# Patient Record
Sex: MALE | Race: BLACK OR AFRICAN AMERICAN | Employment: OTHER | URBAN - METROPOLITAN AREA
[De-identification: names, ages, dates, MRNs, and addresses within clinical notes are randomized per-mention and may not be internally consistent; named-entity substitution may affect disease eponyms.]

---

## 2017-03-28 ENCOUNTER — APPOINTMENT (OUTPATIENT)
Dept: GENERAL RADIOLOGY | Age: 72
DRG: 389 | End: 2017-03-28
Attending: EMERGENCY MEDICINE
Payer: MEDICARE

## 2017-03-28 ENCOUNTER — APPOINTMENT (OUTPATIENT)
Dept: CT IMAGING | Age: 72
DRG: 389 | End: 2017-03-28
Attending: EMERGENCY MEDICINE
Payer: MEDICARE

## 2017-03-28 ENCOUNTER — HOSPITAL ENCOUNTER (EMERGENCY)
Age: 72
Discharge: HOME | DRG: 389 | End: 2017-03-28
Attending: EMERGENCY MEDICINE | Admitting: EMERGENCY MEDICINE
Payer: MEDICARE

## 2017-03-28 VITALS
HEART RATE: 68 BPM | OXYGEN SATURATION: 100 % | BODY MASS INDEX: 18.51 KG/M2 | RESPIRATION RATE: 14 BRPM | TEMPERATURE: 98.5 F | WEIGHT: 125 LBS | HEIGHT: 69 IN | DIASTOLIC BLOOD PRESSURE: 90 MMHG | SYSTOLIC BLOOD PRESSURE: 147 MMHG

## 2017-03-28 VITALS
TEMPERATURE: 97.5 F | OXYGEN SATURATION: 99 % | HEART RATE: 74 BPM | RESPIRATION RATE: 17 BRPM | WEIGHT: 125 LBS | BODY MASS INDEX: 19.01 KG/M2 | SYSTOLIC BLOOD PRESSURE: 155 MMHG | DIASTOLIC BLOOD PRESSURE: 96 MMHG

## 2017-03-28 DIAGNOSIS — R11.2 NON-INTRACTABLE VOMITING WITH NAUSEA, UNSPECIFIED VOMITING TYPE: ICD-10-CM

## 2017-03-28 DIAGNOSIS — R10.9 ACUTE ABDOMINAL PAIN: Primary | ICD-10-CM

## 2017-03-28 DIAGNOSIS — I73.9 PVD (PERIPHERAL VASCULAR DISEASE) (HCC): ICD-10-CM

## 2017-03-28 DIAGNOSIS — R55 SYNCOPE, UNSPECIFIED SYNCOPE TYPE: Primary | ICD-10-CM
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
## 2017-03-28 DIAGNOSIS — I95.1 ORTHOSTATIC HYPOTENSION: ICD-10-CM

## 2017-03-28 DIAGNOSIS — R51.9 ACUTE NONINTRACTABLE HEADACHE, UNSPECIFIED HEADACHE TYPE: ICD-10-CM

## 2017-03-28 DIAGNOSIS — E87.1 HYPONATREMIA: ICD-10-CM

## 2017-03-28 DIAGNOSIS — E87.6 HYPOKALEMIA: ICD-10-CM
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
## 2017-03-28 LAB
ALBUMIN SERPL BCP-MCNC: 3.6 G/DL (ref 3.4–5)
ALBUMIN/GLOB SERPL: 1.1 {RATIO} (ref 0.8–1.7)
ALP SERPL-CCNC: 64 U/L (ref 45–117)
ALT SERPL-CCNC: 70 U/L (ref 16–61)
ANION GAP BLD CALC-SCNC: 11 MMOL/L (ref 3–18)
ANION GAP BLD CALC-SCNC: 9 MMOL/L (ref 3–18)
APPEARANCE UR: CLEAR
AST SERPL W P-5'-P-CCNC: 56 U/L (ref 15–37)
BASOPHILS # BLD AUTO: 0 K/UL (ref 0–0.06)
BASOPHILS # BLD AUTO: 0 K/UL (ref 0–0.06)
BASOPHILS # BLD: 0 % (ref 0–2)
BASOPHILS # BLD: 0 % (ref 0–2)
BILIRUB SERPL-MCNC: 0.3 MG/DL (ref 0.2–1)
BILIRUB UR QL: NEGATIVE
BUN SERPL-MCNC: 5 MG/DL (ref 7–18)
BUN SERPL-MCNC: 8 MG/DL (ref 7–18)
BUN/CREAT SERPL: 10 (ref 12–20)
BUN/CREAT SERPL: 7 (ref 12–20)
CALCIUM SERPL-MCNC: 8.5 MG/DL (ref 8.5–10.1)
CALCIUM SERPL-MCNC: 8.6 MG/DL (ref 8.5–10.1)
CHLORIDE SERPL-SCNC: 89 MMOL/L (ref 100–108)
CHLORIDE SERPL-SCNC: 92 MMOL/L (ref 100–108)
CK MB CFR SERPL CALC: 1.6 % (ref 0–4)
CK MB CFR SERPL CALC: 1.6 % (ref 0–4)
CK MB CFR SERPL CALC: 1.7 % (ref 0–4)
CK MB SERPL-MCNC: 2.5 NG/ML (ref 5–25)
CK MB SERPL-MCNC: 2.9 NG/ML (ref 5–25)
CK MB SERPL-MCNC: 2.9 NG/ML (ref 5–25)
CK SERPL-CCNC: 160 U/L (ref 39–308)
CK SERPL-CCNC: 166 U/L (ref 39–308)
CK SERPL-CCNC: 184 U/L (ref 39–308)
CO2 SERPL-SCNC: 28 MMOL/L (ref 21–32)
CO2 SERPL-SCNC: 29 MMOL/L (ref 21–32)
COLOR UR: YELLOW
CREAT SERPL-MCNC: 0.67 MG/DL (ref 0.6–1.3)
CREAT SERPL-MCNC: 0.84 MG/DL (ref 0.6–1.3)
DIFFERENTIAL METHOD BLD: ABNORMAL
DIFFERENTIAL METHOD BLD: ABNORMAL
EOSINOPHIL # BLD: 0 K/UL (ref 0–0.4)
EOSINOPHIL # BLD: 0 K/UL (ref 0–0.4)
EOSINOPHIL NFR BLD: 0 % (ref 0–5)
EOSINOPHIL NFR BLD: 0 % (ref 0–5)
ERYTHROCYTE [DISTWIDTH] IN BLOOD BY AUTOMATED COUNT: 13.7 % (ref 11.6–14.5)
ERYTHROCYTE [DISTWIDTH] IN BLOOD BY AUTOMATED COUNT: 13.8 % (ref 11.6–14.5)
GLOBULIN SER CALC-MCNC: 3.2 G/DL (ref 2–4)
GLUCOSE SERPL-MCNC: 127 MG/DL (ref 74–99)
GLUCOSE SERPL-MCNC: 146 MG/DL (ref 74–99)
GLUCOSE UR STRIP.AUTO-MCNC: NEGATIVE MG/DL
HCT VFR BLD AUTO: 39 % (ref 36–48)
HCT VFR BLD AUTO: 39.2 % (ref 36–48)
HGB BLD-MCNC: 13.7 G/DL (ref 13–16)
HGB BLD-MCNC: 13.9 G/DL (ref 13–16)
HGB UR QL STRIP: NEGATIVE
KETONES UR QL STRIP.AUTO: NEGATIVE MG/DL
LEUKOCYTE ESTERASE UR QL STRIP.AUTO: NEGATIVE
LIPASE SERPL-CCNC: 124 U/L (ref 73–393)
LYMPHOCYTES # BLD AUTO: 11 % (ref 21–52)
LYMPHOCYTES # BLD AUTO: 25 % (ref 21–52)
LYMPHOCYTES # BLD: 0.5 K/UL (ref 0.9–3.6)
LYMPHOCYTES # BLD: 1.1 K/UL (ref 0.9–3.6)
MAGNESIUM SERPL-MCNC: 2.4 MG/DL (ref 1.8–2.4)
MCH RBC QN AUTO: 31.6 PG (ref 24–34)
MCH RBC QN AUTO: 32.1 PG (ref 24–34)
MCHC RBC AUTO-ENTMCNC: 34.9 G/DL (ref 31–37)
MCHC RBC AUTO-ENTMCNC: 35.6 G/DL (ref 31–37)
MCV RBC AUTO: 90.1 FL (ref 74–97)
MCV RBC AUTO: 90.5 FL (ref 74–97)
MONOCYTES # BLD: 0.6 K/UL (ref 0.05–1.2)
MONOCYTES # BLD: 1 K/UL (ref 0.05–1.2)
MONOCYTES NFR BLD AUTO: 13 % (ref 3–10)
MONOCYTES NFR BLD AUTO: 21 % (ref 3–10)
NEUTS SEG # BLD: 2.5 K/UL (ref 1.8–8)
NEUTS SEG # BLD: 3.5 K/UL (ref 1.8–8)
NEUTS SEG NFR BLD AUTO: 54 % (ref 40–73)
NEUTS SEG NFR BLD AUTO: 76 % (ref 40–73)
NITRITE UR QL STRIP.AUTO: NEGATIVE
PH UR STRIP: 5.5 [PH] (ref 5–8)
PLATELET # BLD AUTO: 207 K/UL (ref 135–420)
PLATELET # BLD AUTO: 211 K/UL (ref 135–420)
PMV BLD AUTO: 9 FL (ref 9.2–11.8)
PMV BLD AUTO: 9 FL (ref 9.2–11.8)
POTASSIUM SERPL-SCNC: 2.8 MMOL/L (ref 3.5–5.5)
POTASSIUM SERPL-SCNC: 3.8 MMOL/L (ref 3.5–5.5)
PROT SERPL-MCNC: 6.8 G/DL (ref 6.4–8.2)
PROT UR STRIP-MCNC: NEGATIVE MG/DL
RBC # BLD AUTO: 4.33 M/UL (ref 4.7–5.5)
RBC # BLD AUTO: 4.33 M/UL (ref 4.7–5.5)
SODIUM SERPL-SCNC: 129 MMOL/L (ref 136–145)
SODIUM SERPL-SCNC: 129 MMOL/L (ref 136–145)
SP GR UR REFRACTOMETRY: 1.01 (ref 1–1.03)
TROPONIN I SERPL-MCNC: <0.02 NG/ML (ref 0–0.04)
UROBILINOGEN UR QL STRIP.AUTO: 0.2 EU/DL (ref 0.2–1)
WBC # BLD AUTO: 4.6 K/UL (ref 4.6–13.2)
WBC # BLD AUTO: 4.6 K/UL (ref 4.6–13.2)

## 2017-03-28 RX ORDER — POTASSIUM CHLORIDE 20 MEQ/1
40 TABLET, EXTENDED RELEASE ORAL DAILY
Qty: 9 TAB | Refills: 0 | Status: SHIPPED | OUTPATIENT
Start: 2017-03-28 | End: 2017-03-31

## 2017-03-28 RX ORDER — HYDROCODONE BITARTRATE AND ACETAMINOPHEN 5; 325 MG/1; MG/1
1 TABLET ORAL
Qty: 6 TAB | Refills: 0 | Status: SHIPPED | OUTPATIENT
Start: 2017-03-28 | End: 2017-10-20

## 2017-03-28 RX ORDER — ASPIRIN 81 MG/1
81 TABLET ORAL DAILY
Qty: 90 TAB | Refills: 3 | Status: SHIPPED | OUTPATIENT
Start: 2017-03-28 | End: 2019-06-11

## 2017-03-28 RX ORDER — ONDANSETRON 4 MG/1
4 TABLET, ORALLY DISINTEGRATING ORAL
Qty: 8 TAB | Refills: 0 | Status: SHIPPED | OUTPATIENT
Start: 2017-03-28 | End: 2017-10-20

## 2017-03-28 RX ORDER — ONDANSETRON 4 MG/1
4 TABLET, ORALLY DISINTEGRATING ORAL
Qty: 8 TAB | Refills: 0 | Status: SHIPPED | OUTPATIENT
Start: 2017-03-28 | End: 2017-03-28

## 2017-03-28 RX ORDER — ONDANSETRON 2 MG/ML
4 INJECTION INTRAMUSCULAR; INTRAVENOUS
Status: COMPLETED | OUTPATIENT
Start: 2017-03-28 | End: 2017-03-28

## 2017-03-28 RX ORDER — POTASSIUM CHLORIDE 7.45 MG/ML
10 INJECTION INTRAVENOUS
Status: COMPLETED | OUTPATIENT
Start: 2017-03-28 | End: 2017-03-28

## 2017-03-28 RX ORDER — POTASSIUM CHLORIDE 20 MEQ/1
40 TABLET, EXTENDED RELEASE ORAL
Status: COMPLETED | OUTPATIENT
Start: 2017-03-28 | End: 2017-03-28

## 2017-03-28 RX ORDER — SODIUM CHLORIDE 9 MG/ML
100 INJECTION, SOLUTION INTRAVENOUS
Status: DISCONTINUED | OUTPATIENT
Start: 2017-03-28 | End: 2017-03-29 | Stop reason: HOSPADM

## 2017-03-28 RX ORDER — HYDROMORPHONE HYDROCHLORIDE 1 MG/ML
0.5 INJECTION, SOLUTION INTRAMUSCULAR; INTRAVENOUS; SUBCUTANEOUS
Status: COMPLETED | OUTPATIENT
Start: 2017-03-28 | End: 2017-03-28

## 2017-03-28 RX ADMIN — IOPAMIDOL 96 ML: 612 INJECTION, SOLUTION INTRAVENOUS at 21:28

## 2017-03-28 RX ADMIN — POTASSIUM CHLORIDE 10 MEQ: 7.46 INJECTION, SOLUTION INTRAVENOUS at 12:35

## 2017-03-28 RX ADMIN — SODIUM CHLORIDE 2000 ML: 900 INJECTION, SOLUTION INTRAVENOUS at 11:14

## 2017-03-28 RX ADMIN — POTASSIUM CHLORIDE 40 MEQ: 1500 TABLET, FILM COATED, EXTENDED RELEASE ORAL at 11:13

## 2017-03-28 RX ADMIN — ONDANSETRON 4 MG: 2 INJECTION INTRAMUSCULAR; INTRAVENOUS at 20:33

## 2017-03-28 RX ADMIN — HYDROMORPHONE HYDROCHLORIDE 0.5 MG: 1 INJECTION, SOLUTION INTRAMUSCULAR; INTRAVENOUS; SUBCUTANEOUS at 20:32

## 2017-03-28 RX ADMIN — POTASSIUM CHLORIDE 10 MEQ: 7.46 INJECTION, SOLUTION INTRAVENOUS at 11:13
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
## 2017-03-28 NOTE — DISCHARGE INSTRUCTIONS
Electrolyte Imbalance: Care Instructions  Your Care Instructions  Electrolytes are minerals in your blood. They include sodium, potassium, calcium, and magnesium. When they are not at the right levels, you can feel very ill. You may not know what is causing it, but you know something is wrong. You may feel weak or numb, have muscle spasms, or twitch. Your heart may beat fast. Symptoms are different with each mineral. Too much is as bad as too little. Minerals help keep your body working as it should. Vomiting, diarrhea, and fever can cause you to lose minerals. A problem with your kidneys can tip a mineral out of balance. So can taking certain medicines. Your doctor may do more tests. He or she may change your medicine and diet. If you are low in one or more minerals, they may be given through a tube into your vein (IV). Your doctor may have you take or drink special fluids or pills. If your kidneys are failing, your blood may be filtered. This is called dialysis. It can put the minerals back in balance. Follow-up care is a key part of your treatment and safety. Be sure to make and go to all appointments, and call your doctor if you are having problems. It's also a good idea to know your test results and keep a list of the medicines you take. How can you care for yourself at home? · Take your medicines exactly as prescribed. Call your doctor if you have any problems with your medicines. You will get more details on the specific medicines your doctor prescribes. · Do not take any medicine without talking to your doctor first. This includes prescription, over-the-counter, and herbal medicines. · If you have kidney, heart, or liver disease and have to limit fluids, talk with your doctor before you increase the amount of fluids you drink. · Your doctor or dietitian may give you a diet plan to help balance your minerals. Follow the diet carefully. When should you call for help?   Call 911 anytime you think you may need emergency care. For example, call if:  · You passed out (lost consciousness). · Your heart seems to be speeding up and then slowing down or skipping beats. Call your doctor now or seek immediate medical care if:  · You are very tired and have no energy. · You have trouble thinking or concentrating. Watch closely for changes in your health, and be sure to contact your doctor if:  · You want advice on what to do to keep your minerals in balance. · You do not get better as expected. Where can you learn more? Go to http://vale-peng.info/. Enter F424 in the search box to learn more about \"Electrolyte Imbalance: Care Instructions. \"  Current as of: July 26, 2016  Content Version: 11.1  © 1727-4778 [employer]. Care instructions adapted under license by [employer] (which disclaims liability or warranty for this information). If you have questions about a medical condition or this instruction, always ask your healthcare professional. [place] [address] any warranty or liability for your use of this information. Headache: Care Instructions  Your Care Instructions    Headaches have many possible causes. Most headaches aren't a sign of a more serious problem, and they will get better on their own. Home treatment may help you feel better faster. The doctor has checked you carefully, but problems can develop later. If you notice any problems or new symptoms, get medical treatment right away. Follow-up care is a key part of your treatment and safety. Be sure to make and go to all appointments, and call your doctor if you are having problems. It's also a good idea to know your test results and keep a list of the medicines you take. How can you care for yourself at home? · Do not drive if you have taken a prescription pain medicine. · Rest in a quiet, dark room until your headache is gone. Close your eyes and try to relax or go to sleep. Don't watch TV or read. · Put a cold, moist cloth or cold pack on the painful area for 10 to 20 minutes at a time. Put a thin cloth between the cold pack and your skin. · Use a warm, moist towel or a heating pad set on low to relax tight shoulder and neck muscles. · Have someone gently massage your neck and shoulders. · Take pain medicines exactly as directed. ¨ If the doctor gave you a prescription medicine for pain, take it as prescribed. ¨ If you are not taking a prescription pain medicine, ask your doctor if you can take an over-the-counter medicine. · Be careful not to take pain medicine more often than the instructions allow, because you may get worse or more frequent headaches when the medicine wears off. · Do not ignore new symptoms that occur with a headache, such as a fever, weakness or numbness, vision changes, or confusion. These may be signs of a more serious problem. To prevent headaches  · Keep a headache diary so you can figure out what triggers your headaches. Avoiding triggers may help you prevent headaches. Record when each headache began, how long it lasted, and what the pain was like (throbbing, aching, stabbing, or dull). Write down any other symptoms you had with the headache, such as nausea, flashing lights or dark spots, or sensitivity to bright light or loud noise. Note if the headache occurred near your period. List anything that might have triggered the headache, such as certain foods (chocolate, cheese, wine) or odors, smoke, bright light, stress, or lack of sleep. · Find healthy ways to deal with stress. Headaches are most common during or right after stressful times. Take time to relax before and after you do something that has caused a headache in the past.  · Try to keep your muscles relaxed by keeping good posture. Check your jaw, face, neck, and shoulder muscles for tension, and try relaxing them.  When sitting at a desk, change positions often, and stretch for 30 seconds each hour. · Get plenty of sleep and exercise. · Eat regularly and well. Long periods without food can trigger a headache. · Treat yourself to a massage. Some people find that regular massages are very helpful in relieving tension. · Limit caffeine by not drinking too much coffee, tea, or soda. But don't quit caffeine suddenly, because that can also give you headaches. · Reduce eyestrain from computers by blinking frequently and looking away from the computer screen every so often. Make sure you have proper eyewear and that your monitor is set up properly, about an arm's length away. · Seek help if you have depression or anxiety. Your headaches may be linked to these conditions. Treatment can both prevent headaches and help with symptoms of anxiety or depression. When should you call for help? Call 911 anytime you think you may need emergency care. For example, call if:  · You have signs of a stroke. These may include:  ¨ Sudden numbness, paralysis, or weakness in your face, arm, or leg, especially on only one side of your body. ¨ Sudden vision changes. ¨ Sudden trouble speaking. ¨ Sudden confusion or trouble understanding simple statements. ¨ Sudden problems with walking or balance. ¨ A sudden, severe headache that is different from past headaches. Call your doctor now or seek immediate medical care if:  · You have a new or worse headache. · Your headache gets much worse. Where can you learn more? Go to http://vale-peng.info/. Enter M271 in the search box to learn more about \"Headache: Care Instructions. \"  Current as of: February 19, 2016  Content Version: 11.1  © 9707-4409 [employer]. Care instructions adapted under license by [employer] (which disclaims liability or warranty for this information).  If you have questions about a medical condition or this instruction, always ask your healthcare professional. [place] [address] any warranty or liability for your use of this information.
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
## 2017-03-28 NOTE — ED NOTES
While walking back to ER room pt was noted to be unsteady at points in the walk, pt's wife states this is new.

## 2017-03-28 NOTE — ED NOTES
Pt hourly rounding competed. Safety   Pt () resting on stretcher with side rails up and call bell in reach. () in chair    () in parents arms. Toileting   Pt offered ()Bedpan     ()Assistance to Restroom     ()Urinal  Ongoing Updates  Updated on plan of care and status of test results.   Pain Management  Inquired as to comfort and offered comfort measures:    (x) warm blankets   () dimmed lights

## 2017-03-28 NOTE — LETTER
NOTIFICATION RETURN TO WORK / SCHOOL 
 
3/28/2017 11:24 PM 
 
Mr. Gwen Kumar [address] [place] [phone] To Whom It May Concern: Gwen Kumar is currently under the care of [facility] EMERGENCY DEPT. He will return to work/school on: 3/31/17 If there are questions or concerns please have the patient contact our office. Sincerely, Lamonte Chaparro MD

## 2017-03-28 NOTE — LETTER
NOTIFICATION RETURN TO WORK / SCHOOL 
 
3/28/2017 1:42 PM 
 
Mr. Harvey Nicole [address] [place] [phone] To Whom It May Concern: Harvey Nicole is currently under the care of [address] [place] [address] EMERGENCY DEPT. He will return to work/school on: 3/30/17. If there are questions or concerns please have the patient contact our office.  
 
 
 
Sincerely, 
 
 
 
 
Caity Nickerson PA-C

## 2017-03-29 LAB
ATRIAL RATE: 72 BPM
ATRIAL RATE: 88 BPM
CALCULATED P AXIS, ECG09: 34 DEGREES
CALCULATED P AXIS, ECG09: 69 DEGREES
CALCULATED R AXIS, ECG10: 29 DEGREES
CALCULATED R AXIS, ECG10: 63 DEGREES
CALCULATED T AXIS, ECG11: 52 DEGREES
CALCULATED T AXIS, ECG11: 70 DEGREES
DIAGNOSIS, 93000: NORMAL
DIAGNOSIS, 93000: NORMAL
P-R INTERVAL, ECG05: 142 MS
P-R INTERVAL, ECG05: 156 MS
Q-T INTERVAL, ECG07: 386 MS
Q-T INTERVAL, ECG07: 426 MS
QRS DURATION, ECG06: 86 MS
QRS DURATION, ECG06: 88 MS
QTC CALCULATION (BEZET), ECG08: 466 MS
QTC CALCULATION (BEZET), ECG08: 467 MS
VENTRICULAR RATE, ECG03: 72 BPM
VENTRICULAR RATE, ECG03: 88 BPM

## 2017-03-29 PROCEDURE — 99285 EMERGENCY DEPT VISIT HI MDM: CPT

## 2017-03-29 PROCEDURE — 96375 TX/PRO/DX INJ NEW DRUG ADDON: CPT

## 2017-03-29 PROCEDURE — 96374 THER/PROPH/DIAG INJ IV PUSH: CPT

## 2017-03-29 PROCEDURE — 96361 HYDRATE IV INFUSION ADD-ON: CPT

## 2017-03-29 PROCEDURE — 94640 AIRWAY INHALATION TREATMENT: CPT
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
## 2017-03-29 NOTE — ED TRIAGE NOTES
Patient states he developed abdominal pain and N/V after leaving ED this afternoon. States pain increased with standing or lying down.

## 2017-03-30 ENCOUNTER — APPOINTMENT (OUTPATIENT)
Dept: CT IMAGING | Age: 72
DRG: 389 | End: 2017-03-30
Attending: EMERGENCY MEDICINE
Payer: MEDICARE

## 2017-03-30 ENCOUNTER — HOSPITAL ENCOUNTER (INPATIENT)
Age: 72
LOS: 3 days | Discharge: LEFT BEFORE DISCHARGE | DRG: 389 | End: 2017-04-02
Attending: EMERGENCY MEDICINE | Admitting: FAMILY MEDICINE
Payer: MEDICARE

## 2017-03-30 DIAGNOSIS — J20.9 ACUTE BRONCHITIS, UNSPECIFIED ORGANISM: ICD-10-CM

## 2017-03-30 DIAGNOSIS — K56.609 SBO (SMALL BOWEL OBSTRUCTION) (HCC): Primary | ICD-10-CM

## 2017-03-30 DIAGNOSIS — S39.91XD BLUNT ABDOMINAL TRAUMA, SUBSEQUENT ENCOUNTER: ICD-10-CM

## 2017-03-30 PROBLEM — I10 HTN (HYPERTENSION): Status: ACTIVE | Noted: 2017-03-30

## 2017-03-30 PROBLEM — J06.9 URI (UPPER RESPIRATORY INFECTION): Status: ACTIVE | Noted: 2017-03-30

## 2017-03-30 LAB
ALBUMIN SERPL BCP-MCNC: 3.7 G/DL (ref 3.4–5)
ALBUMIN/GLOB SERPL: 1.1 {RATIO} (ref 0.8–1.7)
ALP SERPL-CCNC: 68 U/L (ref 45–117)
ALT SERPL-CCNC: 55 U/L (ref 16–61)
ANION GAP BLD CALC-SCNC: 10 MMOL/L (ref 3–18)
APPEARANCE UR: CLEAR
AST SERPL W P-5'-P-CCNC: 27 U/L (ref 15–37)
BASOPHILS # BLD AUTO: 0 K/UL (ref 0–0.06)
BASOPHILS # BLD: 0 % (ref 0–2)
BILIRUB DIRECT SERPL-MCNC: 0.2 MG/DL (ref 0–0.2)
BILIRUB SERPL-MCNC: 0.5 MG/DL (ref 0.2–1)
BILIRUB UR QL: NEGATIVE
BUN SERPL-MCNC: 6 MG/DL (ref 7–18)
BUN/CREAT SERPL: 7 (ref 12–20)
CALCIUM SERPL-MCNC: 9.3 MG/DL (ref 8.5–10.1)
CHLORIDE SERPL-SCNC: 90 MMOL/L (ref 100–108)
CO2 SERPL-SCNC: 30 MMOL/L (ref 21–32)
COLOR UR: YELLOW
CREAT SERPL-MCNC: 0.81 MG/DL (ref 0.6–1.3)
DIFFERENTIAL METHOD BLD: ABNORMAL
EOSINOPHIL # BLD: 0 K/UL (ref 0–0.4)
EOSINOPHIL NFR BLD: 0 % (ref 0–5)
ERYTHROCYTE [DISTWIDTH] IN BLOOD BY AUTOMATED COUNT: 13.9 % (ref 11.6–14.5)
GLOBULIN SER CALC-MCNC: 3.5 G/DL (ref 2–4)
GLUCOSE SERPL-MCNC: 154 MG/DL (ref 74–99)
GLUCOSE UR STRIP.AUTO-MCNC: NEGATIVE MG/DL
HCT VFR BLD AUTO: 42.1 % (ref 36–48)
HGB BLD-MCNC: 14.6 G/DL (ref 13–16)
HGB UR QL STRIP: NEGATIVE
KETONES UR QL STRIP.AUTO: ABNORMAL MG/DL
LACTATE BLD-SCNC: 1.6 MMOL/L (ref 0.4–2)
LEUKOCYTE ESTERASE UR QL STRIP.AUTO: NEGATIVE
LIPASE SERPL-CCNC: 74 U/L (ref 73–393)
LYMPHOCYTES # BLD AUTO: 5 % (ref 21–52)
LYMPHOCYTES # BLD: 0.5 K/UL (ref 0.9–3.6)
MCH RBC QN AUTO: 31.5 PG (ref 24–34)
MCHC RBC AUTO-ENTMCNC: 34.7 G/DL (ref 31–37)
MCV RBC AUTO: 90.9 FL (ref 74–97)
MONOCYTES # BLD: 1 K/UL (ref 0.05–1.2)
MONOCYTES NFR BLD AUTO: 10 % (ref 3–10)
NEUTS SEG # BLD: 8.1 K/UL (ref 1.8–8)
NEUTS SEG NFR BLD AUTO: 85 % (ref 40–73)
NITRITE UR QL STRIP.AUTO: NEGATIVE
PH UR STRIP: 6 [PH] (ref 5–8)
PLATELET # BLD AUTO: 219 K/UL (ref 135–420)
PMV BLD AUTO: 9.2 FL (ref 9.2–11.8)
POTASSIUM SERPL-SCNC: 3.3 MMOL/L (ref 3.5–5.5)
PROT SERPL-MCNC: 7.2 G/DL (ref 6.4–8.2)
PROT UR STRIP-MCNC: NEGATIVE MG/DL
RBC # BLD AUTO: 4.63 M/UL (ref 4.7–5.5)
SODIUM SERPL-SCNC: 130 MMOL/L (ref 136–145)
SP GR UR REFRACTOMETRY: 1.03 (ref 1–1.03)
UROBILINOGEN UR QL STRIP.AUTO: 1 EU/DL (ref 0.2–1)
WBC # BLD AUTO: 9.6 K/UL (ref 4.6–13.2)

## 2017-03-30 PROCEDURE — 81003 URINALYSIS AUTO W/O SCOPE: CPT | Performed by: EMERGENCY MEDICINE

## 2017-03-30 PROCEDURE — 74011000250 HC RX REV CODE- 250: Performed by: FAMILY MEDICINE

## 2017-03-30 PROCEDURE — 85025 COMPLETE CBC W/AUTO DIFF WBC: CPT | Performed by: EMERGENCY MEDICINE

## 2017-03-30 PROCEDURE — 80048 BASIC METABOLIC PNL TOTAL CA: CPT | Performed by: EMERGENCY MEDICINE

## 2017-03-30 PROCEDURE — 74011250637 HC RX REV CODE- 250/637: Performed by: EMERGENCY MEDICINE

## 2017-03-30 PROCEDURE — 74011250637 HC RX REV CODE- 250/637

## 2017-03-30 PROCEDURE — 83690 ASSAY OF LIPASE: CPT | Performed by: EMERGENCY MEDICINE

## 2017-03-30 PROCEDURE — 74011250636 HC RX REV CODE- 250/636: Performed by: FAMILY MEDICINE

## 2017-03-30 PROCEDURE — 80076 HEPATIC FUNCTION PANEL: CPT | Performed by: EMERGENCY MEDICINE

## 2017-03-30 PROCEDURE — 74011636320 HC RX REV CODE- 636/320: Performed by: EMERGENCY MEDICINE

## 2017-03-30 PROCEDURE — 83605 ASSAY OF LACTIC ACID: CPT

## 2017-03-30 PROCEDURE — 74011000250 HC RX REV CODE- 250: Performed by: EMERGENCY MEDICINE

## 2017-03-30 PROCEDURE — 77030008771 HC TU NG SALEM SUMP -A

## 2017-03-30 PROCEDURE — 74011250636 HC RX REV CODE- 250/636

## 2017-03-30 PROCEDURE — 74177 CT ABD & PELVIS W/CONTRAST: CPT

## 2017-03-30 PROCEDURE — 0D9670Z DRAINAGE OF STOMACH WITH DRAINAGE DEVICE, VIA NATURAL OR ARTIFICIAL OPENING: ICD-10-PCS | Performed by: INTERNAL MEDICINE

## 2017-03-30 PROCEDURE — 94640 AIRWAY INHALATION TREATMENT: CPT

## 2017-03-30 PROCEDURE — 74011250636 HC RX REV CODE- 250/636: Performed by: EMERGENCY MEDICINE

## 2017-03-30 PROCEDURE — 74011000250 HC RX REV CODE- 250

## 2017-03-30 PROCEDURE — 65270000029 HC RM PRIVATE

## 2017-03-30 RX ORDER — HEPARIN SODIUM 5000 [USP'U]/ML
5000 INJECTION, SOLUTION INTRAVENOUS; SUBCUTANEOUS EVERY 8 HOURS
Status: DISCONTINUED | OUTPATIENT
Start: 2017-03-30 | End: 2017-04-02 | Stop reason: HOSPADM

## 2017-03-30 RX ORDER — HYDRALAZINE HYDROCHLORIDE 20 MG/ML
10 INJECTION INTRAMUSCULAR; INTRAVENOUS
Status: DISCONTINUED | OUTPATIENT
Start: 2017-03-30 | End: 2017-03-31

## 2017-03-30 RX ORDER — DEXTROSE, SODIUM CHLORIDE, AND POTASSIUM CHLORIDE 5; .45; .15 G/100ML; G/100ML; G/100ML
125 INJECTION INTRAVENOUS
Status: COMPLETED | OUTPATIENT
Start: 2017-03-30 | End: 2017-03-30

## 2017-03-30 RX ORDER — MORPHINE SULFATE 10 MG/ML
4 INJECTION, SOLUTION INTRAMUSCULAR; INTRAVENOUS
Status: DISCONTINUED | OUTPATIENT
Start: 2017-03-30 | End: 2017-03-31

## 2017-03-30 RX ORDER — ALBUTEROL SULFATE 2.5 MG/.5ML
2.5 SOLUTION RESPIRATORY (INHALATION)
Status: DISCONTINUED | OUTPATIENT
Start: 2017-03-30 | End: 2017-04-02 | Stop reason: HOSPADM

## 2017-03-30 RX ORDER — OXYMETAZOLINE HCL 0.05 %
3 SPRAY, NON-AEROSOL (ML) NASAL
Status: COMPLETED | OUTPATIENT
Start: 2017-03-30 | End: 2017-03-30

## 2017-03-30 RX ORDER — MORPHINE SULFATE 2 MG/ML
INJECTION, SOLUTION INTRAMUSCULAR; INTRAVENOUS
Status: COMPLETED
Start: 2017-03-30 | End: 2017-03-30

## 2017-03-30 RX ORDER — DEXAMETHASONE SODIUM PHOSPHATE 4 MG/ML
10 INJECTION, SOLUTION INTRA-ARTICULAR; INTRALESIONAL; INTRAMUSCULAR; INTRAVENOUS; SOFT TISSUE
Status: COMPLETED | OUTPATIENT
Start: 2017-03-30 | End: 2017-03-30

## 2017-03-30 RX ORDER — IPRATROPIUM BROMIDE AND ALBUTEROL SULFATE 2.5; .5 MG/3ML; MG/3ML
3 SOLUTION RESPIRATORY (INHALATION)
Status: COMPLETED | OUTPATIENT
Start: 2017-03-30 | End: 2017-03-30

## 2017-03-30 RX ORDER — NALOXONE HYDROCHLORIDE 0.4 MG/ML
0.4 INJECTION, SOLUTION INTRAMUSCULAR; INTRAVENOUS; SUBCUTANEOUS AS NEEDED
Status: DISCONTINUED | OUTPATIENT
Start: 2017-03-30 | End: 2017-04-02 | Stop reason: HOSPADM

## 2017-03-30 RX ORDER — ONDANSETRON 2 MG/ML
4 INJECTION INTRAMUSCULAR; INTRAVENOUS
Status: DISCONTINUED | OUTPATIENT
Start: 2017-03-30 | End: 2017-04-02 | Stop reason: HOSPADM

## 2017-03-30 RX ORDER — LEVOFLOXACIN 5 MG/ML
500 INJECTION, SOLUTION INTRAVENOUS EVERY 24 HOURS
Status: DISCONTINUED | OUTPATIENT
Start: 2017-03-31 | End: 2017-04-02 | Stop reason: HOSPADM

## 2017-03-30 RX ORDER — ALBUTEROL SULFATE 2.5 MG/.5ML
SOLUTION RESPIRATORY (INHALATION)
Status: COMPLETED
Start: 2017-03-30 | End: 2017-03-30

## 2017-03-30 RX ORDER — MORPHINE SULFATE 2 MG/ML
2 INJECTION, SOLUTION INTRAMUSCULAR; INTRAVENOUS
Status: COMPLETED | OUTPATIENT
Start: 2017-03-30 | End: 2017-03-30

## 2017-03-30 RX ORDER — LEVOFLOXACIN 500 MG/1
500 TABLET, FILM COATED ORAL
Status: COMPLETED | OUTPATIENT
Start: 2017-03-30 | End: 2017-03-30

## 2017-03-30 RX ORDER — SODIUM CHLORIDE AND POTASSIUM CHLORIDE .9; .15 G/100ML; G/100ML
SOLUTION INTRAVENOUS CONTINUOUS
Status: DISCONTINUED | OUTPATIENT
Start: 2017-03-30 | End: 2017-04-02 | Stop reason: HOSPADM

## 2017-03-30 RX ORDER — LEVOFLOXACIN 500 MG/1
TABLET, FILM COATED ORAL
Status: COMPLETED
Start: 2017-03-30 | End: 2017-03-30

## 2017-03-30 RX ORDER — LIDOCAINE HYDROCHLORIDE 20 MG/ML
JELLY TOPICAL AS NEEDED
Status: DISCONTINUED | OUTPATIENT
Start: 2017-03-30 | End: 2017-04-02 | Stop reason: HOSPADM

## 2017-03-30 RX ADMIN — ALBUTEROL SULFATE 2.5 MG: 2.5 SOLUTION RESPIRATORY (INHALATION) at 21:58

## 2017-03-30 RX ADMIN — SODIUM CHLORIDE 1000 ML: 900 INJECTION, SOLUTION INTRAVENOUS at 01:38

## 2017-03-30 RX ADMIN — DEXTROSE MONOHYDRATE, SODIUM CHLORIDE, AND POTASSIUM CHLORIDE 125 ML/HR: 50; 4.5; 1.49 INJECTION, SOLUTION INTRAVENOUS at 06:27

## 2017-03-30 RX ADMIN — IOPAMIDOL 100 ML: 612 INJECTION, SOLUTION INTRAVENOUS at 04:45

## 2017-03-30 RX ADMIN — IOHEXOL 50 ML: 240 INJECTION, SOLUTION INTRATHECAL; INTRAVASCULAR; INTRAVENOUS; ORAL at 03:00

## 2017-03-30 RX ADMIN — MORPHINE SULFATE 4 MG: 10 INJECTION INTRAMUSCULAR; INTRAVENOUS; SUBCUTANEOUS at 20:04

## 2017-03-30 RX ADMIN — TOPICAL ANESTHETIC 200 SPRAY: 200 SPRAY DENTAL; PERIODONTAL at 06:26

## 2017-03-30 RX ADMIN — MORPHINE SULFATE 2 MG: 2 INJECTION, SOLUTION INTRAMUSCULAR; INTRAVENOUS at 01:46

## 2017-03-30 RX ADMIN — LEVOFLOXACIN 500 MG: 500 TABLET, FILM COATED ORAL at 01:39

## 2017-03-30 RX ADMIN — DEXAMETHASONE SODIUM PHOSPHATE 10 MG: 4 INJECTION, SOLUTION INTRAMUSCULAR; INTRAVENOUS at 01:41

## 2017-03-30 RX ADMIN — TOPICAL ANESTHETIC 200 SPRAY: 200 SPRAY DENTAL; PERIODONTAL at 06:39

## 2017-03-30 RX ADMIN — HEPARIN SODIUM 5000 UNITS: 5000 INJECTION, SOLUTION INTRAVENOUS; SUBCUTANEOUS at 10:40

## 2017-03-30 RX ADMIN — ALBUTEROL SULFATE 2.5 MG: 2.5 SOLUTION RESPIRATORY (INHALATION) at 01:44

## 2017-03-30 RX ADMIN — PROMETHAZINE HYDROCHLORIDE 25 MG: 25 INJECTION, SOLUTION INTRAMUSCULAR; INTRAVENOUS at 06:38

## 2017-03-30 RX ADMIN — OXYMETAZOLINE HYDROCHLORIDE 3 SPRAY: 5 SPRAY NASAL at 05:40

## 2017-03-30 RX ADMIN — LIDOCAINE HYDROCHLORIDE 5 ML: 20 JELLY TOPICAL at 06:26

## 2017-03-30 RX ADMIN — SODIUM CHLORIDE AND POTASSIUM CHLORIDE: 9; 1.49 INJECTION, SOLUTION INTRAVENOUS at 10:42

## 2017-03-30 RX ADMIN — IPRATROPIUM BROMIDE AND ALBUTEROL SULFATE 3 ML: .5; 3 SOLUTION RESPIRATORY (INHALATION) at 02:11

## 2017-03-30 NOTE — ED TRIAGE NOTES
Patient with continued abdominal pain. Evaluated for same symptoms yesterday. No improvement in pain.

## 2017-03-30 NOTE — PROGRESS NOTES
5200 Assumed care for the pt.; Assessment done. 1030 Pt in bed; asleep; awaken; denies any complaints; call bell within reach; bed on the lowest position. 1203 Pt visitor in the room. 1400 Reassessment; no changes noted. 1630 Pt in bed; resting denies any complaints; safety measures maintained. 1900 Pt in bed; wife at the bedside; watching TV; NAD noted. Bedside shift change report given to Naa Arroyo (oncoming nurse) by Derek Burk (offgoing nurse). Report included the following information SBAR, Kardex and MAR.
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
## 2017-03-30 NOTE — ED NOTES
14 Serbian NG tube inserted right nares. Patient tolerates fair. Immediate return of gastric secretions and positive air bolus auscultated.
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
## 2017-03-30 NOTE — PROGRESS NOTES
Epifanio   Discharge Planning/ Assessment    Reasons for Intervention: Interviewed patient, he agrees to share his discharge information with his wife, see below. He was independent prior to admission and see Dr Guardado for his primary care needs. He has VA Medicare and generic Commercial insurance. His discharge plan is to retuen home.      High Risk Criteria  [x] Yes  []No   Physician Referral  [] Yes  [x]No        Date    Nursing Referral  [] Yes  [x]No        Date    Patient/Family Request  [] Yes  [x]No        Date       Resources:    Medicare  [x] Yes  []No   Medicaid  [] Yes  [x]No   No Resources  [] Yes  [x]No   Private Insurance  [x] Yes  []No    Name/Phone Number    Other  [] Yes  [x]No        (i.e. Workman's Comp)         Prior Services:    Prior Services  [] Yes  [x]No   Home Health  [] Yes  [x]No   [address] [place]  [] Yes  [x]No        Number of [address]  [] Yes  [x]No       Meals on Wheels  [] Yes  [x]No   Office on Aging  [] Yes  [x]No   Transportation Services  [] Yes  [x]No   Nursing Home  [] Yes  [x]No        Nursing Home Name    [address]  [] Yes  [x]No        [address] Name    Other       Information Source:      Information obtained from  [x] Patient  [] Parent   [] [address]  [] Child  [] Spouse   [] Significant Other/Partner   [] Friend      [] EMS    [] Nursing Home Chart          [x] Other: chart   Chart Review  [x] Yes  []No     Family/Support System:    Patient lives with  [] Alone    [x] Spouse   [] Significant Other  [] Children  [] Caretaker   [] Parent  [] Sibling     [] Other       Other Support System:    Is the patient responsible for care of others  [] Yes  [x]No   Information of person caring for patient on  discharge self   Managers financial affairs independently  [x] Yes  []No   If no, explain:      Status Prior to Admission:    Mental Status  [x] Awake  [x] Alert  [x] Oriented  [] Quiet/Calm [] Lethargic/Sedated   [] Disoriented  [] Restless/Anxious  [] Combative   Personal Care  [] Dependent  [x] Independent Personal Care  [] Requires Assistance   Meal Preparation Ability  [x] Independent   [] Standby Assistance   [] Minimal Assistance   [] Moderate Assistance  [] Maximum Assistance     [] Total Assistance   Chores  [x] Independent with Chores   [] N/A Nursing Home Resident   [] Requires Assistance   Bowel/Bladder  [x] Continent  [] Catheter  [] Incontinent  [] Ostomy Self-Care    [] Urine Diversion Self-Care  [] Maximum Assistance     [] Total Assistance   Number of Persons needed for assistance    DME at home  [] [employer]  [] [employer], Straight   [] Commode    [] Bathroom/Grab Bars  [] Hospital Bed  [] Nebulizer  [] Oxygen           [] Raised Toilet Seat  [] Shower Chair  [] Side Rails for Bed   [] Tub Transfer Bench   [] Parish Ing  [] Trey Man      [] Other:   Vendor      Treatment Presently Receiving:    Current Treatments  [] Chemotherapy  [] Dialysis  [] Insulin  [] IVAB [x] IVF   [] O2  [] PCA   [] PT   [] RT   [] Tube Feedings   [] Wound Care     Psychosocial Evaluation:    Verbalized Knowledge of Disease Process  [x] Patient  [x]Family   Coping with Disease Process  [x] Patient  [x]Family   Requires Further Counseling Coping with Disease Process  [] Patient  []Family     Identified Projected Needs:    Home Health Aid  [] Yes  [x]No   Transportation  [] Yes  [x]No   Education  [] Yes  [x]No        Specific Education     Financial Counseling  [] Yes  [x]No   Inability to Care for Self/Will Require 24 hour care  [] Yes  [x]No   Pain Management  [] Yes  [x]No   Home Infusion Therapy  [] Yes  [x]No   Oxygen Therapy  [] Yes  [x]No   DME  [] Yes  [x]No   Long Term Care Placement  [] Yes  [x]No   Rehab  [] Yes  [x]No   Physical Therapy  [] Yes  [x]No   Needs Anticipated At This Time  [] Yes  [x]No     Intra-Hospital Referral:    [facility]  [] Yes  [x]No    [] Yes  [x]No   Patient Representative  [] Yes  [x]No   Staff for Teaching Needs  [] Yes  [x]No   Specialty Teaching Needs     Diabetic Educator  [] Yes  [x]No   Referral for Diabetic Educator Needed  [] Yes  [x]No  If Yes, place order for Nutritionist or Diabetic Consult     Tentative Discharge Plan:    Home with No Services  [x] Yes  []No   Home with [address]  [] Yes  [x]No        If Yes, specify type    Home Care Program  [] Yes  [x]No        If Yes, specify type    Meals on Wheels  [] Yes  [x]No   Office of Aging  [] Yes  [x]No   NHP  [] Yes  [x]No   Return to the Nursing Home  [] Yes  [x]No   Rehab Therapy  [] Yes  [x]No   Acute Rehab  [] Yes  [x]No   Subacute Rehab  [] Yes  [x]No   Private Care  [] Yes  [x]No   Substance Abuse Referral  [] Yes  [x]No   Transportation  [] Yes  [x]No   Chore Service  [] Yes  [x]No   Inpatient Hospice  [] Yes  [x]No   OP RT  [] Yes  [x] No   OP Hemo  [] Yes  [x] No   OP PT  [] Yes  [x]No   Support Group  [] Yes  [x]No   Reach to Recovery  [] Yes  [x]No   OP Oncology Clinic  [] Yes  [x]No   Clinic Appointment  [] Yes  [x]No   DME  [] Yes  [x]No   Comments    Name of D/C Planner or  Given to Patient or Family Ladona Babinski, RN   Phone Number [phone]   Date March 30, 2017   Time 800 am   If you are discharged home, whom do you designate to participate in your discharge plan and receive any information needed?      Enter name of ava Díaz  spouse        Phone # of ava         Address of [address]        Updated March 30, 2017        Patient refused to designate any           individual
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
## 2017-03-30 NOTE — PROGRESS NOTES
Patient has designated his wife to participate in his/her discharge plan and to receive any needed information.      Name:   Marlene Lerma  spouse   [phone]   [facility] [address]   March 30, 2017     Address:  Phone number:
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
## 2017-03-31 ENCOUNTER — APPOINTMENT (OUTPATIENT)
Dept: GENERAL RADIOLOGY | Age: 72
DRG: 389 | End: 2017-03-31
Attending: NURSE PRACTITIONER
Payer: MEDICARE

## 2017-03-31 LAB
ANION GAP BLD CALC-SCNC: 9 MMOL/L (ref 3–18)
BASOPHILS # BLD AUTO: 0 K/UL (ref 0–0.06)
BASOPHILS # BLD: 0 % (ref 0–2)
BUN SERPL-MCNC: 8 MG/DL (ref 7–18)
BUN/CREAT SERPL: 11 (ref 12–20)
CALCIUM SERPL-MCNC: 8.6 MG/DL (ref 8.5–10.1)
CHLORIDE SERPL-SCNC: 95 MMOL/L (ref 100–108)
CO2 SERPL-SCNC: 33 MMOL/L (ref 21–32)
CREAT SERPL-MCNC: 0.76 MG/DL (ref 0.6–1.3)
DIFFERENTIAL METHOD BLD: ABNORMAL
EOSINOPHIL # BLD: 0 K/UL (ref 0–0.4)
EOSINOPHIL NFR BLD: 0 % (ref 0–5)
ERYTHROCYTE [DISTWIDTH] IN BLOOD BY AUTOMATED COUNT: 14.5 % (ref 11.6–14.5)
GLUCOSE SERPL-MCNC: 103 MG/DL (ref 74–99)
HCT VFR BLD AUTO: 42.7 % (ref 36–48)
HGB BLD-MCNC: 14.8 G/DL (ref 13–16)
INR PPP: 1.1 (ref 0.8–1.2)
LYMPHOCYTES # BLD AUTO: 9 % (ref 21–52)
LYMPHOCYTES # BLD: 0.8 K/UL (ref 0.9–3.6)
MCH RBC QN AUTO: 32 PG (ref 24–34)
MCHC RBC AUTO-ENTMCNC: 34.7 G/DL (ref 31–37)
MCV RBC AUTO: 92.4 FL (ref 74–97)
MONOCYTES # BLD: 1.5 K/UL (ref 0.05–1.2)
MONOCYTES NFR BLD AUTO: 17 % (ref 3–10)
NEUTS SEG # BLD: 6.4 K/UL (ref 1.8–8)
NEUTS SEG NFR BLD AUTO: 74 % (ref 40–73)
PLATELET # BLD AUTO: 252 K/UL (ref 135–420)
PMV BLD AUTO: 10.1 FL (ref 9.2–11.8)
POTASSIUM SERPL-SCNC: 4.1 MMOL/L (ref 3.5–5.5)
PROTHROMBIN TIME: 13.9 SEC (ref 11.5–15.2)
RBC # BLD AUTO: 4.62 M/UL (ref 4.7–5.5)
SODIUM SERPL-SCNC: 137 MMOL/L (ref 136–145)
TSH SERPL DL<=0.05 MIU/L-ACNC: 1.43 UIU/ML (ref 0.36–3.74)
WBC # BLD AUTO: 8.7 K/UL (ref 4.6–13.2)

## 2017-03-31 PROCEDURE — 36415 COLL VENOUS BLD VENIPUNCTURE: CPT | Performed by: FAMILY MEDICINE

## 2017-03-31 PROCEDURE — 84443 ASSAY THYROID STIM HORMONE: CPT | Performed by: FAMILY MEDICINE

## 2017-03-31 PROCEDURE — 74011250636 HC RX REV CODE- 250/636: Performed by: FAMILY MEDICINE

## 2017-03-31 PROCEDURE — 74011000250 HC RX REV CODE- 250: Performed by: FAMILY MEDICINE

## 2017-03-31 PROCEDURE — 74011250636 HC RX REV CODE- 250/636: Performed by: INTERNAL MEDICINE

## 2017-03-31 PROCEDURE — 94640 AIRWAY INHALATION TREATMENT: CPT

## 2017-03-31 PROCEDURE — 74020 XR ABD FLAT/ ERECT: CPT

## 2017-03-31 PROCEDURE — 85610 PROTHROMBIN TIME: CPT | Performed by: FAMILY MEDICINE

## 2017-03-31 PROCEDURE — 65270000029 HC RM PRIVATE

## 2017-03-31 PROCEDURE — 74011250636 HC RX REV CODE- 250/636: Performed by: SPECIALIST

## 2017-03-31 PROCEDURE — 85025 COMPLETE CBC W/AUTO DIFF WBC: CPT | Performed by: FAMILY MEDICINE

## 2017-03-31 PROCEDURE — 93306 TTE W/DOPPLER COMPLETE: CPT

## 2017-03-31 PROCEDURE — 80048 BASIC METABOLIC PNL TOTAL CA: CPT | Performed by: FAMILY MEDICINE

## 2017-03-31 RX ORDER — MORPHINE SULFATE 10 MG/ML
4 INJECTION, SOLUTION INTRAMUSCULAR; INTRAVENOUS
Status: DISCONTINUED | OUTPATIENT
Start: 2017-03-31 | End: 2017-04-02 | Stop reason: HOSPADM

## 2017-03-31 RX ORDER — HYDRALAZINE HYDROCHLORIDE 20 MG/ML
20 INJECTION INTRAMUSCULAR; INTRAVENOUS
Status: DISCONTINUED | OUTPATIENT
Start: 2017-03-31 | End: 2017-04-02 | Stop reason: HOSPADM

## 2017-03-31 RX ADMIN — HEPARIN SODIUM 5000 UNITS: 5000 INJECTION, SOLUTION INTRAVENOUS; SUBCUTANEOUS at 23:17

## 2017-03-31 RX ADMIN — HEPARIN SODIUM 5000 UNITS: 5000 INJECTION, SOLUTION INTRAVENOUS; SUBCUTANEOUS at 00:39

## 2017-03-31 RX ADMIN — MORPHINE SULFATE 4 MG: 10 INJECTION INTRAMUSCULAR; INTRAVENOUS; SUBCUTANEOUS at 23:15

## 2017-03-31 RX ADMIN — SODIUM CHLORIDE AND POTASSIUM CHLORIDE: 9; 1.49 INJECTION, SOLUTION INTRAVENOUS at 23:23

## 2017-03-31 RX ADMIN — SODIUM CHLORIDE AND POTASSIUM CHLORIDE: 9; 1.49 INJECTION, SOLUTION INTRAVENOUS at 04:31

## 2017-03-31 RX ADMIN — HEPARIN SODIUM 5000 UNITS: 5000 INJECTION, SOLUTION INTRAVENOUS; SUBCUTANEOUS at 08:46

## 2017-03-31 RX ADMIN — HEPARIN SODIUM 5000 UNITS: 5000 INJECTION, SOLUTION INTRAVENOUS; SUBCUTANEOUS at 15:47

## 2017-03-31 RX ADMIN — MORPHINE SULFATE 4 MG: 10 INJECTION INTRAMUSCULAR; INTRAVENOUS; SUBCUTANEOUS at 13:29

## 2017-03-31 RX ADMIN — ALBUTEROL SULFATE 2.5 MG: 2.5 SOLUTION RESPIRATORY (INHALATION) at 08:54

## 2017-03-31 RX ADMIN — MORPHINE SULFATE 4 MG: 10 INJECTION INTRAMUSCULAR; INTRAVENOUS; SUBCUTANEOUS at 04:36

## 2017-03-31 RX ADMIN — LEVOFLOXACIN 500 MG: 5 INJECTION, SOLUTION INTRAVENOUS at 01:35

## 2017-03-31 RX ADMIN — SODIUM CHLORIDE AND POTASSIUM CHLORIDE: 9; 1.49 INJECTION, SOLUTION INTRAVENOUS at 15:42

## 2017-03-31 RX ADMIN — ALBUTEROL SULFATE 2.5 MG: 2.5 SOLUTION RESPIRATORY (INHALATION) at 13:26

## 2017-03-31 RX ADMIN — ALBUTEROL SULFATE 2.5 MG: 2.5 SOLUTION RESPIRATORY (INHALATION) at 20:39

## 2017-03-31 RX ADMIN — HYDRALAZINE HYDROCHLORIDE 20 MG: 20 INJECTION INTRAMUSCULAR; INTRAVENOUS at 22:26
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
## 2017-03-31 NOTE — PROGRESS NOTES
4206 Report received; Pt is going for abd x-ray. Maria M Saravia 6515 Off the floor for radiology. 7391  Ambulating in the room; assisted to sit in bed; attached to the NGT; and continue IV fluids; Denies any pain; call bell within reach. 3486 Informed by Dr. Mario Flor that on the x-ray the NGT tube is on the lower esophagus and to ordered this nurse to advance it. Advanced NGT tube it is now on 65 cm. Flushed and checked ph of 3. In bed, Dr. Mario Flor in the room; assessing the pt. Another x-ray of the abd is ordered for tomorrow. Made pt; Pt understood. In bed resting    1152 Pt off the floor for an echo. 1224 Pt is back on the floor. 1300 Pt's wife visited the pt; pt ambulated to the bathroom to change. 1430 NGT ph checked; flushed and replaced cannister. 1500 Pt in bed; resting; scheduled heparin given; NAD noted. 1712 Pt resting in bed; NAD noted. 1826 Pt in bed; resting; denies any complaints; call bell within reach; bed on the lowest position. Bedside shift change report given to CATHIE Tony (oncoming nurse) by Haven Estrada (offgoing nurse). Report included the following information SBAR, Kardex and MAR.
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
## 2017-03-31 NOTE — ROUTINE PROCESS
Bedside and Verbal shift change report given to Naa florian (oncoming nurse) by Keyla Guo RN (offgoing nurse). Report included the following information SBAR, Kardex, Intake/Output and MAR.
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
## 2017-04-01 ENCOUNTER — APPOINTMENT (OUTPATIENT)
Dept: GENERAL RADIOLOGY | Age: 72
DRG: 389 | End: 2017-04-01
Attending: INTERNAL MEDICINE
Payer: MEDICARE

## 2017-04-01 ENCOUNTER — APPOINTMENT (OUTPATIENT)
Dept: GENERAL RADIOLOGY | Age: 72
DRG: 389 | End: 2017-04-01
Attending: SPECIALIST
Payer: MEDICARE

## 2017-04-01 LAB
ANION GAP BLD CALC-SCNC: 14 MMOL/L (ref 3–18)
BASOPHILS # BLD AUTO: 0 K/UL (ref 0–0.06)
BASOPHILS # BLD: 0 % (ref 0–2)
BUN SERPL-MCNC: 8 MG/DL (ref 7–18)
BUN/CREAT SERPL: 11 (ref 12–20)
CALCIUM SERPL-MCNC: 8.3 MG/DL (ref 8.5–10.1)
CHLORIDE SERPL-SCNC: 101 MMOL/L (ref 100–108)
CO2 SERPL-SCNC: 25 MMOL/L (ref 21–32)
CREAT SERPL-MCNC: 0.7 MG/DL (ref 0.6–1.3)
DIFFERENTIAL METHOD BLD: ABNORMAL
EOSINOPHIL # BLD: 0 K/UL (ref 0–0.4)
EOSINOPHIL NFR BLD: 0 % (ref 0–5)
ERYTHROCYTE [DISTWIDTH] IN BLOOD BY AUTOMATED COUNT: 14.8 % (ref 11.6–14.5)
GLUCOSE SERPL-MCNC: 96 MG/DL (ref 74–99)
HCT VFR BLD AUTO: 40.1 % (ref 36–48)
HGB BLD-MCNC: 13.8 G/DL (ref 13–16)
LYMPHOCYTES # BLD AUTO: 24 % (ref 21–52)
LYMPHOCYTES # BLD: 0.8 K/UL (ref 0.9–3.6)
MCH RBC QN AUTO: 32 PG (ref 24–34)
MCHC RBC AUTO-ENTMCNC: 34.4 G/DL (ref 31–37)
MCV RBC AUTO: 93 FL (ref 74–97)
MONOCYTES # BLD: 1.1 K/UL (ref 0.05–1.2)
MONOCYTES NFR BLD AUTO: 32 % (ref 3–10)
NEUTS SEG # BLD: 1.5 K/UL (ref 1.8–8)
NEUTS SEG NFR BLD AUTO: 44 % (ref 40–73)
PLATELET # BLD AUTO: 337 K/UL (ref 135–420)
PMV BLD AUTO: 9.9 FL (ref 9.2–11.8)
POTASSIUM SERPL-SCNC: 4.2 MMOL/L (ref 3.5–5.5)
RBC # BLD AUTO: 4.31 M/UL (ref 4.7–5.5)
SODIUM SERPL-SCNC: 140 MMOL/L (ref 136–145)
WBC # BLD AUTO: 3.4 K/UL (ref 4.6–13.2)

## 2017-04-01 PROCEDURE — 77030018846 HC SOL IRR STRL H20 ICUM -A

## 2017-04-01 PROCEDURE — 74011000250 HC RX REV CODE- 250: Performed by: FAMILY MEDICINE

## 2017-04-01 PROCEDURE — 74011250636 HC RX REV CODE- 250/636: Performed by: SPECIALIST

## 2017-04-01 PROCEDURE — 36415 COLL VENOUS BLD VENIPUNCTURE: CPT | Performed by: SPECIALIST

## 2017-04-01 PROCEDURE — 65270000029 HC RM PRIVATE

## 2017-04-01 PROCEDURE — 74000 XR ABD (KUB): CPT

## 2017-04-01 PROCEDURE — 80048 BASIC METABOLIC PNL TOTAL CA: CPT | Performed by: SPECIALIST

## 2017-04-01 PROCEDURE — 74020 XR ABD (AP AND ERECT OR DECUB): CPT

## 2017-04-01 PROCEDURE — 74011250636 HC RX REV CODE- 250/636: Performed by: FAMILY MEDICINE

## 2017-04-01 PROCEDURE — 94640 AIRWAY INHALATION TREATMENT: CPT

## 2017-04-01 PROCEDURE — 85025 COMPLETE CBC W/AUTO DIFF WBC: CPT | Performed by: SPECIALIST

## 2017-04-01 RX ADMIN — ALBUTEROL SULFATE 2.5 MG: 2.5 SOLUTION RESPIRATORY (INHALATION) at 02:52

## 2017-04-01 RX ADMIN — ALBUTEROL SULFATE 2.5 MG: 2.5 SOLUTION RESPIRATORY (INHALATION) at 13:58

## 2017-04-01 RX ADMIN — SODIUM CHLORIDE AND POTASSIUM CHLORIDE: 9; 1.49 INJECTION, SOLUTION INTRAVENOUS at 10:55

## 2017-04-01 RX ADMIN — HEPARIN SODIUM 5000 UNITS: 5000 INJECTION, SOLUTION INTRAVENOUS; SUBCUTANEOUS at 10:33

## 2017-04-01 RX ADMIN — ALBUTEROL SULFATE 2.5 MG: 2.5 SOLUTION RESPIRATORY (INHALATION) at 19:48

## 2017-04-01 RX ADMIN — SODIUM CHLORIDE AND POTASSIUM CHLORIDE: 9; 1.49 INJECTION, SOLUTION INTRAVENOUS at 20:21

## 2017-04-01 RX ADMIN — MORPHINE SULFATE 4 MG: 10 INJECTION INTRAMUSCULAR; INTRAVENOUS; SUBCUTANEOUS at 05:03

## 2017-04-01 RX ADMIN — HEPARIN SODIUM 5000 UNITS: 5000 INJECTION, SOLUTION INTRAVENOUS; SUBCUTANEOUS at 17:39

## 2017-04-01 RX ADMIN — ALBUTEROL SULFATE 2.5 MG: 2.5 SOLUTION RESPIRATORY (INHALATION) at 08:30

## 2017-04-01 RX ADMIN — LEVOFLOXACIN 500 MG: 5 INJECTION, SOLUTION INTRAVENOUS at 02:16
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
## 2017-04-01 NOTE — PROGRESS NOTES
Surgery    Pt reports starting passing flatus (which he describes as \"lots\") and just had a very large BM. He feels much less distended now    Patient Vitals for the past 12 hrs:   Temp Pulse Resp BP SpO2   04/01/17 0950 97.6 °F (36.4 °C) 84 16 (!) 166/95 96 %   04/01/17 0436 97.7 °F (36.5 °C) 88 17 159/90 92 %   04/01/17 0253 - - - - 95 %       Date 03/31/17 0700 - 04/01/17 0659 04/01/17 0700 - 04/02/17 0659   Shift 3496-5844 8669-5043 24 Hour Total 8694-2614 6159-2264 24 Hour Total   I  N  T  A  K  E   P. O.  720 720         P. O.  720 720       I.V.  (mL/kg/hr) 1491.7  (2.2) 1422.9  (2.1) 2914.6  (2.1)         Volume (0.9% sodium chloride with KCl 20 mEq/L infusion) 1491.7 1422.9 2914.6       NG/ 60 180         Water Flush Volume (mL) (Nasogastric Tube 03/30/17) 120 60 180       Shift Total  (mL/kg) 1611.7  (28.4) 2202.9  (38.9) 3814.6  (67.3)      O  U  T  P  U  T   Urine  (mL/kg/hr) 1100  (1.6) 375  (0.6) 1475  (1.1)         Urine Voided 5300 656 3822       Emesis/NG output 2300 1000 3300         Output (ml) (Nasogastric Tube 03/30/17) 2300 1000 3300       Shift Total  (mL/kg) 3400  (60) 1375  (24.3) 4775  (84.2)      NET -1788.3 827.9 -960.4      Weight (kg) 56.7 56.7 56.7 56.7 56.7 56.7     Pulm: CTA    Abd - soft, nondistended, much mnore active BS norw, NT    Recent Results (from the past 12 hour(s))   CBC WITH AUTOMATED DIFF    Collection Time: 04/01/17  4:53 AM   Result Value Ref Range    WBC 3.4 (L) 4.6 - 13.2 K/uL    RBC 4.31 (L) 4.70 - 5.50 M/uL    HGB 13.8 13.0 - 16.0 g/dL    HCT 40.1 36.0 - 48.0 %    MCV 93.0 74.0 - 97.0 FL    MCH 32.0 24.0 - 34.0 PG    MCHC 34.4 31.0 - 37.0 g/dL    RDW 14.8 (H) 11.6 - 14.5 %    PLATELET 496 442 - 071 K/uL    MPV 9.9 9.2 - 11.8 FL    NEUTROPHILS 44 40 - 73 %    LYMPHOCYTES 24 21 - 52 %    MONOCYTES 32 (H) 3 - 10 %    EOSINOPHILS 0 0 - 5 %    BASOPHILS 0 0 - 2 %    ABS. NEUTROPHILS 1.5 (L) 1.8 - 8.0 K/UL    ABS. LYMPHOCYTES 0.8 (L) 0.9 - 3.6 K/UL    ABS.  MONOCYTES 1.1 0.05 - 1.2 K/UL    ABS. EOSINOPHILS 0.0 0.0 - 0.4 K/UL    ABS.  BASOPHILS 0.0 0.0 - 0.06 K/UL    DF AUTOMATED     METABOLIC PANEL, BASIC    Collection Time: 04/01/17  4:53 AM   Result Value Ref Range    Sodium 140 136 - 145 mmol/L    Potassium 4.2 3.5 - 5.5 mmol/L    Chloride 101 100 - 108 mmol/L    CO2 25 21 - 32 mmol/L    Anion gap 14 3.0 - 18 mmol/L    Glucose 96 74 - 99 mg/dL    BUN 8 7.0 - 18 MG/DL    Creatinine 0.70 0.6 - 1.3 MG/DL    BUN/Creatinine ratio 11 (L) 12 - 20      GFR est AA >60 >60 ml/min/1.73m2    GFR est non-AA >60 >60 ml/min/1.73m2    Calcium 8.3 (L) 8.5 - 10.1 MG/DL     Films - still with distended SB AF levels but more colonic air    Imp: resolving PSBO    Will clamp NG for 4 hours - if no GR over 200 ml, remove NG and start PO in AM. If over 220 ml, resume suction

## 2017-04-01 NOTE — ROUTINE PROCESS
Bedside and Verbal shift change report given to QUE Gutierrez RN (oncoming nurse) by Dalia Elizabeth RN   (offgoing nurse). Report included the following information SBAR, Kardex and MAR.

## 2017-04-01 NOTE — PROGRESS NOTES
Rec'd pt on RA- pt awake & alert sitting upright on bed  -administer neb-no adverse reaction observed

## 2017-04-02 VITALS
RESPIRATION RATE: 14 BRPM | WEIGHT: 125 LBS | HEART RATE: 74 BPM | OXYGEN SATURATION: 98 % | DIASTOLIC BLOOD PRESSURE: 77 MMHG | SYSTOLIC BLOOD PRESSURE: 146 MMHG | BODY MASS INDEX: 18.51 KG/M2 | TEMPERATURE: 97.6 F | HEIGHT: 69 IN

## 2017-04-02 PROCEDURE — 94640 AIRWAY INHALATION TREATMENT: CPT

## 2017-04-02 PROCEDURE — 74011250636 HC RX REV CODE- 250/636: Performed by: FAMILY MEDICINE

## 2017-04-02 PROCEDURE — 74011000250 HC RX REV CODE- 250: Performed by: FAMILY MEDICINE

## 2017-04-02 RX ADMIN — SODIUM CHLORIDE AND POTASSIUM CHLORIDE: 9; 1.49 INJECTION, SOLUTION INTRAVENOUS at 05:47

## 2017-04-02 RX ADMIN — HEPARIN SODIUM 5000 UNITS: 5000 INJECTION, SOLUTION INTRAVENOUS; SUBCUTANEOUS at 02:10

## 2017-04-02 RX ADMIN — ALBUTEROL SULFATE 2.5 MG: 2.5 SOLUTION RESPIRATORY (INHALATION) at 01:53

## 2017-04-02 RX ADMIN — ALBUTEROL SULFATE 2.5 MG: 2.5 SOLUTION RESPIRATORY (INHALATION) at 08:09

## 2017-04-02 RX ADMIN — LEVOFLOXACIN 500 MG: 5 INJECTION, SOLUTION INTRAVENOUS at 02:13

## 2017-04-02 NOTE — PROGRESS NOTES
Surgery    Pt wants to go home. He has begun to have copious BMs, 3 this morning, all liquid. He is also having more and more flatus. His NG clamp trial failed yesterday. However, the patient has been taking a relatively large volume of ice chips PO (at least 4 cups). His NG was also replaced yesterday. He has not had his morning films yet. Patient Vitals for the past 12 hrs:   Temp Pulse Resp BP SpO2   04/02/17 0847 97.6 °F (36.4 °C) 74 14 146/77 98 %   04/02/17 0407 97.8 °F (36.6 °C) 71 14 185/88 98 %   04/02/17 0153 - - - - 99 %   04/02/17 0125 98 °F (36.7 °C) 66 16 185/88 91 %       Date 04/01/17 0700 - 04/02/17 0659 04/02/17 0700 - 04/03/17 0659   Shift 4542-9565 2935-9244 24 Hour Total 2815-5303 0812-6284 24 Hour Total   I  N  T  A  K  E   I.V.  (mL/kg/hr) 1575  (2.3) 1600  (2.4) 3175  (2.3) 125  125      Volume (0.9% sodium chloride with KCl 20 mEq/L infusion) 1575 1500 3075 125  125      Volume (levoFLOXacin (LEVAQUIN) 500 mg in D5W IVPB)  100 100       NG/GT 60  60         Water Flush Volume (mL) (Nasogastric Tube 03/30/17) 60  60       Shift Total  (mL/kg) 1635  (28.8) 1600  (28.2) 3235  (57.1) 125  (2.2)  125  (2.2)   O  U  T  P  U  T   Urine  (mL/kg/hr)  400  (0.6) 400  (0.3)         Urine Voided  400 400         Urine Occurrence(s) 3 x  3 x       Emesis/NG output 1434 617 9037 500  500      Output (ml) (Nasogastric Tube 03/30/17) 4338 282 9625 500  500    Stool            Stool Occurrence(s)  1 x 1 x       Shift Total  (mL/kg) 1950  (34.4) 1300  (22.9) 3250  (57.3) 500  (8.8)  500  (8.8)   NET -315 300 -15 -375  -375   Weight (kg) 56.7 56.7 56.7 56.7 56.7 56.7     NG - light green output    Abd - soft, ND NT, BS are more active now    No results found for this or any previous visit (from the past 12 hour(s)).     Abd films pending    Imp: PSBO - clinically resolving but NG and X-rays remain concerning    Check films today    Keep NG for now    If does not clear by tomorrow, need to consider SBFT versus exploration.
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
## 2017-04-02 NOTE — ROUTINE PROCESS
Bedside and Verbal shift change report given to Deshawn Shelton (oncoming nurse) by Bridgette Valentin RN   (offgoing nurse). Report included the following information SBAR, Kardex, Intake/Output, MAR and Recent Results.
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
## 2017-04-02 NOTE — PROGRESS NOTES
2000 Nursing assessment done, no distress noted. Ngt to suction draining brown drainage. 0000 Sleeping on and off, no change noted. Wants to go home. 0530 Loose bowel move twice.

## 2017-04-02 NOTE — PROGRESS NOTES
4302 Received bedside report from May, RN, updated white board, call bell is within reach. During shift report patient expressed that he is ready to go home today. Will make the doctors aware of this. 46 Dr. Maximino Sun is made aware of the patients wishes and states patient can leave AMA if he choices to go. 6542 Unable to perform assessment on the patient as he refuses to allow me to. He is up pacing in the hallway, disturbing all the other patients on the unit. Took coffee on the nutrition tray with creamer. Educated the patient that it is not advised to drink anything at this time. 0900 Supervisor made aware that the patient wishes to leave AMA. Dr. Corie Ledesma paged at this time. 6673 Dr. Corie Ledesma return paged and informed him of the patient's wishes. 9606 Patient's IV removed, NGT removed, AMA paper signed, patient walked off the unit with all personal items sent with him.

## 2017-04-06 ENCOUNTER — HOSPITAL ENCOUNTER (INPATIENT)
Age: 72
LOS: 1 days | Discharge: LEFT BEFORE DISCHARGE | DRG: 389 | End: 2017-04-08
Attending: EMERGENCY MEDICINE | Admitting: HOSPITALIST
Payer: MEDICARE

## 2017-04-06 ENCOUNTER — APPOINTMENT (OUTPATIENT)
Dept: CT IMAGING | Age: 72
DRG: 389 | End: 2017-04-06
Attending: EMERGENCY MEDICINE
Payer: MEDICARE

## 2017-04-06 DIAGNOSIS — R10.9 ACUTE ABDOMINAL PAIN: ICD-10-CM

## 2017-04-06 DIAGNOSIS — K56.609 SMALL BOWEL OBSTRUCTION (HCC): Primary | ICD-10-CM

## 2017-04-06 LAB
ALBUMIN SERPL BCP-MCNC: 3.6 G/DL (ref 3.4–5)
ALBUMIN/GLOB SERPL: 0.9 {RATIO} (ref 0.8–1.7)
ALP SERPL-CCNC: 61 U/L (ref 45–117)
ALT SERPL-CCNC: 21 U/L (ref 16–61)
ANION GAP BLD CALC-SCNC: 16 MMOL/L (ref 3–18)
AST SERPL W P-5'-P-CCNC: 25 U/L (ref 15–37)
BASOPHILS # BLD AUTO: 0 K/UL (ref 0–0.06)
BASOPHILS # BLD: 0 % (ref 0–2)
BILIRUB SERPL-MCNC: 0.7 MG/DL (ref 0.2–1)
BUN SERPL-MCNC: 10 MG/DL (ref 7–18)
BUN/CREAT SERPL: 13 (ref 12–20)
CALCIUM SERPL-MCNC: 9.4 MG/DL (ref 8.5–10.1)
CHLORIDE SERPL-SCNC: 90 MMOL/L (ref 100–108)
CK MB CFR SERPL CALC: NORMAL % (ref 0–4)
CK MB SERPL-MCNC: <1 NG/ML (ref 5–25)
CK SERPL-CCNC: 68 U/L (ref 39–308)
CO2 SERPL-SCNC: 26 MMOL/L (ref 21–32)
CREAT SERPL-MCNC: 0.76 MG/DL (ref 0.6–1.3)
DIFFERENTIAL METHOD BLD: ABNORMAL
EOSINOPHIL # BLD: 0 K/UL (ref 0–0.4)
EOSINOPHIL NFR BLD: 0 % (ref 0–5)
ERYTHROCYTE [DISTWIDTH] IN BLOOD BY AUTOMATED COUNT: 14.9 % (ref 11.6–14.5)
GLOBULIN SER CALC-MCNC: 4.1 G/DL (ref 2–4)
GLUCOSE SERPL-MCNC: 107 MG/DL (ref 74–99)
HCT VFR BLD AUTO: 48.5 % (ref 36–48)
HGB BLD-MCNC: 15.2 G/DL (ref 13–16)
LACTATE BLD-SCNC: 1.5 MMOL/L (ref 0.4–2)
LIPASE SERPL-CCNC: 401 U/L (ref 73–393)
LYMPHOCYTES # BLD AUTO: 10 % (ref 21–52)
LYMPHOCYTES # BLD: 1.2 K/UL (ref 0.9–3.6)
MCH RBC QN AUTO: 32.3 PG (ref 24–34)
MCHC RBC AUTO-ENTMCNC: 31.3 G/DL (ref 31–37)
MCV RBC AUTO: 103.2 FL (ref 74–97)
MONOCYTES # BLD: 1.7 K/UL (ref 0.05–1.2)
MONOCYTES NFR BLD AUTO: 13 % (ref 3–10)
NEUTS SEG # BLD: 10.1 K/UL (ref 1.8–8)
NEUTS SEG NFR BLD AUTO: 77 % (ref 40–73)
PLATELET # BLD AUTO: 388 K/UL (ref 135–420)
PMV BLD AUTO: 12.6 FL (ref 9.2–11.8)
POTASSIUM SERPL-SCNC: 4.1 MMOL/L (ref 3.5–5.5)
PROT SERPL-MCNC: 7.7 G/DL (ref 6.4–8.2)
RBC # BLD AUTO: 4.7 M/UL (ref 4.7–5.5)
SODIUM SERPL-SCNC: 132 MMOL/L (ref 136–145)
TROPONIN I SERPL-MCNC: <0.02 NG/ML (ref 0–0.04)
WBC # BLD AUTO: 13 K/UL (ref 4.6–13.2)

## 2017-04-06 PROCEDURE — 83605 ASSAY OF LACTIC ACID: CPT

## 2017-04-06 PROCEDURE — 74011000250 HC RX REV CODE- 250: Performed by: EMERGENCY MEDICINE

## 2017-04-06 PROCEDURE — 74177 CT ABD & PELVIS W/CONTRAST: CPT

## 2017-04-06 PROCEDURE — 96361 HYDRATE IV INFUSION ADD-ON: CPT

## 2017-04-06 PROCEDURE — 96374 THER/PROPH/DIAG INJ IV PUSH: CPT

## 2017-04-06 PROCEDURE — 99285 EMERGENCY DEPT VISIT HI MDM: CPT

## 2017-04-06 PROCEDURE — 77030008771 HC TU NG SALEM SUMP -A

## 2017-04-06 PROCEDURE — 80053 COMPREHEN METABOLIC PANEL: CPT | Performed by: EMERGENCY MEDICINE

## 2017-04-06 PROCEDURE — 85025 COMPLETE CBC W/AUTO DIFF WBC: CPT | Performed by: EMERGENCY MEDICINE

## 2017-04-06 PROCEDURE — 82550 ASSAY OF CK (CPK): CPT | Performed by: EMERGENCY MEDICINE

## 2017-04-06 PROCEDURE — 74011636320 HC RX REV CODE- 636/320: Performed by: EMERGENCY MEDICINE

## 2017-04-06 PROCEDURE — 74011250636 HC RX REV CODE- 250/636: Performed by: EMERGENCY MEDICINE

## 2017-04-06 PROCEDURE — 74011250637 HC RX REV CODE- 250/637: Performed by: EMERGENCY MEDICINE

## 2017-04-06 PROCEDURE — 96375 TX/PRO/DX INJ NEW DRUG ADDON: CPT

## 2017-04-06 PROCEDURE — 83690 ASSAY OF LIPASE: CPT | Performed by: EMERGENCY MEDICINE

## 2017-04-06 RX ORDER — SODIUM CHLORIDE 9 MG/ML
150 INJECTION, SOLUTION INTRAVENOUS CONTINUOUS
Status: DISCONTINUED | OUTPATIENT
Start: 2017-04-06 | End: 2017-04-08 | Stop reason: HOSPADM

## 2017-04-06 RX ORDER — DIPHENHYDRAMINE HYDROCHLORIDE 50 MG/ML
25 INJECTION, SOLUTION INTRAMUSCULAR; INTRAVENOUS
Status: COMPLETED | OUTPATIENT
Start: 2017-04-06 | End: 2017-04-06

## 2017-04-06 RX ORDER — OXYMETAZOLINE HCL 0.05 %
2 SPRAY, NON-AEROSOL (ML) NASAL
Status: COMPLETED | OUTPATIENT
Start: 2017-04-06 | End: 2017-04-06

## 2017-04-06 RX ORDER — LIDOCAINE HYDROCHLORIDE 20 MG/ML
JELLY TOPICAL AS NEEDED
Status: DISCONTINUED | OUTPATIENT
Start: 2017-04-06 | End: 2017-04-08 | Stop reason: HOSPADM

## 2017-04-06 RX ORDER — METOCLOPRAMIDE HYDROCHLORIDE 5 MG/ML
10 INJECTION INTRAMUSCULAR; INTRAVENOUS
Status: COMPLETED | OUTPATIENT
Start: 2017-04-06 | End: 2017-04-06

## 2017-04-06 RX ORDER — MORPHINE SULFATE 4 MG/ML
4 INJECTION, SOLUTION INTRAMUSCULAR; INTRAVENOUS
Status: COMPLETED | OUTPATIENT
Start: 2017-04-06 | End: 2017-04-06

## 2017-04-06 RX ADMIN — SODIUM CHLORIDE 2000 ML: 900 INJECTION, SOLUTION INTRAVENOUS at 19:28

## 2017-04-06 RX ADMIN — LIDOCAINE HYDROCHLORIDE: 20 JELLY TOPICAL at 21:16

## 2017-04-06 RX ADMIN — OXYMETAZOLINE HYDROCHLORIDE 2 SPRAY: 5 SPRAY NASAL at 21:16

## 2017-04-06 RX ADMIN — IOPAMIDOL 100 ML: 612 INJECTION, SOLUTION INTRAVENOUS at 20:43

## 2017-04-06 RX ADMIN — DIPHENHYDRAMINE HYDROCHLORIDE 25 MG: 50 INJECTION, SOLUTION INTRAMUSCULAR; INTRAVENOUS at 20:44

## 2017-04-06 RX ADMIN — SODIUM CHLORIDE 150 ML/HR: 900 INJECTION, SOLUTION INTRAVENOUS at 21:30

## 2017-04-06 RX ADMIN — Medication 4 MG: at 20:44

## 2017-04-06 RX ADMIN — TOPICAL ANESTHETIC 200 SPRAY: 200 SPRAY DENTAL; PERIODONTAL at 21:16

## 2017-04-06 RX ADMIN — METOCLOPRAMIDE 10 MG: 5 INJECTION, SOLUTION INTRAMUSCULAR; INTRAVENOUS at 20:44

## 2017-04-06 NOTE — ED TRIAGE NOTES
Recent admission for bowel obstruction. Had blunt force trauma causing green vomit.  Continues to vomit green , white BM
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
## 2017-04-07 ENCOUNTER — APPOINTMENT (OUTPATIENT)
Dept: GENERAL RADIOLOGY | Age: 72
DRG: 389 | End: 2017-04-07
Attending: HOSPITALIST
Payer: MEDICARE

## 2017-04-07 LAB
ANION GAP BLD CALC-SCNC: 14 MMOL/L (ref 3–18)
BUN SERPL-MCNC: 9 MG/DL (ref 7–18)
BUN/CREAT SERPL: 14 (ref 12–20)
CALCIUM SERPL-MCNC: 8.1 MG/DL (ref 8.5–10.1)
CHLORIDE SERPL-SCNC: 98 MMOL/L (ref 100–108)
CO2 SERPL-SCNC: 27 MMOL/L (ref 21–32)
CREAT SERPL-MCNC: 0.65 MG/DL (ref 0.6–1.3)
GLUCOSE SERPL-MCNC: 92 MG/DL (ref 74–99)
POTASSIUM SERPL-SCNC: 2.9 MMOL/L (ref 3.5–5.5)
SODIUM SERPL-SCNC: 139 MMOL/L (ref 136–145)

## 2017-04-07 PROCEDURE — 74000 XR ABD (KUB): CPT

## 2017-04-07 PROCEDURE — 74011250636 HC RX REV CODE- 250/636: Performed by: HOSPITALIST

## 2017-04-07 PROCEDURE — 77030018846 HC SOL IRR STRL H20 ICUM -A

## 2017-04-07 PROCEDURE — 77030020263 HC SOL INJ SOD CL0.9% LFCR 1000ML

## 2017-04-07 PROCEDURE — 36415 COLL VENOUS BLD VENIPUNCTURE: CPT | Performed by: SPECIALIST

## 2017-04-07 PROCEDURE — 80048 BASIC METABOLIC PNL TOTAL CA: CPT | Performed by: SPECIALIST

## 2017-04-07 PROCEDURE — 77030019938 HC TBNG IV PCA ICUM -A

## 2017-04-07 PROCEDURE — 65270000029 HC RM PRIVATE

## 2017-04-07 RX ORDER — POTASSIUM CHLORIDE 7.45 MG/ML
10 INJECTION INTRAVENOUS
Status: DISPENSED | OUTPATIENT
Start: 2017-04-07 | End: 2017-04-07

## 2017-04-07 RX ORDER — LABETALOL HYDROCHLORIDE 5 MG/ML
20 INJECTION, SOLUTION INTRAVENOUS
Status: DISCONTINUED | OUTPATIENT
Start: 2017-04-07 | End: 2017-04-07

## 2017-04-07 RX ORDER — ENOXAPARIN SODIUM 100 MG/ML
40 INJECTION SUBCUTANEOUS EVERY 24 HOURS
Status: DISCONTINUED | OUTPATIENT
Start: 2017-04-07 | End: 2017-04-08 | Stop reason: HOSPADM

## 2017-04-07 RX ORDER — MORPHINE SULFATE 2 MG/ML
2 INJECTION, SOLUTION INTRAMUSCULAR; INTRAVENOUS
Status: DISCONTINUED | OUTPATIENT
Start: 2017-04-07 | End: 2017-04-08 | Stop reason: HOSPADM

## 2017-04-07 RX ORDER — LABETALOL HYDROCHLORIDE 5 MG/ML
10 INJECTION, SOLUTION INTRAVENOUS
Status: DISPENSED | OUTPATIENT
Start: 2017-04-07 | End: 2017-04-07

## 2017-04-07 RX ORDER — SODIUM CHLORIDE 9 MG/ML
100 INJECTION, SOLUTION INTRAVENOUS CONTINUOUS
Status: DISCONTINUED | OUTPATIENT
Start: 2017-04-07 | End: 2017-04-07

## 2017-04-07 RX ORDER — ENOXAPARIN SODIUM 100 MG/ML
INJECTION SUBCUTANEOUS
Status: DISPENSED
Start: 2017-04-07 | End: 2017-04-07

## 2017-04-07 RX ADMIN — POTASSIUM CHLORIDE 10 MEQ: 7.46 INJECTION, SOLUTION INTRAVENOUS at 12:49

## 2017-04-07 RX ADMIN — POTASSIUM CHLORIDE 10 MEQ: 7.46 INJECTION, SOLUTION INTRAVENOUS at 10:32

## 2017-04-07 RX ADMIN — ENOXAPARIN SODIUM 40 MG: 40 INJECTION SUBCUTANEOUS at 01:03

## 2017-04-07 NOTE — PROGRESS NOTES
Patient has designated his wife to participate in his/her discharge plan and to receive any needed information.      Name:   Lesa Sandoval  spouse      [facility] [address]   April 7 2017     Address:  Phone number:

## 2017-04-07 NOTE — PROGRESS NOTES
Patient admitted early this AM by Dr Rubin Samuels with SBO. He has NG tube in place. Apparently he left the hospital AMA this past weekend also with SBO. He and I agreed on IDR's that we will have good communication and that he will notify us with any concerns or frustrations early on.
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
## 2017-04-07 NOTE — PROGRESS NOTES
Nutrition initial assessment/Plan of care      RECOMMENDATIONS:   1. NPO. Advance diet when medically indicated  2. Monitor weight and PO intake  3. RD to follow     GOALS:   1. PO intake meets >75% of protein/calorie needs by 4/10  2. Promote healthy gain (1-2 lb) by 4/17    ASSESSMENT:   Per BMI of 18.7, weight is in the normal classification. However, patient is at nutritional risk since patient is > 72years old with BMI <23. PO intake is poor. Labs noted. Patient with hypokalemia. Nutrition recommendations listed. RD to follow. Nutrition Diagnoses: Altered GI function related to SBO as evidenced by NPO order. Nutrition Risk:  [x] High  [] Moderate []  Low    SUBJECTIVE/OBJECTIVE:   Patient admitted for SBO. Pt was recently here for SBO as well. No known food. Patient states that his weight has been stable at 125 lb (but used to be 150 lb 25 years ago). Patient denies trouble chewing/swallowing. Information Obtained from:    [x] Chart Review   [x] Patient   [] Family/Caregiver   [] Nurse/Physician   [] Interdisciplinary Meeting/Rounds    Diet: NPO  Medications: [x] Reviewed  (0.9%NaCl: 150 ml/hr)  Allergies: [x] Reviewed   Encounter Diagnoses     ICD-10-CM ICD-9-CM   1. Small bowel obstruction (HCC) K56.69 560.9   2.  Acute abdominal pain R10.9 789.00     338.19     Past Medical History:   Diagnosis Date    Hypertension       Labs:    Lab Results   Component Value Date/Time    Sodium 139 04/07/2017 08:05 AM    Potassium 2.9 04/07/2017 08:05 AM    Chloride 98 04/07/2017 08:05 AM    CO2 27 04/07/2017 08:05 AM    Anion gap 14 04/07/2017 08:05 AM    Glucose 92 04/07/2017 08:05 AM    BUN 9 04/07/2017 08:05 AM    Creatinine 0.65 04/07/2017 08:05 AM    Calcium 8.1 04/07/2017 08:05 AM    Magnesium 2.4 03/28/2017 09:40 AM    Albumin 3.6 04/06/2017 06:45 PM     Anthropometrics: BMI (calculated): 18.7  Last 3 Recorded Weights in this Encounter    04/06/17 1812   Weight: 56.7 kg (125 lb)      Ht Readings from Last 1 Encounters:   04/06/17 5' 8.5\" (1.74 m)       IBW: 157 lb %IBW: 80% UBW: 125 lb %UBW: 100%   [] Weight Loss [] Weight Gain [x] Weight Stable    Estimated Nutrition Needs: [x] MSJ  [] Other:  Calories: 2400 kcal Based on:   [x] Actual BW    Protein:   70-80 g Based on:   [x] Actual BW    Fluid:       8756-8902 ml Based on:   [x] Actual BW      [x] No Cultural, Mormon or ethnic dietary need identified.     [] Cultural, Mormon and ethnic food preferences identified and addressed     Wt Status:  [x] Normal (18.6 - 24.9) [] Underweight (<18.5) [] Overweight (25 - 29.9) [] Mild Obesity (30 - 34.9)  [] Moderate Obesity (35 - 39.9) [] Morbid Obesity (40+)   [] Moderate Malnutrition [] Severe Malnutrition in the context of :     Nutrition Problems Identified:   [x] Suboptimal PO intake   [] Food Allergies  [] Difficulty chewing/swallowing/poor dentition  [] Constipation/Diarrhea   [] Nausea/Vomiting   [] None  [] Other:     Plan:   [] Therapeutic Diet  []  Obtained/adjusted food preferences/tolerances and/or snacks options   []  Supplements added   [] Occupational therapy following for feeding techniques  []  HS snack added   []  Modify diet texture   []  Modify diet for food allergies   []  Educate patient   []  Assist with menu selection   []  Monitor PO intake on meal rounds   [x]  Continue inpatient monitoring and intervention   []  Participated in discharge planning/Interdisciplinary rounds/Team meetings   []  Other:     Education Needs:   [x] Not appropriate for teaching at this time due to: NPO   [] Identified and addressed    Nutrition Monitoring and Evaluation:  [x] Continue ongoing monitoring and intervention  [] Other    Alicia Pugh, [address] [place] [address]  Pager: [phone]

## 2017-04-07 NOTE — CDMP QUERY
This patient presented with abdominal pain and noted to have  a height of 5'8.5 and weight 125 lbs with BMI  of 18.3. The Center for Disease Control and Prevention suggest that BMI of 18 indicates a weight status of underweight. Please Clarify if possible:     ____yes this patent is underweight  ____No this patient is not underweight      Please clarify and document your clinical opinion in the progress notes and discharge summary including the definitive and/or presumptive diagnosis, (suspected or probable), related to the above clinical findings. Please include clinical findings supporting your diagnosis. If you DECLINE this query or would like to communicate with [facility], please utilize the \"Chewse message box\" at the TOP of the Progress Note on the right.       Thank you,  Cl Betts [phone]

## 2017-04-07 NOTE — H&P
History and Physical    Patient: Armaan Aguilar               Sex: male          DOA: 4/6/2017       YOB: 1945      Age:  73 y.o.        LOS:  LOS: 0 days        HPI:     Armaan Aguilar is a 73 y.o. male who presents to the ED complaining of abdominal pain and vomiting. Patient was recently admitted for SBO. During admission, he left against medical advice. He states that beginning yesterday, he had abdominal pain and vomited twice. Symptoms persisted today, he was unable to keep any food down. He decided to seek medical attention. While in the ED, patient reports 4 episode of non bloody emesis. Imaging consistent with bowel obstruction. GI consulted and agrees to see patient . He is admitted for further management. Past Medical History:   Diagnosis Date    Hypertension        Past Surgical History:   Procedure Laterality Date    APPENDECTOMY      HX APPENDECTOMY      HX COLONOSCOPY  1/28/2015    Repeat colonoscopy in 5 yrs per Dr. Mar Feliz       Social History     Social History    Marital status:      Spouse name: N/A    Number of children: N/A    Years of education: N/A     Occupational History    Not on file. Social History Main Topics    Smoking status: Current Every Day Smoker     Packs/day: 1.00     Years: 50.00     Types: Cigarettes    Smokeless tobacco: Never Used    Alcohol use Yes      Comment: 2 beers aday    Drug use: Not on file    Sexual activity: Not on file     Other Topics Concern    Not on file     Social History Narrative       Family History   Problem Relation Age of Onset    Heart Disease Mother     Cancer Father     Cancer Sister     Diabetes Sister        No Known Allergies    Review of Systems:  Positive in bold.   CONST: Fever, weight loss, fatigue or chills  GI: Nausea, vomiting, abdominal pain, change in bowel habits, hematochezia, melena, and GERD   INTEG: Dermatitis, abnormal moles  HEENT: Recent changes in vision, vertigo, epistaxis, dysphagia, hoarseness  CV: Chest pain, palpitations, HTN, edema and varicosities  RESP: Cough, shortness of breath, wheezing, hemoptysis, snoring. : Hematuria, dysuria, frequency, urgency, retention, incontinence   MS: Weakness, joint pain and arthritis  ENDO: Diabetes, thyroid disease, polyuria, polydipsia, polyphagia, poor wound healing, heat intolerance, cold intolerance  LYMPH/HEME: Anemia, bruising and history of blood transfusions  NEURO: Dizziness, headache, fainting, seizures and stroke  PSYCH: Anxiety , depression    Physical Exam:      Visit Vitals    /83    Pulse 98    Temp 98.4 °F (36.9 °C)    Resp 17    Wt 56.7 kg (125 lb)    SpO2 97%    BMI 18.73 kg/m2       Physical Exam:  Gen:  No distress, alert, awake and oriented x 4. NGT in place. HEENT:  Normal cephalic atraumatic, extra-occular movements are intact. Neck:  Supple, No JVD  Lungs:  Clear bilaterally, no wheeze, no rales, normal effort  Cardiovascular:  Regular Rate and Rhythm, normal S1 and S2, no audible murmur. Capillary refill: < 3 seconds. Abdomen:  Soft, non tender, non distended, normal bowel sounds, no guarding. Extremities:  Well perfused, no cyanosis, no edema  Neurological:  Awake and alert, CN's are intact, normal strength throughout extremities  Skin:  No rashes or moles. Turgor and color normal  Mental Status:  Normal thought process, does not appear anxious      Laboratory Studies: All lab results for the last 24 hours reviewed. Assessment/Plan     Active Problems:    SBO (small bowel obstruction) ([facility] [address] (3/30/2017)        PLAN:  71yo male admitted for recurrent Small bowel obstruction   Admit to surgical unit   NGT to intermittent low suction   Keep NPO   Continue IV fluid   Morphine 2 mg IV as needed. CV: HTN- uncontrolled. IV antihypertensive as needed. Heme:  DVT prophylaxis   Lovenox 40 mg SQ daily    Misc:  FULL CODE   Ambulate with assistance.    Monitor intake and output   Monitor vitals as per unit   Replace electrolytes as needed. Follow up am labs.           Fara Ray MD

## 2017-04-07 NOTE — PROGRESS NOTES
Received Pt in bed alert and oriented X 4.  IV infusing to right AC infusing  ml/hr. Pt has NG-tube on intermittent suction. Pt NPO. Skin assessment completed no open areas noted. Pt denies SOB, chest pain or numbness and tingling. Call bell within reach. Monitoring ongoing. 0400 Pt denies pain. PH 5. In NG tube. Pt voiding clear michelle colored urine. Monitoring ongoing. 0530 IV to placed to left forearm #20. Patient denies pain states I'm just really nausea. Monitoring ongoing.

## 2017-04-07 NOTE — ROUTINE PROCESS
Bedside and Verbal shift change report given to Edwin Guerra (oncoming nurse) by Radhika Shelton RN (offgoing nurse). Report included the following information SBAR, Kardex, Intake/Output, MAR and Recent Results.

## 2017-04-07 NOTE — ROUTINE PROCESS
Received report from [facility], patient has no complaints of pain, NG tube in left nare, patient requesting to have something to drink, explained to patient concerning his NPO status, canister from previous shift full, canister changed by previous nurse. 1909: Patient has critical lab values, potassium 2.9, paged Dr. Arias Jha regarding patients critical labs, patient has new orders for 3 runs of potassium,    1032: Assessment completed. Started patients first run of potassium, explained to patient the side effects of potassium, and how it may burn at some point. 1109: Patient  Called this nurse, that his arm is burning and does not want potassium, explained to patient this nurse will slow the infusion down some and applied ice to IV site. Patient states it not going to do any good, (patient is abusive verbally) clinical coordinator (Mitul Ag RN) went in to talk to patient. 1249: started patients 2nd run of potassium, patient states he does not want anymore potassium after this bag.     1432: this nurse change canister to wall suction, canister full, 1500 ml of green drainage  from canister.

## 2017-04-07 NOTE — PROGRESS NOTES
conducted an initial consultation and Spiritual Assessment for [facility], who is a 73 y.o.,male. Patients Primary Language is: [place]. According to the patients EMR Lutheran Affiliation is: [place]. The reason the Patient came to the hospital is:   Patient Active Problem List    Diagnosis Date Noted    SBO (small bowel obstruction) ([facility] [address] 03/30/2017    HTN (hypertension) 03/30/2017    URI (upper respiratory infection) 03/30/2017        The  provided the following Interventions:  Initiated a relationship of care and support with patient in room 2201 at 1000 this morning. Listened  As patient talked about being here and how God was blessing him in a great way. Patient shared how he was here in the past and now he knows that God will make him better once again. Provided information about Spiritual Care Services. Offered prayer and assurance of continued prayers on patients behalf. The following outcomes were achieved:  Patient shared limited information about his medical narrative and spiritual journey/beliefs. Patient processed feeling about current hospitalization. Patient expressed gratitude for pastoral care visit. Assessment:  Patient does not have any Presybeterian/cultural needs that will affect patients preferences in health care. There are no further spiritual or Presybeterian issues which require Spiritual Care Services interventions at this time. Plan:  Chaplains will continue to follow and will provide pastoral care on an as needed/requested basis    . Shelli Nails   Spiritual Care   [phone]

## 2017-04-07 NOTE — PROGRESS NOTES
[facility]   Discharge Planning/ Assessment     Reasons for Intervention: Sindy Mcintyre patient, he agrees to share his discharge information with his wife, see below. He was independent prior to admission and see Dr Guardado for his primary care needs. He has VA Medicare and generic Commercial insurance.  His discharge plan is to retuen home.      High Risk Criteria [x] Yes  []No   Physician Referral [] Yes  [x]No   Date     Nursing Referral [] Yes  [x]No   Date     Patient/Family Request [] Yes  [x]No   Date         Resources:     Medicare [x] Yes  []No   Medicaid [] Yes  [x]No   No Resources [] Yes  [x]No   Private Insurance [x] Yes  []No    Name/Phone Number     Other [] Yes  [x]No   (i.e. Workman's Comp)         Prior Services:     Prior Services [] Yes  [x]No   Home Health [] Yes  [x]No   [place] [address] [] Yes  [x]No   Number of [address] Program [] Yes  [x]No        Meals on Wheels [] Yes  [x]No   Office on Aging [] Yes  [x]No   Transportation Services [] Yes  [x]No   Nursing Home [] Yes  [x]No   Nursing Home Name     [address] [] Yes  [x]No   [address] Name     Other         Information Source:       Information obtained from [x] Patient  [] Parent  [] [address] [] Child [] Spouse  [] Significant Other/Partner  [] Friend  [] EMS   [] Nursing Home Chart [x] Other: chart   Chart Review [x] Yes  []No      Family/Support System:     Patient lives with [] Alone  [x] Spouse  [] Significant Other [] Children  [] Caretaker  [] Parent  [] Sibling  [] Other        Other Support System:     Is the patient responsible for care of others [] Yes  [x]No   Information of person caring for patient on  discharge self   Managers financial affairs independently [x] Yes  []No   If no, explain:        Status Prior to Admission:     Mental Status [x] Awake  [x] Alert  [x] Oriented [] Quiet/Calm [] Lethargic/Sedated  [] Disoriented [] Restless/Anxious [] Combative   Personal Care [] Dependent  [x] Independent Personal Care [] Requires Assistance   Meal Preparation Ability [x] Independent  [] Standby Assistance  [] Minimal Assistance  [] Moderate Assistance  [] Maximum Assistance   [] Total Assistance   Chores [x] Independent with Chores  [] N/A Nursing Home Resident  [] Requires Assistance   Bowel/Bladder [x] Continent [] Catheter  [] Incontinent  [] Ostomy Self-Care   [] Urine Diversion Self-Care  [] Maximum Assistance   [] Total Assistance   Number of Persons needed for assistance     DME at home [] Charmayne Brakeman, Melba Samuel  [] Charmayne Brakeman, Straight  [] Commode   [] Bathroom/Grab Bars  [] Hospital Bed [] Nebulizer  [] Oxygen [] Raised Toilet Seat [] Shower Chair  [] Side Rails for Bed  [] Tub Transfer Bench  [] Della Lopez  [] Walker, Standard   [] Other:   Vendor        Treatment Presently Receiving:     Current Treatments [] Chemotherapy  [] Dialysis  [] Insulin [] IVAB [x] IVF  [] O2 [] PCA [] PT [] RT [] Tube Feedings [] Wound Care      Psychosocial Evaluation:     Verbalized Knowledge of Disease Process [x] Patient  [x]Family   Coping with Disease Process [x] Patient  [x]Family   Requires Further Counseling Coping with Disease Process [] Patient  []Family      Identified Projected Needs:  [address] Aid [] Yes  [x]No   Transportation [] Yes  [x]No   Education [] Yes  [x]No   Specific Education      Financial Counseling [] Yes  [x]No   Inability to Care for Self/Will Require 24 hour care [] Yes  [x]No   Pain Management [] Yes  [x]No   Home Infusion Therapy [] Yes  [x]No   Oxygen Therapy [] Yes  [x]No   DME [] Yes  [x]No   Long Term Care Placement [] Yes  [x]No   Rehab [] Yes  [x]No   Physical Therapy [] Yes  [x]No   Needs Anticipated At This Time [] Yes  [x]No      Intra-Hospital Referral:  [address] [place] [address] [] Yes  [x]No    [] Yes  [x]No   Patient Representative [] Yes  [x]No   Staff for Teaching Needs [] Yes  [x]No   Specialty Teaching Needs    Diabetic Educator [] Yes  [x]No   Referral for Diabetic Educator Needed [] Yes  [x]No  If Yes, place order for Nutritionist or Diabetic Consult      Tentative Discharge Plan:     Home with No Services [x] Yes  []No   Home with [address] [] Yes  [x]No   If Yes, specify type [address] [] Yes  [x]No   If Yes, specify type     Meals on Wheels [] Yes  [x]No   Office of Aging [] Yes  [x]No   NHP [] Yes  [x]No   Return to the Nursing Home [] Yes  [x]No   Rehab Therapy [] Yes  [x]No   Acute Rehab [] Yes  [x]No   Subacute Rehab [] Yes  [x]No   Private Care [] Yes  [x]No   Substance Abuse Referral [] Yes  [x]No   Transportation [] Yes  [x]No   Chore Service [] Yes  [x]No   Inpatient Hospice [] Yes  [x]No   OP RT [] Yes  [x] No   OP Hemo [] Yes  [x] No   OP PT [] Yes  [x]No   Support Group [] Yes  [x]No   Reach to Recovery [] Yes  [x]No   OP Oncology Clinic [] Yes  [x]No   Clinic Appointment [] Yes  [x]No   DME [] Yes  [x]No   Comments     Name of D/C Planner or  Given to Patient or Family Kelvin Aly RN   Phone Number    Date April 7, 2017   Time 800 am   If you are discharged home, whom do you designate to participate in your discharge plan and receive any information needed?      Enter name of ava Alessandra Cornelldavide  spouse   Phone # of ava    Address of ava [address]   Updated April 7 2017   Patient refused to designate any   individual

## 2017-04-08 VITALS
RESPIRATION RATE: 16 BRPM | HEIGHT: 69 IN | TEMPERATURE: 97.9 F | HEART RATE: 88 BPM | SYSTOLIC BLOOD PRESSURE: 159 MMHG | WEIGHT: 125 LBS | OXYGEN SATURATION: 92 % | BODY MASS INDEX: 18.51 KG/M2 | DIASTOLIC BLOOD PRESSURE: 90 MMHG

## 2017-04-08 LAB
ANION GAP BLD CALC-SCNC: 13 MMOL/L (ref 3–18)
BASOPHILS # BLD AUTO: 0 K/UL (ref 0–0.06)
BASOPHILS # BLD: 0 % (ref 0–2)
BUN SERPL-MCNC: 10 MG/DL (ref 7–18)
BUN/CREAT SERPL: 15 (ref 12–20)
CALCIUM SERPL-MCNC: 8.4 MG/DL (ref 8.5–10.1)
CHLORIDE SERPL-SCNC: 101 MMOL/L (ref 100–108)
CO2 SERPL-SCNC: 29 MMOL/L (ref 21–32)
CREAT SERPL-MCNC: 0.68 MG/DL (ref 0.6–1.3)
DIFFERENTIAL METHOD BLD: ABNORMAL
EOSINOPHIL # BLD: 0.1 K/UL (ref 0–0.4)
EOSINOPHIL NFR BLD: 1 % (ref 0–5)
ERYTHROCYTE [DISTWIDTH] IN BLOOD BY AUTOMATED COUNT: 14.7 % (ref 11.6–14.5)
GLUCOSE SERPL-MCNC: 60 MG/DL (ref 74–99)
HCT VFR BLD AUTO: 36.9 % (ref 36–48)
HGB BLD-MCNC: 12.7 G/DL (ref 13–16)
LYMPHOCYTES # BLD AUTO: 17 % (ref 21–52)
LYMPHOCYTES # BLD: 1.6 K/UL (ref 0.9–3.6)
MCH RBC QN AUTO: 31.8 PG (ref 24–34)
MCHC RBC AUTO-ENTMCNC: 34.4 G/DL (ref 31–37)
MCV RBC AUTO: 92.3 FL (ref 74–97)
MONOCYTES # BLD: 1.2 K/UL (ref 0.05–1.2)
MONOCYTES NFR BLD AUTO: 12 % (ref 3–10)
NEUTS SEG # BLD: 6.7 K/UL (ref 1.8–8)
NEUTS SEG NFR BLD AUTO: 70 % (ref 40–73)
PLATELET # BLD AUTO: 584 K/UL (ref 135–420)
PMV BLD AUTO: 9.7 FL (ref 9.2–11.8)
POTASSIUM SERPL-SCNC: 3.6 MMOL/L (ref 3.5–5.5)
RBC # BLD AUTO: 4 M/UL (ref 4.7–5.5)
RBC MORPH BLD: ABNORMAL
SODIUM SERPL-SCNC: 143 MMOL/L (ref 136–145)
WBC # BLD AUTO: 9.6 K/UL (ref 4.6–13.2)

## 2017-04-08 PROCEDURE — 74011250636 HC RX REV CODE- 250/636: Performed by: EMERGENCY MEDICINE

## 2017-04-08 PROCEDURE — 85025 COMPLETE CBC W/AUTO DIFF WBC: CPT | Performed by: HOSPITALIST

## 2017-04-08 PROCEDURE — 77030020263 HC SOL INJ SOD CL0.9% LFCR 1000ML

## 2017-04-08 PROCEDURE — 74011250636 HC RX REV CODE- 250/636: Performed by: HOSPITALIST

## 2017-04-08 PROCEDURE — 36415 COLL VENOUS BLD VENIPUNCTURE: CPT | Performed by: HOSPITALIST

## 2017-04-08 PROCEDURE — 80048 BASIC METABOLIC PNL TOTAL CA: CPT | Performed by: HOSPITALIST

## 2017-04-08 RX ORDER — ENOXAPARIN SODIUM 100 MG/ML
INJECTION SUBCUTANEOUS
Status: DISCONTINUED
Start: 2017-04-08 | End: 2017-04-08 | Stop reason: HOSPADM

## 2017-04-08 RX ADMIN — SODIUM CHLORIDE 150 ML/HR: 900 INJECTION, SOLUTION INTRAVENOUS at 01:33

## 2017-04-08 RX ADMIN — ENOXAPARIN SODIUM 40 MG: 40 INJECTION SUBCUTANEOUS at 01:32
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
## 2017-04-09 ENCOUNTER — HOSPITAL ENCOUNTER (INPATIENT)
Age: 72
LOS: 6 days | Discharge: HOME | DRG: 388 | End: 2017-04-15
Attending: EMERGENCY MEDICINE | Admitting: HOSPITALIST
Payer: MEDICARE

## 2017-04-09 ENCOUNTER — APPOINTMENT (OUTPATIENT)
Dept: GENERAL RADIOLOGY | Age: 72
DRG: 388 | End: 2017-04-09
Attending: HOSPITALIST
Payer: MEDICARE

## 2017-04-09 ENCOUNTER — APPOINTMENT (OUTPATIENT)
Dept: GENERAL RADIOLOGY | Age: 72
DRG: 388 | End: 2017-04-09
Attending: EMERGENCY MEDICINE
Payer: MEDICARE

## 2017-04-09 ENCOUNTER — APPOINTMENT (OUTPATIENT)
Dept: CT IMAGING | Age: 72
DRG: 388 | End: 2017-04-09
Attending: EMERGENCY MEDICINE
Payer: MEDICARE

## 2017-04-09 DIAGNOSIS — E87.6 HYPOKALEMIA: Primary | ICD-10-CM

## 2017-04-09 DIAGNOSIS — K56.609 SBO (SMALL BOWEL OBSTRUCTION) (HCC): ICD-10-CM

## 2017-04-09 LAB
ALBUMIN SERPL BCP-MCNC: 3.4 G/DL (ref 3.4–5)
ALBUMIN/GLOB SERPL: 0.8 {RATIO} (ref 0.8–1.7)
ALP SERPL-CCNC: 58 U/L (ref 45–117)
ALT SERPL-CCNC: 16 U/L (ref 16–61)
ANION GAP BLD CALC-SCNC: 14 MMOL/L (ref 3–18)
APPEARANCE UR: ABNORMAL
AST SERPL W P-5'-P-CCNC: 12 U/L (ref 15–37)
ATRIAL RATE: 94 BPM
BACTERIA URNS QL MICRO: ABNORMAL /HPF
BASOPHILS # BLD AUTO: 0 K/UL (ref 0–0.06)
BASOPHILS # BLD: 0 % (ref 0–2)
BILIRUB DIRECT SERPL-MCNC: <0.1 MG/DL (ref 0–0.2)
BILIRUB SERPL-MCNC: 0.4 MG/DL (ref 0.2–1)
BILIRUB UR QL: ABNORMAL
BUN SERPL-MCNC: 10 MG/DL (ref 7–18)
BUN/CREAT SERPL: 10 (ref 12–20)
CALCIUM SERPL-MCNC: 9.6 MG/DL (ref 8.5–10.1)
CALCULATED P AXIS, ECG09: 68 DEGREES
CALCULATED R AXIS, ECG10: -19 DEGREES
CALCULATED T AXIS, ECG11: 60 DEGREES
CHLORIDE SERPL-SCNC: 90 MMOL/L (ref 100–108)
CK MB CFR SERPL CALC: NORMAL % (ref 0–4)
CK MB SERPL-MCNC: <1 NG/ML (ref 5–25)
CK SERPL-CCNC: 64 U/L (ref 39–308)
CO2 SERPL-SCNC: 34 MMOL/L (ref 21–32)
COLOR UR: ABNORMAL
CREAT SERPL-MCNC: 1 MG/DL (ref 0.6–1.3)
DIAGNOSIS, 93000: NORMAL
DIFFERENTIAL METHOD BLD: ABNORMAL
EOSINOPHIL # BLD: 0 K/UL (ref 0–0.4)
EOSINOPHIL NFR BLD: 0 % (ref 0–5)
EPITH CASTS URNS QL MICRO: ABNORMAL /LPF (ref 0–5)
ERYTHROCYTE [DISTWIDTH] IN BLOOD BY AUTOMATED COUNT: 14.1 % (ref 11.6–14.5)
GLOBULIN SER CALC-MCNC: 4.3 G/DL (ref 2–4)
GLUCOSE SERPL-MCNC: 134 MG/DL (ref 74–99)
GLUCOSE UR STRIP.AUTO-MCNC: NEGATIVE MG/DL
HCT VFR BLD AUTO: 41.2 % (ref 36–48)
HGB BLD-MCNC: 14.4 G/DL (ref 13–16)
HGB UR QL STRIP: NEGATIVE
HYALINE CASTS URNS QL MICRO: ABNORMAL /LPF (ref 0–2)
KETONES UR QL STRIP.AUTO: 40 MG/DL
LEUKOCYTE ESTERASE UR QL STRIP.AUTO: NEGATIVE
LYMPHOCYTES # BLD AUTO: 10 % (ref 21–52)
LYMPHOCYTES # BLD: 1.3 K/UL (ref 0.9–3.6)
MAGNESIUM SERPL-MCNC: 2 MG/DL (ref 1.6–2.6)
MCH RBC QN AUTO: 31.6 PG (ref 24–34)
MCHC RBC AUTO-ENTMCNC: 35 G/DL (ref 31–37)
MCV RBC AUTO: 90.4 FL (ref 74–97)
MONOCYTES # BLD: 1.9 K/UL (ref 0.05–1.2)
MONOCYTES NFR BLD AUTO: 14 % (ref 3–10)
MUCOUS THREADS URNS QL MICRO: ABNORMAL /LPF
NEUTS SEG # BLD: 10.5 K/UL (ref 1.8–8)
NEUTS SEG NFR BLD AUTO: 76 % (ref 40–73)
NITRITE UR QL STRIP.AUTO: NEGATIVE
P-R INTERVAL, ECG05: 144 MS
PH UR STRIP: 5 [PH] (ref 5–8)
PLATELET # BLD AUTO: 588 K/UL (ref 135–420)
PMV BLD AUTO: 9.3 FL (ref 9.2–11.8)
POTASSIUM SERPL-SCNC: 2.7 MMOL/L (ref 3.5–5.5)
PROT SERPL-MCNC: 7.7 G/DL (ref 6.4–8.2)
PROT UR STRIP-MCNC: 100 MG/DL
Q-T INTERVAL, ECG07: 380 MS
QRS DURATION, ECG06: 84 MS
QTC CALCULATION (BEZET), ECG08: 475 MS
RBC # BLD AUTO: 4.56 M/UL (ref 4.7–5.5)
RBC #/AREA URNS HPF: NEGATIVE /HPF (ref 0–5)
SODIUM SERPL-SCNC: 138 MMOL/L (ref 136–145)
SP GR UR REFRACTOMETRY: >1.03 (ref 1–1.03)
TROPONIN I SERPL-MCNC: <0.02 NG/ML (ref 0–0.04)
TSH SERPL DL<=0.05 MIU/L-ACNC: 1.88 UIU/ML (ref 0.36–3.74)
UROBILINOGEN UR QL STRIP.AUTO: 1 EU/DL (ref 0.2–1)
VENTRICULAR RATE, ECG03: 94 BPM
WBC # BLD AUTO: 13.9 K/UL (ref 4.6–13.2)
WBC URNS QL MICRO: ABNORMAL /HPF (ref 0–4)

## 2017-04-09 PROCEDURE — 80048 BASIC METABOLIC PNL TOTAL CA: CPT | Performed by: EMERGENCY MEDICINE

## 2017-04-09 PROCEDURE — 71010 XR CHEST PORT: CPT

## 2017-04-09 PROCEDURE — 85025 COMPLETE CBC W/AUTO DIFF WBC: CPT | Performed by: EMERGENCY MEDICINE

## 2017-04-09 PROCEDURE — 77030008771 HC TU NG SALEM SUMP -A

## 2017-04-09 PROCEDURE — 0D9670Z DRAINAGE OF STOMACH WITH DRAINAGE DEVICE, VIA NATURAL OR ARTIFICIAL OPENING: ICD-10-PCS | Performed by: EMERGENCY MEDICINE

## 2017-04-09 PROCEDURE — 74011250636 HC RX REV CODE- 250/636: Performed by: HOSPITALIST

## 2017-04-09 PROCEDURE — 74011636320 HC RX REV CODE- 636/320: Performed by: EMERGENCY MEDICINE

## 2017-04-09 PROCEDURE — 74177 CT ABD & PELVIS W/CONTRAST: CPT

## 2017-04-09 PROCEDURE — 80076 HEPATIC FUNCTION PANEL: CPT | Performed by: EMERGENCY MEDICINE

## 2017-04-09 PROCEDURE — 83735 ASSAY OF MAGNESIUM: CPT | Performed by: HOSPITALIST

## 2017-04-09 PROCEDURE — 93005 ELECTROCARDIOGRAM TRACING: CPT

## 2017-04-09 PROCEDURE — 84443 ASSAY THYROID STIM HORMONE: CPT | Performed by: EMERGENCY MEDICINE

## 2017-04-09 PROCEDURE — 99285 EMERGENCY DEPT VISIT HI MDM: CPT

## 2017-04-09 PROCEDURE — 82550 ASSAY OF CK (CPK): CPT | Performed by: EMERGENCY MEDICINE

## 2017-04-09 PROCEDURE — 81001 URINALYSIS AUTO W/SCOPE: CPT | Performed by: EMERGENCY MEDICINE

## 2017-04-09 PROCEDURE — 96365 THER/PROPH/DIAG IV INF INIT: CPT

## 2017-04-09 PROCEDURE — 74011636320 HC RX REV CODE- 636/320: Performed by: HOSPITALIST

## 2017-04-09 PROCEDURE — 74000 XR ABD PORT  1 V: CPT

## 2017-04-09 PROCEDURE — 74011250636 HC RX REV CODE- 250/636: Performed by: EMERGENCY MEDICINE

## 2017-04-09 PROCEDURE — 74000 XR ABD (KUB): CPT

## 2017-04-09 PROCEDURE — 65270000029 HC RM PRIVATE

## 2017-04-09 RX ORDER — HEPARIN SODIUM 5000 [USP'U]/ML
5000 INJECTION, SOLUTION INTRAVENOUS; SUBCUTANEOUS EVERY 8 HOURS
Status: DISCONTINUED | OUTPATIENT
Start: 2017-04-09 | End: 2017-04-15 | Stop reason: HOSPADM

## 2017-04-09 RX ORDER — POTASSIUM CHLORIDE 7.45 MG/ML
10 INJECTION INTRAVENOUS
Status: COMPLETED | OUTPATIENT
Start: 2017-04-09 | End: 2017-04-09

## 2017-04-09 RX ORDER — POTASSIUM CHLORIDE, DEXTROSE MONOHYDRATE AND SODIUM CHLORIDE 300; 5; 900 MG/100ML; G/100ML; MG/100ML
INJECTION, SOLUTION INTRAVENOUS CONTINUOUS
Status: DISPENSED | OUTPATIENT
Start: 2017-04-09 | End: 2017-04-10

## 2017-04-09 RX ORDER — POTASSIUM CHLORIDE 7.45 MG/ML
10 INJECTION INTRAVENOUS
Status: COMPLETED | OUTPATIENT
Start: 2017-04-09 | End: 2017-04-10

## 2017-04-09 RX ORDER — ONDANSETRON 2 MG/ML
4 INJECTION INTRAMUSCULAR; INTRAVENOUS
Status: DISCONTINUED | OUTPATIENT
Start: 2017-04-09 | End: 2017-04-15 | Stop reason: HOSPADM

## 2017-04-09 RX ORDER — MORPHINE SULFATE 2 MG/ML
2 INJECTION, SOLUTION INTRAMUSCULAR; INTRAVENOUS
Status: DISCONTINUED | OUTPATIENT
Start: 2017-04-09 | End: 2017-04-15 | Stop reason: HOSPADM

## 2017-04-09 RX ORDER — LIDOCAINE HYDROCHLORIDE 20 MG/ML
JELLY TOPICAL AS NEEDED
Status: DISCONTINUED | OUTPATIENT
Start: 2017-04-09 | End: 2017-04-15 | Stop reason: HOSPADM

## 2017-04-09 RX ORDER — SODIUM CHLORIDE 9 MG/ML
100 INJECTION, SOLUTION INTRAVENOUS CONTINUOUS
Status: DISCONTINUED | OUTPATIENT
Start: 2017-04-09 | End: 2017-04-09

## 2017-04-09 RX ORDER — ENOXAPARIN SODIUM 100 MG/ML
40 INJECTION SUBCUTANEOUS EVERY 24 HOURS
Status: DISCONTINUED | OUTPATIENT
Start: 2017-04-09 | End: 2017-04-09

## 2017-04-09 RX ADMIN — IOPAMIDOL 100 ML: 612 INJECTION, SOLUTION INTRAVENOUS at 10:58

## 2017-04-09 RX ADMIN — MORPHINE SULFATE 2 MG: 2 INJECTION, SOLUTION INTRAMUSCULAR; INTRAVENOUS at 18:38

## 2017-04-09 RX ADMIN — HEPARIN SODIUM 5000 UNITS: 5000 INJECTION, SOLUTION INTRAVENOUS; SUBCUTANEOUS at 18:42

## 2017-04-09 RX ADMIN — IOHEXOL 50 ML: 240 INJECTION, SOLUTION INTRATHECAL; INTRAVASCULAR; INTRAVENOUS; ORAL at 08:41

## 2017-04-09 RX ADMIN — POTASSIUM CHLORIDE 10 MEQ: 7.46 INJECTION, SOLUTION INTRAVENOUS at 21:44

## 2017-04-09 RX ADMIN — POTASSIUM CHLORIDE 10 MEQ: 7.46 INJECTION, SOLUTION INTRAVENOUS at 06:52

## 2017-04-09 RX ADMIN — POTASSIUM CHLORIDE 10 MEQ: 7.46 INJECTION, SOLUTION INTRAVENOUS at 20:13

## 2017-04-09 RX ADMIN — POTASSIUM CHLORIDE 10 MEQ: 7.46 INJECTION, SOLUTION INTRAVENOUS at 18:42

## 2017-04-09 RX ADMIN — POTASSIUM CHLORIDE 10 MEQ: 7.46 INJECTION, SOLUTION INTRAVENOUS at 23:07

## 2017-04-09 RX ADMIN — POTASSIUM CHLORIDE 10 MEQ: 7.46 INJECTION, SOLUTION INTRAVENOUS at 08:17

## 2017-04-09 RX ADMIN — POTASSIUM CHLORIDE, DEXTROSE MONOHYDRATE AND SODIUM CHLORIDE: 300; 5; 900 INJECTION, SOLUTION INTRAVENOUS at 20:21

## 2017-04-09 RX ADMIN — SODIUM CHLORIDE 100 ML/HR: 900 INJECTION, SOLUTION INTRAVENOUS at 11:40

## 2017-04-09 NOTE — ROUTINE PROCESS
Bedside and Verbal shift change report given to Greg Swan (oncoming nurse) by Rebekah Garcia RN   (offgoing nurse). Report included the following information SBAR, Kardex, Intake/Output and Recent Results.

## 2017-04-09 NOTE — PROGRESS NOTES
1940  I took  Morphine 1 mg from pyxis, initial  Count 15, remaining should be 14, but pyxis written I created a discrepancy, when I check the discrepancy there should be 20 , but the actual  Is only 15, I called pharmacist spoke to [place] and Esthela Oleary, pharmacist said that the ana rosa Simpson refill the pyxis yesterday 04/08/2017, with ( 5 ) Morphine 2mg, added to the beginning count of 10, with a total 15, but per Esthela Oleary, the tech accidentally pressed 10 Morphine, making count of 20, so the pharmacy aware of the discrepancy as of yesterday but it was not resolve, ana rosa Simpson to resolve in AM per Esthela Oleary.

## 2017-04-09 NOTE — ED PROVIDER NOTES
HPI Comments: 5:23 AM Shay Sim is a 73 y.o. male who presents to the ED, via EMS, for evaluation of AMS. Per EMS, pt's wife called because pt was more confused than normal and not, \"acting like himself. \" Pt was admitted to the hospital for bowel obstruction but left AMA this week. Pt does not remember home phone number in the ED. Pt did have abd pain but denies any currently in the ED. There are no other sx or complaints. Past Medical History:   Diagnosis Date    Hypertension        Past Surgical History:   Procedure Laterality Date    APPENDECTOMY      HX APPENDECTOMY      HX COLONOSCOPY  1/28/2015    Repeat colonoscopy in 5 yrs per Dr. Neo Guerra         Family History:   Problem Relation Age of Onset    Heart Disease Mother     Cancer Father     Cancer Sister     Diabetes Sister        Social History     Social History    Marital status:      Spouse name: N/A    Number of children: N/A    Years of education: N/A     Occupational History    Not on file. Social History Main Topics    Smoking status: Current Every Day Smoker     Packs/day: 1.00     Years: 50.00     Types: Cigarettes    Smokeless tobacco: Never Used    Alcohol use Yes      Comment: 2 beers aday    Drug use: Not on file    Sexual activity: Not on file     Other Topics Concern    Not on file     Social History Narrative         ALLERGIES: Review of patient's allergies indicates no known allergies. Review of Systems   Constitutional: Negative for appetite change, chills, diaphoresis, fatigue, fever and unexpected weight change. HENT: Negative for congestion, dental problem, ear discharge, ear pain, hearing loss, nosebleeds, rhinorrhea, sinus pressure, sore throat, tinnitus, trouble swallowing and voice change. Eyes: Negative for photophobia, pain, discharge, redness and visual disturbance. Respiratory: Negative for cough, choking, chest tightness, shortness of breath, wheezing and stridor. Cardiovascular: Negative for chest pain, palpitations and leg swelling. Gastrointestinal: Positive for abdominal pain. Negative for abdominal distention, anal bleeding, blood in stool, constipation, diarrhea, nausea and vomiting. Genitourinary: Negative for decreased urine volume, difficulty urinating, discharge, dysuria, flank pain, frequency, genital sores, hematuria, penile pain, penile swelling, scrotal swelling, testicular pain and urgency. Musculoskeletal: Negative for back pain, neck pain and neck stiffness. Skin: Negative for rash. Neurological: Negative for dizziness, tremors, seizures, syncope, speech difficulty, weakness, light-headedness and headaches. Hematological: Negative for adenopathy. Does not bruise/bleed easily. Psychiatric/Behavioral: Positive for confusion. Negative for agitation, behavioral problems, hallucinations, self-injury, sleep disturbance and suicidal ideas. The patient is not nervous/anxious and is not hyperactive. All other systems reviewed and are negative. Vitals:    04/09/17 0454   BP: 161/82   Pulse: 100   Resp: 14   Temp: 98.6 °F (37 °C)   SpO2: 95%   Weight: 56.7 kg (125 lb)            Physical Exam   Constitutional: He is oriented to person, place, and time. He appears well-developed and well-nourished. He appears distressed. 73year old  male in moderate distress. HENT:   Head: Normocephalic and atraumatic. Right Ear: External ear normal.   Left Ear: External ear normal.   Nose: Nose normal.   Mouth/Throat: Oropharynx is clear and moist. No oropharyngeal exudate. Eyes: Conjunctivae and EOM are normal. Pupils are equal, round, and reactive to light. Right eye exhibits no discharge. Left eye exhibits no discharge. No scleral icterus. Neck: Normal range of motion. Neck supple. No JVD present. No tracheal deviation present. No thyromegaly present.    Cardiovascular: Normal rate, regular rhythm, normal heart sounds and intact distal pulses. Exam reveals no gallop and no friction rub. No murmur heard. Pulmonary/Chest: Effort normal and breath sounds normal. No stridor. No respiratory distress. He has no wheezes. He has no rales. He exhibits no tenderness. Abdominal: Soft. Bowel sounds are normal. He exhibits distension. He exhibits no mass. There is no tenderness. There is no rebound and no guarding. Genitourinary: Penis normal.   Musculoskeletal: Normal range of motion. He exhibits no edema, tenderness or deformity. Lymphadenopathy:     He has no cervical adenopathy. Neurological: He is alert and oriented to person, place, and time. No cranial nerve deficit. He exhibits normal muscle tone. Coordination normal.   Skin: Skin is warm and dry. No rash noted. He is not diaphoretic. No erythema. No pallor. Psychiatric: He has a normal mood and affect. His behavior is normal. Judgment and thought content normal.   Nursing note and vitals reviewed. MDM  Number of Diagnoses or Management Options  Hypokalemia:   SBO (small bowel obstruction) [facility]):   Diagnosis management comments: Patient left AMA for the 2nd time in 1 week. Recently admitted for SBO. Labs and KUB ordered and noted below. He will require repeat admission.      Consulted Hospitalist and General Surgery       Amount and/or Complexity of Data Reviewed  Clinical lab tests: ordered and reviewed  Tests in the radiology section of CPT®: ordered and reviewed  Tests in the medicine section of CPT®: ordered and reviewed  Discussion of test results with the performing providers: yes  Decide to obtain previous medical records or to obtain history from someone other than the patient: yes  Obtain history from someone other than the patient: yes  Review and summarize past medical records: yes  Discuss the patient with other providers: yes  Independent visualization of images, tracings, or specimens: yes    Risk of Complications, Morbidity, and/or Mortality  Presenting problems: high  Diagnostic procedures: high  Management options: high    Patient Progress  Patient progress: stable    ED Course       Procedures      Vitals:  Patient Vitals for the past 12 hrs:   Temp Pulse Resp BP SpO2   04/09/17 0454 98.6 °F (37 °C) 100 14 161/82 95 %       Medications ordered:   Medications   potassium chloride 10 mEq in 100 ml IVPB (0 mEq IntraVENous IV Completed 4/9/17 0807)         Lab findings:  Recent Results (from the past 12 hour(s))   EKG, 12 LEAD, INITIAL    Collection Time: 04/09/17  4:49 AM   Result Value Ref Range    Ventricular Rate 94 BPM    Atrial Rate 94 BPM    P-R Interval 144 ms    QRS Duration 84 ms    Q-T Interval 380 ms    QTC Calculation (Bezet) 475 ms    Calculated P Axis 68 degrees    Calculated R Axis -19 degrees    Calculated T Axis 60 degrees    Diagnosis       Normal sinus rhythm  Possible Inferior infarct , age undetermined  Abnormal ECG  When compared with ECG of 28-MAR-2017 20:39,  Criteria for Septal infarct are no longer present  T wave amplitude has decreased in Anterior leads     METABOLIC PANEL, BASIC    Collection Time: 04/09/17  4:57 AM   Result Value Ref Range    Sodium 138 136 - 145 mmol/L    Potassium 2.7 (LL) 3.5 - 5.5 mmol/L    Chloride 90 (L) 100 - 108 mmol/L    CO2 34 (H) 21 - 32 mmol/L    Anion gap 14 3.0 - 18 mmol/L    Glucose 134 (H) 74 - 99 mg/dL    BUN 10 7.0 - 18 MG/DL    Creatinine 1.00 0.6 - 1.3 MG/DL    BUN/Creatinine ratio 10 (L) 12 - 20      GFR est AA >60 >60 ml/min/1.73m2    GFR est non-AA >60 >60 ml/min/1.73m2    Calcium 9.6 8.5 - 10.1 MG/DL   CBC WITH AUTOMATED DIFF    Collection Time: 04/09/17  4:57 AM   Result Value Ref Range    WBC 13.9 (H) 4.6 - 13.2 K/uL    RBC 4.56 (L) 4.70 - 5.50 M/uL    HGB 14.4 13.0 - 16.0 g/dL    HCT 41.2 36.0 - 48.0 %    MCV 90.4 74.0 - 97.0 FL    MCH 31.6 24.0 - 34.0 PG    MCHC 35.0 31.0 - 37.0 g/dL    RDW 14.1 11.6 - 14.5 %    PLATELET 334 (H) 428 - 420 K/uL    MPV 9.3 9.2 - 11.8 FL    NEUTROPHILS 76 (H) 40 - 73 %    LYMPHOCYTES 10 (L) 21 - 52 %    MONOCYTES 14 (H) 3 - 10 %    EOSINOPHILS 0 0 - 5 %    BASOPHILS 0 0 - 2 %    ABS. NEUTROPHILS 10.5 (H) 1.8 - 8.0 K/UL    ABS. LYMPHOCYTES 1.3 0.9 - 3.6 K/UL    ABS. MONOCYTES 1.9 (H) 0.05 - 1.2 K/UL    ABS. EOSINOPHILS 0.0 0.0 - 0.4 K/UL    ABS. BASOPHILS 0.0 0.0 - 0.06 K/UL    DF AUTOMATED     CARDIAC PANEL,(CK, CKMB & TROPONIN)    Collection Time: 04/09/17  4:57 AM   Result Value Ref Range    CK 64 39 - 308 U/L    CK - MB <1.0 <3.6 ng/ml    CK-MB Index Cannot be calulated 0.0 - 4.0 %    Troponin-I, Qt. <0.02 0.0 - 0.045 NG/ML   HEPATIC FUNCTION PANEL    Collection Time: 04/09/17  4:57 AM   Result Value Ref Range    Protein, total 7.7 6.4 - 8.2 g/dL    Albumin 3.4 3.4 - 5.0 g/dL    Globulin 4.3 (H) 2.0 - 4.0 g/dL    A-G Ratio 0.8 0.8 - 1.7      Bilirubin, total 0.4 0.2 - 1.0 MG/DL    Bilirubin, direct <0.1 0.0 - 0.2 MG/DL    Alk.  phosphatase 58 45 - 117 U/L    AST (SGOT) 12 (L) 15 - 37 U/L    ALT (SGPT) 16 16 - 61 U/L   TSH 3RD GENERATION    Collection Time: 04/09/17  4:57 AM   Result Value Ref Range    TSH 1.88 0.36 - 3.74 uIU/mL   URINALYSIS W/ RFLX MICROSCOPIC    Collection Time: 04/09/17  6:05 AM   Result Value Ref Range    Color DARK YELLOW      Appearance CLOUDY      Specific gravity >1.030 (H) 1.005 - 1.030    pH (UA) 5.0 5.0 - 8.0      Protein 100 (A) NEG mg/dL    Glucose NEGATIVE  NEG mg/dL    Ketone 40 (A) NEG mg/dL    Bilirubin MODERATE (A) NEG      Blood NEGATIVE  NEG      Urobilinogen 1.0 0.2 - 1.0 EU/dL    Nitrites NEGATIVE  NEG      Leukocyte Esterase NEGATIVE  NEG     URINE MICROSCOPIC ONLY    Collection Time: 04/09/17  6:05 AM   Result Value Ref Range    WBC 0 to 3 0 - 4 /hpf    RBC NEGATIVE  0 - 5 /hpf    Epithelial cells 1+ 0 - 5 /lpf    Bacteria 3+ (A) NEG /hpf    Mucus 4+ (A) NEG /lpf    Hyaline cast 4 to 10 0 - 2 /lpf       X-Ray, CT or other radiology findings or impressions:  XR CHEST PORT  No acute process   XR ABD (KUB)    Multiple dilated bowel loops consistent with bowel obstruction   CT ABD PELV W CONT    (Results Pending)       Progress notes, Consult notes or additional Procedure notes:   6:02 AM Updated wife on pt's labs and status regarding pt's low potassium     8:10 AM Consult:  Discussed care with Dr. Elida Gallardo (Surgery) Standard discussion; including history of patients chief complaint, available diagnostic results, and treatment course. Dr. Elida Gallardo recommended repeat CTA. Disposition:  Diagnosis:   1. Hypokalemia    2. SBO (small bowel obstruction) (HCC)        Disposition:    Follow-up Information     None           Patient's Medications   Start Taking    No medications on file   Continue Taking    ASPIRIN DELAYED-RELEASE 81 MG TABLET    Take 1 Tab by mouth daily. CHOLECALCIFEROL (VITAMIN D3) 1,000 UNIT TABLET    Take  by mouth daily. HYDROCHLOROTHIAZIDE (HYDRODIURIL) 25 MG TABLET    Take 25 mg by mouth daily. HYDROCODONE-ACETAMINOPHEN (NORCO) 5-325 MG PER TABLET    Take 1 Tab by mouth every six (6) hours as needed for Pain. Max Daily Amount: 4 Tabs. ONDANSETRON (ZOFRAN ODT) 4 MG DISINTEGRATING TABLET    Take 1 Tab by mouth every eight (8) hours as needed for Nausea. TADALAFIL (CIALIS) 5 MG TABLET    Take 5 mg by mouth daily as needed. These Medications have changed    No medications on file   Stop Taking    No medications on file         Scribe Attestation:   I, Astrid Yoder am scribing for and in the presence of Alexandra Licona DO on [facility]    Provider Attestation:  I personally performed the services described in the documentation, reviewed the documentation, as recorded by the scribe in my presence, and it accurately and completely records my words and actions.   Alexandra Licona DO. 5:22 AM      Signed by: Jossue Prakash, 5:22 AM

## 2017-04-09 NOTE — ED NOTES
Patient oriented to self and knows his wifes name and his . Patient unable to verbalize where he is at and is unable to verbalize the year.

## 2017-04-09 NOTE — PROGRESS NOTES
General Surgery Consult      Cata Rajan  Admit date: 2017    MRN: [id number]     : 1945     Age: 73 y.o. Attending Physician: Aleida Arauz MD, FACS      Subjective:     Cata Rajan is a 73 y.o. male who presented with abdominal pain and a picture of bowel obstruction. The patient had left AMA twice in the last week. This is his third admission. He has nausea and vomiting and abdominal pain. His CT scan showed  transition point. Right now the patient said he has no pain and he feels good. A CT scan of abdomen and pelvis showed SBO with a transition point.     Patient Active Problem List    Diagnosis Date Noted    SBO (small bowel obstruction) ([address] 2017    HTN (hypertension) 2017    URI (upper respiratory infection) 2017     Past Medical History:   Diagnosis Date    Hypertension       Past Surgical History:   Procedure Laterality Date    APPENDECTOMY      HX APPENDECTOMY      HX COLONOSCOPY  2015    Repeat colonoscopy in 5 yrs per Dr. Leobardo Hsu History   Substance Use Topics    Smoking status: Current Every Day Smoker     Packs/day: 1.00     Years: 50.00     Types: Cigarettes    Smokeless tobacco: Never Used    Alcohol use Yes      Comment: 2 beers aday      History   Smoking Status    Current Every Day Smoker    Packs/day: 1.00    Years: 50.00    Types: Cigarettes   Smokeless Tobacco    Never Used     Family History   Problem Relation Age of Onset    Heart Disease Mother     Cancer Father     Cancer Sister     Diabetes Sister       Current Facility-Administered Medications   Medication Dose Route Frequency    iohexol (OMNIPAQUE) solution 50 mL  50 mL Oral ONCE    benzocaine (HURRICAINE) 20 % spray   Mucous Membrane PRN    lidocaine (XYLOCAINE) 2 % jelly   Mucous Membrane PRN    morphine injection 2 mg  2 mg IntraVENous Q4H PRN    ondansetron (ZOFRAN) injection 4 mg  4 mg IntraVENous Q4H PRN    heparin (porcine) injection 5,000 Units  5,000 Units SubCUTAneous Q8H    dextrose 5% - 0.9% NaCl with KCl 40 mEq/L infusion   IntraVENous CONTINUOUS    potassium chloride 10 mEq in 100 ml IVPB  10 mEq IntraVENous Q1H      No Known Allergies     Review of Systems:  Pertinent items are noted in the History of Present Illness. Objective:     Visit Vitals    /81 (BP 1 Location: Left arm, BP Patient Position: At rest)    Pulse 84    Temp 97.9 °F (36.6 °C)    Resp 16    Wt 56.7 kg (125 lb)    SpO2 93%    BMI 18.73 kg/m2       Physical Exam:      General:  in no apparent distress   Eyes:  conjunctivae and sclerae normal, pupils equal, round, reactive to light   Throat & Neck: no erythema or exudates noted and neck supple and symmetrical; no palpable masses   Lungs:   clear to auscultation bilaterally   Heart:  Regular rate and rhythm   Abdomen:   flat, soft, nontender, nondistended, no masses or organomegaly.    Extremities: extremities normal, atraumatic, no cyanosis or edema   Skin: Normal.         Imaging and Lab Review:     CBC:   Lab Results   Component Value Date/Time    WBC 13.9 04/09/2017 04:57 AM    RBC 4.56 04/09/2017 04:57 AM    HGB 14.4 04/09/2017 04:57 AM    HCT 41.2 04/09/2017 04:57 AM    PLATELET 487 94/04/4531 04:57 AM     BMP:   Lab Results   Component Value Date/Time    Glucose 134 04/09/2017 04:57 AM    Sodium 138 04/09/2017 04:57 AM    Potassium 2.7 04/09/2017 04:57 AM    Chloride 90 04/09/2017 04:57 AM    CO2 34 04/09/2017 04:57 AM    BUN 10 04/09/2017 04:57 AM    Creatinine 1.00 04/09/2017 04:57 AM    Calcium 9.6 04/09/2017 04:57 AM     CMP:  Lab Results   Component Value Date/Time    Glucose 134 04/09/2017 04:57 AM    Sodium 138 04/09/2017 04:57 AM    Potassium 2.7 04/09/2017 04:57 AM    Chloride 90 04/09/2017 04:57 AM    CO2 34 04/09/2017 04:57 AM    BUN 10 04/09/2017 04:57 AM    Creatinine 1.00 04/09/2017 04:57 AM    Calcium 9.6 04/09/2017 04:57 AM    Anion gap 14 04/09/2017 04:57 AM    BUN/Creatinine ratio 10 04/09/2017 04:57 AM    Alk. phosphatase 58 04/09/2017 04:57 AM    Protein, total 7.7 04/09/2017 04:57 AM    Albumin 3.4 04/09/2017 04:57 AM    Globulin 4.3 04/09/2017 04:57 AM    A-G Ratio 0.8 04/09/2017 04:57 AM       Recent Results (from the past 24 hour(s))   EKG, 12 LEAD, INITIAL    Collection Time: 04/09/17  4:49 AM   Result Value Ref Range    Ventricular Rate 94 BPM    Atrial Rate 94 BPM    P-R Interval 144 ms    QRS Duration 84 ms    Q-T Interval 380 ms    QTC Calculation (Bezet) 475 ms    Calculated P Axis 68 degrees    Calculated R Axis -19 degrees    Calculated T Axis 60 degrees    Diagnosis       Normal sinus rhythm  When compared with ECG of 28-MAR-2017 20:39,  Criteria for Septal infarct are no longer present    Confirmed by Lin Essex ([phone]) on 4/9/2017 8:53:29 AM     METABOLIC PANEL, BASIC    Collection Time: 04/09/17  4:57 AM   Result Value Ref Range    Sodium 138 136 - 145 mmol/L    Potassium 2.7 (LL) 3.5 - 5.5 mmol/L    Chloride 90 (L) 100 - 108 mmol/L    CO2 34 (H) 21 - 32 mmol/L    Anion gap 14 3.0 - 18 mmol/L    Glucose 134 (H) 74 - 99 mg/dL    BUN 10 7.0 - 18 MG/DL    Creatinine 1.00 0.6 - 1.3 MG/DL    BUN/Creatinine ratio 10 (L) 12 - 20      GFR est AA >60 >60 ml/min/1.73m2    GFR est non-AA >60 >60 ml/min/1.73m2    Calcium 9.6 8.5 - 10.1 MG/DL   CBC WITH AUTOMATED DIFF    Collection Time: 04/09/17  4:57 AM   Result Value Ref Range    WBC 13.9 (H) 4.6 - 13.2 K/uL    RBC 4.56 (L) 4.70 - 5.50 M/uL    HGB 14.4 13.0 - 16.0 g/dL    HCT 41.2 36.0 - 48.0 %    MCV 90.4 74.0 - 97.0 FL    MCH 31.6 24.0 - 34.0 PG    MCHC 35.0 31.0 - 37.0 g/dL    RDW 14.1 11.6 - 14.5 %    PLATELET 595 (H) 665 - 420 K/uL    MPV 9.3 9.2 - 11.8 FL    NEUTROPHILS 76 (H) 40 - 73 %    LYMPHOCYTES 10 (L) 21 - 52 %    MONOCYTES 14 (H) 3 - 10 %    EOSINOPHILS 0 0 - 5 %    BASOPHILS 0 0 - 2 %    ABS. NEUTROPHILS 10.5 (H) 1.8 - 8.0 K/UL    ABS. LYMPHOCYTES 1.3 0.9 - 3.6 K/UL    ABS. MONOCYTES 1.9 (H) 0.05 - 1.2 K/UL    ABS. EOSINOPHILS 0.0 0.0 - 0.4 K/UL    ABS. BASOPHILS 0.0 0.0 - 0.06 K/UL    DF AUTOMATED     CARDIAC PANEL,(CK, CKMB & TROPONIN)    Collection Time: 04/09/17  4:57 AM   Result Value Ref Range    CK 64 39 - 308 U/L    CK - MB <1.0 <3.6 ng/ml    CK-MB Index Cannot be calulated 0.0 - 4.0 %    Troponin-I, Qt. <0.02 0.0 - 0.045 NG/ML   HEPATIC FUNCTION PANEL    Collection Time: 04/09/17  4:57 AM   Result Value Ref Range    Protein, total 7.7 6.4 - 8.2 g/dL    Albumin 3.4 3.4 - 5.0 g/dL    Globulin 4.3 (H) 2.0 - 4.0 g/dL    A-G Ratio 0.8 0.8 - 1.7      Bilirubin, total 0.4 0.2 - 1.0 MG/DL    Bilirubin, direct <0.1 0.0 - 0.2 MG/DL    Alk. phosphatase 58 45 - 117 U/L    AST (SGOT) 12 (L) 15 - 37 U/L    ALT (SGPT) 16 16 - 61 U/L   TSH 3RD GENERATION    Collection Time: 04/09/17  4:57 AM   Result Value Ref Range    TSH 1.88 0.36 - 3.74 uIU/mL   MAGNESIUM    Collection Time: 04/09/17  4:57 AM   Result Value Ref Range    Magnesium 2.0 1.6 - 2.6 mg/dL   URINALYSIS W/ RFLX MICROSCOPIC    Collection Time: 04/09/17  6:05 AM   Result Value Ref Range    Color DARK YELLOW      Appearance CLOUDY      Specific gravity >1.030 (H) 1.005 - 1.030    pH (UA) 5.0 5.0 - 8.0      Protein 100 (A) NEG mg/dL    Glucose NEGATIVE  NEG mg/dL    Ketone 40 (A) NEG mg/dL    Bilirubin MODERATE (A) NEG      Blood NEGATIVE  NEG      Urobilinogen 1.0 0.2 - 1.0 EU/dL    Nitrites NEGATIVE  NEG      Leukocyte Esterase NEGATIVE  NEG     URINE MICROSCOPIC ONLY    Collection Time: 04/09/17  6:05 AM   Result Value Ref Range    WBC 0 to 3 0 - 4 /hpf    RBC NEGATIVE  0 - 5 /hpf    Epithelial cells 1+ 0 - 5 /lpf    Bacteria 3+ (A) NEG /hpf    Mucus 4+ (A) NEG /lpf    Hyaline cast 4 to 10 0 - 2 /lpf       images and reports reviewed    Assessment:   Chavez Turk is a 73 y.o. male is presenting with abdominal pain and a picture of bowel obstruction. I think the patient needs exploratory laparotomy for his bowel obstruction.  But the patient is not sure and would like to wait. I am concerned that he will leave AMA again. That being said, he is feeling better, and his abdomen is completely soft and non-tender. So may be we can give it a day or 2, but I doubt he will open up. Plan:     NPO  IVF for hydration  Correction of any electrolytes abnormalities especially low Potassium and Magnesium  NG tube placement for decompression to low intermittent suction  Daily labs including CBC and CMP  DVT prophylaxis  Ambulation as tolerated  Possible surgery in the next 1 to 2 days.     Please call me if you have any questions (cell phone: [phone])     Signed By: Bela Loya MD     April 9, 2017
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
## 2017-04-09 NOTE — ED NOTES
Bedside report given to [facility], RN. Potassium rate verified in MAR. Pt denies any needs, VSS. Will continue to monitor.

## 2017-04-09 NOTE — IP AVS SNAPSHOT
Thomas Pruett 
 
 
 [address] [place] [address] Patient: Jose E Correia MRN: [id number] :1945 You are allergic to the following No active allergies Recent Documentation Height Weight BMI Smoking Status 1.74 m 56.7 kg 18.73 kg/m2 Current Every Day Smoker Emergency Contacts Name Discharge Info Relation Home Work Mobile Bibiana Whitney  Spouse [3] [phone] Kaitlynn Roca  Spouse [3] [phone] About your hospitalization You were admitted on:  2017 You last received care in the:  [address] [place] [address],[address] You were discharged on:  April 15, 2017 Unit phone number:  [phone] Why you were hospitalized Your primary diagnosis was:  Not on File Your diagnoses also included:  Sbo (Small Bowel Obstruction) (Hcc) Providers Seen During Your Hospitalizations Provider Role Specialty Primary office phone Keya Gaviria DO Attending Provider Emergency Medicine [phone] Milan Shultz DO Attending Provider Internal Medicine [phone] Joseph Narayan MD Attending Provider [facility] [phone] Lisa Gibson DO Attending Provider Internal Medicine [phone] Your Primary Care Physician (PCP) Primary Care Physician Office Phone Office Fax [address], [phone] Follow-up Information Follow up With Details Comments Contact Info Cindy Manuel MD Schedule an appointment as soon as possible for a visit in 1 week  [address] [facility] [address] [facility] [address] 
[phone] Current Discharge Medication List  
  
CONTINUE these medications which have NOT CHANGED Dose & Instructions Dispensing Information Comments Morning Noon Evening Bedtime  
 aspirin delayed-release 81 mg tablet Dose:  81 mg Take 1 Tab by mouth daily. Quantity:  90 Tab Refills:  3  
     
  
   
   
   
  
 hydroCHLOROthiazide 25 mg tablet Commonly known as:  HYDRODIURIL Dose:  25 mg Take 25 mg by mouth daily. Refills:  0 HYDROcodone-acetaminophen 5-325 mg per tablet Commonly known as:  Lynnda Levo Dose:  1 Tab Take 1 Tab by mouth every six (6) hours as needed for Pain. Max Daily Amount: 4 Tabs. Quantity:  6 Tab Refills:  0  
     
   
   
   
  
 ondansetron 4 mg disintegrating tablet Commonly known as:  ZOFRAN ODT Dose:  4 mg Take 1 Tab by mouth every eight (8) hours as needed for Nausea. Quantity:  8 Tab Refills:  0  
     
   
   
   
  
 tadalafil 5 mg tablet Commonly known as:  CIALIS Dose:  5 mg Take 5 mg by mouth daily as needed. Quantity:  30 Tab Refills:  1 VITAMIN D3 1,000 unit tablet Generic drug:  cholecalciferol Take  by mouth daily. Refills:  0 Discharge Instructions Bowel Blockage (Intestinal Obstruction): Care Instructions Your Care Instructions A bowel blockage, also called an intestinal obstruction, can prevent gas, fluids, or solids from moving through the intestines normally. It can cause constipation and, rarely, diarrhea. You may have pain, nausea, vomiting, and cramping. Most of the time, complete blockages require a stay in the hospital and possibly surgery. But if your bowel is only partly blocked, your doctor may tell you to wait until it clears on its own and you are able to pass gas and stool. If so, there are things you can do at home to help make you feel better. If you have had surgery for a bowel blockage, there are things you can do at home to make sure you heal well. You can also make some changes to keep your bowel from becoming blocked again. Follow-up care is a key part of your treatment and safety.  Be sure to make and go to all appointments, and call your doctor if you are having problems. It's also a good idea to know your test results and keep a list of the medicines you take. How can you care for yourself at home? If your doctor has told you to wait at home for a blockage to clear on its own: · Follow your doctor's instructions. These may include eating a liquid diet to avoid complete blockage. · Take your medicines exactly as prescribed. Call your doctor if you think you are having a problem with your medicine. · Put a heating pad set on low on your belly to relieve mild cramps and pain. To prevent another blockage · Try to eat smaller amounts of food more often. For example, have 5 or 6 small meals throughout the day instead of 2 or 3 large meals. · Chew your food very well. Try to chew each bite about 20 times or until it is liquid. · Avoid high-fiber foods and raw fruits and vegetables with skins, husks, strings, or seeds. These can form a ball of undigested material that can cause a blockage if a part of your bowel is scarred or narrowed. · Check with your doctor before you eat whole-grain products or use a fiber supplement such as Citrucel or Metamucil. · To help you have regular bowel movements, eat at regular times, do not strain during a bowel movement, and drink at least 8 to 10 glasses of water each day. If you have kidney, heart, or liver disease and have to limit fluids, talk with your doctor or before you increase the amount of fluids you drink. · Drink high-calorie liquid formulas if your doctor says to. Severe symptoms may make it hard for your body to take in vitamins and minerals. · Get regular exercise. It helps you digest your food better. Get at least 30 minutes of physical activity on most days of the week. Walking is a good choice. When should you call for help? Call 911 anytime you think you may need emergency care. For example, call if: 
· You passed out (lost consciousness). · You have severe pain or swelling in your belly. · You vomit blood or what looks like coffee grounds. · You pass maroon or very bloody stools. · You cannot pass any stools or gas. Call your doctor now or seek immediate medical care if: 
· You have a new or higher fever. · You cannot keep fluids or medicines down. · You have new pain that gets worse when you move or cough. · Your symptoms become much worse than usual. 
Watch closely for changes in your health, and be sure to contact your doctor if: 
· You do not get better as expected. · You have steady diarrhea for more than 2 weeks. · You've been losing weight. Where can you learn more? Go to http://vale-peng.info/. Enter E515 in the search box to learn more about \"Bowel Blockage (Intestinal Obstruction): Care Instructions. \" Current as of: August 9, 2016 Content Version: 11.2 © 7605-0283 Trendr. Care instructions adapted under license by [employer] (which disclaims liability or warranty for this information). If you have questions about a medical condition or this instruction, always ask your healthcare professional. [place] [address] any warranty or liability for your use of this information. DISCHARGE SUMMARY from Nurse The following personal items are in your possession at time of discharge: 
 
Dental Appliances: Partials, With patient Visual Aid: None Home Medications: None Jewelry: None Clothing: Undergarments Other Valuables: None Personal Items Sent to Safe: none PATIENT INSTRUCTIONS: 
 
 
F-face looks uneven A-arms unable to move or move unevenly S-speech slurred or non-existent T-time-call 911 as soon as signs and symptoms begin-DO NOT go Back to bed or wait to see if you get better-TIME IS BRAIN. Warning Signs of HEART ATTACK Call 911 if you have these symptoms: 
? Chest discomfort. Most heart attacks involve discomfort in the center of the chest that lasts more than a few minutes, or that goes away and comes back. It can feel like uncomfortable pressure, squeezing, fullness, or pain. ? Discomfort in other areas of the upper body. Symptoms can include pain or discomfort in one or both arms, the back, neck, jaw, or stomach. ? Shortness of breath with or without chest discomfort. ? Other signs may include breaking out in a cold sweat, nausea, or lightheadedness. Don't wait more than five minutes to call [address]! Fast action can save your life. Calling 911 is almost always the fastest way to get lifesaving treatment. Emergency Medical Services staff can begin treatment when they arrive  up to an hour sooner than if someone gets to the hospital by car. The discharge information has been reviewed with the patient and spouse. The patient and spouse verbalized understanding. Discharge medications reviewed with the patient and spouse and appropriate educational materials and side effects teaching were provided. Patient armband removed and shredded. RescueTime Activation Thank you for requesting access to RescueTime. Please follow the instructions below to securely access and download your online medical record. RescueTime allows you to send messages to your doctor, view your test results, renew your prescriptions, schedule appointments, and more. How Do I Sign Up? 1. In your internet browser, go to [email or web address] 
2. Click on the First Time User? Click Here link in the Sign In box. You will be redirect to the New Member Sign Up page. 3. Enter your RescueTime Access Code exactly as it appears below.  You will not need to use this code after youve completed the sign-up process. If you do not sign up before the expiration date, you must request a new code. [employer] Access Code: [email or web address] Expires: 2017  9:51 AM (This is the date your [employer] access code will ) 4. Enter the last four digits of your Social Security Number (xxxx) and Date of Birth (mm/dd/yyyy) as indicated and click Submit. You will be taken to the next sign-up page. 5. Create a GENIAC ID. This will be your [employer] login ID and cannot be changed, so think of one that is secure and easy to remember. 6. Create a [employer]AC password. You can change your password at any time. 7. Enter your Password Reset Question and Answer. This can be used at a later time if you forget your password. 8. Enter your e-mail address. You will receive e-mail notification when new information is available in [address]. 9. Click Sign Up. You can now view and download portions of your medical record. 10. Click the Download Summary menu link to download a portable copy of your medical information. Additional Information If you have questions, please visit the Frequently Asked Questions section of the GENIAC website at [email or web address] Remember, [employer] is NOT to be used for urgent needs. For medical emergencies, dial 911. Bowel Blockage (Intestinal Obstruction): Care Instructions Your Care Instructions A bowel blockage, also called an intestinal obstruction, can prevent gas, fluids, or solids from moving through the intestines normally. It can cause constipation and, rarely, diarrhea. You may have pain, nausea, vomiting, and cramping. Most of the time, complete blockages require a stay in the hospital and possibly surgery.  But if your bowel is only partly blocked, your doctor may tell you to wait until it clears on its own and you are able to pass gas and stool. If so, there are things you can do at home to help make you feel better. If you have had surgery for a bowel blockage, there are things you can do at home to make sure you heal well. You can also make some changes to keep your bowel from becoming blocked again. Follow-up care is a key part of your treatment and safety. Be sure to make and go to all appointments, and call your doctor if you are having problems. It's also a good idea to know your test results and keep a list of the medicines you take. How can you care for yourself at home? If your doctor has told you to wait at home for a blockage to clear on its own: · Follow your doctor's instructions. These may include eating a liquid diet to avoid complete blockage. · Take your medicines exactly as prescribed. Call your doctor if you think you are having a problem with your medicine. · Put a heating pad set on low on your belly to relieve mild cramps and pain. To prevent another blockage!!! 
· Try to eat smaller amounts of food more often. For example, have 5 or 6 small meals throughout the day instead of 2 or 3 large meals. · Chew your food very well. Try to chew each bite about 20 times or until it is liquid. · Avoid high-fiber foods and raw fruits and vegetables with skins, husks, strings, or seeds. These can form a ball of undigested material that can cause a blockage if a part of your bowel is scarred or narrowed. · Check with your doctor before you eat whole-grain products or use a fiber supplement such as Citrucel or Metamucil. · To help you have regular bowel movements, eat at regular times, do not strain during a bowel movement, and drink at least 8 to 10 glasses of water each day. If you have kidney, heart, or liver disease and have to limit fluids, talk with your doctor or before you increase the amount of fluids you drink. · Drink high-calorie liquid formulas if your doctor says to.  Severe symptoms may make it hard for your body to take in vitamins and minerals. · Get regular exercise. It helps you digest your food better. Get at least 30 minutes of physical activity on most days of the week. Walking is a good choice. When should you call for help? Call 911 anytime you think you may need emergency care. For example, call if: 
· You passed out (lost consciousness). · You have severe pain or swelling in your belly. · You vomit blood or what looks like coffee grounds. · You pass maroon or very bloody stools. · You cannot pass any stools or gas. Call your doctor now or seek immediate medical care if: 
· You have a new or higher fever. · You cannot keep fluids or medicines down. · You have new pain that gets worse when you move or cough. · Your symptoms become much worse than usual. 
Watch closely for changes in your health, and be sure to contact your doctor if: 
· You do not get better as expected. · You have steady diarrhea for more than 2 weeks. · You've been losing weight. Where can you learn more? Go to http://vale-peng.info/. Enter P214 in the search box to learn more about \"Bowel Blockage (Intestinal Obstruction): Care Instructions. \" Current as of: August 9, 2016 Content Version: 11.2 © 9383-0396 [employer]. Care instructions adapted under license by [employer] (which disclaims liability or warranty for this information). If you have questions about a medical condition or this instruction, always ask your healthcare professional. [place] [address] any warranty or liability for your use of this information. Discharge Orders None [place] [facility] [place] Announcement We are excited to announce that we are making your provider's discharge notes available to you in bigtincan.   You will see these notes when they are completed and signed by the physician that discharged you from your recent hospital stay. If you have any questions or concerns about any information you see in [employer], please call the Health Information Department where you were seen or reach out to your Primary Care Provider for more information about your plan of care. Introducing [facility]! Rainer Thomas introduces [employer] patient portal. Now you can access parts of your medical record, email your doctor's office, and request medication refills online. 1. In your internet browser, go to [email or web address] 2. Click on the First Time User? Click Here link in the Sign In box. You will see the New Member Sign Up page. 3. Enter your [employer] Access Code exactly as it appears below. You will not need to use this code after youve completed the sign-up process. If you do not sign up before the expiration date, you must request a new code. · [employer] Access Code: [email or web address] Expires: 6/26/2017  9:51 AM 
 
4. Enter the last four digits of your Social Security Number (xxxx) and Date of Birth (mm/dd/yyyy) as indicated and click Submit. You will be taken to the next sign-up page. 5. Create a [employer] ID. This will be your [employer] login ID and cannot be changed, so think of one that is secure and easy to remember. 6. Create a [employer] password. You can change your password at any time. 7. Enter your Password Reset Question and Answer. This can be used at a later time if you forget your password. 8. Enter your e-mail address. You will receive e-mail notification when new information is available in [address] 9. Click Sign Up. You can now view and download portions of your medical record. 10. Click the Download Summary menu link to download a portable copy of your medical information. If you have questions, please visit the Frequently Asked Questions section of the [employer] website. Remember, [employer] is NOT to be used for urgent needs. For medical emergencies, dial 911. Now available from your iPhone and Android! General Information Please provide this summary of care documentation to your next provider. Patient Signature:  ____________________________________________________________ Date:  ____________________________________________________________  
  
Bartlett Ledger Provider Signature:  ____________________________________________________________ Date:  ____________________________________________________________

## 2017-04-09 NOTE — H&P
Internal Medicine History and Physical          Subjective     HPI: Baljit Mitchell is a 73 y.o. male with a PMHx of HTN, SBO, Vit D def who presented to the ED complaining of continued abdominal pain with nausea and vomiting after multiple recent admissions for untreated SBO. The patient was admitted on 03/30/17 for a SBO and left AMA on 04/03 and then returned on 04/06 for the same thing. He then left AMA again on 04/07 because he wasn't aloud to eat. He returns again today for continued abdominal pain with nausea/vomiting. He also c/o hiccups. The symptoms have persisted and he has been unable to keep anything down. He states he doesn't remember leaving both times and doesn't know why he did. His wife states she believes him. He states he will not leave again because he wants to get better. In the ED, he had a NGT placed after imaging was consistent again with a high-grade SBO with focal transition in the right lower quad. PMHx:  HTN  SBO  Vit D def    PSurgHx:  Appendectomy  Colonoscopy    SocialHx:  Tobacco: 5/6 cig/d x 40 yrs  EtOH: 3 cans beer/day, hasnt drank in weeks since SBO started  Drugs: Denies  Lives at home with wife    FamilyHx:  M - Hrt disease  F - Ca    Prior to Admission Medications   Prescriptions Last Dose Informant Patient Reported? Taking? HYDROcodone-acetaminophen (NORCO) 5-325 mg per tablet Unknown at Unknown time  No No   Sig: Take 1 Tab by mouth every six (6) hours as needed for Pain. Max Daily Amount: 4 Tabs. aspirin delayed-release 81 mg tablet Unknown at Unknown time  No No   Sig: Take 1 Tab by mouth daily. cholecalciferol (VITAMIN D3) 1,000 unit tablet Unknown at Unknown time  Yes No   Sig: Take  by mouth daily. hydrochlorothiazide (HYDRODIURIL) 25 mg tablet Unknown at Unknown time  Yes No   Sig: Take 25 mg by mouth daily.    ondansetron (ZOFRAN ODT) 4 mg disintegrating tablet Unknown at Unknown time  No No   Sig: Take 1 Tab by mouth every eight (8) hours as needed for Nausea. tadalafil (CIALIS) 5 mg tablet Unknown at Unknown time  No No   Sig: Take 5 mg by mouth daily as needed. Facility-Administered Medications: None       Review of Systems:  Constitutional:  Denies fever, chills. Admits weight loss  Eyes: Denies vision loss or changes, denies pain  Ears, Nose, Mouth, Throat: Denies hearing loss, denies sore throat  Cardiovascular:  Denies chest pain or diaphoresis  Respiratory:  Denies coughing, wheezing, or shortness of breath. Gastrointestinal:  Admits nausea, vomiting, abd pain and hiccups.   No BMs  Genitourinary:  Denies hematuria or dysuria  Musculoskeletal: Denies back pain or muscle/joint pain  Skin:  Denies rashes or moles  Neuro:  Denies seizures, loss of sensation or motor function or syncope      Objective      Visit Vitals    /81 (BP 1 Location: Left arm, BP Patient Position: At rest)    Pulse 84    Temp 97.9 °F (36.6 °C)    Resp 16    Wt 56.7 kg (125 lb)    SpO2 93%    BMI 18.73 kg/m2       Physical Exam:  General Appearance: NAD, conversant  HENT: normocephalic/atraumatic, moist mucus membranes  Lungs: CTA with normal respiratory effort  Cardiovascular: RRR, no m/r/g  Abdomen: soft, distended w/ diffuse TTP, hypoactive bowel sounds  Extremities: no cyanosis, no peripheral edema  Neuro: moves all extremities, no focal deficits  Psych: appropriate affect, alert and oriented to person, place and time    Laboratory Studies:  CMP:   Lab Results   Component Value Date/Time     04/09/2017 04:57 AM    K 2.7 (LL) 04/09/2017 04:57 AM    CL 90 (L) 04/09/2017 04:57 AM    CO2 34 (H) 04/09/2017 04:57 AM    AGAP 14 04/09/2017 04:57 AM     (H) 04/09/2017 04:57 AM    BUN 10 04/09/2017 04:57 AM    CREA 1.00 04/09/2017 04:57 AM    GFRAA >60 04/09/2017 04:57 AM    GFRNA >60 04/09/2017 04:57 AM    CA 9.6 04/09/2017 04:57 AM    MG 2.0 04/09/2017 04:57 AM    ALB 3.4 04/09/2017 04:57 AM    TP 7.7 04/09/2017 04:57 AM    GLOB 4.3 (H) 04/09/2017 04:57 AM AGRAT 0.8 04/09/2017 04:57 AM    SGOT 12 (L) 04/09/2017 04:57 AM    ALT 16 04/09/2017 04:57 AM     CBC:   Lab Results   Component Value Date/Time    WBC 13.9 (H) 04/09/2017 04:57 AM    HGB 14.4 04/09/2017 04:57 AM    HCT 41.2 04/09/2017 04:57 AM     (H) 04/09/2017 04:57 AM       Imaging Reviewed:  Sahra Maher Nely (kub)    Result Date: 4/9/2017  EXAM: Frontal View of the Abdomen And Pelvis Indication: Recent bowel obstruction, patient able to give history of this time Technique: Single frontal view  of the abdomen and pelvis. Comparison: 04/07/2017 _______________ Findings: Diffuse abnormal small bowel dilatation with small bowel loops measuring 5.7 cm. No gross free air. _______________    IMPRESSION: 1. Findings of small bowel obstruction. Note: Preliminary report was provided by the on-call radiology resident. Ct Abd Pelv W Cont    Result Date: 4/9/2017  EXAM: CT of the Abdomen and Pelvis INDICATION: Abnormal KUB, small bowel obstruction, COMPARISON: KUB from same day, CT scan of the abdomen and pelvis from 04/06/2017. TECHNIQUE: Axial CT imaging of the abdomen and pelvis was performed with oral and intravenous contrast. Multiplanar reformats were generated. Dose reduction techniques used: Automated exposure control, adjustment of the mAs and/or kVp according to patient's size, and iterative reconstruction techniques. _______________ FINDINGS: LOWER CHEST: Bibasilar dependent changes without consolidation or effusion. Canary Malady LIVER, BILIARY: Liver is normal. No biliary dilation. Gallbladder is unremarkable. PANCREAS: Normal. SPLEEN: Normal. ADRENALS: Normal. KIDNEYS: Symmetric enhancing bilateral kidneys without hydronephrosis, perinephric fluid or induration. No hydroureter. LYMPH NODES: No enlarged lymph nodes.  GASTROINTESTINAL TRACT: Redemonstration of diffuse significant small bowel dilatation measuring up to 4.4 cm pathologic air-fluid levels and focal transition in the right upper pelvis (axial image [id number]/coronal image 43/sagittal image 70-74). No visualized pneumatosis. There is a very small amount of air in the otherwise decompressed colon. Enteric line tip is present in the proximal stomach PELVIC ORGANS: Unremarkable. VASCULATURE: Diffuse atherosclerotic calcification with high-grade/severe stenosis at the aortic bifurcation/origin of bilateral common iliac arteries. Valaria Ding BONES: No acute or aggressive osseous abnormalities identified. OTHER: No pneumatosis or free air. . _______________     IMPRESSION: 1. High-grade small bowel obstruction with focal transition in the right lower quadrant/right upper pelvis. No findings of pneumatosis or evidence of perforation. Xr Chest Port    Result Date: 4/9/2017  EXAM:Chest X-Ray  History: Chest pain Technique:  Portable Frontal View Comparison: 03/28/2017 _______________ FINDINGS: The trachea is midline. The cardiomediastinal silhouette is midline and, within normal limits. The lungs are grossly clear without evidence of focal consolidation, effusion, or pneumothorax. Intact osseous structures. _______________     IMPRESSION: 1. No acute cardiopulmonary process. Note: Preliminary report was provided by the on-call radiology resident. EKG:   Normal sinus rhythm   When compared with ECG of 28-MAR-2017 20:39,   Criteria for Septal infarct are no longer present    Assessment/Plan     #High Grade SBO  #Hypokalemia  #HTN  #Vit D Def  #Tobacco Use  #DVT PPx    - Bowel rest. NGT to intermittent suction. Pain control PRN. I/Os. Anti-emetics. Trend flat plate. Incentive spirometry. Appreciate surgery consult. - Replete lytes. Check Mg. Trend BMP  - Hold home meds for now. IV meds PRN  - Nicotine patch PRN. Smoking cessation discussion  - [facility]    I have personally reviewed all pertinent labs, films and EKGs that have officially resulted.  I reviewed available electronic documentation outlining the initial presentation as well as the emergency room physician's encounter.     Silvia Escudero, DO  Internal Medicine, Hospitalist  Pager: [phone] [facility]

## 2017-04-09 NOTE — ED NOTES
Assume care of patient, patient resting at this time, family at bedside. VSS.   Purposeful rounding completed:    Side rails up x 2:  YES  Bed in low position and wheels locked: YES  Call bell within reach: YES  Comfort addressed: YES    Toileting needs addressed: YES  Plan of care reviewed/updated with patient and or family members: YES  IV site assessed: YES  Pain assessed and addressed: YES, 0

## 2017-04-09 NOTE — PROGRESS NOTES
1200  Received from ER, alert and oriented x4 at this time, answering  Questions but does not remember he was in the hospital  Lately and went AMA, due to void, bell with in reach NPo maintained, , no drainage from NGT except small amount of blood, no number in the tube, I called ER, I was told  The length of NGT is 48 inches total, the number out is 28 inches, 21 inches  Is the length  The  Tube is inside, monitor. 1400  Pt alert, still not aware of AMA, wife at bedside wants to talk to Dr Paulette Kenny, about needed to call her when the pt  Goes AMA, ordered written, Dr Nash Cline talk to pt and  Wife , hiccups no  order but order for Zofran  For nausea,  stat KUB per placement check, Dr Henrry Khan.

## 2017-04-09 NOTE — PROGRESS NOTES
1200  Received from ER, alert and oriented x4 at this time, answering  Questions but does not remember he was in the hospital  Lately and went AMA, due to void, bell with in reach NPo maintained, , no drainage from NGT except small amount of blood, no number in the tube, I called ER, I was told  The length of NGT is 48 inches total, the number out is 28 inches, 21 inches  Is the length  The  Tube is inside, monitor. 1400  Pt alert, still not aware of AMA, wife at bedside wants to talk to Dr Shilpa Melton, about needed to call her when the pt  Goes AMA, ordered written, Dr Breanna Weiss talk to pt and  Wife , hiccups no  order but order for Zofran  For nausea,  stat KUB per placement check, Dr Henry Lowers. 1800  No nausea, hiccups gone, alert and oriented x 4, medicated for pain, awaiting result of KUB, post placement of NGT from ER, no  X ray done after insertion, there is 600 cc greenish drain, Dr Shilpa Melton think NGT is working but due to policy portable x ray ordered and was been done stat with wet reading but until now no result yet. 1900    No result of NGT yet to informed incoming RN to check result.

## 2017-04-10 ENCOUNTER — APPOINTMENT (OUTPATIENT)
Dept: GENERAL RADIOLOGY | Age: 72
DRG: 388 | End: 2017-04-10
Attending: HOSPITALIST
Payer: MEDICARE

## 2017-04-10 LAB
ANION GAP BLD CALC-SCNC: 12 MMOL/L (ref 3–18)
BASOPHILS # BLD AUTO: 0 K/UL (ref 0–0.06)
BASOPHILS # BLD: 0 % (ref 0–2)
BUN SERPL-MCNC: 7 MG/DL (ref 7–18)
BUN/CREAT SERPL: 12 (ref 12–20)
CALCIUM SERPL-MCNC: 8 MG/DL (ref 8.5–10.1)
CHLORIDE SERPL-SCNC: 95 MMOL/L (ref 100–108)
CO2 SERPL-SCNC: 32 MMOL/L (ref 21–32)
CREAT SERPL-MCNC: 0.58 MG/DL (ref 0.6–1.3)
DIFFERENTIAL METHOD BLD: ABNORMAL
EOSINOPHIL # BLD: 0.1 K/UL (ref 0–0.4)
EOSINOPHIL NFR BLD: 1 % (ref 0–5)
ERYTHROCYTE [DISTWIDTH] IN BLOOD BY AUTOMATED COUNT: 14.2 % (ref 11.6–14.5)
GLUCOSE SERPL-MCNC: 86 MG/DL (ref 74–99)
HCT VFR BLD AUTO: 32.8 % (ref 36–48)
HGB BLD-MCNC: 11.5 G/DL (ref 13–16)
LYMPHOCYTES # BLD AUTO: 18 % (ref 21–52)
LYMPHOCYTES # BLD: 1.7 K/UL (ref 0.9–3.6)
MCH RBC QN AUTO: 31.4 PG (ref 24–34)
MCHC RBC AUTO-ENTMCNC: 35.1 G/DL (ref 31–37)
MCV RBC AUTO: 89.6 FL (ref 74–97)
MONOCYTES # BLD: 0.9 K/UL (ref 0.05–1.2)
MONOCYTES NFR BLD AUTO: 10 % (ref 3–10)
NEUTS SEG # BLD: 6.7 K/UL (ref 1.8–8)
NEUTS SEG NFR BLD AUTO: 71 % (ref 40–73)
PLATELET # BLD AUTO: 543 K/UL (ref 135–420)
PMV BLD AUTO: 9.2 FL (ref 9.2–11.8)
POTASSIUM SERPL-SCNC: 3.3 MMOL/L (ref 3.5–5.5)
RBC # BLD AUTO: 3.66 M/UL (ref 4.7–5.5)
SODIUM SERPL-SCNC: 139 MMOL/L (ref 136–145)
WBC # BLD AUTO: 9.4 K/UL (ref 4.6–13.2)

## 2017-04-10 PROCEDURE — 74020 XR ABD FLAT/ ERECT: CPT

## 2017-04-10 PROCEDURE — 74011250636 HC RX REV CODE- 250/636: Performed by: HOSPITALIST

## 2017-04-10 PROCEDURE — 65270000029 HC RM PRIVATE

## 2017-04-10 PROCEDURE — 36415 COLL VENOUS BLD VENIPUNCTURE: CPT | Performed by: HOSPITALIST

## 2017-04-10 PROCEDURE — 80048 BASIC METABOLIC PNL TOTAL CA: CPT | Performed by: HOSPITALIST

## 2017-04-10 PROCEDURE — 74011250637 HC RX REV CODE- 250/637: Performed by: NURSE ANESTHETIST, CERTIFIED REGISTERED

## 2017-04-10 PROCEDURE — 85025 COMPLETE CBC W/AUTO DIFF WBC: CPT | Performed by: HOSPITALIST

## 2017-04-10 PROCEDURE — 77030008771 HC TU NG SALEM SUMP -A

## 2017-04-10 RX ORDER — FAMOTIDINE 20 MG/1
20 TABLET, FILM COATED ORAL ONCE
Status: COMPLETED | OUTPATIENT
Start: 2017-04-10 | End: 2017-04-10

## 2017-04-10 RX ORDER — SODIUM CHLORIDE, SODIUM LACTATE, POTASSIUM CHLORIDE, CALCIUM CHLORIDE 600; 310; 30; 20 MG/100ML; MG/100ML; MG/100ML; MG/100ML
75 INJECTION, SOLUTION INTRAVENOUS CONTINUOUS
Status: DISPENSED | OUTPATIENT
Start: 2017-04-11 | End: 2017-04-11

## 2017-04-10 RX ADMIN — POTASSIUM CHLORIDE, DEXTROSE MONOHYDRATE AND SODIUM CHLORIDE: 300; 5; 900 INJECTION, SOLUTION INTRAVENOUS at 12:47

## 2017-04-10 RX ADMIN — HEPARIN SODIUM 5000 UNITS: 5000 INJECTION, SOLUTION INTRAVENOUS; SUBCUTANEOUS at 19:51

## 2017-04-10 RX ADMIN — HEPARIN SODIUM 5000 UNITS: 5000 INJECTION, SOLUTION INTRAVENOUS; SUBCUTANEOUS at 09:42

## 2017-04-10 RX ADMIN — FAMOTIDINE 20 MG: 20 TABLET, FILM COATED ORAL at 21:45

## 2017-04-10 RX ADMIN — HEPARIN SODIUM 5000 UNITS: 5000 INJECTION, SOLUTION INTRAVENOUS; SUBCUTANEOUS at 02:08

## 2017-04-10 NOTE — PROGRESS NOTES
2000 Assumed care of the pt, assessments completed and charted. Resting quietly in the bed, VSS, no distress noted or reported. NGT is in place and to LIS per order. Pts nose is sore to the touch, Pt advised to not touch the tube as often to decrease agitation. Pt states that he does not plan to leave until he is better. 0700 No issues to report throughout the evening. Bedside and Verbal shift change report given to [address] [place] [address] (oncoming nurse) by Cecily Bernal RN (offgoing nurse). Report included the following information SBAR, Kardex, Intake/Output, MAR and Recent Results.

## 2017-04-10 NOTE — PROGRESS NOTES
Patient has designated his wife to participate in his/her discharge plan and to receive any needed information.      Name:   Nieves Vargas  spouse   [phone]   [facility] [address]   April 7 2017     Address:  Phone number:

## 2017-04-10 NOTE — PROGRESS NOTES
Nutrition initial assessment/Plan of care      RECOMMENDATIONS:   1. NPO. Advance diet when medically indicated  2. Monitor weight and PO intake  3. RD to follow     GOALS:   1. PO intake meets >75% of protein/calorie needs by 4/13  2. Promote healthy gain (1-2 lb) by 4/20    ASSESSMENT:   Per BMI of 18.7, weight is in the normal classification. However, patient is at nutritional risk since patient is > 72years old with BMI <23. PO intake is poor. Labs noted. Patient with hypokalemia. Nutrition recommendations listed. RD to follow.      Nutrition Diagnoses: Altered GI function related to SBO as evidenced by NPO order. Nutrition Risk:  [x] High  [] Moderate []  Low    SUBJECTIVE/OBJECTIVE:   Patient admitted for AMS and SBO. He was here for the same reason 3 days ago but left hospital AMA per MD. No known food. Patient states that his weight has been stable at 125 lb (but used to be 150 lb 25 years ago). Patient denies trouble chewing/swallowing. Plan for surgery later today. Information Obtained from:    [x] Chart Review   [x] Patient   [] Family/Caregiver   [] Nurse/Physician   [] Interdisciplinary Meeting/Rounds    Diet: NPO  Medications: [x] Reviewed    Allergies: [x] Reviewed   Encounter Diagnoses     ICD-10-CM ICD-9-CM   1. Hypokalemia E87.6 276.8   2.  SBO (small bowel obstruction) ([facility]) K56.69 560.9     Past Medical History:   Diagnosis Date    Hypertension       Labs:  Lab Results   Component Value Date/Time    Sodium 139 04/10/2017 03:30 AM    Potassium 3.3 04/10/2017 03:30 AM    Chloride 95 04/10/2017 03:30 AM    CO2 32 04/10/2017 03:30 AM    Anion gap 12 04/10/2017 03:30 AM    Glucose 86 04/10/2017 03:30 AM    BUN 7 04/10/2017 03:30 AM    Creatinine 0.58 04/10/2017 03:30 AM    Calcium 8.0 04/10/2017 03:30 AM    Magnesium 2.0 04/09/2017 04:57 AM    Albumin 3.4 04/09/2017 04:57 AM     Anthropometrics: BMI (calculated): 18.7  Last 3 Recorded Weights in this Encounter    04/09/17 7124   Weight: 56.7 kg (125 lb)    Ht Readings from Last 1 Encounters:   04/09/17 5' 8.5\" (1.74 m)     Patient Vitals for the past 100 hrs:   % Diet Eaten   04/09/17 1200 0 %       IBW: 157 lb %IBW: 80% UBW: 125 lb %UBW: 100%   [] Weight Loss [] Weight Gain [x] Weight Stable     Estimated Nutrition Needs: [x] MSJ [] Other:  Calories: 2400 kcal Based on: [x] Actual BW   Protein: 70-80 g Based on: [x] Actual BW   Fluid: 6832-6565 ml Based on: [x] Actual BW      [x] No Cultural, Moravian or ethnic dietary need identified.     [] Cultural, Moravian and ethnic food preferences identified and addressed     Wt Status:  [x] Normal (18.6 - 24.9) [] Underweight (<18.5) [] Overweight (25 - 29.9) [] Mild Obesity (30 - 34.9)  [] Moderate Obesity (35 - 39.9) [] Morbid Obesity (40+)   [] Moderate Malnutrition [] Severe Malnutrition in the context of :     Nutrition Problems Identified:   [x] Suboptimal PO intake   [] Food Allergies  [] Difficulty chewing/swallowing/poor dentition  [] Constipation/Diarrhea   [] Nausea/Vomiting   [] None  [] Other:     Plan:   [] Therapeutic Diet  []  Obtained/adjusted food preferences/tolerances and/or snacks options   []  Supplements added   [] Occupational therapy following for feeding techniques  []  HS snack added   []  Modify diet texture   []  Modify diet for food allergies   []  Educate patient   []  Assist with menu selection   []  Monitor PO intake on meal rounds   [x]  Continue inpatient monitoring and intervention   []  Participated in discharge planning/Interdisciplinary rounds/Team meetings   []  Other:     Education Needs:   [x] Not appropriate for teaching at this time    [] Identified and addressed    Nutrition Monitoring and Evaluation:  [x] Continue ongoing monitoring and intervention  [] Other    Warren Donnelly, [address] [place] [address]  Pager: [phone]

## 2017-04-10 NOTE — PROGRESS NOTES
Patient seen and examined. Doing relatively well. No flatus or stool. NO abdominal pain. NG tube in place. Abdomen is soft, and non-tender. Despite the fact that he is doing well, I think he is still obstructed, and I think he still needs the surgery (Has been admitted 3 times in the last couple weeks). However the patient is refusing surgery. He wants to decide if he will go for surgery tonight. If the patient agrees on surgery and still obstructed will post him most likely for tomorrow for a laparotomy and lysis of adhesions.     Crystal Pickering

## 2017-04-10 NOTE — PROGRESS NOTES
Progress Note      Patient: Gabino Phelan               Sex: male          DOA: 4/9/2017       YOB: 1945      Age:  73 y.o.        LOS:  LOS: 1 day             CHIEF COMPLAINT:  SBO    Subjective:     NG in place  Patient reports ongoing pain    Objective:      Visit Vitals    BP (!) 170/99 (BP 1 Location: Left arm, BP Patient Position: At rest)    Pulse 95    Temp 97.7 °F (36.5 °C)    Resp 16    Ht 5' 8.5\" (1.74 m)    Wt 56.7 kg (125 lb)    SpO2 90%    BMI 18.73 kg/m2       Physical Exam:  Gen:  No distress, no complaint  Lungs:  Clear bilaterally, no wheeze or rhonchi  Heart:  Regular rate and rhythm, no murmurs or gallops  Abdomen:  Soft, non-tender, normal bowel sounds        Lab/Data Reviewed: All lab results for the last 24 hours reviewed. Assessment/Plan     Active Problems:    SBO (small bowel obstruction) ([address] (3/30/2017)        Plan:  Patient reports he is agreeable to surgery  Discussed the likelihood that this will not improve without surgery as this is his third admission in a brief period.

## 2017-04-10 NOTE — PROGRESS NOTES
conducted an initial consultation and Spiritual Assessment for [facility], who is a 73 y.o.,male. Patients Primary Language is: [place]. According to the patients EMR Restorationist Affiliation is: Gael Melissa. The reason the Patient came to the hospital is:   Patient Active Problem List    Diagnosis Date Noted    SBO (small bowel obstruction) ([facility] 03/30/2017    HTN (hypertension) 03/30/2017    URI (upper respiratory infection) 03/30/2017        The  provided the following Interventions:  Initiated a relationship of care and support with patient in room 2204 at 1022 this morning. Listened empathically as patient talked about being here and how he was going to have surgery this afternoon. Patient was no pleased that his clothes had been taken home by his family because he wanted to go outside. Provided information about Spiritual Care Services. Offered prayer and assurance of continued prayers on patients behalf. The following outcomes were achieved:  Patient shared limited information about his medical narrative and spiritual journey/beliefs. Patient processed feeling about current hospitalization. Patient expressed gratitude for pastoral care visit. Assessment:  Patient does not have any Zoroastrianism/cultural needs that will affect patients preferences in health care. There are no further spiritual or Zoroastrianism issues which require Spiritual Care Services interventions at this time. Plan:  Chaplains will continue to follow and will provide pastoral care on an as needed/requested basis    . Jakob Inman   Spiritual Care   [phone]

## 2017-04-10 NOTE — PROGRESS NOTES
Assumed care from off going nurse at the bedside. Patient lying in bed without distress. Patient alert and orient X 4. Patient has call bell within reach, and bed locked in low position. See complex assessment for further findings. 1526 patient off the unit via stretcher with transport personnel going for xray, no distress or discomfort noted at this time. 0930 spoke with radiologist earlier and stated patient ngt needs to be advanced. Informed patient and wife what radiologist informed me about ngt needing advancement. Patient stated i was not advancing tube because it didn't need to be done. Informed Dr. Christina Edwards about patient stated he wasn't getting tube advanced, and that he wanted surgery today. Doctor stated he will receive surgery tomorrow. Wife and patient made aware. 1500 patient pulled out iv at this time reported by cna.      1530 patient pulled out nasal gastric tube at this time. Will not allow me to reinsert a new one. Dr. Christina Edwards aware. 1600 attempted to reinsert iv unsuccessful times 2.  icu nurse called at this time. 1 icu charge nurse at the bedside attempted to place iv at this time. 1920 Bedside and Verbal shift change report given to [facility] (oncoming nurse) by Rossie Boxer, RN (offgoing nurse). Report given with SBAR, Kardex, Intake/Output, MAR and Recent Results. Chanelle Coleman

## 2017-04-10 NOTE — PROGRESS NOTES
[facility]   Discharge Planning/ Assessment      Reasons for Intervention: Interviewed patient, he agrees to share his discharge information with his wife, see below. This make the third admission for the same problem, the patient has left A on 2 other occasions. He was independent prior to admission and see Dr Guardado for his primary care needs. He has VA Medicare and generic Commercial insurance.  His discharge plan is to return home.       High Risk Criteria [x] Yes []No   Physician Referral [] Yes [x]No   Date      Nursing Referral [] Yes [x]No   Date      Patient/Family Request [] Yes [x]No   Date          Resources:      Medicare [x] Yes []No   Medicaid [] Yes [x]No   No Resources [] Yes [x]No   Private Insurance [x] Yes []No    Name/Phone Number      Other [] Yes [x]No   (i.e. Workman's Comp)          Prior Services:      Prior Services [] Yes [x]No   Home Health [] Yes [x]No   [address] [] Yes [x]No   Number of [place] [] Yes [x]No         Meals on Wheels [] Yes [x]No   Office on Aging [] Yes [x]No   Transportation Services [] Yes [x]No   [address] [] Yes [x]No   Nursing Home Name [facility] [] Yes [x]No   [address] Name      Other          Information Source:       Information obtained from [x] Patient [] Parent [] Albert Nora [] Child [] Spouse  [] Significant Other/Partner [] Friend [] EMS   [] Nursing Home Chart [x] Other: chart   Chart Review [x] Yes []No       Family/Support System:      Patient lives with [] Alone [x] Spouse [] Significant Other [] Children [] Caretaker  [] Parent [] Sibling [] Other        Other Support System:      Is the patient responsible for care of others [] Yes [x]No   Information of person caring for patient on  discharge self   Managers financial affairs independently [x] Yes []No   If no, explain:          Status Prior to Admission:      Mental Status [x] Awake [x] Alert [x] Oriented [] Quiet/Calm [] Lethargic/Sedated  [] Disoriented [] Restless/Anxious [] Combative   Personal Care [] Dependent [x] Independent Personal Care [] Requires Assistance   Meal Preparation Ability [x] Independent [] Standby Assistance [] Minimal Assistance  [] Moderate Assistance [] Maximum Assistance   [] Total Assistance   Chores [x] Independent with Chores [] N/A Nursing Home Resident  [] Requires Assistance   Bowel/Bladder [x] Continent [] Catheter [] Incontinent [] Ostomy Self-Care   [] Urine Diversion Self-Care [] Maximum Assistance   [] Total Assistance   Number of Persons needed for assistance      DME at home [] Jie Wise, Lucia Sams [] Jie Rater, Straight [] Commode   [] Bathroom/Grab Bars [] Hospital Bed [] Nebulizer [] Oxygen [] Raised Toilet Seat [] Shower Chair [] Side Rails for Bed  [] Tub Transfer Bench [] Elizabeth Harden [] Walker, Standard   [] Other:   Vendor          Treatment Presently Receiving:      Current Treatments [] Chemotherapy [] Dialysis [] Insulin [] IVAB [x] IVF  [] O2 [] PCA [] PT [] RT [] Tube Feedings [] Wound Care       Psychosocial Evaluation:      Verbalized Knowledge of Disease Process [x] Patient [x]Family   Coping with Disease Process [x] Patient [x]Family   Requires Further Counseling Coping with Disease Process [] Patient []Family       Identified Projected Needs:  [address] [] Yes [x]No   Transportation [] Yes [x]No   Education [] Yes [x]No   Specific Education       Financial Counseling [] Yes [x]No   Inability to Care for Self/Will Require 24 hour care [] Yes [x]No   Pain Management [] Yes [x]No   Home Infusion Therapy [] Yes [x]No   Oxygen Therapy [] Yes [x]No   DME [] Yes [x]No   Long Term Care Placement [] Yes [x]No   Rehab [] Yes [x]No   Physical Therapy [] Yes [x]No   Needs Anticipated At This Time [] Yes [x]No       Intra-Hospital Referral:  [address] [] Yes [x]No    [] Yes [x]No   Patient Representative [] Yes [x]No   Staff for Teaching Needs [] Yes [x]No   Specialty Teaching Needs      Diabetic Educator [] Yes [x]No   Referral for Diabetic Educator Needed [] Yes [x]No  If Yes, place order for Nutritionist or Diabetic Consult       Tentative Discharge Plan:  [facility] with No Services [x] Yes []No   Home with [address] [] Yes [x]No   If Yes, specify type [address] [] Yes [x]No   If Yes, specify type      Meals on Wheels [] Yes [x]No   Office of Aging [] Yes [x]No   NHP [] Yes [x]No   Return to the Nursing Home [] Yes [x]No   Rehab Therapy [] Yes [x]No   Acute Rehab [] Yes [x]No   Subacute Rehab [] Yes [x]No   Private Care [] Yes [x]No   Substance Abuse Referral [] Yes [x]No   Transportation [] Yes [x]No   Chore Service [] Yes [x]No   Inpatient Hospice [] Yes [x]No   OP RT [] Yes [x] No   OP Hemo [] Yes [x] No   OP PT [] Yes [x]No   Support Group [] Yes [x]No   Reach to Recovery [] Yes [x]No   OP Oncology Clinic [] Yes [x]No   Clinic Appointment [] Yes [x]No   DME [] Yes [x]No   Comments      Name of D/C Planner or  Given to Patient or Family Citlaly Thomas RN   Phone Number [phone] extension [phone]   Date April 10,  2017   Time 800 am   If you are discharged home, whom do you designate to participate in your discharge plan and receive any information needed?       Enter name of ava Roger Common  spouse   Phone # of ava    [facility], [address] [place] [address]   Updated April 7 2017   Patient refused to designate any   individual

## 2017-04-11 ENCOUNTER — APPOINTMENT (OUTPATIENT)
Dept: CT IMAGING | Age: 72
DRG: 388 | End: 2017-04-11
Attending: FAMILY MEDICINE
Payer: MEDICARE

## 2017-04-11 LAB
ANION GAP BLD CALC-SCNC: 8 MMOL/L (ref 3–18)
BUN SERPL-MCNC: 6 MG/DL (ref 7–18)
BUN/CREAT SERPL: 8 (ref 12–20)
CALCIUM SERPL-MCNC: 8.9 MG/DL (ref 8.5–10.1)
CHLORIDE SERPL-SCNC: 98 MMOL/L (ref 100–108)
CO2 SERPL-SCNC: 33 MMOL/L (ref 21–32)
CREAT SERPL-MCNC: 0.8 MG/DL (ref 0.6–1.3)
GLUCOSE SERPL-MCNC: 95 MG/DL (ref 74–99)
POTASSIUM SERPL-SCNC: 3.7 MMOL/L (ref 3.5–5.5)
SODIUM SERPL-SCNC: 139 MMOL/L (ref 136–145)

## 2017-04-11 PROCEDURE — 74011250636 HC RX REV CODE- 250/636: Performed by: HOSPITALIST

## 2017-04-11 PROCEDURE — 80048 BASIC METABOLIC PNL TOTAL CA: CPT | Performed by: FAMILY MEDICINE

## 2017-04-11 PROCEDURE — 36415 COLL VENOUS BLD VENIPUNCTURE: CPT | Performed by: FAMILY MEDICINE

## 2017-04-11 PROCEDURE — 74011250637 HC RX REV CODE- 250/637: Performed by: HOSPITALIST

## 2017-04-11 PROCEDURE — 65270000029 HC RM PRIVATE

## 2017-04-11 PROCEDURE — 70450 CT HEAD/BRAIN W/O DYE: CPT

## 2017-04-11 RX ORDER — LORAZEPAM 2 MG/ML
2 INJECTION INTRAMUSCULAR
Status: DISCONTINUED | OUTPATIENT
Start: 2017-04-11 | End: 2017-04-15 | Stop reason: HOSPADM

## 2017-04-11 RX ORDER — LORAZEPAM 1 MG/1
2 TABLET ORAL
Status: DISCONTINUED | OUTPATIENT
Start: 2017-04-11 | End: 2017-04-15 | Stop reason: HOSPADM

## 2017-04-11 RX ORDER — LORAZEPAM 2 MG/ML
3 INJECTION INTRAMUSCULAR
Status: DISCONTINUED | OUTPATIENT
Start: 2017-04-11 | End: 2017-04-15 | Stop reason: HOSPADM

## 2017-04-11 RX ORDER — LORAZEPAM 1 MG/1
1 TABLET ORAL
Status: DISCONTINUED | OUTPATIENT
Start: 2017-04-11 | End: 2017-04-15 | Stop reason: HOSPADM

## 2017-04-11 RX ORDER — LORAZEPAM 2 MG/ML
1 INJECTION INTRAMUSCULAR
Status: DISCONTINUED | OUTPATIENT
Start: 2017-04-11 | End: 2017-04-15 | Stop reason: HOSPADM

## 2017-04-11 RX ORDER — THERA TABS 400 MCG
1 TAB ORAL DAILY
Status: DISCONTINUED | OUTPATIENT
Start: 2017-04-11 | End: 2017-04-15 | Stop reason: HOSPADM

## 2017-04-11 RX ORDER — ASPIRIN 325 MG/1
100 TABLET, FILM COATED ORAL DAILY
Status: DISCONTINUED | OUTPATIENT
Start: 2017-04-11 | End: 2017-04-15 | Stop reason: HOSPADM

## 2017-04-11 RX ORDER — SODIUM CHLORIDE 0.9 % (FLUSH) 0.9 %
5-10 SYRINGE (ML) INJECTION AS NEEDED
Status: DISCONTINUED | OUTPATIENT
Start: 2017-04-11 | End: 2017-04-15 | Stop reason: HOSPADM

## 2017-04-11 RX ORDER — LORAZEPAM 2 MG/ML
INJECTION INTRAMUSCULAR
Status: DISPENSED
Start: 2017-04-11 | End: 2017-04-12

## 2017-04-11 RX ORDER — LANOLIN ALCOHOL/MO/W.PET/CERES
1 CREAM (GRAM) TOPICAL DAILY
Status: DISCONTINUED | OUTPATIENT
Start: 2017-04-11 | End: 2017-04-15 | Stop reason: HOSPADM

## 2017-04-11 RX ORDER — SODIUM CHLORIDE 0.9 % (FLUSH) 0.9 %
5-10 SYRINGE (ML) INJECTION EVERY 8 HOURS
Status: DISCONTINUED | OUTPATIENT
Start: 2017-04-11 | End: 2017-04-15 | Stop reason: HOSPADM

## 2017-04-11 RX ADMIN — LORAZEPAM 2 MG: 2 INJECTION, SOLUTION INTRAMUSCULAR; INTRAVENOUS at 18:11

## 2017-04-11 RX ADMIN — LORAZEPAM 2 MG: 2 INJECTION, SOLUTION INTRAMUSCULAR; INTRAVENOUS at 20:04

## 2017-04-11 RX ADMIN — Medication 9 ML: at 22:00

## 2017-04-11 RX ADMIN — THIAMINE HCL TAB 100 MG 100 MG: 100 TAB at 12:46

## 2017-04-11 RX ADMIN — LORAZEPAM 2 MG: 2 INJECTION, SOLUTION INTRAMUSCULAR; INTRAVENOUS at 23:54

## 2017-04-11 RX ADMIN — LORAZEPAM 1 MG: 2 INJECTION, SOLUTION INTRAMUSCULAR; INTRAVENOUS at 22:20

## 2017-04-11 RX ADMIN — HEPARIN SODIUM 5000 UNITS: 5000 INJECTION, SOLUTION INTRAVENOUS; SUBCUTANEOUS at 18:11

## 2017-04-11 RX ADMIN — HEPARIN SODIUM 5000 UNITS: 5000 INJECTION, SOLUTION INTRAVENOUS; SUBCUTANEOUS at 09:00

## 2017-04-11 RX ADMIN — HEPARIN SODIUM 5000 UNITS: 5000 INJECTION, SOLUTION INTRAVENOUS; SUBCUTANEOUS at 01:59

## 2017-04-11 RX ADMIN — FOLIC ACID TAB 400 MCG 1 MG: 400 TAB at 12:46

## 2017-04-11 RX ADMIN — LORAZEPAM 2 MG: 1 TABLET ORAL at 13:14

## 2017-04-11 RX ADMIN — THERA TABS 1 TABLET: TAB at 12:47

## 2017-04-11 NOTE — PROGRESS NOTES
Patient seen ad examined. HE had a large bowel movement, and passing flatus. He has been without an NG tube for more than 12 hours with no nausea or vomiting. Abdomen is soft and non-tender    Plan:  Giving the patient condition, I decided to hold on the surgery and will start liquid diet. If he tolerates diet, i think he can be discharged home  If not than unfortunately we have to take him for surgery  Will follow closely.

## 2017-04-11 NOTE — PROGRESS NOTES
Patient is having periods of confusion. ETOH protocol initiated per Dr Pedro Chopra. Patient was heard saying he was , \" ready to leave.  \" Jacky Morales RN
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
## 2017-04-11 NOTE — PROGRESS NOTES
Progress Note      Patient: Harvey Nicole               Sex: male          DOA: 4/9/2017       YOB: 1945      Age:  73 y.o.        LOS:  LOS: 2 days             CHIEF COMPLAINT:  SBO, alcoholism    Subjective:     NG tube removed  Surgery cancelled as patient has improved    Objective:      Visit Vitals    /47 (BP 1 Location: Right arm, BP Patient Position: At rest)    Pulse 91    Temp 97.8 °F (36.6 °C)    Resp 20    Ht 5' 8.5\" (1.74 m)    Wt 56.7 kg (125 lb)    SpO2 98%    BMI 18.73 kg/m2       Physical Exam:  Gen:  No distress, no complaint  Lungs:  Clear bilaterally, no wheeze or rhonchi  Heart:  Regular rate and rhythm, no murmurs or gallops  Abdomen:  Soft, non-tender, normal bowel sounds  Neuro:  Agitation, previously throwing objects in the room        Lab/Data Reviewed: All lab results for the last 24 hours reviewed. Assessment/Plan     Active Problems:    SBO (small bowel obstruction) ([address] (3/30/2017)    Alcohol withdrawal    Plan:  Monitor abdominal pain  CIWA protocol  Patient attempted to leave A but was brought back.

## 2017-04-11 NOTE — PROGRESS NOTES
RRR on patient with Fall. Fall was unwitnessed. Patient reports that he fell while attempting to go to the toilet. Patient appears confused. On Ciwa protocol . Per RN scored zero at 7 pm. Patient has bruise on his Lip, denies headache or pain in any other areas. Patient is agitated. Vitals wnl. CT head w/o contrast ordered stat, check electrolytes. Ativan as needed for agitation, Sitter stat. Review results when available.   Conrado Zhang MD  8:00 PM

## 2017-04-12 PROCEDURE — 65270000029 HC RM PRIVATE

## 2017-04-12 PROCEDURE — 74011250637 HC RX REV CODE- 250/637: Performed by: HOSPITALIST

## 2017-04-12 PROCEDURE — 74011250636 HC RX REV CODE- 250/636: Performed by: HOSPITALIST

## 2017-04-12 RX ADMIN — MORPHINE SULFATE 2 MG: 2 INJECTION, SOLUTION INTRAMUSCULAR; INTRAVENOUS at 20:46

## 2017-04-12 RX ADMIN — ONDANSETRON 4 MG: 2 INJECTION INTRAMUSCULAR; INTRAVENOUS at 20:34

## 2017-04-12 RX ADMIN — THIAMINE HCL TAB 100 MG 100 MG: 100 TAB at 08:36

## 2017-04-12 RX ADMIN — Medication 10 ML: at 22:24

## 2017-04-12 RX ADMIN — LORAZEPAM 1 MG: 1 TABLET ORAL at 01:09

## 2017-04-12 RX ADMIN — HEPARIN SODIUM 5000 UNITS: 5000 INJECTION, SOLUTION INTRAVENOUS; SUBCUTANEOUS at 17:12

## 2017-04-12 RX ADMIN — Medication 10 ML: at 06:00

## 2017-04-12 RX ADMIN — HEPARIN SODIUM 5000 UNITS: 5000 INJECTION, SOLUTION INTRAVENOUS; SUBCUTANEOUS at 10:00

## 2017-04-12 RX ADMIN — FOLIC ACID TAB 400 MCG 1 MG: 400 TAB at 08:36

## 2017-04-12 RX ADMIN — Medication 10 ML: at 14:00

## 2017-04-12 RX ADMIN — HEPARIN SODIUM 5000 UNITS: 5000 INJECTION, SOLUTION INTRAVENOUS; SUBCUTANEOUS at 01:09

## 2017-04-12 RX ADMIN — THERA TABS 1 TABLET: TAB at 08:36

## 2017-04-12 RX ADMIN — LORAZEPAM 1 MG: 1 TABLET ORAL at 08:36

## 2017-04-12 NOTE — PROGRESS NOTES
Responded to RRT. Pt found on the floor, unwitnessed fall. Noted small laceration to bridge of nose and right side of lip. Bruised and swollen lip noted. No other injuries noted. MD at bedside. Pt admitted with sbo, had attempted to go to the bathroom. Pt oriented to self. Vital signs taken. Pt given 2 mg ativan iv after assisting pt to bed and cleaned of stool incontinence. Bilateral wrist restraints placed and sitter ordered per MD.  Taken to CT of head w/o contrast at 2055 with NA and RN and back to room w/o event. Pt calmer, remains disoriented to place, time, and event.

## 2017-04-12 NOTE — PROGRESS NOTES
0725 Report received; Pt asleep; restraint check in placed. CIWA started. 9570 Reassessment of CIWA and restraint Range of motion; Pt in bed; disoriented of time and place; Reoriented. Sitter at the bedside; will continue to monitor. 1110 Range motion for restrain; Pt asleep; awaken; refused to do range of motion at this time; will continue to monitor. 200 Pt spoke to his wife on the phone; Wife requested to not give him any Ativan and advanced of diet. Informed Charge Nurse Carol and Dr. Darron Demarco. Informed the pt's wife that his diet will be full liquid now. Dr. Darron Demarco stated that CIWA protocol will be followed CIWA of 6 now; Informed wife of the the CIWA protocol. Wife acknowledged. 1320 Pt in bed resting; sitter at the bedside; Sitter feed the pt with his lunch; tolerated about 50%. 1520 Pt in bed; resting; CIWA 4; pt seems relax; speech more clear than this morning; sitter at the bedside; call bell within reach. 1700 Dr. Darrno Demarco verbally renewed the restrains order      Pt more calm; ate dinner. Sitter at the bedside; call bell within reach. 1844 In bed; resting; call bell within reach; bed on the lowest position. Bedside shift change report given to CATHIE Wheeler (oncoming nurse) by Travis Hedrick (offgoing nurse). Report included the following information SBAR, Kardex and MAR.

## 2017-04-12 NOTE — PROGRESS NOTES
0725 Report received; Pt asleep; restraint check in placed. CIWA started. 0341 Reassessment of CIWA and restraint Range of motion; Pt in bed; disoriented of time and place; Reoriented. Sitter at the bedside; will continue to monitor. 1110 Range motion for restrain; Pt asleep; awaken; refused to do range of motion at this time; will continue to monitor. 200 Pt spoke to his wife on the phone; Wife requested to not give him any Ativan and advanced of diet. Informed Charge Nurse Carol and Dr. Arias Jha. Informed the pt's wife that his diet will be full liquid now. Dr. Arias Jha stated that CIWA protocol will be followed CIWA of 6 now; Informed wife of the the CIWA protocol. Wife acknowledged. 1320 Pt in bed resting; sitter at the bedside; Sitter feed the pt with his lunch; tolerated about 50%. 1520 Pt in bed; resting; CIWA 4; pt seems relax; speech more clear than this morning; sitter at the bedside; call bell within reach. 1700 Dr. Arias Jha verbally renewed the restrains order      Pt more calm; ate dinner. Sitter at the bedside; call bell within reach. 1844 In bed; resting; call bell within reach; bed on the lowest position. Bedside shift change report given to CATHIE Wheeler (oncoming nurse) by Myranda Correia (offgoing nurse). Report included the following information SBAR, Kardex and MAR.

## 2017-04-12 NOTE — PROGRESS NOTES
Progress Note      Patient: Mireya Schwartz               Sex: male          DOA: 4/9/2017       YOB: 1945      Age:  73 y.o.        LOS:  LOS: 3 days             CHIEF COMPLAINT:  SBO, alcohol withdrawal    Subjective:     Patient confused, agitated    Objective:      Visit Vitals    BP (!) 154/93 (BP 1 Location: Left arm, BP Patient Position: At rest)    Pulse 89    Temp 97.4 °F (36.3 °C)    Resp 16    Ht 5' 8.5\" (1.74 m)    Wt 56.7 kg (125 lb)    SpO2 97%    BMI 18.73 kg/m2       Physical Exam:  Gen:  No distress, no complaint  Lungs:  Clear bilaterally, no wheeze or rhonchi  Heart:  Regular rate and rhythm, no murmurs or gallops  Abdomen:  Soft, mild distention increased from yesterday        Lab/Data Reviewed: All lab results for the last 24 hours reviewed. Assessment/Plan     Active Problems:    SBO (small bowel obstruction) ([address] (3/30/2017)    Alcohol withdrawal    Plan:  Continue with CIWA protocol  Monitor mental status  Patient continuing with oral diet.

## 2017-04-12 NOTE — PROGRESS NOTES
1905 Received shift report from Dayanna Seymour RN. Pt lying in bed sleeping. Current CIWA of 0.     1945 Called into pt's room due to unwitnessed fall. Pt sitting in excrement on floor with laceration to the bridge of nose and R side of lip. No other injuries noted. Pt cleaned up and put back into bed. Pt orienetd x1 (self). MD at bedside. CIWA of 21, administered 2mg Ativan per MD. Restraints, sitter, CT scan, and Chem 7 ordered per MD (will call MD once results are verified). 2015 Called pt's wife, phone was busy. Will try back. 2045 Called pt's wife and left a VM about the pt's condition and his restraints. CIWA of 7.    2055 Pt brought down to CT scan. CIWA of 7. Sitter at bedside, bed in lowest position, call bell within reach of sitter, will continue to monitor. 2135 No abnormalities found with CT scan. 2200 Pt's wife called unit, she did not listen to her VM. Explained to spouse what has been going on with the pt this evening including the need for restraints and the pt's condition. Spouse will be back in AM.    2215 Lab came to draw Chem 7. Pt is more alert and non-cooperative. CIWA of 14, administered 1mg Ativan. Turned lights off in room to destress pt.     2245 CIWA of 16, administered 2mg Ativan. Chem 7 results show no significant findings. Sitter at bedside, bed in lowest position, call bell within reach of sitter, will continue to monitor. 0100 CIWA of 14, administered 1mg Ativan. Sitter at bedside, bed in lowest position, call bell within reach of sitter, will continue to monitor. 0200 CIWA of 7. Sitter at bedside, bed in lowest position, call bell within reach of sitter, will continue to monitor. 0430 CIWA of 0. Pt lying in bed sleeping. Sitter at bedside, bed in lowest position, call bell within reach of sitter, will continue to monitor. 0700 CIWA of 0. Pt lying in bed sleeping.  Sitter at bedside, bed in lowest position, call bell within reach of sitter, will continue to monitor. Bedside and Verbal shift change report given to Jesus Jara RN (oncoming nurse) by Shahana Mayo RN (offgoing nurse). Report included the following information SBAR, Kardex, Intake/Output, MAR and Recent Results.

## 2017-04-12 NOTE — PROGRESS NOTES
Patient has no bowel obstruction currently. He is still passing flatus and stool. Will sign off. Please call for any question.

## 2017-04-13 PROCEDURE — 65270000029 HC RM PRIVATE

## 2017-04-13 PROCEDURE — 74011250636 HC RX REV CODE- 250/636: Performed by: FAMILY MEDICINE

## 2017-04-13 PROCEDURE — 74011250636 HC RX REV CODE- 250/636

## 2017-04-13 PROCEDURE — 77030020263 HC SOL INJ SOD CL0.9% LFCR 1000ML

## 2017-04-13 PROCEDURE — 51798 US URINE CAPACITY MEASURE: CPT

## 2017-04-13 PROCEDURE — 74011250637 HC RX REV CODE- 250/637: Performed by: HOSPITALIST

## 2017-04-13 PROCEDURE — 74011250636 HC RX REV CODE- 250/636: Performed by: HOSPITALIST

## 2017-04-13 RX ORDER — PROMETHAZINE HYDROCHLORIDE 25 MG/ML
INJECTION, SOLUTION INTRAMUSCULAR; INTRAVENOUS
Status: COMPLETED
Start: 2017-04-13 | End: 2017-04-13

## 2017-04-13 RX ORDER — SODIUM CHLORIDE 9 MG/ML
100 INJECTION, SOLUTION INTRAVENOUS CONTINUOUS
Status: DISCONTINUED | OUTPATIENT
Start: 2017-04-13 | End: 2017-04-15 | Stop reason: HOSPADM

## 2017-04-13 RX ORDER — PROMETHAZINE HYDROCHLORIDE 25 MG/ML
25 INJECTION, SOLUTION INTRAMUSCULAR; INTRAVENOUS
Status: DISCONTINUED | OUTPATIENT
Start: 2017-04-13 | End: 2017-04-15 | Stop reason: HOSPADM

## 2017-04-13 RX ADMIN — THIAMINE HCL TAB 100 MG 100 MG: 100 TAB at 09:21

## 2017-04-13 RX ADMIN — ONDANSETRON 4 MG: 2 INJECTION INTRAMUSCULAR; INTRAVENOUS at 01:11

## 2017-04-13 RX ADMIN — THERA TABS 1 TABLET: TAB at 09:22

## 2017-04-13 RX ADMIN — HEPARIN SODIUM 5000 UNITS: 5000 INJECTION, SOLUTION INTRAVENOUS; SUBCUTANEOUS at 09:20

## 2017-04-13 RX ADMIN — SODIUM CHLORIDE 100 ML/HR: 900 INJECTION, SOLUTION INTRAVENOUS at 20:26

## 2017-04-13 RX ADMIN — SODIUM CHLORIDE 100 ML/HR: 900 INJECTION, SOLUTION INTRAVENOUS at 11:16

## 2017-04-13 RX ADMIN — PROMETHAZINE HYDROCHLORIDE 25 MG: 25 INJECTION, SOLUTION INTRAMUSCULAR; INTRAVENOUS at 01:52

## 2017-04-13 RX ADMIN — HEPARIN SODIUM 5000 UNITS: 5000 INJECTION, SOLUTION INTRAVENOUS; SUBCUTANEOUS at 01:11

## 2017-04-13 RX ADMIN — SODIUM CHLORIDE 100 ML/HR: 900 INJECTION, SOLUTION INTRAVENOUS at 01:52

## 2017-04-13 RX ADMIN — HEPARIN SODIUM 5000 UNITS: 5000 INJECTION, SOLUTION INTRAVENOUS; SUBCUTANEOUS at 18:15

## 2017-04-13 RX ADMIN — FOLIC ACID TAB 400 MCG 1 MG: 400 TAB at 09:21

## 2017-04-13 NOTE — PROGRESS NOTES
Assumed care; pt awake in bed restless and nauseous; Spouse, Sitter at bedside; No distress noted; Pt complains of abdominal pain. Per spouse, pt given yogurt to eat. Spouse educated on pt current diet regimen, verbalizes understanding; Pt repositioned in bed; See Mar for intervention. BP to be rechecked, PTA BP meds noted held during this admission. To be clarified during rounds. Bilateral wrist restraint in place, no sign of injury noted; Bed alarm on; Side rails up x 3; Call light and personal belonging within reach. Will continue to monitor. 2320:Pt vomiting large amount of brown liquid. Pt cleaned and Dr Kapil Anand notified. Active bowel sounds; Pt not tolerating diet. Resume clear liquid diet. If vomiting persist, notify him.

## 2017-04-13 NOTE — PROGRESS NOTES
Progress Note      Patient: Abigail Shannon               Sex: male          DOA: 4/9/2017       YOB: 1945      Age:  73 y.o.        LOS:  LOS: 4 days             CHIEF COMPLAINT:  SBO and alcohol withdrawal    Subjective:     Less agitated    Objective:      Visit Vitals    /88 (BP 1 Location: Left arm, BP Patient Position: At rest)    Pulse 82    Temp 98.4 °F (36.9 °C)    Resp 18    Ht 5' 8.5\" (1.74 m)    Wt 56.7 kg (125 lb)    SpO2 94%    BMI 18.73 kg/m2       Physical Exam:  Gen:  No distress, no complaint  Lungs:  Clear bilaterally, no wheeze or rhonchi  Heart:  Regular rate and rhythm, no murmurs or gallops  Abdomen:  Soft, non-tender, normal bowel sounds        Lab/Data Reviewed:    Labs reviewed        Assessment/Plan     Active Problems:    SBO (small bowel obstruction) ([address] (3/30/2017)    Alcohol withdrawal    Plan:  Patient awake and talking  Continue CIWA protocol  Monitor mental status.

## 2017-04-13 NOTE — PROGRESS NOTES
NUTRITION follow-up/Plan of care    RECOMMENDATIONS:   1. NPO. Advance diet when medically indicated  2. Monitor weight and PO intake  3. RD to follow    GOALS:   1. Ongoing: PO intake meets >75% of protein/calorie needs by 4/16  2. Ongoing: Promote healthy gain (1-2 lb) by 4/20    ASSESSMENT:   Per BMI of 18.7, weight is in the normal classification. However, patient is at nutritional risk since patient is > 72years old with BMI <23. PO intake is poor. Labs noted. Nutrition recommendations listed. RD to follow.       Nutrition Diagnoses: Altered GI function related to SBO as evidenced by NPO order.     Nutrition Risk:  [x]   High []  Moderate [] Low    SUBJECTIVE/OBJECTIVE:   Patient admitted for AMS and SBO. No known food. Patient states that his weight has been stable at 125 lb (but used to be 150 lb 25 years ago). Patient denies trouble chewing/swallowing. Patient drank some liquid yesterday but he had vomiting last night. Currently NPO. Information Obtained From:   [x] Chart Review  [x] Patient  [] Family/Caregiver  [] Nurse/Physician   [] Patient Rounds/Interdisciplinary Meeting    Diet: NPO  Patient Vitals for the past 100 hrs:   % Diet Eaten   04/12/17 1706 100 %   04/12/17 1300 100 %   04/11/17 1534 0 %   04/11/17 1200 0 %   04/09/17 1200 0 %     Medications: [x] Reviewed (Folic acid, KCl, MVI, Thiamine, 0.9%NaCl: 100 ml/hr)  Encounter Diagnoses     ICD-10-CM ICD-9-CM   1. Hypokalemia E87.6 276.8   2.  SBO (small bowel obstruction) ([facility]) K56.69 560.9     Past Medical History:   Diagnosis Date    Hypertension      Labs:  Lab Results   Component Value Date/Time    Sodium 139 04/11/2017 10:20 PM    Potassium 3.7 04/11/2017 10:20 PM    Chloride 98 04/11/2017 10:20 PM    CO2 33 04/11/2017 10:20 PM    Anion gap 8 04/11/2017 10:20 PM    Glucose 95 04/11/2017 10:20 PM    BUN 6 04/11/2017 10:20 PM    Creatinine 0.80 04/11/2017 10:20 PM    Calcium 8.9 04/11/2017 10:20 PM    Magnesium 2.0 04/09/2017 04:57 AM Albumin 3.4 04/09/2017 04:57 AM     Anthropometrics: BMI (calculated): 18.7 Last 3 Recorded Weights in this Encounter    04/09/17 0454   Weight: 56.7 kg (125 lb)    Ht Readings from Last 1 Encounters:   04/09/17 5' 8.5\" (1.74 m)     []  Weight Loss  []  Weight Gain  []  Weight Stable   [x]  New wt n/a on record     Estimated Nutrition Needs:   2400 Kcals/day  Protein (g): 75 g    Nutrition Problems Identified:   [x] Suboptimal PO intake   [] Food Allergies  [] Difficulty chewing/swallowing/poor dentition  [] Constipation/Diarrhea   [x] Nausea/Vomiting   [] None  [] Other:     Plan:   [] Therapeutic Diet  []  Obtained/adjusted food preferences/tolerances and/or snacks options   []  Supplements added   [] Occupational therapy following for feeding techniques  []  HS snack added   []  Modify diet texture   []  Modify diet for food allergies   []  Educate patient   []  Assist with menu selection   []  Monitor PO intake on meal rounds   [x]  Continue inpatient monitoring and intervention   []  Participated in discharge planning/Interdisciplinary rounds/Team meetings   []  Other:     Education Needs:   [x] Not appropriate for teaching at this time   [] Identified and addressed    Nutrition Monitoring and Evaluation:   [x] Continue inpatient monitoring and interventions    [] Other:     Eleno Brian, [address] [place] [address]  Pager: [phone]

## 2017-04-13 NOTE — PROGRESS NOTES
0000 Assumed care of patient, Patient laying in bed, Assessment completed , Bed railings up x3 , Call bell in reach, Patient express no concerns at this time. Sitter at bedside   0100 Patient vomited 400 ml of brown emesis, Pt medicated with Zofran. 0119 Patient cont to vomit brown emesis , Dr. Joe Altamirano paged and notified. Orders received to have patient NPO,  phenergan 25 mg q6h PRN, NS at 100ml/hr, and to page him if the patient vomits again  0200 Patient sleeping, sitter at bedside          Bedside and Verbal shift change report given to  CATHIE Emery (oncoming nurse) by Shakira Goodrich (offgoing nurse). Report included the following information SBAR, Kardex, OR Summary, Intake/Output and Recent Results.

## 2017-04-13 NOTE — PROGRESS NOTES
attempted to complete a follow up visit with patient in room 2204 at 1250 today, and found patient and resting peacefully. I did not disturb him. No family seen.  Chaplains will continue to follow and will provide pastoral care on an as needed/requested basiss    Chaplain Rafa Herring   Board Certified [address] [place] [address]   [phone]

## 2017-04-14 PROCEDURE — 77030020263 HC SOL INJ SOD CL0.9% LFCR 1000ML

## 2017-04-14 PROCEDURE — 74011250637 HC RX REV CODE- 250/637: Performed by: HOSPITALIST

## 2017-04-14 PROCEDURE — 74011250636 HC RX REV CODE- 250/636: Performed by: FAMILY MEDICINE

## 2017-04-14 PROCEDURE — 65270000029 HC RM PRIVATE

## 2017-04-14 PROCEDURE — 74011250636 HC RX REV CODE- 250/636: Performed by: HOSPITALIST

## 2017-04-14 RX ADMIN — HEPARIN SODIUM 5000 UNITS: 5000 INJECTION, SOLUTION INTRAVENOUS; SUBCUTANEOUS at 18:17

## 2017-04-14 RX ADMIN — HEPARIN SODIUM 5000 UNITS: 5000 INJECTION, SOLUTION INTRAVENOUS; SUBCUTANEOUS at 02:30

## 2017-04-14 RX ADMIN — HEPARIN SODIUM 5000 UNITS: 5000 INJECTION, SOLUTION INTRAVENOUS; SUBCUTANEOUS at 09:32

## 2017-04-14 RX ADMIN — SODIUM CHLORIDE 100 ML/HR: 900 INJECTION, SOLUTION INTRAVENOUS at 05:47

## 2017-04-14 RX ADMIN — THERA TABS 1 TABLET: TAB at 09:31

## 2017-04-14 RX ADMIN — THIAMINE HCL TAB 100 MG 100 MG: 100 TAB at 09:31

## 2017-04-14 RX ADMIN — FOLIC ACID TAB 400 MCG 1 MG: 400 TAB at 09:31

## 2017-04-14 NOTE — ROUTINE PROCESS
Bedside and Verbal shift change report given to [address] [place] [address] (oncoming nurse) by Christiano Hauser RN (offgoing nurse). Report included the following information SBAR, Kardex, Intake/Output and MAR.

## 2017-04-14 NOTE — PROGRESS NOTES
NUTRITION follow-up/Plan of care    RECOMMENDATIONS:   1. Clear liquid diet. Advance diet when medically indicated  2. Monitor weight and PO intake  3. RD to follow    GOALS:   1. Ongoing: PO intake meets >75% of protein/calorie needs by 4/17  2. Ongoing: Promote healthy gain (1-2 lb) by 4/21    ASSESSMENT:   Per BMI of 18.7, weight is in the normal classification. However, patient is at nutritional risk since patient is > 72years old with BMI <23. PO intake is poor. Labs noted. Nutrition recommendations listed. RD to follow.       Nutrition Diagnoses: Altered GI function related to SBO as evidenced by NPO order. Nutrition Risk:  [x]   High []  Moderate [] Low    SUBJECTIVE/OBJECTIVE:   Patient admitted for AMS and SBO. No known food. Patient states that his weight has been stable at 125 lb (but used to be 150 lb 25 years ago). Patient denies trouble chewing/swallowing. Patient with no N/V but still NPO. Information Obtained From:   [x] Chart Review  [] Patient  [] Family/Caregiver  [] Nurse/Physician   [] Patient Rounds/Interdisciplinary Meeting    Diet: NPO  Patient Vitals for the past 100 hrs:   % Diet Eaten   04/12/17 1706 100 %   04/12/17 1300 100 %   04/11/17 1534 0 %   04/11/17 1200 0 %     Medications: [x] Reviewed (Folic acid, KCl, MVI, Thiamine, 0.9%NaCl: 100 ml/hr)  Encounter Diagnoses     ICD-10-CM ICD-9-CM   1. Hypokalemia E87.6 276.8   2.  SBO (small bowel obstruction) ([facility]) K56.69 560.9     Past Medical History:   Diagnosis Date    Hypertension      Labs:  Lab Results   Component Value Date/Time    Sodium 139 04/11/2017 10:20 PM    Potassium 3.7 04/11/2017 10:20 PM    Chloride 98 04/11/2017 10:20 PM    CO2 33 04/11/2017 10:20 PM    Anion gap 8 04/11/2017 10:20 PM    Glucose 95 04/11/2017 10:20 PM    BUN 6 04/11/2017 10:20 PM    Creatinine 0.80 04/11/2017 10:20 PM    Calcium 8.9 04/11/2017 10:20 PM    Magnesium 2.0 04/09/2017 04:57 AM    Albumin 3.4 04/09/2017 04:57 AM     Anthropometrics: BMI (calculated): 18.7 Last 3 Recorded Weights in this Encounter    04/09/17 0454   Weight: 56.7 kg (125 lb)    Ht Readings from Last 1 Encounters:   04/09/17 5' 8.5\" (1.74 m)     []  Weight Loss  []  Weight Gain  []  Weight Stable   [x]  New wt n/a on record     Estimated Nutrition Needs:   2400 Kcals/day  Protein (g): 75 g    Nutrition Problems Identified:   [x] Suboptimal PO intake   [] Food Allergies  [] Difficulty chewing/swallowing/poor dentition  [] Constipation/Diarrhea   [x] Nausea/Vomiting (resolved?)  [] None  [] Other:     Plan:   [] Therapeutic Diet  []  Obtained/adjusted food preferences/tolerances and/or snacks options   []  Supplements added   [] Occupational therapy following for feeding techniques  []  HS snack added   []  Modify diet texture   []  Modify diet for food allergies   []  Educate patient   []  Assist with menu selection   []  Monitor PO intake on meal rounds   [x]  Continue inpatient monitoring and intervention   []  Participated in discharge planning/Interdisciplinary rounds/Team meetings   []  Other:     Education Needs:   [x] Not appropriate for teaching at this time due to: NPO   [] Identified and addressed    Nutrition Monitoring and Evaluation:   [x] Continue inpatient monitoring and interventions    [] Other:     Wu Mejia, [address]  Pager: [phone]

## 2017-04-14 NOTE — ROUTINE PROCESS
Bedside and Verbal shift change report given to CATHIE Miguel (oncoming nurse) by Ryann Mclean (offgoing nurse). Report included the following information SBAR, Kardex, MAR and Recent Results.

## 2017-04-14 NOTE — PROGRESS NOTES
Patient less confused, sitter in room. His diet advanced to Clear liquids  Discharge plan is to return home.   Hammad Muir RN

## 2017-04-14 NOTE — ROUTINE PROCESS
Received care for patient from [address] [place] [address], patient in bed showing no signs of discomfort, sitter at bedside. 0957:  Assessment completed. No agitation noted. Will continue to monitor. Patient has new orders for restraints discontinued. 1239: Patient had no episodes of vomiting or nausea, patient still has orders for NPO.     1642: patient denies pain and discomfort, will continue to monitor.

## 2017-04-14 NOTE — PROGRESS NOTES
Progress Note      Patient: Gwen Kumar               Sex: male          DOA: 4/9/2017       YOB: 1945      Age:  73 y.o.        LOS:  LOS: 5 days             CHIEF COMPLAINT:  Initial SBO, then Alcohol withdrawal, improving    Subjective:     Patient much less agitated today  Less Abdominal pain    Objective:      Visit Vitals    /85 (BP 1 Location: Left arm, BP Patient Position: At rest)    Pulse 85    Temp 98.1 °F (36.7 °C)    Resp 18    Ht 5' 8.5\" (1.74 m)    Wt 56.7 kg (125 lb)    SpO2 91%    BMI 18.73 kg/m2       Physical Exam:  Gen:  No distress, no complaint  Lungs:  Clear bilaterally, no wheeze or rhonchi  Heart:  Regular rate and rhythm, no murmurs or gallops  Abdomen:  Soft, non-tender, normal bowel sounds        Lab/Data Reviewed: All lab results for the last 24 hours reviewed. Assessment/Plan     Active Problems:    SBO (small bowel obstruction) ([address] (3/30/2017)    Alcohol withdrawal improving    Plan:  Surgery has signed off  Re-trial of clear liquid diet  Advance as possible. Patient is highly motivated to go home.

## 2017-04-14 NOTE — PROGRESS NOTES
2029: Received patient in bed awake,alert and  Oriented  X 2-3. No signs of distress. Denies any pain at this time. Bed low and locked. Sitter at the bedside. Call bell within reach. Will continue monitoring. 2710: In bed resting quietly,denies any pain at this time. No other concerns. Sitter at the bedside. Call bell within reach. Will monitor. 0600: In bed ,no complains voiced out. Sitter at the bedside. Call bell within reach . Will monitor.

## 2017-04-15 VITALS
HEIGHT: 69 IN | BODY MASS INDEX: 18.51 KG/M2 | OXYGEN SATURATION: 92 % | SYSTOLIC BLOOD PRESSURE: 158 MMHG | WEIGHT: 125 LBS | TEMPERATURE: 98 F | HEART RATE: 90 BPM | RESPIRATION RATE: 20 BRPM | DIASTOLIC BLOOD PRESSURE: 102 MMHG

## 2017-04-15 PROCEDURE — 74011250637 HC RX REV CODE- 250/637: Performed by: HOSPITALIST

## 2017-04-15 PROCEDURE — 77030020253 HC SOL INJ D545NS .05 DEX .45 SAL

## 2017-04-15 PROCEDURE — 74011250636 HC RX REV CODE- 250/636: Performed by: HOSPITALIST

## 2017-04-15 PROCEDURE — 74011250636 HC RX REV CODE- 250/636: Performed by: FAMILY MEDICINE

## 2017-04-15 RX ADMIN — HEPARIN SODIUM 5000 UNITS: 5000 INJECTION, SOLUTION INTRAVENOUS; SUBCUTANEOUS at 03:57

## 2017-04-15 RX ADMIN — THIAMINE HCL TAB 100 MG 100 MG: 100 TAB at 09:06

## 2017-04-15 RX ADMIN — HEPARIN SODIUM 5000 UNITS: 5000 INJECTION, SOLUTION INTRAVENOUS; SUBCUTANEOUS at 09:06

## 2017-04-15 RX ADMIN — THERA TABS 1 TABLET: TAB at 09:06

## 2017-04-15 RX ADMIN — SODIUM CHLORIDE 100 ML/HR: 900 INJECTION, SOLUTION INTRAVENOUS at 01:51

## 2017-04-15 RX ADMIN — FOLIC ACID TAB 400 MCG 1 MG: 400 TAB at 09:06

## 2017-04-15 NOTE — PROGRESS NOTES
1955: Received patient in bed awake,alert and oriented x 3. No signs of distress. Denies any pain at this time. Bed low and locked. Sitter at the bedside. Call bell within  reach. Will continue monitoring. 0030: Resting in bed quietly,no other concerns at this time. Call bell within reach. Sitter at the bedside. Call bell within reach. Will continue monitoring. 0600: In bed resting no complaints voiced out. Sitter at the bedside. Call bell within reach. Will continue monitoring.

## 2017-04-15 NOTE — DISCHARGE SUMMARY
[facility]  Hospitalist Division    Discharge Summary      Patient: Jules Garcia MRN: [id number]  [facility]: [id number]    YOB: 1945  Age: 73 y.o. Sex: male    DOA: 4/9/2017 LOS:  LOS: 6 days   Discharge Date: 04/15/17     PCP:  Lito Castillo MD    Chief Complaint:    Chief Complaint   Patient presents with    Altered mental status     Admission Diagnosis:   Hospital Problems as of 4/15/2017  Date Reviewed: 2/11/2015          Codes Class Noted - Resolved POA    SBO (small bowel obstruction) ([facility] ICD-10-CM: K56.69  ICD-9-CM: 560.9  3/30/2017 - Present Unknown              Discharge Diagnoses:    High Grade SBO-resolved  Acute encephalopathy due to alcohol withdrawal -resolved  Alcohol withdrawal - resolved  Hypokalemia-resolved  HTN -managed  Vit D Def-managed  Tobacco Use-please quit    Hospital Course:   73 y.o. male with a PMHx of HTN, SBO, Vit D def who presented to the ED complaining of continued abdominal pain with nausea and vomiting after multiple recent admissions for untreated SBO. The patient was admitted on 03/30/17 for a SBO and left AMA on 04/03 and then returned on 04/06 for the same thing. He then left AMA again on 04/07 because he wasn't aloud to eat. He returns again today for continued abdominal pain with nausea/vomiting. He also c/o hiccups. The symptoms have persisted and he has been unable to keep anything down. He states he doesn't remember leaving both times which is ridiculous. He states he will not leave again because he wants to get better. In the ED, he had a NGT placed after imaging was consistent again with a high-grade SBO with focal transition in the right lower quad. Surgery consulted, patient refusing any surgical interventions. Within 48hrs the patient began having bowel movements and passing gas.  His course was complicated by suspected alcohol withdrawal and CIWA started with prn ativan, he had mild disorientation but this resolved by day of discharge, CT head no acute findings. He did well with a bedside sitter. NGT removed. Diet instructions given to help prevent these SBO recurrences. Significant Diagnostic Studies:  CT abd/pelvis 4/9  CT head 4/11    Consults:  Surgery    Operative Procedures:  NGT placement -4/9      Discharge Condition:       Discharge Medications:    Current Discharge Medication List      CONTINUE these medications which have NOT CHANGED    Details   aspirin delayed-release 81 mg tablet Take 1 Tab by mouth daily. Qty: 90 Tab, Refills: 3      HYDROcodone-acetaminophen (NORCO) 5-325 mg per tablet Take 1 Tab by mouth every six (6) hours as needed for Pain. Max Daily Amount: 4 Tabs. Qty: 6 Tab, Refills: 0      ondansetron (ZOFRAN ODT) 4 mg disintegrating tablet Take 1 Tab by mouth every eight (8) hours as needed for Nausea. Qty: 8 Tab, Refills: 0      hydrochlorothiazide (HYDRODIURIL) 25 mg tablet Take 25 mg by mouth daily. tadalafil (CIALIS) 5 mg tablet Take 5 mg by mouth daily as needed. Qty: 30 Tab, Refills: 1      cholecalciferol (VITAMIN D3) 1,000 unit tablet Take  by mouth daily. Follow-Up And Discharge Instructions: Follow-up Information     Follow up With Details Comments Mira Guardado III, MD Schedule an appointment as soon as possible for a visit  [facility] [phone]          Instructions given to prevent further recurrence in discharge instruction section.     Wound Care:   NA      [facility]  Hospitalist Division        Time Spent:  35min    Cc: ANDIE GUARDADO III, MD

## 2017-04-15 NOTE — PROGRESS NOTES
Bedside and Verbal shift change report given to Evelyn Ramos (oncoming nurse) by Celina Carvalho (offgoing nurse). Report included the following information SBAR, Kardex, MAR and Recent Results.

## 2017-04-15 NOTE — DISCHARGE INSTRUCTIONS
Bowel Blockage (Intestinal Obstruction): Care Instructions  Your Care Instructions  A bowel blockage, also called an intestinal obstruction, can prevent gas, fluids, or solids from moving through the intestines normally. It can cause constipation and, rarely, diarrhea. You may have pain, nausea, vomiting, and cramping. Most of the time, complete blockages require a stay in the hospital and possibly surgery. But if your bowel is only partly blocked, your doctor may tell you to wait until it clears on its own and you are able to pass gas and stool. If so, there are things you can do at home to help make you feel better. If you have had surgery for a bowel blockage, there are things you can do at home to make sure you heal well. You can also make some changes to keep your bowel from becoming blocked again. Follow-up care is a key part of your treatment and safety. Be sure to make and go to all appointments, and call your doctor if you are having problems. It's also a good idea to know your test results and keep a list of the medicines you take. How can you care for yourself at home? If your doctor has told you to wait at home for a blockage to clear on its own:  · Follow your doctor's instructions. These may include eating a liquid diet to avoid complete blockage. · Take your medicines exactly as prescribed. Call your doctor if you think you are having a problem with your medicine. · Put a heating pad set on low on your belly to relieve mild cramps and pain. To prevent another blockage  · Try to eat smaller amounts of food more often. For example, have 5 or 6 small meals throughout the day instead of 2 or 3 large meals. · Chew your food very well. Try to chew each bite about 20 times or until it is liquid. · Avoid high-fiber foods and raw fruits and vegetables with skins, husks, strings, or seeds.  These can form a ball of undigested material that can cause a blockage if a part of your bowel is scarred or narrowed. · Check with your doctor before you eat whole-grain products or use a fiber supplement such as Citrucel or Metamucil. · To help you have regular bowel movements, eat at regular times, do not strain during a bowel movement, and drink at least 8 to 10 glasses of water each day. If you have kidney, heart, or liver disease and have to limit fluids, talk with your doctor or before you increase the amount of fluids you drink. · Drink high-calorie liquid formulas if your doctor says to. Severe symptoms may make it hard for your body to take in vitamins and minerals. · Get regular exercise. It helps you digest your food better. Get at least 30 minutes of physical activity on most days of the week. Walking is a good choice. When should you call for help? Call 911 anytime you think you may need emergency care. For example, call if:  · You passed out (lost consciousness). · You have severe pain or swelling in your belly. · You vomit blood or what looks like coffee grounds. · You pass maroon or very bloody stools. · You cannot pass any stools or gas. Call your doctor now or seek immediate medical care if:  · You have a new or higher fever. · You cannot keep fluids or medicines down. · You have new pain that gets worse when you move or cough. · Your symptoms become much worse than usual.  Watch closely for changes in your health, and be sure to contact your doctor if:  · You do not get better as expected. · You have steady diarrhea for more than 2 weeks. · You've been losing weight. Where can you learn more? Go to http://vale-peng.info/. Enter Y450 in the search box to learn more about \"Bowel Blockage (Intestinal Obstruction): Care Instructions. \"  Current as of: August 9, 2016  Content Version: 11.2  © 3947-5582 [employer]. Care instructions adapted under license by [employer] (which disclaims liability or warranty for this information).  If you have questions about a medical condition or this instruction, always ask your healthcare professional. [address] any warranty or liability for your use of this information. DISCHARGE SUMMARY from Nurse    The following personal items are in your possession at time of discharge:    Dental Appliances: Partials, With patient  Visual Aid: None     Home Medications: None  Jewelry: None  Clothing: Undergarments  Other Valuables: None  Personal Items Sent to Safe: none          PATIENT INSTRUCTIONS:    After general anesthesia or intravenous sedation, for 24 hours or while taking prescription Narcotics:  · Limit your activities  · Do not drive and operate hazardous machinery  · Do not make important personal or business decisions  · Do  not drink alcoholic beverages  · If you have not urinated within 8 hours after discharge, please contact your surgeon on call. Report the following to your surgeon:  · Excessive pain, swelling, redness or odor of or around the surgical area  · Temperature over 100.5  · Nausea and vomiting lasting longer than 4 hours or if unable to take medications  · Any signs of decreased circulation or nerve impairment to extremity: change in color, persistent  numbness, tingling, coldness or increase pain  · Any questions        What to do at Home:  Recommended activity: Activity as tolerated,     If you experience any of the following symptoms nausea, vomiting, severe abdominal distention, severe abdominal pain, please follow up with PCP. *  Please give a list of your current medications to your Primary Care Provider. *  Please update this list whenever your medications are discontinued, doses are      changed, or new medications (including over-the-counter products) are added. *  Please carry medication information at all times in case of emergency situations.           These are general instructions for a healthy lifestyle:    No smoking/ No tobacco products/ Avoid exposure to second hand smoke    Surgeon General's Warning:  Quitting smoking now greatly reduces serious risk to your health. Obesity, smoking, and sedentary lifestyle greatly increases your risk for illness    A healthy diet, regular physical exercise & weight monitoring are important for maintaining a healthy lifestyle    You may be retaining fluid if you have a history of heart failure or if you experience any of the following symptoms:  Weight gain of 3 pounds or more overnight or 5 pounds in a week, increased swelling in our hands or feet or shortness of breath while lying flat in bed. Please call your doctor as soon as you notice any of these symptoms; do not wait until your next office visit. Recognize signs and symptoms of STROKE:    F-face looks uneven    A-arms unable to move or move unevenly    S-speech slurred or non-existent    T-time-call 911 as soon as signs and symptoms begin-DO NOT go       Back to bed or wait to see if you get better-TIME IS BRAIN. Warning Signs of HEART ATTACK     Call 911 if you have these symptoms:   Chest discomfort. Most heart attacks involve discomfort in the center of the chest that lasts more than a few minutes, or that goes away and comes back. It can feel like uncomfortable pressure, squeezing, fullness, or pain.  Discomfort in other areas of the upper body. Symptoms can include pain or discomfort in one or both arms, the back, neck, jaw, or stomach.  Shortness of breath with or without chest discomfort.  Other signs may include breaking out in a cold sweat, nausea, or lightheadedness. Don't wait more than five minutes to call 911 - MINUTES MATTER! Fast action can save your life. Calling 911 is almost always the fastest way to get lifesaving treatment. Emergency Medical Services staff can begin treatment when they arrive -- up to an hour sooner than if someone gets to the hospital by car.        The discharge information has been reviewed with the patient and spouse. The patient and spouse verbalized understanding. Discharge medications reviewed with the patient and spouse and appropriate educational materials and side effects teaching were provided. Patient armband removed and shredded. MyChart Activation    Thank you for requesting access to [facility]. Please follow the instructions below to securely access and download your online medical record. [facility] allows you to send messages to your doctor, view your test results, renew your prescriptions, schedule appointments, and more. How Do I Sign Up? 1. In your internet browser, go to [email or web address]  2. Click on the First Time User? Click Here link in the Sign In box. You will be redirect to the New Member Sign Up page. 3. Enter your [facility] Access Code exactly as it appears below. You will not need to use this code after youve completed the sign-up process. If you do not sign up before the expiration date, you must request a new code. [facility] Access Code: [email or web address]  Expires: 2017  9:51 AM (This is the date your [facility] access code will )    4. Enter the last four digits of your Social Security Number (xxxx) and Date of Birth (mm/dd/yyyy) as indicated and click Submit. You will be taken to the next sign-up page. 5. Create a [facility] ID. This will be your [facility] login ID and cannot be changed, so think of one that is secure and easy to remember. 6. Create a [facility] password. You can change your password at any time. 7. Enter your Password Reset Question and Answer. This can be used at a later time if you forget your password. 8. Enter your e-mail address. You will receive e-mail notification when new information is available in [address] 9. Click Sign Up. You can now view and download portions of your medical record. 10. Click the Download Summary menu link to download a portable copy of your medical information.     Additional Information    If you have questions, please visit the Frequently Asked Questions section of the [employer] website at [email or web address] Remember, [facility] is NOT to be used for urgent needs. For medical emergencies, dial 911. Bowel Blockage (Intestinal Obstruction): Care Instructions  Your Care Instructions  A bowel blockage, also called an intestinal obstruction, can prevent gas, fluids, or solids from moving through the intestines normally. It can cause constipation and, rarely, diarrhea. You may have pain, nausea, vomiting, and cramping. Most of the time, complete blockages require a stay in the hospital and possibly surgery. But if your bowel is only partly blocked, your doctor may tell you to wait until it clears on its own and you are able to pass gas and stool. If so, there are things you can do at home to help make you feel better. If you have had surgery for a bowel blockage, there are things you can do at home to make sure you heal well. You can also make some changes to keep your bowel from becoming blocked again. Follow-up care is a key part of your treatment and safety. Be sure to make and go to all appointments, and call your doctor if you are having problems. It's also a good idea to know your test results and keep a list of the medicines you take. How can you care for yourself at home? If your doctor has told you to wait at home for a blockage to clear on its own:  · Follow your doctor's instructions. These may include eating a liquid diet to avoid complete blockage. · Take your medicines exactly as prescribed. Call your doctor if you think you are having a problem with your medicine. · Put a heating pad set on low on your belly to relieve mild cramps and pain. To prevent another blockage!!!  · Try to eat smaller amounts of food more often. For example, have 5 or 6 small meals throughout the day instead of 2 or 3 large meals. · Chew your food very well.  Try to chew each bite about 20 times or until it is liquid. · Avoid high-fiber foods and raw fruits and vegetables with skins, husks, strings, or seeds. These can form a ball of undigested material that can cause a blockage if a part of your bowel is scarred or narrowed. · Check with your doctor before you eat whole-grain products or use a fiber supplement such as Citrucel or Metamucil. · To help you have regular bowel movements, eat at regular times, do not strain during a bowel movement, and drink at least 8 to 10 glasses of water each day. If you have kidney, heart, or liver disease and have to limit fluids, talk with your doctor or before you increase the amount of fluids you drink. · Drink high-calorie liquid formulas if your doctor says to. Severe symptoms may make it hard for your body to take in vitamins and minerals. · Get regular exercise. It helps you digest your food better. Get at least 30 minutes of physical activity on most days of the week. Walking is a good choice. When should you call for help? Call 911 anytime you think you may need emergency care. For example, call if:  · You passed out (lost consciousness). · You have severe pain or swelling in your belly. · You vomit blood or what looks like coffee grounds. · You pass maroon or very bloody stools. · You cannot pass any stools or gas. Call your doctor now or seek immediate medical care if:  · You have a new or higher fever. · You cannot keep fluids or medicines down. · You have new pain that gets worse when you move or cough. · Your symptoms become much worse than usual.  Watch closely for changes in your health, and be sure to contact your doctor if:  · You do not get better as expected. · You have steady diarrhea for more than 2 weeks. · You've been losing weight. Where can you learn more? Go to http://vale-peng.info/.   Enter X424 in the search box to learn more about \"Bowel Blockage (Intestinal Obstruction): Care Instructions. \"  Current as of: August 9, 2016  Content Version: 11.2  © 0289-5048 [employer] [facility]. Care instructions adapted under license by [employer] (which disclaims liability or warranty for this information). If you have questions about a medical condition or this instruction, always ask your healthcare professional. [place] [address] any warranty or liability for your use of this information.

## 2017-04-15 NOTE — PROGRESS NOTES
Tolerated clear liquid diet for breakfast and soft diet for lunch. No c/o of abdominal pain, no N/V noted. Patient scheduled for discharge to home with spouse. Spouse to communicate will pickup patient after 3 pm once off from work. Patient made aware. @ [phone]  Noted patient with increased anxiety, awaiting wife arrival to be discharged home. /102 manual, asymptomatic, no c/o of chest pain nor headache. Updated Dr. Espinal with findings. Per MD ok to be discharged, patient to follow up with PCP, no new medication orders at this time.

## 2017-04-21 ENCOUNTER — APPOINTMENT (OUTPATIENT)
Dept: GENERAL RADIOLOGY | Age: 72
DRG: 390 | End: 2017-04-21
Attending: HOSPITALIST
Payer: MEDICARE

## 2017-04-21 ENCOUNTER — APPOINTMENT (OUTPATIENT)
Dept: CT IMAGING | Age: 72
DRG: 390 | End: 2017-04-21
Attending: EMERGENCY MEDICINE
Payer: MEDICARE

## 2017-04-21 ENCOUNTER — APPOINTMENT (OUTPATIENT)
Dept: GENERAL RADIOLOGY | Age: 72
DRG: 390 | End: 2017-04-21
Attending: EMERGENCY MEDICINE
Payer: MEDICARE

## 2017-04-21 ENCOUNTER — HOSPITAL ENCOUNTER (INPATIENT)
Age: 72
LOS: 1 days | Discharge: HOME | DRG: 390 | End: 2017-04-22
Attending: EMERGENCY MEDICINE | Admitting: HOSPITALIST
Payer: MEDICARE

## 2017-04-21 DIAGNOSIS — E87.6 HYPOKALEMIA: ICD-10-CM

## 2017-04-21 DIAGNOSIS — K56.609 SBO (SMALL BOWEL OBSTRUCTION) (HCC): Primary | ICD-10-CM

## 2017-04-21 LAB
ALBUMIN SERPL BCP-MCNC: 3.3 G/DL (ref 3.4–5)
ALBUMIN/GLOB SERPL: 0.8 {RATIO} (ref 0.8–1.7)
ALP SERPL-CCNC: 58 U/L (ref 45–117)
ALT SERPL-CCNC: 19 U/L (ref 16–61)
ANION GAP BLD CALC-SCNC: 13 MMOL/L (ref 3–18)
APPEARANCE UR: CLEAR
AST SERPL W P-5'-P-CCNC: 14 U/L (ref 15–37)
BASOPHILS # BLD AUTO: 0 K/UL (ref 0–0.06)
BASOPHILS # BLD: 0 % (ref 0–2)
BILIRUB DIRECT SERPL-MCNC: 0.1 MG/DL (ref 0–0.2)
BILIRUB SERPL-MCNC: 0.5 MG/DL (ref 0.2–1)
BILIRUB UR QL: NEGATIVE
BUN SERPL-MCNC: 6 MG/DL (ref 7–18)
BUN/CREAT SERPL: 7 (ref 12–20)
CALCIUM SERPL-MCNC: 8.9 MG/DL (ref 8.5–10.1)
CHLORIDE SERPL-SCNC: 95 MMOL/L (ref 100–108)
CO2 SERPL-SCNC: 29 MMOL/L (ref 21–32)
COLOR UR: YELLOW
CREAT SERPL-MCNC: 0.82 MG/DL (ref 0.6–1.3)
DIFFERENTIAL METHOD BLD: ABNORMAL
EOSINOPHIL # BLD: 0.1 K/UL (ref 0–0.4)
EOSINOPHIL NFR BLD: 1 % (ref 0–5)
ERYTHROCYTE [DISTWIDTH] IN BLOOD BY AUTOMATED COUNT: 14.7 % (ref 11.6–14.5)
ETHANOL SERPL-MCNC: <3 MG/DL (ref 0–3)
GLOBULIN SER CALC-MCNC: 4.2 G/DL (ref 2–4)
GLUCOSE SERPL-MCNC: 106 MG/DL (ref 74–99)
GLUCOSE UR STRIP.AUTO-MCNC: NEGATIVE MG/DL
HCT VFR BLD AUTO: 39.5 % (ref 36–48)
HGB BLD-MCNC: 13.9 G/DL (ref 13–16)
HGB UR QL STRIP: NEGATIVE
KETONES UR QL STRIP.AUTO: ABNORMAL MG/DL
LACTATE BLD-SCNC: 1.2 MMOL/L (ref 0.4–2)
LEUKOCYTE ESTERASE UR QL STRIP.AUTO: NEGATIVE
LIPASE SERPL-CCNC: 153 U/L (ref 73–393)
LYMPHOCYTES # BLD AUTO: 18 % (ref 21–52)
LYMPHOCYTES # BLD: 2.5 K/UL (ref 0.9–3.6)
MAGNESIUM SERPL-MCNC: 1.6 MG/DL (ref 1.6–2.6)
MCH RBC QN AUTO: 31.2 PG (ref 24–34)
MCHC RBC AUTO-ENTMCNC: 35.2 G/DL (ref 31–37)
MCV RBC AUTO: 88.8 FL (ref 74–97)
MONOCYTES # BLD: 1 K/UL (ref 0.05–1.2)
MONOCYTES NFR BLD AUTO: 7 % (ref 3–10)
NEUTS SEG # BLD: 10.7 K/UL (ref 1.8–8)
NEUTS SEG NFR BLD AUTO: 74 % (ref 40–73)
NITRITE UR QL STRIP.AUTO: NEGATIVE
PH UR STRIP: 5.5 [PH] (ref 5–8)
PLATELET # BLD AUTO: 485 K/UL (ref 135–420)
PMV BLD AUTO: 9.1 FL (ref 9.2–11.8)
POTASSIUM SERPL-SCNC: 2.7 MMOL/L (ref 3.5–5.5)
PROT SERPL-MCNC: 7.5 G/DL (ref 6.4–8.2)
PROT UR STRIP-MCNC: NEGATIVE MG/DL
RBC # BLD AUTO: 4.45 M/UL (ref 4.7–5.5)
SODIUM SERPL-SCNC: 137 MMOL/L (ref 136–145)
SP GR UR REFRACTOMETRY: 1.01 (ref 1–1.03)
TROPONIN I SERPL-MCNC: <0.02 NG/ML (ref 0–0.04)
UROBILINOGEN UR QL STRIP.AUTO: 0.2 EU/DL (ref 0.2–1)
WBC # BLD AUTO: 14.3 K/UL (ref 4.6–13.2)

## 2017-04-21 PROCEDURE — 0D9670Z DRAINAGE OF STOMACH WITH DRAINAGE DEVICE, VIA NATURAL OR ARTIFICIAL OPENING: ICD-10-PCS | Performed by: HOSPITALIST

## 2017-04-21 PROCEDURE — 74011250637 HC RX REV CODE- 250/637: Performed by: EMERGENCY MEDICINE

## 2017-04-21 PROCEDURE — 83605 ASSAY OF LACTIC ACID: CPT

## 2017-04-21 PROCEDURE — 96365 THER/PROPH/DIAG IV INF INIT: CPT

## 2017-04-21 PROCEDURE — 71010 XR CHEST PORT: CPT

## 2017-04-21 PROCEDURE — 74177 CT ABD & PELVIS W/CONTRAST: CPT

## 2017-04-21 PROCEDURE — C9113 INJ PANTOPRAZOLE SODIUM, VIA: HCPCS | Performed by: EMERGENCY MEDICINE

## 2017-04-21 PROCEDURE — 81003 URINALYSIS AUTO W/O SCOPE: CPT | Performed by: EMERGENCY MEDICINE

## 2017-04-21 PROCEDURE — 77030008771 HC TU NG SALEM SUMP -A

## 2017-04-21 PROCEDURE — 74011250636 HC RX REV CODE- 250/636: Performed by: EMERGENCY MEDICINE

## 2017-04-21 PROCEDURE — 74011000250 HC RX REV CODE- 250: Performed by: EMERGENCY MEDICINE

## 2017-04-21 PROCEDURE — 80307 DRUG TEST PRSMV CHEM ANLYZR: CPT | Performed by: EMERGENCY MEDICINE

## 2017-04-21 PROCEDURE — 84484 ASSAY OF TROPONIN QUANT: CPT | Performed by: EMERGENCY MEDICINE

## 2017-04-21 PROCEDURE — 83735 ASSAY OF MAGNESIUM: CPT | Performed by: HOSPITALIST

## 2017-04-21 PROCEDURE — 74000 XR ABD (KUB): CPT

## 2017-04-21 PROCEDURE — 74011250636 HC RX REV CODE- 250/636

## 2017-04-21 PROCEDURE — 77030027138 HC INCENT SPIROMETER -A

## 2017-04-21 PROCEDURE — 65270000029 HC RM PRIVATE

## 2017-04-21 PROCEDURE — 74011636320 HC RX REV CODE- 636/320: Performed by: EMERGENCY MEDICINE

## 2017-04-21 PROCEDURE — 99285 EMERGENCY DEPT VISIT HI MDM: CPT

## 2017-04-21 PROCEDURE — 96366 THER/PROPH/DIAG IV INF ADDON: CPT

## 2017-04-21 PROCEDURE — 74011250636 HC RX REV CODE- 250/636: Performed by: HOSPITALIST

## 2017-04-21 PROCEDURE — 96361 HYDRATE IV INFUSION ADD-ON: CPT

## 2017-04-21 PROCEDURE — 85025 COMPLETE CBC W/AUTO DIFF WBC: CPT | Performed by: EMERGENCY MEDICINE

## 2017-04-21 PROCEDURE — 80076 HEPATIC FUNCTION PANEL: CPT | Performed by: EMERGENCY MEDICINE

## 2017-04-21 PROCEDURE — 80048 BASIC METABOLIC PNL TOTAL CA: CPT | Performed by: EMERGENCY MEDICINE

## 2017-04-21 PROCEDURE — 96375 TX/PRO/DX INJ NEW DRUG ADDON: CPT

## 2017-04-21 PROCEDURE — 83690 ASSAY OF LIPASE: CPT | Performed by: EMERGENCY MEDICINE

## 2017-04-21 RX ORDER — POTASSIUM CHLORIDE 7.45 MG/ML
10 INJECTION INTRAVENOUS
Status: COMPLETED | OUTPATIENT
Start: 2017-04-21 | End: 2017-04-21

## 2017-04-21 RX ORDER — HEPARIN SODIUM 5000 [USP'U]/ML
5000 INJECTION, SOLUTION INTRAVENOUS; SUBCUTANEOUS EVERY 8 HOURS
Status: DISCONTINUED | OUTPATIENT
Start: 2017-04-21 | End: 2017-04-22 | Stop reason: HOSPADM

## 2017-04-21 RX ORDER — PROCHLORPERAZINE EDISYLATE 5 MG/ML
10 INJECTION INTRAMUSCULAR; INTRAVENOUS
Status: COMPLETED | OUTPATIENT
Start: 2017-04-21 | End: 2017-04-21

## 2017-04-21 RX ORDER — ACETAMINOPHEN 650 MG/1
650 SUPPOSITORY RECTAL
Status: DISCONTINUED | OUTPATIENT
Start: 2017-04-21 | End: 2017-04-22 | Stop reason: HOSPADM

## 2017-04-21 RX ORDER — MAGNESIUM SULFATE HEPTAHYDRATE 40 MG/ML
2 INJECTION, SOLUTION INTRAVENOUS ONCE
Status: COMPLETED | OUTPATIENT
Start: 2017-04-21 | End: 2017-04-21

## 2017-04-21 RX ORDER — SODIUM CHLORIDE 0.9 % (FLUSH) 0.9 %
5-10 SYRINGE (ML) INJECTION AS NEEDED
Status: DISCONTINUED | OUTPATIENT
Start: 2017-04-21 | End: 2017-04-22 | Stop reason: HOSPADM

## 2017-04-21 RX ORDER — SODIUM CHLORIDE 0.9 % (FLUSH) 0.9 %
5-10 SYRINGE (ML) INJECTION EVERY 8 HOURS
Status: DISCONTINUED | OUTPATIENT
Start: 2017-04-21 | End: 2017-04-22 | Stop reason: HOSPADM

## 2017-04-21 RX ORDER — PANTOPRAZOLE SODIUM 40 MG/10ML
40 INJECTION, POWDER, LYOPHILIZED, FOR SOLUTION INTRAVENOUS
Status: COMPLETED | OUTPATIENT
Start: 2017-04-21 | End: 2017-04-21

## 2017-04-21 RX ORDER — HYDROMORPHONE HYDROCHLORIDE 1 MG/ML
0.5 INJECTION, SOLUTION INTRAMUSCULAR; INTRAVENOUS; SUBCUTANEOUS
Status: COMPLETED | OUTPATIENT
Start: 2017-04-21 | End: 2017-04-21

## 2017-04-21 RX ORDER — LIDOCAINE HYDROCHLORIDE 20 MG/ML
JELLY TOPICAL AS NEEDED
Status: DISCONTINUED | OUTPATIENT
Start: 2017-04-21 | End: 2017-04-22 | Stop reason: HOSPADM

## 2017-04-21 RX ORDER — DEXTROSE, SODIUM CHLORIDE, AND POTASSIUM CHLORIDE 5; .45; .15 G/100ML; G/100ML; G/100ML
50 INJECTION INTRAVENOUS CONTINUOUS
Status: DISCONTINUED | OUTPATIENT
Start: 2017-04-21 | End: 2017-04-22

## 2017-04-21 RX ORDER — OXYMETAZOLINE HCL 0.05 %
3 SPRAY, NON-AEROSOL (ML) NASAL
Status: COMPLETED | OUTPATIENT
Start: 2017-04-21 | End: 2017-04-21

## 2017-04-21 RX ORDER — HYDROMORPHONE HYDROCHLORIDE 1 MG/ML
INJECTION, SOLUTION INTRAMUSCULAR; INTRAVENOUS; SUBCUTANEOUS
Status: DISPENSED
Start: 2017-04-21 | End: 2017-04-21

## 2017-04-21 RX ADMIN — POTASSIUM CHLORIDE 10 MEQ: 10 INJECTION, SOLUTION INTRAVENOUS at 09:40

## 2017-04-21 RX ADMIN — HYDROMORPHONE HYDROCHLORIDE 0.5 MG: 1 INJECTION, SOLUTION INTRAMUSCULAR; INTRAVENOUS; SUBCUTANEOUS at 01:42

## 2017-04-21 RX ADMIN — POTASSIUM CHLORIDE 10 MEQ: 7.46 INJECTION, SOLUTION INTRAVENOUS at 04:08

## 2017-04-21 RX ADMIN — HEPARIN SODIUM 5000 UNITS: 5000 INJECTION, SOLUTION INTRAVENOUS; SUBCUTANEOUS at 15:30

## 2017-04-21 RX ADMIN — POTASSIUM CHLORIDE 10 MEQ: 7.46 INJECTION, SOLUTION INTRAVENOUS at 03:06

## 2017-04-21 RX ADMIN — OXYMETAZOLINE HYDROCHLORIDE 3 SPRAY: 5 SPRAY NASAL at 02:03

## 2017-04-21 RX ADMIN — PANTOPRAZOLE SODIUM 40 MG: 40 INJECTION, POWDER, FOR SOLUTION INTRAVENOUS at 01:46

## 2017-04-21 RX ADMIN — POTASSIUM CHLORIDE 10 MEQ: 10 INJECTION, SOLUTION INTRAVENOUS at 12:11

## 2017-04-21 RX ADMIN — TOPICAL ANESTHETIC: 200 SPRAY DENTAL; PERIODONTAL at 02:03

## 2017-04-21 RX ADMIN — DEXTROSE MONOHYDRATE, SODIUM CHLORIDE, AND POTASSIUM CHLORIDE 125 ML/HR: 50; 4.5; 1.49 INJECTION, SOLUTION INTRAVENOUS at 02:42

## 2017-04-21 RX ADMIN — LIDOCAINE HYDROCHLORIDE: 20 JELLY TOPICAL at 02:03

## 2017-04-21 RX ADMIN — Medication 10 ML: at 15:31

## 2017-04-21 RX ADMIN — IOPAMIDOL 94 ML: 612 INJECTION, SOLUTION INTRAVENOUS at 03:03

## 2017-04-21 RX ADMIN — SODIUM CHLORIDE 2000 ML: 900 INJECTION, SOLUTION INTRAVENOUS at 01:50

## 2017-04-21 RX ADMIN — PROCHLORPERAZINE EDISYLATE 10 MG: 5 INJECTION INTRAMUSCULAR; INTRAVENOUS at 01:40

## 2017-04-21 RX ADMIN — MAGNESIUM SULFATE HEPTAHYDRATE 2 G: 40 INJECTION, SOLUTION INTRAVENOUS at 02:46

## 2017-04-21 RX ADMIN — POTASSIUM CHLORIDE 10 MEQ: 10 INJECTION, SOLUTION INTRAVENOUS at 08:40

## 2017-04-21 RX ADMIN — DEXTROSE MONOHYDRATE, SODIUM CHLORIDE, AND POTASSIUM CHLORIDE 125 ML/HR: 50; 4.5; 1.49 INJECTION, SOLUTION INTRAVENOUS at 10:56

## 2017-04-21 RX ADMIN — POTASSIUM CHLORIDE 10 MEQ: 10 INJECTION, SOLUTION INTRAVENOUS at 11:04

## 2017-04-21 RX ADMIN — DEXTROSE MONOHYDRATE, SODIUM CHLORIDE, AND POTASSIUM CHLORIDE 125 ML/HR: 50; 4.5; 1.49 INJECTION, SOLUTION INTRAVENOUS at 21:39

## 2017-04-21 NOTE — H&P
Internal Medicine History and Physical          Subjective     HPI: Baljit Mitchell is a 73 y.o. male with a PMHx of HTN, SBO, Vit D def who presented to the ED complaining of continued RLQ abdominal pain with nausea and vomiting after multiple recent admissions for SBO. The patient was admitted on 03/30/17 for a SBO and left AMA on 04/03 and then returned on 04/06 for the same thing. He then left AMA again on 04/07 because he wasn't aloud to eat. He returned again on 04/09 and treated with medical management for another SBO and discharged home on 04/15 after he began having bowel movements again. He then developed symptoms again yesterday with abdominal pain and pencil thin BMs. He is now interested in surgical intervention. He had imaging in the ED consistent again with a SBO. PMHx:  HTN  SBO  Vit D def     PSurgHx:  Appendectomy  Colonoscopy     SocialHx:  Tobacco: 5/6 cig/d x 40 yrs  EtOH: 3 cans beer/day, hasnt drank in weeks since SBO started  Drugs: Denies  Lives at home with wife     FamilyHx:  M - Hrt disease  F - Ca    Prior to Admission Medications   Prescriptions Last Dose Informant Patient Reported? Taking? HYDROcodone-acetaminophen (NORCO) 5-325 mg per tablet   No No   Sig: Take 1 Tab by mouth every six (6) hours as needed for Pain. Max Daily Amount: 4 Tabs. aspirin delayed-release 81 mg tablet   No No   Sig: Take 1 Tab by mouth daily. cholecalciferol (VITAMIN D3) 1,000 unit tablet   Yes No   Sig: Take  by mouth daily. hydrochlorothiazide (HYDRODIURIL) 25 mg tablet   Yes No   Sig: Take 25 mg by mouth daily. ondansetron (ZOFRAN ODT) 4 mg disintegrating tablet   No No   Sig: Take 1 Tab by mouth every eight (8) hours as needed for Nausea. tadalafil (CIALIS) 5 mg tablet   No No   Sig: Take 5 mg by mouth daily as needed. Facility-Administered Medications: None       Review of Systems:  Constitutional: Denies fever, chills.  Admits weight loss  Eyes: Denies vision loss or changes, denies pain  Ears, Nose, Mouth, Throat: Denies hearing loss, denies sore throat  Cardiovascular: Denies chest pain or diaphoresis  Respiratory: Denies coughing, wheezing, or shortness of breath. Gastrointestinal: Admits nausea, vomiting, abd pain and hiccups.  No BMs  Genitourinary: Denies hematuria or dysuria  Musculoskeletal: Denies back pain or muscle/joint pain  Skin: Denies rashes or moles  Neuro: Denies seizures, loss of sensation or motor function or syncope    Objective      Visit Vitals    /86    Pulse 88    Temp 97.7 °F (36.5 °C)    Resp 9    Ht 5' 8.5\" (1.74 m)    Wt 56.7 kg (125 lb)    SpO2 98%    BMI 18.73 kg/m2       Physical Exam:  General Appearance: NAD, conversant  HENT: normocephalic/atraumatic, + NGT  Lungs: CTA with normal respiratory effort  Cardiovascular: RRR, no m/r/g  Abdomen: soft, distended w/ diffuse TTP, hypoactive bowel sounds  Extremities: no cyanosis, no peripheral edema  Neuro: moves all extremities, no focal deficits  Psych: appropriate affect, alert and oriented to person, place and time    Laboratory Studies:  CMP:   Lab Results   Component Value Date/Time     04/21/2017 01:45 AM    K 2.7 (LL) 04/21/2017 01:45 AM    CL 95 (L) 04/21/2017 01:45 AM    CO2 29 04/21/2017 01:45 AM    AGAP 13 04/21/2017 01:45 AM     (H) 04/21/2017 01:45 AM    BUN 6 (L) 04/21/2017 01:45 AM    CREA 0.82 04/21/2017 01:45 AM    GFRAA >60 04/21/2017 01:45 AM    GFRNA >60 04/21/2017 01:45 AM    CA 8.9 04/21/2017 01:45 AM    MG 1.6 04/21/2017 01:45 AM    ALB 3.3 (L) 04/21/2017 01:45 AM    TP 7.5 04/21/2017 01:45 AM    GLOB 4.2 (H) 04/21/2017 01:45 AM    AGRAT 0.8 04/21/2017 01:45 AM    SGOT 14 (L) 04/21/2017 01:45 AM    ALT 19 04/21/2017 01:45 AM     CBC:   Lab Results   Component Value Date/Time    WBC 14.3 (H) 04/21/2017 01:45 AM    HGB 13.9 04/21/2017 01:45 AM    HCT 39.5 04/21/2017 01:45 AM     (H) 04/21/2017 01:45 AM       Imaging Reviewed:  Juan Carlos Murray Abd (kub)    Result Date: 4/21/2017  Abdomen, single view COMPARISON: Several prior exams, most recently 4/10/2017. INDICATION: Abdominal pain and vomiting FINDINGS: Supine AP view the abdomen was obtained. Diffuse gaseous distention of the small intestine demonstrated, similar in magnitude to prior exam of 4/10/2017. Small bowel luminal diameter of approximately 6.3 cm. Relative possibly of large bowel gas noted. No acute osseous abnormality. Bilateral hip joint osteoarthritis redemonstrated with lower lumbar spondylosis. Impression: Radiographic findings compatible with small bowel obstruction. No gross free intraperitoneal gas. Ct Abd Pelv W Cont    Result Date: 4/21/2017   CT OF THE ABDOMEN AND PELVIS, WITH CONTRAST INDICATION: Recurrent abdominal pain, vomiting blood, progressive over the past 5 days COMPARISON: Abdominal radiographs same day; CT abdomen/pelvis 4/9/2017. TECHNIQUE: Standard helical CT performed through the abdomen and pelvis with IV contrast.  Coronal and sagittal reformations were generated. One or more dose reduction techniques were used on this CT: automated exposure control, adjustment of the mAs and/or kVp according to patient's size, and iterative reconstruction techniques. The specific techniques utilized on this CT exam have been documented in the patient's electronic medical record. ============ FINDINGS: INFERIOR THORAX: Bronchiectasis and mild bronchial wall thickening throughout left and right lung bases. Left and right basilar atelectasis. There are a few regions of mucous plugging visualized in the left lower lobe (axial images 10, 13, and 15). Solitary 3 mm groundglass nodule posterior right lower lobe (image 2). No acute pulmonary infiltrate. No global cardiomegaly or pericardial effusion. LIVER/BILIARY: Heterogeneous hepatic parenchymal enhancement. No suspicious liver lesion. No biliary dilation. Gallbladder appears nondistended.  SPLEEN: Normal. PANCREAS: Heterogeneous coarse calcifications throughout the posterior aspect of the pancreatic head. There is mild pancreatic duct dilatation throughout the body and head. Homogeneous pancreatic parenchymal enhancement currently. ADRENALS: Normal. KIDNEYS: Normal. LYMPH NODES: No mesenteric or retroperitoneal lymphadenopathy. GI TRACT: Diffuse small bowel distention with layering air-fluid levels throughout. Small bowel loops measure up to 5.0 cm. Partial swirling of central mesenteric vasculature, not enough to represent an obstructive volvulus. There is tapering of ileum in the right lower quadrant with more distal, decompressed colon. Enteric tube appears adequately located along the lateral aspect of the gastric body. VASCULATURE: Normal caliber distal thoracic and abdominal aorta with moderate circumferential atherosclerotic calcification. PELVIC ORGANS: Minimally distended bladder appears within normal limits. Milli Flakito BONES: No acute osseous abnormality. There is overall straightening of the normal lumbar lordosis. OTHER: No ascites or free intraperitoneal air. ============     IMPRESSION: 1. Small bowel obstruction with tapering in the right lower quadrant, probable transition point. Swirled mesenteric vasculature appears similar to prior CT with no findings to confirm vascular compromise or high-grade obstructive volvulus. 2. Alveolitis throughout left and right lung bases, likely related to adjacent bronchitis and bronchial mucous plugging. 3. Stigmata of chronic pancreatitis. No convincing findings of an acute pancreatitis exacerbation. Note: Preliminary report sent to the Emergency Department by the radiology resident at the time of the study. Xr Chest Port    Result Date: 4/21/2017  Chest, single view Indication: Mid abdominal pain. Hematemesis. Comparison: Several prior exams, most recently 4/9/2017 Findings:  Portable upright AP view of the chest was obtained.  Lungs are underexpanded, without evidence of focal pneumonic consolidation, pneumothorax, or pleural effusion. Cardiac size and mediastinal contours are stable, with mild tortuosity of the thoracic aorta noted. No acute osseous abnormality. IMPRESSION: 1. Mildly underexpanded lungs without radiographic evidence of acute abnormality. Assessment/Plan     #SBO  #Hypokalemia  #HTN  #Vit D Def  #Tobacco Use  #DVT PPx     - Bowel rest. NGT to intermittent suction. Pain control PRN. I/Os. Anti-emetics. Trend flat plate. Incentive spirometry. Appreciate surgery consult. - Replete lytes. Check Mg. Trend BMP  - Hold home meds for now. IV meds PRN  - Nicotine patch PRN. Smoking cessation discussion  - [facility]    I have personally reviewed all pertinent labs, films and EKGs that have officially resulted. I reviewed available electronic documentation outlining the initial presentation as well as the emergency room physician's encounter.     Enedina Tinajero DO  Internal Medicine, Hospitalist  Pager: [phone] [facility]

## 2017-04-21 NOTE — PROGRESS NOTES
Surgery Consult  Evaluation of small bowel obstruction    Subjective:     Cathryn Mera is a 73 y.o. male with a history of HTN, alcoholism, 50 pk year smoker with chronic cough and syncopal episode on 3/28/17 leading to [facility], he was d/c that day and returned with n/v and abdominal pain the same day, but was again d/c.     3/29/17 pt returned to ED for abdominal pain, Dr. Aleida Lawrence was consulted and he ordered CT of abdomen and pelvis w contrast showing multiple loops of dilated small bowel throughout the abdomen measuring up to 4.5 cm. Pt refused surgical intervention at that time and left AMA    3/30/17 Pt returns to ED. Dr. Ramon Avina assessed the pt as having possible edema of the mesentery in the right lower abdomen and SBO. Pt was put on bowel rest and was d/c on 4/3/17 when he started having BM.    4/6/17 Pt returned to ED for abd distention, vomiting and cramping abdominal pain, and was able to have thin BM at the time. Pt saw Dr. Su Rodriguez during this admission. CT showed findings consistent with small bowel obstruction with most likely transition point in the terminal ileum. Pt left hospital on 4/8 AMA because he wasn't allowed to eat. 4/9/17 Pt returned to ED with altered mental status and abdominal pain. CT of head showed no acute bleed or acute CVA. Pt d/c on 4/15/17 because he started having BM.    4/21/17 Pt returned to ED with abdominal pain and n/v.  Pain has been 10/10 cramping intermittent pain in RLQ that was happening every 2 minutes. This has been ongoing since yesterday. He states his stool has been pencil thin for the last several BM. Pt states that he has had enough and is ready for surgical intervention at this time.            Patient Active Problem List    Diagnosis Date Noted    SBO (small bowel obstruction) ([address] 03/30/2017    HTN (hypertension) 03/30/2017    URI (upper respiratory infection) 03/30/2017     Past Medical History:   Diagnosis Date    Hypertension       Past Surgical History: Procedure Laterality Date    APPENDECTOMY      HX APPENDECTOMY      HX COLONOSCOPY  1/28/2015    Repeat colonoscopy in 5 yrs per Dr. Bassem Kumari      Social History   Substance Use Topics    Smoking status: Current Every Day Smoker     Packs/day: 1.00     Years: 50.00     Types: Cigarettes    Smokeless tobacco: Never Used    Alcohol use Yes      Comment: 2 edinson newby      Family History   Problem Relation Age of Onset    Heart Disease Mother     Cancer Father     Cancer Sister     Diabetes Sister       Current Facility-Administered Medications   Medication Dose Route Frequency    lidocaine (XYLOCAINE) 2 % jelly   Mucous Membrane PRN    dextrose 5% - 0.45% NaCl with KCl 20 mEq/L infusion  125 mL/hr IntraVENous CONTINUOUS     Current Outpatient Prescriptions   Medication Sig    aspirin delayed-release 81 mg tablet Take 1 Tab by mouth daily.  HYDROcodone-acetaminophen (NORCO) 5-325 mg per tablet Take 1 Tab by mouth every six (6) hours as needed for Pain. Max Daily Amount: 4 Tabs.  ondansetron (ZOFRAN ODT) 4 mg disintegrating tablet Take 1 Tab by mouth every eight (8) hours as needed for Nausea.  hydrochlorothiazide (HYDRODIURIL) 25 mg tablet Take 25 mg by mouth daily.  tadalafil (CIALIS) 5 mg tablet Take 5 mg by mouth daily as needed.  cholecalciferol (VITAMIN D3) 1,000 unit tablet Take  by mouth daily.       No Known Allergies     Review of Systems:  Positive in BOLD    CONST: Fever, weight loss, fatigue or chills  GI: Nausea, vomiting, abdominal pain, change in bowel habits (stool pencil thin), hematochezia, melena, and GERD   INTEG: Dermatitis, abnormal moles  HEENT: Recent changes in vision, vertigo, epistaxis, dysphagia and hoarseness  CV: Chest pain, palpitations, HTN, edema and varicosities  RESP: Cough, shortness of breath, wheezing, hemoptysis, snoring and reactive airway disease  : Hematuria, dysuria, frequency, urgency, nocturia and stress urinary incontinence   MS: Weakness, joint pain and arthritis  ENDO: Diabetes, thyroid disease, polyuria, polydipsia, polyphagia, poor wound healing, heat intolerance, cold intolerance  LYMPH/HEME: Anemia, bruising and history of blood transfusions  NEURO: Dizziness, headache, fainting, seizures and stroke  PSYCH: Anxiety and depression    Objective:     Visit Vitals    /77    Pulse 82    Temp 97.7 °F (36.5 °C)    Resp 13    Wt 56.7 kg (125 lb)    SpO2 98%    BMI 18.73 kg/m2       Physical Exam:      GENERAL: alert, cooperative, no distress, appears stated age  EYE:pupils equal, round, reactive to light, extraocular movements intact without nystagmus, sclera yellow discoloration  THROAT & NECK: no erythema or exudates noted and neck supple and symmetrical; no palpable masses  LUNG: clear to auscultation bilaterally  HEART: Regular rate and rhythm  ABDOMEN:no rebound tenderness, no guarding or rigidity, no hernias noted, soft, no ttp  EXTREMITIES:  extremities normal, atraumatic, no cyanosis or edema  SKIN: Normal.    Imaging and Lab Review:   CT: 1. Small bowel obstruction with tapering in the right lower quadrant, probable transition point. Swirled mesenteric vasculature appears similar to prior CT with no findings to confirm vascular compromise or high-grade obstructive volvulus. 2. Alveolitis throughout left and right lung bases, likely related to adjacent  bronchitis and bronchial mucous plugging. 3. Stigmata of chronic pancreatitis. No convincing findings of an acute  pancreatitis exacerbation.       Recent Results (from the past 24 hour(s))   POC LACTIC ACID    Collection Time: 04/21/17  1:28 AM   Result Value Ref Range    Lactic Acid (POC) 1.2 0.4 - 2.0 mmol/L   CBC WITH AUTOMATED DIFF    Collection Time: 04/21/17  1:45 AM   Result Value Ref Range    WBC 14.3 (H) 4.6 - 13.2 K/uL    RBC 4.45 (L) 4.70 - 5.50 M/uL    HGB 13.9 13.0 - 16.0 g/dL    HCT 39.5 36.0 - 48.0 %    MCV 88.8 74.0 - 97.0 FL    MCH 31.2 24.0 - 34.0 PG    MCHC 35.2 31.0 - 37.0 g/dL    RDW 14.7 (H) 11.6 - 14.5 %    PLATELET 393 (H) 065 - 420 K/uL    MPV 9.1 (L) 9.2 - 11.8 FL    NEUTROPHILS 74 (H) 40 - 73 %    LYMPHOCYTES 18 (L) 21 - 52 %    MONOCYTES 7 3 - 10 %    EOSINOPHILS 1 0 - 5 %    BASOPHILS 0 0 - 2 %    ABS. NEUTROPHILS 10.7 (H) 1.8 - 8.0 K/UL    ABS. LYMPHOCYTES 2.5 0.9 - 3.6 K/UL    ABS. MONOCYTES 1.0 0.05 - 1.2 K/UL    ABS. EOSINOPHILS 0.1 0.0 - 0.4 K/UL    ABS. BASOPHILS 0.0 0.0 - 0.06 K/UL    DF AUTOMATED     HEPATIC FUNCTION PANEL    Collection Time: 04/21/17  1:45 AM   Result Value Ref Range    Protein, total 7.5 6.4 - 8.2 g/dL    Albumin 3.3 (L) 3.4 - 5.0 g/dL    Globulin 4.2 (H) 2.0 - 4.0 g/dL    A-G Ratio 0.8 0.8 - 1.7      Bilirubin, total 0.5 0.2 - 1.0 MG/DL    Bilirubin, direct 0.1 0.0 - 0.2 MG/DL    Alk.  phosphatase 58 45 - 117 U/L    AST (SGOT) 14 (L) 15 - 37 U/L    ALT (SGPT) 19 16 - 61 U/L   LIPASE    Collection Time: 04/21/17  1:45 AM   Result Value Ref Range    Lipase 153 73 - 067 U/L   METABOLIC PANEL, BASIC    Collection Time: 04/21/17  1:45 AM   Result Value Ref Range    Sodium 137 136 - 145 mmol/L    Potassium 2.7 (LL) 3.5 - 5.5 mmol/L    Chloride 95 (L) 100 - 108 mmol/L    CO2 29 21 - 32 mmol/L    Anion gap 13 3.0 - 18 mmol/L    Glucose 106 (H) 74 - 99 mg/dL    BUN 6 (L) 7.0 - 18 MG/DL    Creatinine 0.82 0.6 - 1.3 MG/DL    BUN/Creatinine ratio 7 (L) 12 - 20      GFR est AA >60 >60 ml/min/1.73m2    GFR est non-AA >60 >60 ml/min/1.73m2    Calcium 8.9 8.5 - 10.1 MG/DL   ETHYL ALCOHOL    Collection Time: 04/21/17  1:45 AM   Result Value Ref Range    ALCOHOL(ETHYL),SERUM <3 0 - 3 MG/DL   TROPONIN I    Collection Time: 04/21/17  1:45 AM   Result Value Ref Range    Troponin-I, Qt. <0.02 0.0 - 0.045 NG/ML   URINALYSIS W/ RFLX MICROSCOPIC    Collection Time: 04/21/17  3:40 AM   Result Value Ref Range    Color YELLOW      Appearance CLEAR      Specific gravity 1.006 1.005 - 1.030      pH (UA) 5.5 5.0 - 8.0      Protein NEGATIVE  NEG mg/dL    Glucose NEGATIVE  NEG mg/dL    Ketone TRACE (A) NEG mg/dL    Bilirubin NEGATIVE  NEG      Blood NEGATIVE  NEG      Urobilinogen 0.2 0.2 - 1.0 EU/dL    Nitrites NEGATIVE  NEG      Leukocyte Esterase NEGATIVE  NEG         images and reports reviewed    Assessment:   SBO   Patient has significant symptoms and wishes to proceed with surgical intervention. Plan:   I explained the indications for lap/open SBO repair as well as the alternatives. I discussed the potential risks, benefits, and likely outcomes of this course of care, including but not limited to bleeding, wound infection, need for reoperation, injury to surrounding structures, hernia recurrence, mesh infection, failure to improve or even worsen his pain, anesthetic risks and imponderables to include death. The patient indicates understanding of the risks and wishes to proceed. Signed By: ABIGAIL Fournier     April 21, 2017      Attestation:  I have personally reviewed the evaluation with the BRIELLE. I agree with the plan for the patient's ongoing problems and participated in the formulation of the plan of treatment which will be carried out as per above. His symptoms have fully resolved since placing the NG tube. He states he never stopped passing flatus with this presentation but that he \"wants to be fixed\" now. We will keep him on NG suction overnight. If he remains with evidence of obstruction, Dr. Shruti Bee will consider exploration at that time. Yanni Steen.  Kavitha Weiner MD, FACS

## 2017-04-21 NOTE — ED NOTES
Purposeful rounding:    Side rails up x2: YES  Bed low and wheels are locked: YES  Call bell in reach: YES  Comfort addressed : YES  Toileting needs addressed: YES  Plan of care reviewed/updated with patient and family members: YES  IV site assessed and addressed: YES  Pain assessed and addressed: YES  Pain level:3  Patient has no requests at this time

## 2017-04-21 NOTE — ED PROVIDER NOTES
HPI Comments: Deacon Leal is a 73 y.o. Male who has had 3 sep admissions for sbo, most recently d/c on 4/15 after he started moving bowels, tolerating diet. Was planned to have ex lap but was canceled due to improved diet tolerant and dec pain. Pain improved after d/c and pt was tolerating diet, moving bowels, passing gas, but noted increased pain in last 24 hours with increased distention and vomiting last night gastric contents. No cp, sob, fever, dec urination. Pain is freq, intermittent, sharp, cramping. Worse with po intake    The history is provided by the patient and medical records. Past Medical History:   Diagnosis Date    Hypertension        Past Surgical History:   Procedure Laterality Date    APPENDECTOMY      HX APPENDECTOMY      HX COLONOSCOPY  1/28/2015    Repeat colonoscopy in 5 yrs per Dr. Connie Mena         Family History:   Problem Relation Age of Onset    Heart Disease Mother     Cancer Father     Cancer Sister     Diabetes Sister        Social History     Social History    Marital status:      Spouse name: N/A    Number of children: N/A    Years of education: N/A     Occupational History    Not on file. Social History Main Topics    Smoking status: Current Every Day Smoker     Packs/day: 1.00     Years: 50.00     Types: Cigarettes    Smokeless tobacco: Never Used    Alcohol use Yes      Comment: 2 beers aday    Drug use: Not on file    Sexual activity: Not on file     Other Topics Concern    Not on file     Social History Narrative         ALLERGIES: Review of patient's allergies indicates no known allergies. Review of Systems   Constitutional: Negative for fever. HENT: Negative for sore throat and trouble swallowing. Eyes: Negative for visual disturbance. Respiratory: Negative for cough and shortness of breath. Cardiovascular: Negative for chest pain and leg swelling. Gastrointestinal: Positive for abdominal distention and abdominal pain. Negative for anal bleeding and blood in stool. Endocrine: Negative for polyuria. Genitourinary: Negative for difficulty urinating and hematuria. Musculoskeletal: Negative for gait problem. Skin: Negative for rash. Allergic/Immunologic: Negative for immunocompromised state. Neurological: Negative for syncope. Hematological: Does not bruise/bleed easily. Psychiatric/Behavioral: Positive for sleep disturbance. Vitals:    04/21/17 0200 04/21/17 0215 04/21/17 0245 04/21/17 0300   BP: 115/73 127/88 (!) 157/92 125/77   Pulse: 91 88 79 81   Resp: 14 13 12 11   Temp:       SpO2: 98% 99%  94%   Weight:                Physical Exam   Constitutional: He is oriented to person, place, and time. Non-toxic appearance. He does not appear ill. No distress. HENT:   Head: Normocephalic and atraumatic. Right Ear: External ear normal.   Left Ear: External ear normal.   Nose: Nose normal.   Mouth/Throat: Oropharynx is clear and moist. No oropharyngeal exudate. Eyes: Conjunctivae are normal.   Neck: Normal range of motion. Cardiovascular: Normal rate, regular rhythm, normal heart sounds and intact distal pulses. Pulmonary/Chest: Effort normal and breath sounds normal. No respiratory distress. Abdominal: Soft. He exhibits distension. He exhibits no mass. There is tenderness in the right upper quadrant, epigastric area and left upper quadrant. There is rebound and guarding. There is no rigidity and no CVA tenderness. Musculoskeletal: Normal range of motion. He exhibits no edema. Neurological: He is alert and oriented to person, place, and time. Skin: Skin is warm and dry. He is not diaphoretic. Psychiatric: His behavior is normal.   Nursing note and vitals reviewed.        [facility]  ED Course       Procedures  Vitals:  Patient Vitals for the past 12 hrs:   Temp Pulse Resp BP SpO2   04/21/17 0300 - 81 11 125/77 94 %   04/21/17 0245 - 79 12 (!) 157/92 -   04/21/17 0215 - 88 13 127/88 99 %   04/21/17 0200 - 91 14 115/73 98 %   04/21/17 0145 - (!) 102 17 110/76 98 %   04/21/17 0140 - - - - 99 %   04/21/17 0138 - (!) 102 16 - 99 %   04/21/17 0130 - (!) 106 21 143/88 99 %   04/21/17 0048 97.7 °F (36.5 °C) 94 16 (!) 134/94 (!) 89 %         Medications ordered:   Medications   lidocaine (XYLOCAINE) 2 % jelly ( Mucous Membrane Given 4/21/17 0203)   dextrose 5% - 0.45% NaCl with KCl 20 mEq/L infusion (125 mL/hr IntraVENous New Bag 4/21/17 0242)   potassium chloride 10 mEq in 100 ml IVPB (10 mEq IntraVENous New Bag 4/21/17 0306)   sodium chloride 0.9 % bolus infusion 2,000 mL (2,000 mL IntraVENous New Bag 4/21/17 0150)   HYDROmorphone (PF) (DILAUDID) injection 0.5 mg (0.5 mg IntraVENous Given 4/21/17 0142)   prochlorperazine (COMPAZINE) injection 10 mg (10 mg IntraVENous Given 4/21/17 0140)   pantoprazole (PROTONIX) injection 40 mg (40 mg IntraVENous Given 4/21/17 0146)   oxymetazoline (AFRIN) 0.05 % nasal spray 3 Spray (3 Sprays Both Nostrils Given 4/21/17 0203)   benzocaine (HURRICAINE) 20 % spray ( Mucous Membrane Given 4/21/17 0203)   magnesium sulfate 2 g/50 ml IVPB (premix or compounded) (0 g IntraVENous IV Completed 4/21/17 0301)   iopamidol (ISOVUE 300) 61 % contrast injection 100 mL (94 mL IntraVENous Given 4/21/17 0303)         Lab findings:  Recent Results (from the past 12 hour(s))   POC LACTIC ACID    Collection Time: 04/21/17  1:28 AM   Result Value Ref Range    Lactic Acid (POC) 1.2 0.4 - 2.0 mmol/L   CBC WITH AUTOMATED DIFF    Collection Time: 04/21/17  1:45 AM   Result Value Ref Range    WBC 14.3 (H) 4.6 - 13.2 K/uL    RBC 4.45 (L) 4.70 - 5.50 M/uL    HGB 13.9 13.0 - 16.0 g/dL    HCT 39.5 36.0 - 48.0 %    MCV 88.8 74.0 - 97.0 FL    MCH 31.2 24.0 - 34.0 PG    MCHC 35.2 31.0 - 37.0 g/dL    RDW 14.7 (H) 11.6 - 14.5 %    PLATELET 057 (H) 321 - 420 K/uL    MPV 9.1 (L) 9.2 - 11.8 FL    NEUTROPHILS 74 (H) 40 - 73 %    LYMPHOCYTES 18 (L) 21 - 52 %    MONOCYTES 7 3 - 10 %    EOSINOPHILS 1 0 - 5 %    BASOPHILS 0 0 - 2 %    ABS. NEUTROPHILS 10.7 (H) 1.8 - 8.0 K/UL    ABS. LYMPHOCYTES 2.5 0.9 - 3.6 K/UL    ABS. MONOCYTES 1.0 0.05 - 1.2 K/UL    ABS. EOSINOPHILS 0.1 0.0 - 0.4 K/UL    ABS. BASOPHILS 0.0 0.0 - 0.06 K/UL    DF AUTOMATED     HEPATIC FUNCTION PANEL    Collection Time: 04/21/17  1:45 AM   Result Value Ref Range    Protein, total 7.5 6.4 - 8.2 g/dL    Albumin 3.3 (L) 3.4 - 5.0 g/dL    Globulin 4.2 (H) 2.0 - 4.0 g/dL    A-G Ratio 0.8 0.8 - 1.7      Bilirubin, total 0.5 0.2 - 1.0 MG/DL    Bilirubin, direct 0.1 0.0 - 0.2 MG/DL    Alk.  phosphatase 58 45 - 117 U/L    AST (SGOT) 14 (L) 15 - 37 U/L    ALT (SGPT) 19 16 - 61 U/L   LIPASE    Collection Time: 04/21/17  1:45 AM   Result Value Ref Range    Lipase 153 73 - 693 U/L   METABOLIC PANEL, BASIC    Collection Time: 04/21/17  1:45 AM   Result Value Ref Range    Sodium 137 136 - 145 mmol/L    Potassium 2.7 (LL) 3.5 - 5.5 mmol/L    Chloride 95 (L) 100 - 108 mmol/L    CO2 29 21 - 32 mmol/L    Anion gap 13 3.0 - 18 mmol/L    Glucose 106 (H) 74 - 99 mg/dL    BUN 6 (L) 7.0 - 18 MG/DL    Creatinine 0.82 0.6 - 1.3 MG/DL    BUN/Creatinine ratio 7 (L) 12 - 20      GFR est AA >60 >60 ml/min/1.73m2    GFR est non-AA >60 >60 ml/min/1.73m2    Calcium 8.9 8.5 - 10.1 MG/DL   ETHYL ALCOHOL    Collection Time: 04/21/17  1:45 AM   Result Value Ref Range    ALCOHOL(ETHYL),SERUM <3 0 - 3 MG/DL   TROPONIN I    Collection Time: 04/21/17  1:45 AM   Result Value Ref Range    Troponin-I, Qt. <0.02 0.0 - 0.045 NG/ML   URINALYSIS W/ RFLX MICROSCOPIC    Collection Time: 04/21/17  3:40 AM   Result Value Ref Range    Color YELLOW      Appearance CLEAR      Specific gravity 1.006 1.005 - 1.030      pH (UA) 5.5 5.0 - 8.0      Protein NEGATIVE  NEG mg/dL    Glucose NEGATIVE  NEG mg/dL    Ketone TRACE (A) NEG mg/dL    Bilirubin NEGATIVE  NEG      Blood NEGATIVE  NEG      Urobilinogen 0.2 0.2 - 1.0 EU/dL    Nitrites NEGATIVE  NEG      Leukocyte Esterase NEGATIVE  NEG         EKG interpretation by ED Physician:      X-Ray, CT or other radiology findings or impressions:  XR CHEST PORT    (Results Pending)   XR ABD (KUB)    (Results Pending)   CT ABD PELV W CONT    (Results Pending)   dilated loops; no free air  Ct with mult dilated loops, sbo    Progress notes, Consult notes or additional Procedure notes:   Feeling better. Will need repeat admission, surgical consult  D/w dr Inder Roman on call for hospitalist who will inform day admitting physician of need for admission  Dw/ dr Zia Dao On call for surgery will consult    ED Critical Care Note    System at risk for life threatening failure: gi, cardiac, renal  Associated problems: leukocytosis, hypokalemia, bowel obstruction    Critical Care services provided: bedside management, consult, documentation  Excluded procedures (time not included in critical care): pulse ox interp    Total Critical Care Time (in minutes) 38          Reevaluation of patient:   Stable for admit    Disposition:  Diagnosis:   1. SBO (small bowel obstruction) ([facility] [address]    2. Hypokalemia        Disposition: admit    Follow-up Information     None            Patient's Medications   Start Taking    No medications on file   Continue Taking    ASPIRIN DELAYED-RELEASE 81 MG TABLET    Take 1 Tab by mouth daily. CHOLECALCIFEROL (VITAMIN D3) 1,000 UNIT TABLET    Take  by mouth daily. HYDROCHLOROTHIAZIDE (HYDRODIURIL) 25 MG TABLET    Take 25 mg by mouth daily. HYDROCODONE-ACETAMINOPHEN (NORCO) 5-325 MG PER TABLET    Take 1 Tab by mouth every six (6) hours as needed for Pain. Max Daily Amount: 4 Tabs. ONDANSETRON (ZOFRAN ODT) 4 MG DISINTEGRATING TABLET    Take 1 Tab by mouth every eight (8) hours as needed for Nausea. TADALAFIL (CIALIS) 5 MG TABLET    Take 5 mg by mouth daily as needed.    These Medications have changed    No medications on file   Stop Taking    No medications on file

## 2017-04-21 NOTE — IP AVS SNAPSHOT
Current Discharge Medication List  
  
CONTINUE these medications which have NOT CHANGED Dose & Instructions Dispensing Information Comments Morning Noon Evening Bedtime  
 aspirin delayed-release 81 mg tablet Your last dose was: Your next dose is:    
   
   
 Dose:  81 mg Take 1 Tab by mouth daily. Quantity:  90 Tab Refills:  3  
     
   
   
   
  
 hydroCHLOROthiazide 25 mg tablet Commonly known as:  HYDRODIURIL Your last dose was: Your next dose is:    
   
   
 Dose:  25 mg Take 25 mg by mouth daily. Refills:  0 HYDROcodone-acetaminophen 5-325 mg per tablet Commonly known as:  Dickinson Hurt Your last dose was: Your next dose is:    
   
   
 Dose:  1 Tab Take 1 Tab by mouth every six (6) hours as needed for Pain. Max Daily Amount: 4 Tabs. Quantity:  6 Tab Refills:  0  
     
   
   
   
  
 ondansetron 4 mg disintegrating tablet Commonly known as:  ZOFRAN ODT Your last dose was: Your next dose is:    
   
   
 Dose:  4 mg Take 1 Tab by mouth every eight (8) hours as needed for Nausea. Quantity:  8 Tab Refills:  0  
     
   
   
   
  
 tadalafil 5 mg tablet Commonly known as:  CIALIS Your last dose was: Your next dose is:    
   
   
 Dose:  5 mg Take 5 mg by mouth daily as needed. Quantity:  30 Tab Refills:  1 VITAMIN D3 1,000 unit tablet Generic drug:  cholecalciferol Your last dose was: Your next dose is: Take  by mouth daily. Refills:  0

## 2017-04-21 NOTE — IP AVS SNAPSHOT
Thomas Pruett 
 
 
 [address] [place] [address] Patient: Jose E Correia MRN: [id number] :1945 You are allergic to the following No active allergies Recent Documentation Height Weight BMI Smoking Status 1.74 m 56.7 kg 18.73 kg/m2 Current Every Day Smoker Emergency Contacts Name Discharge Info Relation Home Work Mobile [address] CAREGIVER [3] Spouse [3] [phone] About your hospitalization You were admitted on:  2017 You last received care in the:  [address] [place] [address],[address] You were discharged on:  2017 Unit phone number:  [phone] Why you were hospitalized Your primary diagnosis was:  Not on File Your diagnoses also included:  Small Bowel Obstruction (Hcc) Providers Seen During Your Hospitalizations Provider Role Specialty Primary office phone Rose Narayanan MD Attending Provider Emergency Medicine [phone] Milan Shultz DO Attending Provider Internal Medicine [phone] Your Primary Care Physician (PCP) Primary Care Physician Office Phone Office Fax [address] [facility] [address], [address] [phone] Follow-up Information Follow up With Details Comments Contact Info Cindy Manuel MD   [address] [place], [place] [address] 
[phone] Current Discharge Medication List  
  
CONTINUE these medications which have NOT CHANGED Dose & Instructions Dispensing Information Comments Morning Noon Evening Bedtime  
 aspirin delayed-release 81 mg tablet Your last dose was: Your next dose is:    
   
   
 Dose:  81 mg Take 1 Tab by mouth daily. Quantity:  90 Tab Refills:  3  
     
   
   
   
  
 hydroCHLOROthiazide 25 mg tablet Commonly known as:  HYDRODIURIL Your last dose was: Your next dose is:    
   
   
 Dose:  25 mg Take 25 mg by mouth daily. Refills:  0 HYDROcodone-acetaminophen 5-325 mg per tablet Commonly known as:  Marly Wilmer Your last dose was: Your next dose is:    
   
   
 Dose:  1 Tab Take 1 Tab by mouth every six (6) hours as needed for Pain. Max Daily Amount: 4 Tabs. Quantity:  6 Tab Refills:  0  
     
   
   
   
  
 ondansetron 4 mg disintegrating tablet Commonly known as:  ZOFRAN ODT Your last dose was: Your next dose is:    
   
   
 Dose:  4 mg Take 1 Tab by mouth every eight (8) hours as needed for Nausea. Quantity:  8 Tab Refills:  0  
     
   
   
   
  
 tadalafil 5 mg tablet Commonly known as:  CIALIS Your last dose was: Your next dose is:    
   
   
 Dose:  5 mg Take 5 mg by mouth daily as needed. Quantity:  30 Tab Refills:  1 VITAMIN D3 1,000 unit tablet Generic drug:  cholecalciferol Your last dose was: Your next dose is: Take  by mouth daily. Refills:  0 Discharge Instructions FOLLOW UP VISIT Appointment in: One Week Please follow up with your PCP within 7 days to discuss your recent hospitalization. Patient to arrange. Bowel Blockage (Intestinal Obstruction): Care Instructions Your Care Instructions A bowel blockage, also called an intestinal obstruction, can prevent gas, fluids, or solids from moving through the intestines normally. It can cause constipation and, rarely, diarrhea. You may have pain, nausea, vomiting, and cramping. Most of the time, complete blockages require a stay in the hospital and possibly surgery. But if your bowel is only partly blocked, your doctor may tell you to wait until it clears on its own and you are able to pass gas and stool. If so, there are things you can do at home to help make you feel better. If you have had surgery for a bowel blockage, there are things you can do at home to make sure you heal well. You can also make some changes to keep your bowel from becoming blocked again. Follow-up care is a key part of your treatment and safety. Be sure to make and go to all appointments, and call your doctor if you are having problems. It's also a good idea to know your test results and keep a list of the medicines you take. How can you care for yourself at home? If your doctor has told you to wait at home for a blockage to clear on its own: · Follow your doctor's instructions. These may include eating a liquid diet to avoid complete blockage. · Take your medicines exactly as prescribed. Call your doctor if you think you are having a problem with your medicine. · Put a heating pad set on low on your belly to relieve mild cramps and pain. To prevent another blockage · Try to eat smaller amounts of food more often. For example, have 5 or 6 small meals throughout the day instead of 2 or 3 large meals. · Chew your food very well. Try to chew each bite about 20 times or until it is liquid. · Avoid high-fiber foods and raw fruits and vegetables with skins, husks, strings, or seeds. These can form a ball of undigested material that can cause a blockage if a part of your bowel is scarred or narrowed. · Check with your doctor before you eat whole-grain products or use a fiber supplement such as Citrucel or Metamucil. · To help you have regular bowel movements, eat at regular times, do not strain during a bowel movement, and drink at least 8 to 10 glasses of water each day. If you have kidney, heart, or liver disease and have to limit fluids, talk with your doctor or before you increase the amount of fluids you drink. · Drink high-calorie liquid formulas if your doctor says to. Severe symptoms may make it hard for your body to take in vitamins and minerals. · Get regular exercise. It helps you digest your food better. Get at least 30 minutes of physical activity on most days of the week. Walking is a good choice. When should you call for help? Call 911 anytime you think you may need emergency care. For example, call if: 
· You passed out (lost consciousness). · You have severe pain or swelling in your belly. · You vomit blood or what looks like coffee grounds. · You pass maroon or very bloody stools. · You cannot pass any stools or gas. Call your doctor now or seek immediate medical care if: 
· You have a new or higher fever. · You cannot keep fluids or medicines down. · You have new pain that gets worse when you move or cough. · Your symptoms become much worse than usual. 
Watch closely for changes in your health, and be sure to contact your doctor if: 
· You do not get better as expected. · You have steady diarrhea for more than 2 weeks. · You've been losing weight. Where can you learn more? Go to http://vale-peng.info/. Enter K758 in the search box to learn more about \"Bowel Blockage (Intestinal Obstruction): Care Instructions. \" Current as of: August 9, 2016 Content Version: 11.2 © 6905-9337 [employer]. Care instructions adapted under license by [employer] (which disclaims liability or warranty for this information). If you have questions about a medical condition or this instruction, always ask your healthcare professional. [place] [address] any warranty or liability for your use of this information. Open Bowel Resection: Before Your Surgery What is an open bowel resection? This surgery removes a piece of the intestine (bowel). The doctor makes one large cut in your belly to take out part of the intestine. This cut is called an incision. The doctor then connects the healthy parts of the intestine. This surgery is done to treat a bowel blockage.  It can also treat diseases such as Crohn's disease, cancer, diverticulitis, or ulcers. Most people go home after 3 to 7 days in the hospital. 
Follow-up care is a key part of your treatment and safety. Be sure to make and go to all appointments, and call your doctor if you are having problems. It's also a good idea to know your test results and keep a list of the medicines you take. What happens before surgery? Surgery can be stressful. This information will help you understand what you can expect. And it will help you safely prepare for surgery. Preparing for surgery · Understand exactly what surgery is planned, along with the risks, benefits, and other options. · Tell your doctors ALL the medicines, vitamins, supplements, and herbal remedies you take. Some of these can increase the risk of bleeding or interact with anesthesia. · If you take blood thinners, such as warfarin (Coumadin), clopidogrel (Plavix), or aspirin, be sure to talk to your doctor. He or she will tell you if you should stop taking these medicines before your surgery. Make sure that you understand exactly what your doctor wants you to do. · Your doctor will tell you which medicines to take or stop before your surgery. You may need to stop taking certain medicines a week or more before surgery. So talk to your doctor as soon as you can. · If you have an advance directive, let your doctor know. It may include a living will and a durable power of  for health care. Bring a copy to the hospital. If you don't have one, you may want to prepare one. It lets your doctor and loved ones know your health care wishes. Doctors advise that everyone prepare these papers before any type of surgery or procedure. · Your doctor may have you take antibiotics before surgery. · A day or two before surgery, your doctor may have you stop eating and have you drink only clear liquids. · You may take laxatives to clean out your bowels.  You also may take an enema. Your doctor will tell you how to do this. Or you may need to go to the hospital the day before surgery to prepare your intestine. What happens on the day of surgery? · Follow the instructions exactly about when to stop eating and drinking. If you don't, your surgery may be canceled. If your doctor told you to take your medicines on the day of surgery, take them with only a sip of water. · Take a bath or shower before you come in for your surgery. Do not apply lotions, perfumes, deodorants, or nail polish. · Do not shave the surgical site yourself. · Take off all jewelry and piercings. And take out contact lenses, if you wear them. At the hospital or surgery center · Bring a picture ID. · The area for surgery is often marked to make sure there are no errors. · You will be kept comfortable and safe by your anesthesia provider. You may get medicine that relaxes you or puts you in a light sleep. The area being worked on will be numb. · The surgery will take 2 to 3 hours. Going home · Be sure you have someone to drive you home. Anesthesia and pain medicine make it unsafe for you to drive. · You will be given more specific instructions about recovering from your surgery. They will cover things like diet, wound care, follow-up care, driving, and getting back to your normal routine. When should you call your doctor? · You have questions or concerns. · You don't understand how to prepare for your surgery. · You become ill before the surgery (such as fever, flu, or a cold). · You need to reschedule or have changed your mind about having the surgery. Where can you learn more? Go to http://vale-peng.info/. Enter Y243 in the search box to learn more about \"Open Bowel Resection: Before Your Surgery. \" Current as of: August 9, 2016 Content Version: 11.2 © 8879-3097 [employer].  Care instructions adapted under license by [address] (which disclaims liability or warranty for this information). If you have questions about a medical condition or this instruction, always ask your healthcare professional. [address] any warranty or liability for your use of this information. Electrolyte Imbalance: Care Instructions Your Care Instructions Electrolytes are minerals in your blood. They include sodium, potassium, calcium, and magnesium. When they are not at the right levels, you can feel very ill. You may not know what is causing it, but you know something is wrong. You may feel weak or numb, have muscle spasms, or twitch. Your heart may beat fast. Symptoms are different with each mineral. Too much is as bad as too little. Minerals help keep your body working as it should. Vomiting, diarrhea, and fever can cause you to lose minerals. A problem with your kidneys can tip a mineral out of balance. So can taking certain medicines. Your doctor may do more tests. He or she may change your medicine and diet. If you are low in one or more minerals, they may be given through a tube into your vein (IV). Your doctor may have you take or drink special fluids or pills. If your kidneys are failing, your blood may be filtered. This is called dialysis. It can put the minerals back in balance. Follow-up care is a key part of your treatment and safety. Be sure to make and go to all appointments, and call your doctor if you are having problems. It's also a good idea to know your test results and keep a list of the medicines you take. How can you care for yourself at home? · Take your medicines exactly as prescribed. Call your doctor if you have any problems with your medicines. You will get more details on the specific medicines your doctor prescribes. · Do not take any medicine without talking to your doctor first. This includes prescription, over-the-counter, and herbal medicines. · If you have kidney, heart, or liver disease and have to limit fluids, talk with your doctor before you increase the amount of fluids you drink. · Your doctor or dietitian may give you a diet plan to help balance your minerals. Follow the diet carefully. When should you call for help? Call 911 anytime you think you may need emergency care. For example, call if: 
· You passed out (lost consciousness). · Your heart seems to be speeding up and then slowing down or skipping beats. Call your doctor now or seek immediate medical care if: 
· You are very tired and have no energy. · You have trouble thinking or concentrating. Watch closely for changes in your health, and be sure to contact your doctor if: 
· You want advice on what to do to keep your minerals in balance. · You do not get better as expected. Where can you learn more? Go to http://vale-peng.info/. Enter H784 in the search box to learn more about \"Electrolyte Imbalance: Care Instructions. \" Current as of: July 26, 2016 Content Version: 11.2 © 6284-4319 [employer]. Care instructions adapted under license by [employer] (which disclaims liability or warranty for this information). If you have questions about a medical condition or this instruction, always ask your healthcare professional. [address] any warranty or liability for your use of this information. Discharge Orders None Introducing [facility]! [employer] introduces [facility] patient portal. Now you can access parts of your medical record, email your doctor's office, and request medication refills online. 1. In your internet browser, go to [email or web address] 2. Click on the First Time User? Click Here link in the Sign In box. You will see the New Member Sign Up page. 3. Enter your [facility] Access Code exactly as it appears below.  You will not need to use this code after youve completed the sign-up process. If you do not sign up before the expiration date, you must request a new code. · SyndicateRoom Access Code: [email or web address] Expires: 6/26/2017  9:51 AM 
 
4. Enter the last four digits of your Social Security Number (xxxx) and Date of Birth (mm/dd/yyyy) as indicated and click Submit. You will be taken to the next sign-up page. 5. Create a SyndicateRoom ID. This will be your SyndicateRoom login ID and cannot be changed, so think of one that is secure and easy to remember. 6. Create a SyndicateRoom password. You can change your password at any time. 7. Enter your Password Reset Question and Answer. This can be used at a later time if you forget your password. 8. Enter your e-mail address. You will receive e-mail notification when new information is available in [address] 9. Click Sign Up. You can now view and download portions of your medical record. 10. Click the Download Summary menu link to download a portable copy of your medical information. If you have questions, please visit the Frequently Asked Questions section of the SyndicateRoom website. Remember, Synd[facility] is NOT to be used for urgent needs. For medical emergencies, dial 911. Now available from your iPhone and Android! General Information Please provide this summary of care documentation to your next provider. Patient Signature:  ____________________________________________________________ Date:  ____________________________________________________________  
  
Deb Bui Provider Signature:  ____________________________________________________________ Date:  ____________________________________________________________

## 2017-04-21 NOTE — ED NOTES
TRANSFER - OUT REPORT:    Verbal report given to SBAR(name) on Armaan Aguilar  being transferred to 2210(unit) for routine progression of care       Report consisted of patients Situation, Background, Assessment and   Recommendations(SBAR). Information from the following report(s) SBAR, ED Summary, Procedure Summary, Intake/Output, MAR, Recent Results and Med Rec Status was reviewed with the receiving nurse. Lines:   Peripheral IV 04/21/17 Right Arm (Active)   Site Assessment Clean, dry, & intact 4/21/2017  2:26 AM   Phlebitis Assessment 0 4/21/2017  2:26 AM   Infiltration Assessment 0 4/21/2017  2:26 AM   Dressing Status Clean, dry, & intact 4/21/2017  2:26 AM   Dressing Type Transparent 4/21/2017  2:26 AM   Hub Color/Line Status Pink; Infusing 4/21/2017  2:26 AM       Peripheral IV 04/21/17 Left Arm (Active)   Site Assessment Clean, dry, & intact 4/21/2017  2:47 AM   Phlebitis Assessment 0 4/21/2017  2:47 AM   Infiltration Assessment 0 4/21/2017  2:47 AM   Dressing Status Clean, dry, & intact 4/21/2017  2:47 AM   Dressing Type Transparent 4/21/2017  2:47 AM   Hub Color/Line Status Pink 4/21/2017  2:47 AM        Opportunity for questions and clarification was provided.       Patient transported with:   Registered Nurse

## 2017-04-21 NOTE — ED NOTES
Purposeful rounding:    Side rails up x2: YES  Bed low and wheels are locked: YES  Call bell in reach: YES  Comfort addressed : YES  Toileting needs addressed: YES  Plan of care reviewed/updated with patient and family members: YES  IV site assessed and addressed: YES  Pain assessed and addressed: YES  Pain level:  Patient sleeping, no apparent distress

## 2017-04-21 NOTE — PROGRESS NOTES
Nutrition initial assessment/Plan of care      RECOMMENDATIONS:   1. NPO. Advance diet when medically indicated  2. Monitor weight and PO intake  3. RD to follow     GOALS:   1. PO intake meets >75% of protein/calorie needs by 4/24  2. Promote healthy gain (1-2 lb) by 4/28        ASSESSMENT:   Per BMI of 18.7, weight is in the normal classification. However, patient is at nutritional risk since patient is > 72years old with BMI <23. PO intake is poor. Labs noted. Patient with hypokalemia. Nutrition recommendations listed. RD to follow.       Nutrition Diagnoses: Altered GI function related to SBO as evidenced by NPO order. Nutrition Risk:  [x] High  [] Moderate []  Low    SUBJECTIVE/OBJECTIVE:   Patient admitted for SBO (patient had several admissions for same complaint in the last 2 months). He has PMHx of HTN, SBO, Vit D. He lives at home with his wife. Patient states that his weight has been stable at 125 lb (but used to be 150 lb 25 years ago). Patient denies trouble chewing/swallowing. He has a NGT on suction. Will follow. Information Obtained from:    [x] Chart Review   [x] Patient   [] Family/Caregiver   [] Nurse/Physician   [] Interdisciplinary Meeting/Rounds    Diet: NPO  Medications: [x] Reviewed    Allergies: [x] Reviewed   Encounter Diagnoses     ICD-10-CM ICD-9-CM   1. SBO (small bowel obstruction) ([facility]) K56.69 560.9   2.  Hypokalemia E87.6 276.8     Past Medical History:   Diagnosis Date    Hypertension       Labs:    Lab Results   Component Value Date/Time    Sodium 137 04/21/2017 01:45 AM    Potassium 2.7 04/21/2017 01:45 AM    Chloride 95 04/21/2017 01:45 AM    CO2 29 04/21/2017 01:45 AM    Anion gap 13 04/21/2017 01:45 AM    Glucose 106 04/21/2017 01:45 AM    BUN 6 04/21/2017 01:45 AM    Creatinine 0.82 04/21/2017 01:45 AM    Calcium 8.9 04/21/2017 01:45 AM    Magnesium 1.6 04/21/2017 01:45 AM    Albumin 3.3 04/21/2017 01:45 AM     Anthropometrics: BMI (calculated): 18.7  Last 3 Recorded Weights in this Encounter    04/21/17 0048   Weight: 56.7 kg (125 lb)      Ht Readings from Last 1 Encounters:   04/21/17 5' 8.5\" (1.74 m)       IBW: 157 lb %IBW: 80% UBW: 125 lb %UBW: 100%   [] Weight Loss [] Weight Gain [x] Weight Stable      Estimated Nutrition Needs: [x] MSJ [] Other:  Calories: 2400 kcal Based on: [x] Actual BW   Protein: 70-80 g Based on: [x] Actual BW   Fluid: 1751-3072 ml Based on: [x] Actual BW      [x] No Cultural, Restorationist or ethnic dietary need identified.     [] Cultural, Restorationist and ethnic food preferences identified and addressed     Wt Status:  [x] Normal (18.6 - 24.9) [] Underweight (<18.5) [] Overweight (25 - 29.9) [] Mild Obesity (30 - 34.9)  [] Moderate Obesity (35 - 39.9) [] Morbid Obesity (40+)   [] Moderate Malnutrition [] Severe Malnutrition in the context of :     Nutrition Problems Identified:   [x] Suboptimal PO intake   [] Food Allergies  [] Difficulty chewing/swallowing/poor dentition  [] Constipation/Diarrhea   [] Nausea/Vomiting   [] None  [] Other:     Plan:   [] Therapeutic Diet  []  Obtained/adjusted food preferences/tolerances and/or snacks options   []  Supplements added   [] Occupational therapy following for feeding techniques  []  HS snack added   []  Modify diet texture   []  Modify diet for food allergies   []  Educate patient   []  Assist with menu selection   []  Monitor PO intake on meal rounds   [x]  Continue inpatient monitoring and intervention   []  Participated in discharge planning/Interdisciplinary rounds/Team meetings   []  Other:     Education Needs:   [x] Not appropriate for teaching at this time    [] Identified and addressed    Nutrition Monitoring and Evaluation:  [x] Continue ongoing monitoring and intervention  [] Other    Iván Aaron, [address] [facility] [address]  Pager: [phone]

## 2017-04-21 NOTE — ROUTINE PROCESS
Patient arrived in room 2210 from ED with techs,pt is alert/oriented,IVF running,Potassium chloride 10 mEq 2nd dose is running,NG tube present but not attached to suction,bed at the lowest position,call bell explained and within reach,white board filled out. 1100 Patient's assessment done,is resting in bed,no complain of having pain. 1115 Noticed that pt already pulled his NG tube out. 1400 Paged Dr Deejay Santos.    1401 Received call from Dr Sada Verde him about pt's NG tube,received verbal with read back order to put another NG tube back again. 1530 Patient resting in bed,call bell within reach,no nausea and vomiting,no pain at this moment. 1630 NG tube inserted by nurse,X-ray KUB ordered to verify placement. 1800 Patient is resting,call bell within reach,no complain of having pain. Bedside and Verbal shift change report given to Fallon Neil (oncoming nurse) by Jean Carlos Dickson (offgoing nurse). Report included the following information SBAR, Kardex, OR Summary, Intake/Output, MAR and Recent Results.

## 2017-04-21 NOTE — PROGRESS NOTES
met with patient completed the initial Spiritual Assessment of the patient, and offered Pastoral Care, see flow sheets for interventions.  extended the assurance of prayer and spiritual care materials. Patient does not have any Synagogue/cultural needs that will affect patients preferences in health care. The patient was informed that chaplains will continue to follow and will provide pastoral care on an as needed/requested basis.     Frank Liu, MPH, [address] [place] [phone]

## 2017-04-22 VITALS
RESPIRATION RATE: 18 BRPM | SYSTOLIC BLOOD PRESSURE: 147 MMHG | BODY MASS INDEX: 18.51 KG/M2 | DIASTOLIC BLOOD PRESSURE: 92 MMHG | TEMPERATURE: 97.9 F | HEART RATE: 107 BPM | HEIGHT: 69 IN | OXYGEN SATURATION: 95 % | WEIGHT: 125 LBS

## 2017-04-22 LAB
ANION GAP BLD CALC-SCNC: 7 MMOL/L (ref 3–18)
BASOPHILS # BLD AUTO: 0 K/UL (ref 0–0.06)
BASOPHILS # BLD: 0 % (ref 0–2)
BUN SERPL-MCNC: 3 MG/DL (ref 7–18)
BUN/CREAT SERPL: 5 (ref 12–20)
CALCIUM SERPL-MCNC: 7.5 MG/DL (ref 8.5–10.1)
CHLORIDE SERPL-SCNC: 108 MMOL/L (ref 100–108)
CO2 SERPL-SCNC: 27 MMOL/L (ref 21–32)
CREAT SERPL-MCNC: 0.55 MG/DL (ref 0.6–1.3)
DIFFERENTIAL METHOD BLD: ABNORMAL
EOSINOPHIL # BLD: 0.2 K/UL (ref 0–0.4)
EOSINOPHIL NFR BLD: 2 % (ref 0–5)
ERYTHROCYTE [DISTWIDTH] IN BLOOD BY AUTOMATED COUNT: 15.1 % (ref 11.6–14.5)
GLUCOSE SERPL-MCNC: 100 MG/DL (ref 74–99)
HCT VFR BLD AUTO: 29.5 % (ref 36–48)
HGB BLD-MCNC: 10 G/DL (ref 13–16)
LYMPHOCYTES # BLD AUTO: 25 % (ref 21–52)
LYMPHOCYTES # BLD: 1.9 K/UL (ref 0.9–3.6)
MCH RBC QN AUTO: 30.4 PG (ref 24–34)
MCHC RBC AUTO-ENTMCNC: 33.9 G/DL (ref 31–37)
MCV RBC AUTO: 89.7 FL (ref 74–97)
MONOCYTES # BLD: 0.8 K/UL (ref 0.05–1.2)
MONOCYTES NFR BLD AUTO: 11 % (ref 3–10)
NEUTS SEG # BLD: 4.8 K/UL (ref 1.8–8)
NEUTS SEG NFR BLD AUTO: 62 % (ref 40–73)
PLATELET # BLD AUTO: 384 K/UL (ref 135–420)
PMV BLD AUTO: 8.6 FL (ref 9.2–11.8)
POTASSIUM SERPL-SCNC: 3.6 MMOL/L (ref 3.5–5.5)
RBC # BLD AUTO: 3.29 M/UL (ref 4.7–5.5)
SODIUM SERPL-SCNC: 142 MMOL/L (ref 136–145)
WBC # BLD AUTO: 7.7 K/UL (ref 4.6–13.2)

## 2017-04-22 PROCEDURE — 74011250636 HC RX REV CODE- 250/636: Performed by: EMERGENCY MEDICINE

## 2017-04-22 PROCEDURE — 74011250636 HC RX REV CODE- 250/636: Performed by: HOSPITALIST

## 2017-04-22 PROCEDURE — 36415 COLL VENOUS BLD VENIPUNCTURE: CPT | Performed by: HOSPITALIST

## 2017-04-22 PROCEDURE — 85025 COMPLETE CBC W/AUTO DIFF WBC: CPT | Performed by: HOSPITALIST

## 2017-04-22 PROCEDURE — 80048 BASIC METABOLIC PNL TOTAL CA: CPT | Performed by: HOSPITALIST

## 2017-04-22 RX ADMIN — Medication 10 ML: at 00:15

## 2017-04-22 RX ADMIN — HEPARIN SODIUM 5000 UNITS: 5000 INJECTION, SOLUTION INTRAVENOUS; SUBCUTANEOUS at 00:14

## 2017-04-22 RX ADMIN — DEXTROSE MONOHYDRATE, SODIUM CHLORIDE, AND POTASSIUM CHLORIDE 125 ML/HR: 50; 4.5; 1.49 INJECTION, SOLUTION INTRAVENOUS at 06:35

## 2017-04-22 RX ADMIN — HEPARIN SODIUM 5000 UNITS: 5000 INJECTION, SOLUTION INTRAVENOUS; SUBCUTANEOUS at 06:32

## 2017-04-22 NOTE — ACP (ADVANCE CARE PLANNING)
Patient has designated ___his spouse_____________________ to participate in his/her discharge plan and to receive any needed information. Name: Anderson Gosselin  Address: [address]  [place] [address]  Phone number:  [phone]

## 2017-04-22 NOTE — ROUTINE PROCESS
Bedside and Verbal shift change report given to Macrina Javier (oncoming nurse) by Alvarez Macario   (offgoing nurse). Report included the following information SBAR, Kardex, Intake/Output, MAR and Recent Results.

## 2017-04-22 NOTE — DISCHARGE INSTRUCTIONS
FOLLOW UP VISIT Appointment in: One Week Please follow up with your PCP within 7 days to discuss your recent hospitalization. Patient to arrange. Bowel Blockage (Intestinal Obstruction): Care Instructions  Your Care Instructions  A bowel blockage, also called an intestinal obstruction, can prevent gas, fluids, or solids from moving through the intestines normally. It can cause constipation and, rarely, diarrhea. You may have pain, nausea, vomiting, and cramping. Most of the time, complete blockages require a stay in the hospital and possibly surgery. But if your bowel is only partly blocked, your doctor may tell you to wait until it clears on its own and you are able to pass gas and stool. If so, there are things you can do at home to help make you feel better. If you have had surgery for a bowel blockage, there are things you can do at home to make sure you heal well. You can also make some changes to keep your bowel from becoming blocked again. Follow-up care is a key part of your treatment and safety. Be sure to make and go to all appointments, and call your doctor if you are having problems. It's also a good idea to know your test results and keep a list of the medicines you take. How can you care for yourself at home? If your doctor has told you to wait at home for a blockage to clear on its own:  · Follow your doctor's instructions. These may include eating a liquid diet to avoid complete blockage. · Take your medicines exactly as prescribed. Call your doctor if you think you are having a problem with your medicine. · Put a heating pad set on low on your belly to relieve mild cramps and pain. To prevent another blockage  · Try to eat smaller amounts of food more often. For example, have 5 or 6 small meals throughout the day instead of 2 or 3 large meals. · Chew your food very well. Try to chew each bite about 20 times or until it is liquid.   · Avoid high-fiber foods and raw fruits and vegetables with skins, husks, strings, or seeds. These can form a ball of undigested material that can cause a blockage if a part of your bowel is scarred or narrowed. · Check with your doctor before you eat whole-grain products or use a fiber supplement such as Citrucel or Metamucil. · To help you have regular bowel movements, eat at regular times, do not strain during a bowel movement, and drink at least 8 to 10 glasses of water each day. If you have kidney, heart, or liver disease and have to limit fluids, talk with your doctor or before you increase the amount of fluids you drink. · Drink high-calorie liquid formulas if your doctor says to. Severe symptoms may make it hard for your body to take in vitamins and minerals. · Get regular exercise. It helps you digest your food better. Get at least 30 minutes of physical activity on most days of the week. Walking is a good choice. When should you call for help? Call 911 anytime you think you may need emergency care. For example, call if:  · You passed out (lost consciousness). · You have severe pain or swelling in your belly. · You vomit blood or what looks like coffee grounds. · You pass maroon or very bloody stools. · You cannot pass any stools or gas. Call your doctor now or seek immediate medical care if:  · You have a new or higher fever. · You cannot keep fluids or medicines down. · You have new pain that gets worse when you move or cough. · Your symptoms become much worse than usual.  Watch closely for changes in your health, and be sure to contact your doctor if:  · You do not get better as expected. · You have steady diarrhea for more than 2 weeks. · You've been losing weight. Where can you learn more? Go to http://vale-peng.info/. Enter B370 in the search box to learn more about \"Bowel Blockage (Intestinal Obstruction): Care Instructions. \"  Current as of: August 9, 2016  Content Version: 11.2  © 0174-9780 [employer]. Care instructions adapted under license by [employer] (which disclaims liability or warranty for this information). If you have questions about a medical condition or this instruction, always ask your healthcare professional. [address] any warranty or liability for your use of this information. Open Bowel Resection: Before Your Surgery  What is an open bowel resection? This surgery removes a piece of the intestine (bowel). The doctor makes one large cut in your belly to take out part of the intestine. This cut is called an incision. The doctor then connects the healthy parts of the intestine. This surgery is done to treat a bowel blockage. It can also treat diseases such as Crohn's disease, cancer, diverticulitis, or ulcers. Most people go home after 3 to 7 days in the hospital.  Follow-up care is a key part of your treatment and safety. Be sure to make and go to all appointments, and call your doctor if you are having problems. It's also a good idea to know your test results and keep a list of the medicines you take. What happens before surgery? Surgery can be stressful. This information will help you understand what you can expect. And it will help you safely prepare for surgery. Preparing for surgery  · Understand exactly what surgery is planned, along with the risks, benefits, and other options. · Tell your doctors ALL the medicines, vitamins, supplements, and herbal remedies you take. Some of these can increase the risk of bleeding or interact with anesthesia. · If you take blood thinners, such as warfarin (Coumadin), clopidogrel (Plavix), or aspirin, be sure to talk to your doctor. He or she will tell you if you should stop taking these medicines before your surgery. Make sure that you understand exactly what your doctor wants you to do. · Your doctor will tell you which medicines to take or stop before your surgery.  You may need to stop taking certain medicines a week or more before surgery. So talk to your doctor as soon as you can. · If you have an advance directive, let your doctor know. It may include a living will and a durable power of  for health care. Bring a copy to the hospital. If you don't have one, you may want to prepare one. It lets your doctor and loved ones know your health care wishes. Doctors advise that everyone prepare these papers before any type of surgery or procedure. · Your doctor may have you take antibiotics before surgery. · A day or two before surgery, your doctor may have you stop eating and have you drink only clear liquids. · You may take laxatives to clean out your bowels. You also may take an enema. Your doctor will tell you how to do this. Or you may need to go to the hospital the day before surgery to prepare your intestine. What happens on the day of surgery? · Follow the instructions exactly about when to stop eating and drinking. If you don't, your surgery may be canceled. If your doctor told you to take your medicines on the day of surgery, take them with only a sip of water. · Take a bath or shower before you come in for your surgery. Do not apply lotions, perfumes, deodorants, or nail polish. · Do not shave the surgical site yourself. · Take off all jewelry and piercings. And take out contact lenses, if you wear them. At the hospital or surgery center  · Bring a picture ID. · The area for surgery is often marked to make sure there are no errors. · You will be kept comfortable and safe by your anesthesia provider. You may get medicine that relaxes you or puts you in a light sleep. The area being worked on will be numb. · The surgery will take 2 to 3 hours. Going home  · Be sure you have someone to drive you home. Anesthesia and pain medicine make it unsafe for you to drive. · You will be given more specific instructions about recovering from your surgery.  They will cover things like diet, wound care, follow-up care, driving, and getting back to your normal routine. When should you call your doctor? · You have questions or concerns. · You don't understand how to prepare for your surgery. · You become ill before the surgery (such as fever, flu, or a cold). · You need to reschedule or have changed your mind about having the surgery. Where can you learn more? Go to http://vale-peng.info/. Enter A155 in the search box to learn more about \"Open Bowel Resection: Before Your Surgery. \"  Current as of: August 9, 2016  Content Version: 11.2  © 2905-4840 [employer]. Care instructions adapted under license by [employer] (which disclaims liability or warranty for this information). If you have questions about a medical condition or this instruction, always ask your healthcare professional. [place] [address] any warranty or liability for your use of this information. Electrolyte Imbalance: Care Instructions  Your Care Instructions  Electrolytes are minerals in your blood. They include sodium, potassium, calcium, and magnesium. When they are not at the right levels, you can feel very ill. You may not know what is causing it, but you know something is wrong. You may feel weak or numb, have muscle spasms, or twitch. Your heart may beat fast. Symptoms are different with each mineral. Too much is as bad as too little. Minerals help keep your body working as it should. Vomiting, diarrhea, and fever can cause you to lose minerals. A problem with your kidneys can tip a mineral out of balance. So can taking certain medicines. Your doctor may do more tests. He or she may change your medicine and diet. If you are low in one or more minerals, they may be given through a tube into your vein (IV). Your doctor may have you take or drink special fluids or pills. If your kidneys are failing, your blood may be filtered.  This is called dialysis. It can put the minerals back in balance. Follow-up care is a key part of your treatment and safety. Be sure to make and go to all appointments, and call your doctor if you are having problems. It's also a good idea to know your test results and keep a list of the medicines you take. How can you care for yourself at home? · Take your medicines exactly as prescribed. Call your doctor if you have any problems with your medicines. You will get more details on the specific medicines your doctor prescribes. · Do not take any medicine without talking to your doctor first. This includes prescription, over-the-counter, and herbal medicines. · If you have kidney, heart, or liver disease and have to limit fluids, talk with your doctor before you increase the amount of fluids you drink. · Your doctor or dietitian may give you a diet plan to help balance your minerals. Follow the diet carefully. When should you call for help? Call 911 anytime you think you may need emergency care. For example, call if:  · You passed out (lost consciousness). · Your heart seems to be speeding up and then slowing down or skipping beats. Call your doctor now or seek immediate medical care if:  · You are very tired and have no energy. · You have trouble thinking or concentrating. Watch closely for changes in your health, and be sure to contact your doctor if:  · You want advice on what to do to keep your minerals in balance. · You do not get better as expected. Where can you learn more? Go to http://vale-peng.info/. Enter K556 in the search box to learn more about \"Electrolyte Imbalance: Care Instructions. \"  Current as of: July 26, 2016  Content Version: 11.2  © 7537-0083 [employer]. Care instructions adapted under license by [employer] (which disclaims liability or warranty for this information).  If you have questions about a medical condition or this instruction, always ask your healthcare professional. [place] [address] any warranty or liability for your use of this information.

## 2017-04-22 NOTE — PROGRESS NOTES
[facility]   Discharge Planning/ Assessment    Reasons for Intervention: Chart reviewed. Met with pt., verified all demographics. States has MCR/VA ins. States his PCP is from the [place]. VA transfer form completed, signed & faxed to [place].  NOK: Brittnee Oh, spouse with whom he lives with & designates can participate in his discharge process. Has walker & cane but does not use. Independent with ADL's prior to admit. PLAN: home. Available as needed.   Cielo Arias, ext [phone]    High Risk Criteria  [] Yes  [x]No   Physician Referral  [] Yes  [x]No        Date    Nursing Referral  [] Yes  [x]No        Date    Patient/Family Request  [] Yes  [x]No        Date       Resources:    Medicare  [x] Yes  []No   Medicaid  [] Yes  [x]No   No Resources  [] Yes  [x]No   Private Insurance  [] Yes  [x]No    Name/Phone Number    Other  [x] Yes  []No        (i.e. Workman's Comp) VA        Prior Services:    Prior Services  [] Yes  [x]No   Home Health  [] Yes  [x]No   [address]  [] Yes  [x]No        Number of [address]  [] Yes  [x]No       Meals on Wheels  [] Yes  [x]No   Office on Aging  [] Yes  [x]No   Transportation Services  [] Yes  [x]No   Nursing Home  [] Yes  [x]No        Nursing Home Name    [address] [facility]  [] Yes  [x]No        [address] Name    Other       Information Source:      Information obtained from  [x] Patient  [] Parent   [] [address]  [] Child  [] Spouse   [] Significant Other/Partner   [] Friend      [] EMS    [] Nursing Home Chart          [] Other:   Chart Review  [x] Yes  []No     Family/Support System:    Patient lives with  [] Alone    [x] Spouse   [] Significant Other  [] Children  [] Caretaker   [] Parent  [] Sibling     [] Other       Other Support System:    Is the patient responsible for care of others  [] Yes  [x]No   Information of person caring for patient on  discharge    Managers financial affairs independently [x] Yes  []No   If no, explain:      Status Prior to Admission:    Mental Status  [x] Awake  [x] Alert  [x] Oriented  [x] Quiet/Calm [] Lethargic/Sedated   [] Disoriented  [] Restless/Anxious  [] Combative   Personal Care  [] Dependent  [x] [address]  [] Requires Assistance   Meal Preparation Ability  [x] Independent   [] Standby Assistance   [] Minimal Assistance   [] Moderate Assistance  [] Maximum Assistance     [] Total Assistance   Chores  [x] Independent with Chores   [] N/A Nursing Home Resident   [] Requires Assistance   Bowel/Bladder  [x] Continent  [] Catheter  [] Incontinent  [] Ostomy Self-Care    [] Urine Diversion Self-Care  [] Maximum Assistance     [] Total Assistance   Number of Persons needed for assistance    DME at home  [] Antolin Vinson  [x] Estefanía Vinson   [] Commode    [] Bathroom/Grab Bars  [] Hospital Bed  [] Nebulizer  [] Oxygen           [] Raised Toilet Seat  [] Shower Chair  [] Side Rails for Bed   [] Tub Transfer Bench   [x] Page Figures  [] Trey Santoro      [] Other:   Vendor      Treatment Presently Receiving:    Current Treatments  [] Chemotherapy  [] Dialysis  [] Insulin  [] IVAB [x] IVF   [] O2  [] PCA   [] PT   [] RT   [] Tube Feedings   [] Wound Care     Psychosocial Evaluation:    Verbalized Knowledge of Disease Process  [] Patient  []Family   Coping with Disease Process  [] Patient  []Family   Requires Further Counseling Coping with Disease Process  [] Patient  []Family     Identified Projected Needs:    Home Health Aid  [] Yes  [x]No   Transportation  [] Yes  [x]No   Education  [] Yes  [x]No        Specific Education     Financial Counseling  [] Yes  [x]No   Inability to Care for Self/Will Require 24 hour care  [] Yes  [x]No   Pain Management  [] Yes  [x]No   Home Infusion Therapy  [] Yes  [x]No   Oxygen Therapy  [] Yes  [x]No   DME  [] Yes  [x]No   Long Term Care Placement  [] Yes  [x]No   Rehab  [] Yes  [x]No   Physical Therapy  [] Yes  [x]No   Needs Anticipated At This Time  [] Yes  [x]No     Intra-Hospital Referral:    [address] [facility]  [] Yes  [x]No     [] Yes  [x]No   Patient Representative  [] Yes  [x]No   Staff for Teaching Needs  [] Yes  [x]No   Specialty Teaching Needs     Diabetic Educator  [] Yes  [x]No   Referral for Diabetic Educator Needed  [] Yes  [x]No  If Yes, place order for Nutritionist or Diabetic Consult     Tentative Discharge Plan:    Home with No Services  [x] Yes  []No   Home with [address]  [] Yes  [x]No        If Yes, specify type    [address]  [] Yes  [x]No        If Yes, specify type    Meals on Wheels  [] Yes  [x]No   Office of Aging  [] Yes  [x]No   NHP  [] Yes  [x]No   Return to the Nursing Home  [] Yes  [x]No   Rehab Therapy  [] Yes  [x]No   Acute Rehab  [] Yes  [x]No   Subacute Rehab  [] Yes  [x]No   Private Care  [] Yes  [x]No   Substance Abuse Referral  [] Yes  [x]No   Transportation  [] Yes  [x]No   Chore Service  [] Yes  [x]No   Inpatient Hospice  [] Yes  [x]No   OP RT  [] Yes  [x] No   OP Hemo  [] Yes  [x] No   OP PT  [] Yes  [x]No   Support Group  [] Yes  [x]No   Reach to Recovery  [] Yes  [x]No   OP Oncology Clinic  [] Yes  [x]No   Clinic Appointment  [] Yes  [x]No   DME  [] Yes  [x]No   Comments    Name of D/C Planner or  Given to Patient or Family Drew Licona   Phone Number Pager: [phone]        [address] [place] [address]    Date 04-   Time    If you are discharged home, whom do you designate to participate in your discharge plan and receive any information needed? Enter name of Ivett Rene        [place] of [facility] [phone]            Address of [place] [address] [address] [place] [address].  Chevy Seaman [id number]        Updated         Patient refused to designate any           individual

## 2017-04-22 NOTE — ROUTINE PROCESS
Bedside, Verbal and Written shift change report given to Lou Hoyt RN (oncoming nurse) by Capri Anderson RN (offgoing nurse). Report included the following information SBAR, Kardex, Intake/Output, MAR, Recent Results, Med Rec Status, Procedure Summary and Cardiac Rhythm .      4816 - Shift assessment completed. Pt alert and oriented x4. No respiratory distress noted. No c/o pain reported. Call bell within reach, bed in low position. Night shift nurse Capri Anderson RN made Dr Jignesh Bustos aware of pt pulling out 2 NG tubes, wanting to eat food, and to leave hospital AMA. Will notify Hospitalist Dr Renee Whitmore. 36 - Dr Renee Whitmore made aware pt pulling out 2 NG tubes, tolerating meals well, and wanting to leave hospital AMA. Will discharge pt.    1152 - Discharge medications reviewed with patient and appropriate educational materials and side effects teaching were provided. I have reviewed discharge instructions with the patient. The patient verbalized understanding. IV catheter discontinued intact. Site without signs and symptoms of complications. Dressing and pressure applied. Patient armband removed and shredded. 200 - Pt discharged to home with a family friend. All pt belongings went with him.

## 2017-04-22 NOTE — DISCHARGE SUMMARY
Internal Medicine Discharge Summary        Patient: Ricardo Shaw    YOB: 1945    Age:  73 y.o. Admit Date: 4/21/2017    Discharge Date: 4/22/2017    LOS:  LOS: 1 day     Discharge To: Home    Consults: General Surgery    Admission Diagnoses: Small bowel obstruction [facility])    Discharge Diagnoses:    Problem List as of 4/22/2017  Date Reviewed: 2/11/2015          Codes Class Noted - Resolved    Small bowel obstruction ([facility] [address] ICD-10-CM: K56.69  ICD-9-CM: 560.9  4/21/2017 - Present        SBO (small bowel obstruction) ([facility] [address] ICD-10-CM: K56.69  ICD-9-CM: 560.9  3/30/2017 - Present        HTN (hypertension) ICD-10-CM: I10  ICD-9-CM: 401.9  3/30/2017 - Present        URI (upper respiratory infection) ICD-10-CM: J06.9  ICD-9-CM: 465.9  3/30/2017 - Present              Discharge Condition:  Improved    Procedures: None         HPI: Ricardo Shaw is a 73 y.o. male with a PMHx of HTN, SBO, Vit D def who presented to the ED complaining of continued RLQ abdominal pain with nausea and vomiting after multiple recent admissions for SBO. The patient was admitted on 03/30/17 for a SBO and left AMA on 04/03 and then returned on 04/06 for the same thing. He then left AMA again on 04/07 because he wasn't aloud to eat. He returned again on 04/09 and treated with medical management for another SBO and discharged home on 04/15 after he began having bowel movements again. He then developed symptoms again yesterday with abdominal pain and pencil thin BMs. He is now interested in surgical intervention. He had imaging in the ED consistent again with a SBO.       Hospital Course (Including Significant Findings): The patient was admitted to the hospital for further management of their presenting condition. The plan was for surgical intervention at admission, but the patient had pulled his NGT overnight and had no pain or discomfort the next morning.  He had no nausea or vomiting and was passing both flatus and stool. His abdomen was soft and non-distended. He was also tolerating regular diet. He was seen by surgery the following morning and they no longer elected on performing surgical intervention. He was ambulating well and was eager to go home. He tolerated lunch as well and was discharged home. The rest of the patient's chronic conditions were managed appropriately during their admission. They were medically stable at the time of discharge. Visit Vitals    BP (!) 147/92 (BP 1 Location: Left arm, BP Patient Position: Sitting;Post activity)    Pulse (!) 107    Temp 97.9 °F (36.6 °C)    Resp 18    Ht 5' 8.5\" (1.74 m)    Wt 56.7 kg (125 lb)    SpO2 95%    BMI 18.73 kg/m2       Physical Exam at Discharge:  General Appearance: NAD, conversant  HENT: normocephalic/atraumatic, moist mucus membranes  Lungs: CTA with normal respiratory effort  CV: RRR, no m/r/g  Abdomen: soft, non-tender, normal bowel sounds  Extremities: no cyanosis, no peripheral edema  Neuro: moves all extremities, no focal deficits  Psych: appropriate affect, alert and oriented to person, place and time    Labs Prior to Discharge:  Labs: Results:       Chemistry Recent Labs      04/22/17 0415  04/21/17   0145   GLU  100*  106*   NA  142  137   K  3.6  2.7*   CL  108  95*   CO2  27  29   BUN  3*  6*   CREA  0.55*  0.82   CA  7.5*  8.9   AGAP  7  13   BUCR  5*  7*   AP   --   58   TP   --   7.5   ALB   --   3.3*   GLOB   --   4.2*   AGRAT   --   0.8      CBC w/Diff Recent Labs      04/22/17 0415  04/21/17   0145   WBC  7.7  14.3*   RBC  3.29*  4.45*   HGB  10.0*  13.9   HCT  29.5*  39.5   PLT  384  485*   GRANS  62  74*   LYMPH  25  18*   EOS  2  1      Cardiac Enzymes No results for input(s): CPK, CKND1, VAN in the last 72 hours. No lab exists for component: CKRMB, TROIP   Coagulation No results for input(s): PTP, INR, APTT in the last 72 hours.     No lab exists for component: INREXT    Lipid Panel No results found for: CHOL, CHOLPOCT, [address] [facility], [address] [place] [address], [address] [place] [address] Rd, [id number], HDL, LDL, NLDLCT, DLDL, LDLC, DLDLP, [id number], VLDLC, VLDL, TGL, TGLX, TRIGL, [id number], TRIGP, TGLPOCT, [id number], CHHD, CHHDX   BNP No results for input(s): BNPP in the last 72 hours. Liver Enzymes Recent Labs      04/21/17   0145   TP  7.5   ALB  3.3*   AP  58   SGOT  14*      Thyroid Studies Lab Results   Component Value Date/Time    TSH 1.88 04/09/2017 04:57 AM            Significant Imaging:  Xr Abd (kub)    Result Date: 4/22/2017  Study: Abdominal radiograph, one view. INDICATION: Nasogastric tube placement. COMPARISON: April 21, 2017. FINDINGS: There has been interval placement of a nasogastric tube with the side-port distal to the GE junction and the tip terminating in the left upper quadrant. Several prominent gas-filled loops of small bowel in the upper abdomen are again noted. Lungs are clear. IMPRESSION: Satisfactory positioning of the nasogastric tube. Xr Abd (kub)    Result Date: 4/21/2017  Abdomen, single view COMPARISON: Several prior exams, most recently 4/10/2017. INDICATION: Abdominal pain and vomiting FINDINGS: Supine AP view the abdomen was obtained. Diffuse gaseous distention of the small intestine demonstrated, similar in magnitude to prior exam of 4/10/2017. Small bowel luminal diameter of approximately 6.3 cm. Relative possibly of large bowel gas noted. No acute osseous abnormality. Bilateral hip joint osteoarthritis redemonstrated with lower lumbar spondylosis. Impression: Radiographic findings compatible with small bowel obstruction. No gross free intraperitoneal gas. Xr Abd (kub)    Result Date: 4/9/2017  EXAM: Frontal View of the Abdomen And Pelvis Indication: Recent bowel obstruction, patient able to give history of this time Technique: Single frontal view  of the abdomen and pelvis. Comparison: 04/07/2017 _______________ Findings: Diffuse abnormal small bowel dilatation with small bowel loops measuring 5.7 cm. No gross free air. _______________    IMPRESSION: 1. Findings of small bowel obstruction. Note: Preliminary report was provided by the on-call radiology resident. Xr Abd (kub)    Result Date: 4/7/2017  Abdomen/KUB Indication: NG tube placement. COMPARISON: 04/01/2017. Findings:  A supine view of abdomen is submitted. NG tube in the left upper quadrant appearing within the stomach with side-port near gastroesophageal junction, recommend further advancement for better positioning. Monitoring leads overlie the chest. Dilated loops of bowel in the mid and right abdomen measuring up to 6.1 cm in diameter. No gross pneumatosis or pneumoperitoneum. Excreted retained contrast within the urinary bladder. Impression: 1. NG tube tip within the stomach, side port near gastroesophageal junction, recommend further advancement for better positioning. 2. Dilated small bowel measuring up to 6.1 cm suggestive of small bowel obstruction. 3. Retained excreted CT contrast within the urinary bladder. Preliminary report provided by on call Radiology resident. Xr Abd (kub)    Result Date: 4/1/2017  INDICATION: NG tube placement. COMPARISON: Similar study from earlier today. TECHNIQUE: Single AP portable supine view of the abdomen. FINDINGS: Lung bases clear. Heart size normal. There is an enteric tube with the tip and side-port projected within the stomach. There are multiple loops of dilated small bowel throughout the abdomen. There is some gas within the descending colon and rectum. There are multilevel degenerative changes of the spine without acute osseous abnormality. There is no supine evidence of free air. IMPRESSION: 1. Enteric tube terminates within the stomach. 2. Findings compatible with known small bowel obstruction. Concur with the initial resident report. Xr Abd (ap And Erect Or Decub)    Result Date: 4/1/2017  ABDOMEN, SUPINE & ERECT, 2 Views Clinical History: Follow-up small bowel obstruction. Comparison: March 31, 2017. Findings: There appears be repositioning of the nasogastric tube with the tip in the body the stomach, however the proximal port is now below the level the GE junction. There are persistent loops of dilated small bowel with multiple air-fluid levels. The small bowel loops measure up to 3.8 cm which is similar to the prior study. There is minimal air within the colon. IMPRESSION: 1. Nasogastric tube in good radiographic position. 2. Small bowel obstruction, similar to the prior study. Xr Abd Flat/ Erect    Result Date: 4/10/2017  Abdomen, flat and erect COMPARISON: Several prior exams, most recently 4/9/2017 INDICATION: Small bowel obstruction, follow-up examination. FINDINGS: Supine and upright views of the abdomen were obtained. The lung bases demonstrate no acute abnormality. Cardiac size stable. No free intraperitoneal gas identified on the upright projection. Persistently dilated loops of small intestine are noted, with a relative paucity of colonic gas. Small amount of oral contrast demonstrate the large intestine. There is a nasogastric tube present, the tip of which is present just beyond the gastroesophageal junction, and the side port present in the distal thoracic esophagus. No acute osseous normality present. Impression: 1. Nasogastric tube in position as above, with side-port above the level of the GE junction. - The position of NG tube and recommendations for advancement were discussed with Samuel Soto Unit nurse caring for the patient  at 9:13 AM on 4/10/2017. 2. Persistently abnormal small bowel gaseous distention in keeping with underlying bowel obstruction. No free intraperitoneal gas.      Xr Abd Flat/ Erect    Result Date: 3/31/2017  EXAM: Supine and upright abdomen INDICATION: Follow-up SBO COMPARISON: CT abdomen/pelvis 3/30/2017 _______________ FINDINGS: Nasogastric tube is present with its tip projecting at the expected position of the proximal gastric body, and its side port just above the diaphragm. Several loops of moderately dilated small bowel are present in the upper and midabdomen, gas is scattered within decompressed colon. Air-fluid levels are present on the upright view within the dilated small bowel, with a minimal stairstep pattern. No free air, mass or visceromegaly. Numbers of gas-filled small bowel loops have diminished since the prior CT, the degree of distention is similar. _______________     IMPRESSION: Small bowel obstruction, stable to minimally improved. NG tube tip projects within the stomach, side port at the distal esophagus. Consider advancing the NG tube several centimeters. This finding was discussed with the patient's nurse, Asad Avelar, at 9:22 AM on 3/31/2017. Ct Head Wo Cont    Result Date: 4/12/2017  EXAM: CT head INDICATION: Jasmyn Álvarezhew down. Small bowel obstruction. Hypertension. . COMPARISON: 3/28/2017. TECHNIQUE: Axial CT imaging of the head was performed without intravenous contrast.Dose reduction techniques used: Automated exposure control, adjustment of the mAs and/or kVp according to patient's size, and iterative reconstruction techniques. _______________ FINDINGS: BRAIN AND POSTERIOR FOSSA: The sulci, folia, ventricles and basal cisterns are within normal limits for the patient?s age. There is no intracranial hemorrhage, mass effect, or midline shift. There is periventricular deep white matter hypodensity which is bilateral and symmetric and unchanged. Clarine Hoover EXTRA-AXIAL SPACES AND MENINGES: There are no abnormal extra-axial fluid collections. CALVARIUM: Intact. SINUSES: Clear. OTHER: None. _______________     IMPRESSION: 1. No significant change. No acute bleed or acute CVA. 2. Nonspecific deep white matter changes could represent areas of ischemic demyelination, unchanged. As the attending radiologist I have personally reviewed the study and this report, and concur with the above stated findings.  .    Ct Head Wo Cont    Result Date: 3/28/2017  EXAM: CT head INDICATION: Syncopal episode COMPARISON: None. TECHNIQUE: Axial CT imaging of the head was performed without intravenous contrast. One or more dose reduction techniques were used on this CT: automated exposure control, adjustment of the mAs and/or kVp according to patient's size, and iterative reconstruction techniques. The specific techniques utilized on this CT exam have been documented in the patient's electronic medical record. _______________ FINDINGS: BRAIN AND POSTERIOR FOSSA: There is mild cortical and cerebellar volume loss present. The ventricular size and configuration is within normal limits. Mild subcortical and periventricular white matter hypoattenuation is present. The basilar cisterns are patent. There is no intracranial hemorrhage, mass effect, or midline shift. The gray-white matter differentiation is within normal limits. EXTRA-AXIAL SPACES AND MENINGES: There are no abnormal extra-axial fluid collections. CALVARIUM: Intact. SINUSES: Clear. OTHER: Atherosclerotic calcification of bilateral carotid siphons is noted. _______________     IMPRESSION: 1. No acute intracranial abnormality. Of note, noncontrast head CT can be normal in the context of early acute stroke. 2. Mild subcortical and periventricular white matter hypoattenuation, a nonspecific finding likely pertaining to chronic ischemic microvascular change. Ct Abd Pelv W Cont    Result Date: 4/21/2017   CT OF THE ABDOMEN AND PELVIS, WITH CONTRAST INDICATION: Recurrent abdominal pain, vomiting blood, progressive over the past 5 days COMPARISON: Abdominal radiographs same day; CT abdomen/pelvis 4/9/2017. TECHNIQUE: Standard helical CT performed through the abdomen and pelvis with IV contrast.  Coronal and sagittal reformations were generated. One or more dose reduction techniques were used on this CT: automated exposure control, adjustment of the mAs and/or kVp according to patient's size, and iterative reconstruction techniques.  The specific techniques utilized on this CT exam have been documented in the patient's electronic medical record. ============ FINDINGS: INFERIOR THORAX: Bronchiectasis and mild bronchial wall thickening throughout left and right lung bases. Left and right basilar atelectasis. There are a few regions of mucous plugging visualized in the left lower lobe (axial images 10, 13, and 15). Solitary 3 mm groundglass nodule posterior right lower lobe (image 2). No acute pulmonary infiltrate. No global cardiomegaly or pericardial effusion. LIVER/BILIARY: Heterogeneous hepatic parenchymal enhancement. No suspicious liver lesion. No biliary dilation. Gallbladder appears nondistended. SPLEEN: Normal. PANCREAS: Heterogeneous coarse calcifications throughout the posterior aspect of the pancreatic head. There is mild pancreatic duct dilatation throughout the body and head. Homogeneous pancreatic parenchymal enhancement currently. ADRENALS: Normal. KIDNEYS: Normal. LYMPH NODES: No mesenteric or retroperitoneal lymphadenopathy. GI TRACT: Diffuse small bowel distention with layering air-fluid levels throughout. Small bowel loops measure up to 5.0 cm. Partial swirling of central mesenteric vasculature, not enough to represent an obstructive volvulus. There is tapering of ileum in the right lower quadrant with more distal, decompressed colon. Enteric tube appears adequately located along the lateral aspect of the gastric body. VASCULATURE: Normal caliber distal thoracic and abdominal aorta with moderate circumferential atherosclerotic calcification. PELVIC ORGANS: Minimally distended bladder appears within normal limits. Shelvy Setting BONES: No acute osseous abnormality. There is overall straightening of the normal lumbar lordosis. OTHER: No ascites or free intraperitoneal air. ============     IMPRESSION: 1. Small bowel obstruction with tapering in the right lower quadrant, probable transition point.  Swirled mesenteric vasculature appears similar to prior CT with no findings to confirm vascular compromise or high-grade obstructive volvulus. 2. Alveolitis throughout left and right lung bases, likely related to adjacent bronchitis and bronchial mucous plugging. 3. Stigmata of chronic pancreatitis. No convincing findings of an acute pancreatitis exacerbation. Note: Preliminary report sent to the Emergency Department by the radiology resident at the time of the study. Ct Abd Pelv W Cont    Result Date: 4/9/2017  EXAM: CT of the Abdomen and Pelvis INDICATION: Abnormal KUB, small bowel obstruction, COMPARISON: KUB from same day, CT scan of the abdomen and pelvis from 04/06/2017. TECHNIQUE: Axial CT imaging of the abdomen and pelvis was performed with oral and intravenous contrast. Multiplanar reformats were generated. Dose reduction techniques used: Automated exposure control, adjustment of the mAs and/or kVp according to patient's size, and iterative reconstruction techniques. _______________ FINDINGS: LOWER CHEST: Bibasilar dependent changes without consolidation or effusion. Jolane Pontiff LIVER, BILIARY: Liver is normal. No biliary dilation. Gallbladder is unremarkable. PANCREAS: Normal. SPLEEN: Normal. ADRENALS: Normal. KIDNEYS: Symmetric enhancing bilateral kidneys without hydronephrosis, perinephric fluid or induration. No hydroureter. LYMPH NODES: No enlarged lymph nodes. GASTROINTESTINAL TRACT: Redemonstration of diffuse significant small bowel dilatation measuring up to 4.4 cm pathologic air-fluid levels and focal transition in the right upper pelvis (axial image [id number]/coronal image 43/sagittal image 70-74). No visualized pneumatosis. There is a very small amount of air in the otherwise decompressed colon. Enteric line tip is present in the proximal stomach PELVIC ORGANS: Unremarkable. VASCULATURE: Diffuse atherosclerotic calcification with high-grade/severe stenosis at the aortic bifurcation/origin of bilateral common iliac arteries. Jolane Pontiff  BONES: No acute or aggressive osseous abnormalities identified. OTHER: No pneumatosis or free air. . _______________     IMPRESSION: 1. High-grade small bowel obstruction with focal transition in the right lower quadrant/right upper pelvis. No findings of pneumatosis or evidence of perforation. Ct Abd Pelv W Cont    Result Date: 4/7/2017  EXAM: CT of the abdomen and pelvis INDICATION: Bowel obstruction. Blunt forced trauma. COMPARISON: 3/30/2017. CTA chest abdomen pelvis 3/28/2017. TECHNIQUE: Axial CT imaging of the abdomen and pelvis was performed post intravenous contrast. Multiplanar reformats were generated. Dose reduction techniques used: Automated exposure control, adjustment of the mAs and/or kVp according to patient's size, and iterative reconstruction techniques. _______________ FINDINGS: LOWER CHEST: Minor atelectasis/consolidation remains right lower lobe. Incompletely visualized is a semisolid appearing pulmonary nodule on image 1 most likely in the minor fissure on the left measuring today 6 mm. It is unchanged and probably represents lymph node in the fissure. LIVER, BILIARY: Liver is normal. No biliary dilation gallbladder is partially contracted but otherwise unremarkable. PANCREAS: Dense calcifications in the head and tail of the pancreas remain. The rest of the pancreas appears normal. SPLEEN: Smaller than expected but unchanged. ADRENALS: Normal KIDNEYS: Normal LYMPH NODES: No enlarged lymph nodes GASTROINTESTINAL TRACT: Significant distention of the small bowel and stomach. There are air-fluid levels. The right colon contains some fluid but is not significantly distended. The left colon contains gas and feces and is collapsed down to the rectum. There is a small amount of free fluid in the true pelvis. No free intraperitoneal air. I am suspicious there is a transition zone in the region of the terminal ileum. Patient did not receive oral contrast today. The appendix is not identified.  There is twisting of the small bowel mesentery, sometimes called a swirl sign in the pelvis. This appears about 180 degrees. Doubt this represents a volvulus. PELVIC ORGANS: Urinary bladder normal. Uterus not identified. VASCULATURE: Dense calcific plaquing is seen in the aorta and iliac vessels. BONES: No acute or aggressive osseous abnormalities identified OTHER: None _______________     IMPRESSION: 1. Findings consistent with small bowel obstruction with most likely transition point in the terminal ileum. This may be incomplete in that there is some fluid in the right colon which is greater than prior. 2. There is some twisting of the mesentery of the small bowel but this is less than 360 degrees. Doubt this represents a midgut volvulus. 3. There is a tiny amount of free fluid in the pelvis. 4. Dense calcifications in the pancreas and small spleen unchanged. 5. Atelectasis/consolidation right lower lobe lung about the same. 6. Dense vascular calcifications about the same. As the attending radiologist I have personally reviewed the study and this report, and concur with the above stated findings. Ct Abd Pelv W Cont    Result Date: 3/30/2017   CT OF THE ABDOMEN AND PELVIS INDICATION: Abdominal pain. Blunt trauma 3 days ago with increasing abdominal pain. COMPARISON: CTA 3/28/2017. TECHNIQUE: Standard helical CT performed through the abdomen and pelvis with IV contrast.  Coronal and sagittal reformations were generated. One or more dose reduction techniques were used on this CT: automated exposure control, adjustment of the mAs and/or kVp according to patient's size, and iterative reconstruction techniques. The specific techniques utilized on this CT exam have been documented in the patient's electronic medical record. ============ FINDINGS: INFERIOR THORAX: Subpleural atelectasis dependent lower lobes. LIVER/BILIARY: No focal liver lesion. No biliary dilation.  Layering high density within gallbladder compatible with vicarious excretion of contrast. SPLEEN: Very small spleen. PANCREAS: Multiple coarse calcifications within pancreatic head compatible with chronic calcific pancreatitis. Additional lesser calcifications pancreatic tail. ADRENALS: Normal. KIDNEYS: Normal. No calculus or hydronephrosis. LYMPH NODES: No mesenteric or retroperitoneal lymphadenopathy. GI TRACT: Near diffuse small bowel dilatation. Colon is decompressed. Findings suggest the presence of a transition point in the region of terminal ileum but not well delineated. There is mesenteric soft tissue stranding in the region of transition point at level of of terminal ileum. Appendix not well delineated. VASCULATURE: Severe aortoiliac atherosclerosis. Additional significant atherosclerotic plaque at the origin of left renal artery. PELVIC ORGANS: Unremarkable. BONES: No acute osseous abnormality. OTHER: No ascites or free intraperitoneal air. ============     IMPRESSION: 1. Interval development of significant small bowel distention since prior study with poorly defined transition point in the region of terminal ileum. There is localized mesenteric stranding in the right lower quadrant near transition point. Appendix not well delineated. Given the history of recent blunt abdominal trauma, the possibility of mesenteric injury in the region of transition point cannot be excluded. 2. Atherosclerosis. Chronic calcific pancreatitis. Interval development of subpleural dependent atelectasis in lower lobes. Note: Preliminary report faxed  to the Emergency Department by the radiology resident at the time of the study. Xr Chest Port    Result Date: 4/21/2017  Chest, single view Indication: Mid abdominal pain. Hematemesis. Comparison: Several prior exams, most recently 4/9/2017 Findings:  Portable upright AP view of the chest was obtained. Lungs are underexpanded, without evidence of focal pneumonic consolidation, pneumothorax, or pleural effusion.  Cardiac size and mediastinal contours are stable, with mild tortuosity of the thoracic aorta noted. No acute osseous abnormality. IMPRESSION: 1. Mildly underexpanded lungs without radiographic evidence of acute abnormality. Xr Chest Port    Result Date: 4/9/2017  EXAM:Chest X-Ray  History: Chest pain Technique:  Portable Frontal View Comparison: 03/28/2017 _______________ FINDINGS: The trachea is midline. The cardiomediastinal silhouette is midline and, within normal limits. The lungs are grossly clear without evidence of focal consolidation, effusion, or pneumothorax. Intact osseous structures. _______________     IMPRESSION: 1. No acute cardiopulmonary process. Note: Preliminary report was provided by the on-call radiology resident. Xr Chest Port    Result Date: 3/28/2017  Chest, single view Indication: Chest pain and headache. Syncopal episode. Comparison: None Findings:  Portable upright AP view of the chest was obtained. The cardiomediastinal silhouette is within normal limits. The pulmonary vasculature is unremarkable. Lung parenchyma is well aerated, without focal consolidation. No pleural effusion nor pneumothorax. No acute osseous abnormality. Impression: No radiographic evidence of an acute abnormality. Xr Abd Port  1 V    Result Date: 4/10/2017  Abdomen, single view COMPARISON: CT abdomen/pelvis 4/9/2017 INDICATION: Abdominal pain, nasogastric tube placement FINDINGS: Single supine AP view of the abdomen was obtained. Proximal side-port is still within the distal thoracic esophagus. Nasogastric tube tip is just beyond the gastroesophageal junction. Essentially unchanged air distended small bowel loops throughout the abdomen, measuring up to 4.7 cm. No free intraperitoneal air. No abnormal calcification or soft tissue mass. Impression: 1. Proximally located enteric tube, recommend interval advancement. 2. Unchanged air distended small bowel loops indicative of small bowel obstruction.  Note: Preliminary report sent to the Emergency Department by the radiology resident at the time of the study. Cta Chest Abd Pelv W Cont    Result Date: 3/29/2017  PROCEDURE: CT angiogram of the chest, abdomen and pelvis. CLINICAL HISTORY: History of trauma, abdominal and chest pain. Concern for dissection. . COMPARISON: None TECHNIQUE: Contrast-enhanced CT angiogram of the chest, abdomen and pelvis was performed after the uneventful administration IV contrast. Coronal and sagittal reformatted images were generated from the axial data. MIP images were generated on a separate workstation. FINDINGS: Vascular findings: CHEST: Thoracic aorta demonstrates mild/moderate atherosclerotic change, otherwise is within normal limits. No dissection or aneurysm appreciated. Coronary vascular calcifications are present. Opacified pulmonary arteries are patent. Visualized great vessels without flow-limiting stenosis appreciated. Abdomen/pelvis: Abdominal aorta is nonaneurysmal and free of acute abnormality. Celiac artery, SMA and right renal artery appear widely patent. Heavy calcification at the origin of the left renal artery limits evaluation, possibly significant stenosis. Remainder of the left renal artery otherwise widely patent. Heavy atherosclerotic changes of the aortic bifurcation. Proximal left common iliac artery is occluded with distal reconstitution and patent left external iliac artery. Approximately 50% stenosis of the left common femoral artery. Right common iliac artery origin demonstrates heavy calcific plaque resulting in hemodynamically significant stenosis, likely greater than 70%. Right external iliac artery is widely patent. Right common femoral artery demonstrates occlusive disease related to bulky calcifications. Bilateral hypogastric arteries demonstrate flow limiting stenosis near their origins. Nonvascular findings: CHEST: Lungs are essentially clear. No effusion or pneumothorax.  Scattered opacities within several right lower lobe distal airways noted and mild bronchial wall thickening. Trace secretions in the distal trachea. No bulky adenopathy. Heart size normal. No pericardial effusion Abdomen/pelvis: Liver, gallbladder, spleen, pancreas, adrenals, and kidneys are free of acute abnormality. Cluster of coarse calcifications in the head of the pancreas noted without discrete mass appreciated. This could represent a focus of chronic pancreatitis. Visualized loops of small and large bowel demonstrate no evidence of obstruction or other acute abnormality. No bulky lymphadenopathy identified. Pelvic organs are unremarkable. Trace free fluid in the pelvis dependently. IMPRESSION: No acute aortic or vascular pathology identified. Chronic/incidental vascular findings: Severe atherosclerotic changes involving primarily the iliac and proximal femoral vasculature bilaterally as described. Possible significant stenosis of the origin of the left renal artery. Partial opacification of several right lower lobe distal airways, possibly submucous plugging or retained secretions most likely. No acute nonvascular finding appreciated. Additional chronic/incidental findings as described. Trace nonspecific free fluid in the pelvis dependently. Preliminary report was given by the on-call radiology resident. Discharge Medications:     Discharge Medication List as of 4/22/2017 11:43 AM      CONTINUE these medications which have NOT CHANGED    Details   aspirin delayed-release 81 mg tablet Take 1 Tab by mouth daily. , Print, Disp-90 Tab, R-3      HYDROcodone-acetaminophen (NORCO) 5-325 mg per tablet Take 1 Tab by mouth every six (6) hours as needed for Pain. Max Daily Amount: 4 Tabs., Print, Disp-6 Tab, R-0      ondansetron (ZOFRAN ODT) 4 mg disintegrating tablet Take 1 Tab by mouth every eight (8) hours as needed for Nausea. , Print, Disp-8 Tab, R-0      hydrochlorothiazide (HYDRODIURIL) 25 mg tablet Take 25 mg by mouth daily. , Historical Med tadalafil (CIALIS) 5 mg tablet Take 5 mg by mouth daily as needed., Normal, Disp-30 Tab, R-1, ALEXIA      cholecalciferol (VITAMIN D3) 1,000 unit tablet Take  by mouth daily. , Historical Med             Activity: Activity as tolerated    Diet: Resume previous diet    Wound Care: None needed    Follow-up:   Please follow up with your PCP within 7 days to discuss your recent hospitalization. Patient to arrange.          Total time spent including time spent on final examination and discharge discussion, discharge documentation and records reviewed and medication reconciliation: > 30 minutes    Reyes Clan, DO  Internal Medicine, Hospitalist  Pager: [phone]

## 2017-04-22 NOTE — PROGRESS NOTES
Received pt alert and oriented X 4 with NG- tube in place. Patient denies pain, SOB or chest pain. IV noted to left wrist # 20 D5 1/2 NS infusing  At 125 ml/hr. Pt is voiding clear yellow urine. Monitoring ongoing. 2145 Pt has removed NG-tube and refused to have it replace. Monitoring ongoing. 0030  Pt is OOB removed IV and linen from bed. States if he is not having surgery in the am he will leave AMA. IV restated. NG-tube has not been replaced. Pt linen changed. Monitoring ongoing. 0130 VS recheck. B/P 165/87 Pt did not take B/P medication prior to coming to the hospital.  At this time pt is deciding if he will stay in hospital or go AMA. Monitoring ongoing.

## 2017-04-22 NOTE — PROGRESS NOTES
Patient seen and examined. Doing well. Has no NG tube. No nausea or vomiting. Passing flatus and stool. Abdomen is soft and non-distend and non-tender. Imp:  No evidence of obstruction. I do not see any reason for surgical exploration currently.   Allow regular diet  Discharge planning per medicine team.

## 2017-10-20 ENCOUNTER — HOSPITAL ENCOUNTER (INPATIENT)
Age: 72
LOS: 7 days | Discharge: HOME | DRG: 353 | End: 2017-10-27
Attending: EMERGENCY MEDICINE | Admitting: HOSPITALIST
Payer: MEDICARE

## 2017-10-20 ENCOUNTER — APPOINTMENT (OUTPATIENT)
Dept: GENERAL RADIOLOGY | Age: 72
DRG: 353 | End: 2017-10-20
Attending: HOSPITALIST
Payer: MEDICARE

## 2017-10-20 ENCOUNTER — APPOINTMENT (OUTPATIENT)
Dept: CT IMAGING | Age: 72
DRG: 353 | End: 2017-10-20
Attending: EMERGENCY MEDICINE
Payer: MEDICARE

## 2017-10-20 ENCOUNTER — APPOINTMENT (OUTPATIENT)
Dept: GENERAL RADIOLOGY | Age: 72
DRG: 353 | End: 2017-10-20
Attending: EMERGENCY MEDICINE
Payer: MEDICARE

## 2017-10-20 DIAGNOSIS — R10.31 ACUTE ABDOMINAL PAIN IN RIGHT LOWER QUADRANT: ICD-10-CM

## 2017-10-20 DIAGNOSIS — K56.609 SMALL BOWEL OBSTRUCTION (HCC): Primary | ICD-10-CM

## 2017-10-20 PROBLEM — K56.7 ILEUS (HCC): Status: ACTIVE | Noted: 2017-10-20

## 2017-10-20 LAB
ALBUMIN SERPL-MCNC: 4.7 G/DL (ref 3.4–5)
ALBUMIN/GLOB SERPL: 1 {RATIO} (ref 0.8–1.7)
ALP SERPL-CCNC: 102 U/L (ref 45–117)
ALT SERPL-CCNC: 16 U/L (ref 16–61)
ANION GAP BLD CALC-SCNC: 17 MMOL/L (ref 10–20)
ANION GAP SERPL CALC-SCNC: 14 MMOL/L (ref 3–18)
APPEARANCE UR: CLEAR
AST SERPL-CCNC: 16 U/L (ref 15–37)
BASOPHILS # BLD: 0 K/UL (ref 0–0.06)
BASOPHILS NFR BLD: 0 % (ref 0–2)
BILIRUB DIRECT SERPL-MCNC: 0.1 MG/DL (ref 0–0.2)
BILIRUB SERPL-MCNC: 0.6 MG/DL (ref 0.2–1)
BILIRUB UR QL: NEGATIVE
BUN BLD-MCNC: 7 MG/DL (ref 7–18)
BUN SERPL-MCNC: 8 MG/DL (ref 7–18)
BUN/CREAT SERPL: 8 (ref 12–20)
CA-I BLD-MCNC: 1.12 MMOL/L (ref 1.12–1.32)
CALCIUM SERPL-MCNC: 9.8 MG/DL (ref 8.5–10.1)
CHLORIDE BLD-SCNC: 94 MMOL/L (ref 100–108)
CHLORIDE SERPL-SCNC: 93 MMOL/L (ref 100–108)
CO2 BLD-SCNC: 34 MMOL/L (ref 19–24)
CO2 SERPL-SCNC: 31 MMOL/L (ref 21–32)
COLOR UR: YELLOW
CREAT SERPL-MCNC: 1.04 MG/DL (ref 0.6–1.3)
CREAT UR-MCNC: 0.8 MG/DL (ref 0.6–1.3)
DIFFERENTIAL METHOD BLD: ABNORMAL
EOSINOPHIL # BLD: 0 K/UL (ref 0–0.4)
EOSINOPHIL NFR BLD: 0 % (ref 0–5)
ERYTHROCYTE [DISTWIDTH] IN BLOOD BY AUTOMATED COUNT: 18 % (ref 11.6–14.5)
GLOBULIN SER CALC-MCNC: 4.6 G/DL (ref 2–4)
GLUCOSE BLD STRIP.AUTO-MCNC: 135 MG/DL (ref 74–106)
GLUCOSE SERPL-MCNC: 128 MG/DL (ref 74–99)
GLUCOSE UR STRIP.AUTO-MCNC: NEGATIVE MG/DL
HCT VFR BLD AUTO: 45.9 % (ref 36–48)
HCT VFR BLD CALC: 47 % (ref 36–49)
HGB BLD-MCNC: 15.9 G/DL (ref 13–16)
HGB BLD-MCNC: 16 G/DL (ref 12–16)
HGB UR QL STRIP: NEGATIVE
KETONES UR QL STRIP.AUTO: ABNORMAL MG/DL
LEUKOCYTE ESTERASE UR QL STRIP.AUTO: NEGATIVE
LIPASE SERPL-CCNC: 262 U/L (ref 73–393)
LYMPHOCYTES # BLD: 0.7 K/UL (ref 0.9–3.6)
LYMPHOCYTES NFR BLD: 4 % (ref 21–52)
MCH RBC QN AUTO: 29.8 PG (ref 24–34)
MCHC RBC AUTO-ENTMCNC: 34.6 G/DL (ref 31–37)
MCV RBC AUTO: 86.1 FL (ref 74–97)
MONOCYTES # BLD: 0.8 K/UL (ref 0.05–1.2)
MONOCYTES NFR BLD: 5 % (ref 3–10)
NEUTS SEG # BLD: 15.7 K/UL (ref 1.8–8)
NEUTS SEG NFR BLD: 91 % (ref 40–73)
NITRITE UR QL STRIP.AUTO: NEGATIVE
PH UR STRIP: 5.5 [PH] (ref 5–8)
PLATELET # BLD AUTO: 342 K/UL (ref 135–420)
PMV BLD AUTO: 9.3 FL (ref 9.2–11.8)
POTASSIUM BLD-SCNC: 3.9 MMOL/L (ref 3.5–5.5)
POTASSIUM SERPL-SCNC: 3.9 MMOL/L (ref 3.5–5.5)
PROT SERPL-MCNC: 9.3 G/DL (ref 6.4–8.2)
PROT UR STRIP-MCNC: NEGATIVE MG/DL
RBC # BLD AUTO: 5.33 M/UL (ref 4.7–5.5)
SODIUM BLD-SCNC: 139 MMOL/L (ref 136–145)
SODIUM SERPL-SCNC: 138 MMOL/L (ref 136–145)
SP GR UR REFRACTOMETRY: >1.03 (ref 1–1.03)
UROBILINOGEN UR QL STRIP.AUTO: 1 EU/DL (ref 0.2–1)
WBC # BLD AUTO: 17.2 K/UL (ref 4.6–13.2)

## 2017-10-20 PROCEDURE — 74011000250 HC RX REV CODE- 250: Performed by: EMERGENCY MEDICINE

## 2017-10-20 PROCEDURE — 74011636320 HC RX REV CODE- 636/320: Performed by: EMERGENCY MEDICINE

## 2017-10-20 PROCEDURE — 74011250636 HC RX REV CODE- 250/636: Performed by: HOSPITALIST

## 2017-10-20 PROCEDURE — 71010 XR CHEST PORT: CPT

## 2017-10-20 PROCEDURE — 80047 BASIC METABLC PNL IONIZED CA: CPT

## 2017-10-20 PROCEDURE — 74000 XR ABD (KUB): CPT

## 2017-10-20 PROCEDURE — 99285 EMERGENCY DEPT VISIT HI MDM: CPT

## 2017-10-20 PROCEDURE — 96361 HYDRATE IV INFUSION ADD-ON: CPT

## 2017-10-20 PROCEDURE — 65270000029 HC RM PRIVATE

## 2017-10-20 PROCEDURE — 83690 ASSAY OF LIPASE: CPT | Performed by: EMERGENCY MEDICINE

## 2017-10-20 PROCEDURE — 77030008771 HC TU NG SALEM SUMP -A

## 2017-10-20 PROCEDURE — 85025 COMPLETE CBC W/AUTO DIFF WBC: CPT | Performed by: EMERGENCY MEDICINE

## 2017-10-20 PROCEDURE — 77030027138 HC INCENT SPIROMETER -A

## 2017-10-20 PROCEDURE — 77030020263 HC SOL INJ SOD CL0.9% LFCR 1000ML

## 2017-10-20 PROCEDURE — 81003 URINALYSIS AUTO W/O SCOPE: CPT | Performed by: EMERGENCY MEDICINE

## 2017-10-20 PROCEDURE — 80048 BASIC METABOLIC PNL TOTAL CA: CPT | Performed by: EMERGENCY MEDICINE

## 2017-10-20 PROCEDURE — 80076 HEPATIC FUNCTION PANEL: CPT | Performed by: EMERGENCY MEDICINE

## 2017-10-20 PROCEDURE — 77030018846 HC SOL IRR STRL H20 ICUM -A

## 2017-10-20 PROCEDURE — 74177 CT ABD & PELVIS W/CONTRAST: CPT

## 2017-10-20 PROCEDURE — 96375 TX/PRO/DX INJ NEW DRUG ADDON: CPT

## 2017-10-20 PROCEDURE — 74011250636 HC RX REV CODE- 250/636: Performed by: EMERGENCY MEDICINE

## 2017-10-20 PROCEDURE — 96374 THER/PROPH/DIAG INJ IV PUSH: CPT

## 2017-10-20 PROCEDURE — 74000 XR ABD PORT  1 V: CPT

## 2017-10-20 RX ORDER — METOCLOPRAMIDE HYDROCHLORIDE 5 MG/ML
5 INJECTION INTRAMUSCULAR; INTRAVENOUS
Status: COMPLETED | OUTPATIENT
Start: 2017-10-20 | End: 2017-10-20

## 2017-10-20 RX ORDER — SODIUM CHLORIDE, SODIUM LACTATE, POTASSIUM CHLORIDE, CALCIUM CHLORIDE 600; 310; 30; 20 MG/100ML; MG/100ML; MG/100ML; MG/100ML
150 INJECTION, SOLUTION INTRAVENOUS CONTINUOUS
Status: DISCONTINUED | OUTPATIENT
Start: 2017-10-20 | End: 2017-10-20 | Stop reason: SDUPTHER

## 2017-10-20 RX ORDER — ONDANSETRON 2 MG/ML
4 INJECTION INTRAMUSCULAR; INTRAVENOUS
Status: DISCONTINUED | OUTPATIENT
Start: 2017-10-20 | End: 2017-10-26 | Stop reason: CLARIF

## 2017-10-20 RX ORDER — ONDANSETRON 2 MG/ML
4 INJECTION INTRAMUSCULAR; INTRAVENOUS
Status: DISCONTINUED | OUTPATIENT
Start: 2017-10-20 | End: 2017-10-27 | Stop reason: HOSPADM

## 2017-10-20 RX ORDER — ENOXAPARIN SODIUM 100 MG/ML
40 INJECTION SUBCUTANEOUS EVERY 24 HOURS
Status: DISCONTINUED | OUTPATIENT
Start: 2017-10-20 | End: 2017-10-27 | Stop reason: HOSPADM

## 2017-10-20 RX ORDER — MIDAZOLAM HYDROCHLORIDE 1 MG/ML
2 INJECTION, SOLUTION INTRAMUSCULAR; INTRAVENOUS
Status: COMPLETED | OUTPATIENT
Start: 2017-10-20 | End: 2017-10-20

## 2017-10-20 RX ORDER — HYDROMORPHONE HYDROCHLORIDE 1 MG/ML
0.5 INJECTION, SOLUTION INTRAMUSCULAR; INTRAVENOUS; SUBCUTANEOUS
Status: COMPLETED | OUTPATIENT
Start: 2017-10-20 | End: 2017-10-20

## 2017-10-20 RX ORDER — ONDANSETRON 2 MG/ML
4 INJECTION INTRAMUSCULAR; INTRAVENOUS
Status: COMPLETED | OUTPATIENT
Start: 2017-10-20 | End: 2017-10-20

## 2017-10-20 RX ORDER — HYDROCODONE BITARTRATE AND ACETAMINOPHEN 7.5; 325 MG/15ML; MG/15ML
10 SOLUTION ORAL
Status: DISCONTINUED | OUTPATIENT
Start: 2017-10-20 | End: 2017-10-22

## 2017-10-20 RX ORDER — SODIUM CHLORIDE 9 MG/ML
75 INJECTION, SOLUTION INTRAVENOUS CONTINUOUS
Status: DISCONTINUED | OUTPATIENT
Start: 2017-10-20 | End: 2017-10-20 | Stop reason: SDUPTHER

## 2017-10-20 RX ORDER — MORPHINE SULFATE 2 MG/ML
2 INJECTION, SOLUTION INTRAMUSCULAR; INTRAVENOUS
Status: DISCONTINUED | OUTPATIENT
Start: 2017-10-20 | End: 2017-10-24

## 2017-10-20 RX ORDER — SODIUM CHLORIDE 9 MG/ML
25 INJECTION, SOLUTION INTRAVENOUS CONTINUOUS
Status: DISCONTINUED | OUTPATIENT
Start: 2017-10-20 | End: 2017-10-27 | Stop reason: HOSPADM

## 2017-10-20 RX ORDER — LIDOCAINE HYDROCHLORIDE 20 MG/ML
JELLY TOPICAL AS NEEDED
Status: DISCONTINUED | OUTPATIENT
Start: 2017-10-20 | End: 2017-10-27 | Stop reason: HOSPADM

## 2017-10-20 RX ADMIN — SODIUM CHLORIDE 2000 ML: 900 INJECTION, SOLUTION INTRAVENOUS at 09:02

## 2017-10-20 RX ADMIN — SODIUM CHLORIDE 75 ML/HR: 900 INJECTION, SOLUTION INTRAVENOUS at 15:42

## 2017-10-20 RX ADMIN — ENOXAPARIN SODIUM 40 MG: 40 INJECTION SUBCUTANEOUS at 16:47

## 2017-10-20 RX ADMIN — MORPHINE SULFATE 2 MG: 2 INJECTION, SOLUTION INTRAMUSCULAR; INTRAVENOUS at 22:00

## 2017-10-20 RX ADMIN — ONDANSETRON 4 MG: 2 INJECTION INTRAMUSCULAR; INTRAVENOUS at 09:05

## 2017-10-20 RX ADMIN — HYDROMORPHONE HYDROCHLORIDE 0.5 MG: 1 INJECTION, SOLUTION INTRAMUSCULAR; INTRAVENOUS; SUBCUTANEOUS at 09:07

## 2017-10-20 RX ADMIN — MORPHINE SULFATE 2 MG: 2 INJECTION, SOLUTION INTRAMUSCULAR; INTRAVENOUS at 16:48

## 2017-10-20 RX ADMIN — METOCLOPRAMIDE 5 MG: 5 INJECTION, SOLUTION INTRAMUSCULAR; INTRAVENOUS at 09:05

## 2017-10-20 RX ADMIN — MIDAZOLAM HYDROCHLORIDE 2 MG: 1 INJECTION, SOLUTION INTRAMUSCULAR; INTRAVENOUS at 11:22

## 2017-10-20 RX ADMIN — SODIUM CHLORIDE, SODIUM LACTATE, POTASSIUM CHLORIDE, AND CALCIUM CHLORIDE 150 ML/HR: 600; 310; 30; 20 INJECTION, SOLUTION INTRAVENOUS at 11:54

## 2017-10-20 RX ADMIN — TOPICAL ANESTHETIC 200 SPRAY: 200 SPRAY DENTAL; PERIODONTAL at 11:22

## 2017-10-20 RX ADMIN — IOPAMIDOL 100 ML: 612 INJECTION, SOLUTION INTRAVENOUS at 10:42

## 2017-10-20 NOTE — IP AVS SNAPSHOT
[address] Patient: Aundrea Cabrera MRN: [id number] :1945 About your hospitalization You were admitted on:  2017 You last received care in the:  [address] [place] [address],[address] You were discharged on:  2017 Why you were hospitalized Your primary diagnosis was:  Not on File Your diagnoses also included:  Ileus (Hcc) Things You Need To Do (next 8 weeks) Follow up with Rain Davenport MD in 2 week(s)  
for follow up Phone:  [phone] Where:  [address] [facility], [address] [facility], [address] [address]  Follow up with Claudette Cash MD  
2pm-this is the soonest appointment available Phone:  [phone] Where:  [address], Kwesi Vazquez [address] Discharge Orders None A check ramone indicates which time of day the medication should be taken. My Medications TAKE these medications as instructed Instructions Each Dose to Equal  
 Morning Noon Evening Bedtime  
 amLODIPine 10 mg tablet Commonly known as:  Arlyss Mell Your last dose was: Your next dose is: Take 1 Tab by mouth daily. 10 mg  
    
   
   
   
  
 aspirin delayed-release 81 mg tablet Your last dose was: Your next dose is: Take 1 Tab by mouth daily. 81 mg  
    
   
   
   
  
 hydroCHLOROthiazide 25 mg tablet Commonly known as:  HYDRODIURIL Your last dose was: Your next dose is: Take 25 mg by mouth daily. 25 mg  
    
   
   
   
  
 oxyCODONE-acetaminophen 5-325 mg per tablet Commonly known as:  PERCOCET Your last dose was: Your next dose is: Take 1 Tab by mouth every four (4) hours as needed. Max Daily Amount: 6 Tabs. 1 Tab VITAMIN D3 1,000 unit tablet Generic drug:  cholecalciferol Your last dose was: Your next dose is: Take  by mouth daily. Where to Get Your Medications Information on where to get these meds will be given to you by the nurse or doctor. ! Ask your nurse or doctor about these medications  
  amLODIPine 10 mg tablet  
 oxyCODONE-acetaminophen 5-325 mg per tablet Discharge Instructions DISCHARGE SUMMARY from Nurse PATIENT INSTRUCTIONS: 
 
 
F-face looks uneven A-arms unable to move or move unevenly S-speech slurred or non-existent T-time-call 911 as soon as signs and symptoms begin-DO NOT go Back to bed or wait to see if you get better-TIME IS BRAIN. Warning Signs of HEART ATTACK Call 911 if you have these symptoms: 
? Chest discomfort. Most heart attacks involve discomfort in the center of the chest that lasts more than a few minutes, or that goes away and comes back. It can feel like uncomfortable pressure, squeezing, fullness, or pain. ? Discomfort in other areas of the upper body. Symptoms can include pain or discomfort in one or both arms, the back, neck, jaw, or stomach. ? Shortness of breath with or without chest discomfort. ? Other signs may include breaking out in a cold sweat, nausea, or lightheadedness. Don't wait more than five minutes to call [address]! Fast action can save your life. Calling 911 is almost always the fastest way to get lifesaving treatment.  Emergency Medical Services staff can begin treatment when they arrive  up to an hour sooner than if someone gets to the hospital by car. The discharge information has been reviewed with the patient. The patient verbalized understanding. Discharge medications reviewed with the patient and appropriate educational materials and side effects teaching were provided. Patient armband removed and shredded. ___________________________________________________________________________________________________________________________________ Instructions Following Surgery Patient: Shravan Freitas MRN: [id number]  SSN: [id number] YOB: 1945  Age: 81 y.o. Sex: male Activity · As tolerated, walking encourage, stairs are okay · Avoid strenuous activities - no lifting anything heavier than 15 pounds. · You may shower at home Diet · Regular diet Pain · Take pain medication as directed by your doctor ·  Call your doctor if pain is NOT relieved by medication Call your doctor if 
· Excessive bleeding that does not stop after holding mild pressure over the area · Temperature of 101 degrees F or above · Redness,excessive swelling or bruising, and/or green or yellow, smelly discharge from incision · If nausea and vomiting continues Follow-Up Phone Calls · Call the office at [phone] to make your follow-up appointment Appointment date/time: 2 weeks after the surgery. Dr. Steve Samuels cell phone number is [phone]. Please call me if you have any concerns or questions. Introducing [facility]! [employer] introduces zulily patient portal. Now you can access parts of your medical record, email your doctor's office, and request medication refills online. 1. In your internet browser, go to [email or web address] 2. Click on the First Time User? Click Here link in the Sign In box. You will see the New Member Sign Up page. 3. Enter your [employer] Access Code exactly as it appears below. You will not need to use this code after youve completed the sign-up process. If you do not sign up before the expiration date, you must request a new code. · [employer] Access Code: [email or web address] Expires: 1/24/2018 10:56 AM 
 
4. Enter the last four digits of your Social Security Number (xxxx) and Date of Birth (mm/dd/yyyy) as indicated and click Submit. You will be taken to the next sign-up page. 5. Create a [employer] ID. This will be your [employer] login ID and cannot be changed, so think of one that is secure and easy to remember. 6. Create a [employer] password. You can change your password at any time. 7. Enter your Password Reset Question and Answer. This can be used at a later time if you forget your password. 8. Enter your e-mail address. You will receive e-mail notification when new information is available in [address] 9. Click Sign Up. You can now view and download portions of your medical record. 10. Click the Download Summary menu link to download a portable copy of your medical information. If you have questions, please visit the Frequently Asked Questions section of the [employer] website. Remember, [employer] is NOT to be used for urgent needs. For medical emergencies, dial 911. Now available from your iPhone and Android! Providers Seen During Your Hospitalization Provider Specialty Primary office phone Crys Fairchild MD Emergency Medicine [phone] Fortino Gale MD Internal Medicine [phone] Robin Naylor MD Internal Medicine [phone] Immunizations Administered for This Admission Name Date Influenza Vaccine (Quad) PF  Deferred () Your Primary Care Physician (PCP) Primary Care Physician Office Phone Office Fax [address], [phone] You are allergic to the following No active allergies Recent Documentation Height Weight BMI Smoking Status 1.74 m 54.4 kg 17.98 kg/m2 Current Every Day Smoker Emergency Contacts Name Discharge Info Relation Home Work Mobile Nicole Gonzalez DISCHARGE CAREGIVER [3] Spouse [3]   [phone] Patient Belongings The following personal items are in your possession at time of discharge: 
  Dental Appliances: Lowers, Uppers  Visual Aid: Glasses, With patient      Home Medications: None   Jewelry: None  Clothing: Undergarments, Shirt, Pants, Footwear    Other Valuables: None Discharge Instructions Attachments/References ABDOMINAL HERNIA REPAIR: POST-OP (ENGLISH) OXYCODONE/ACETAMINOPHEN (BY MOUTH) (ENGLISH) Patient Handouts Abdominal Hernia Repair: What to Expect at Home Your Recovery After surgery to repair your hernia, you are likely to have pain for a few days. You may also feel like you have the flu, and you may have a low fever and feel tired and nauseated. This is common. You should feel better after a few days and will probably feel much better in 7 days. For several weeks you may feel twinges or pulling in the hernia repair when you move. You may have some bruising around the area of your hernia repair. This is normal. 
This care sheet gives you a general idea about how long it will take for you to recover. But each person recovers at a different pace. Follow the steps below to get better as quickly as possible. How can you care for yourself at home? Activity · Rest when you feel tired. Getting enough sleep will help you recover. · Try to walk each day. Start by walking a little more than you did the day before. Bit by bit, increase the amount you walk. Walking boosts blood flow and helps prevent pneumonia and constipation. · If your doctor gives you an abdominal binder to wear, use it as directed.  This is an elastic bandage that wraps around your belly and upper hips. It helps support your belly muscles after surgery. · Avoid strenuous activities, such as biking, jogging, weight lifting, or aerobic exercise, until your doctor says it is okay. · Avoid lifting anything that would make you strain. This may include heavy grocery bags and milk containers, a heavy briefcase or backpack, cat litter or dog food bags, a vacuum , or a child. · Ask your doctor when you can drive again. · Most people are able to return to work within 1 to 2 weeks after surgery. But if your job requires that you to do heavy lifting or strenuous activity, you may need to take 4 to 6 weeks off from work. · You may shower 24 to 48 hours after surgery, if your doctor okays it. Pat the cut (incision) dry. Do not take a bath for the first 2 weeks, or until your doctor tells you it is okay. · Ask your doctor when it is okay for you to have sex. Diet · You can eat your normal diet. If your stomach is upset, try bland, low-fat foods like plain rice, broiled chicken, toast, and yogurt. · Drink plenty of fluids (unless your doctor tells you not to). · You may notice that your bowel movements are not regular right after your surgery. This is common. Avoid constipation and straining with bowel movements. You may want to take a fiber supplement every day. If you have not had a bowel movement after a couple of days, ask your doctor about taking a mild laxative. Medicines · Your doctor will tell you if and when you can restart your medicines. He or she will also give you instructions about taking any new medicines. · If you take blood thinners, such as warfarin (Coumadin), clopidogrel (Plavix), or aspirin, be sure to talk to your doctor. He or she will tell you if and when to start taking those medicines again. Make sure that you understand exactly what your doctor wants you to do. · Be safe with medicines. Take pain medicines exactly as directed. ¨ If the doctor gave you a prescription medicine for pain, take it as prescribed. ¨ If you are not taking a prescription pain medicine, ask your doctor if you can take an over-the-counter medicine. · If your doctor prescribed antibiotics, take them as directed. Do not stop taking them just because you feel better. You need to take the full course of antibiotics. · If you think your pain medicine is making you sick to your stomach: 
¨ Take your medicine after meals (unless your doctor has told you not to). ¨ Ask your doctor for a different pain medicine. Incision care · If you have strips of tape on the cut (incision) the doctor made, leave the tape on for a week or until it falls off. Or follow your doctor's instructions for removing the tape. · If you have staples closing the cut, you will need to visit your doctor in 1 to 2 weeks to have them removed. · Wash the area daily with warm, soapy water, and pat it dry. Don't use hydrogen peroxide or alcohol, which can slow healing. You may cover the area with a gauze bandage if it weeps or rubs against clothing. Change the bandage every day. Other instructions · Hold a pillow over your incision when you cough or take deep breaths. This will support your belly and decrease your pain. · Do breathing exercises at home as instructed by your doctor. This will help prevent pneumonia. · If you had laparoscopic surgery, you may also have pain in your left shoulder. The pain usually lasts about a day or two. Follow-up care is a key part of your treatment and safety. Be sure to make and go to all appointments, and call your doctor if you are having problems. It's also a good idea to know your test results and keep a list of the medicines you take. When should you call for help? Call 911 anytime you think you may need emergency care. For example, call if: 
· You passed out (lost consciousness).  
· You have sudden chest pain and shortness of breath, or you cough up blood. 
· You have severe pain in your belly. Call your doctor now or seek immediate medical care if: 
· You are sick to your stomach and cannot keep fluids down. · You have signs of a blood clot, such as: 
¨ Pain in your calf, back of the knee, thigh, or groin. ¨ Redness and swelling in your leg or groin. · You have signs of infection, such as: 
¨ Increased pain, swelling, warmth, or redness. ¨ Red streaks leading from the incision. ¨ Pus draining from the incision. ¨ A fever. · You have trouble passing urine or stool, especially if you have mild pain or swelling in your lower belly. · Bright red blood has soaked through the bandage over your incision. Watch closely for changes in your health, and be sure to contact your doctor if: 
· Your swelling is getting worse. · Your swelling is not going down. · You still don't have a bowel movement after taking a laxative. Where can you learn more? Go to http://vale-peng.info/. Enter B577 in the search box to learn more about \"Abdominal Hernia Repair: What to Expect at Home. \" Current as of: August 9, 2016 Content Version: 11.3 © 4445-7950 [employer]. Care instructions adapted under license by [employer] (which disclaims liability or warranty for this information). If you have questions about a medical condition or this instruction, always ask your healthcare professional. [place] [address] any warranty or liability for your use of this information. Oxycodone/Acetaminophen (By mouth) Acetaminophen (r-akjw-l-MIN-oh-fen), Oxycodone Hydrochloride (cs-j-SPT-done ashley-droe-KLOR-chris) Treats moderate to moderately severe pain. This medicine is a narcotic pain reliever. Brand Name(s): Endocet, Percocet, Primlev, Xartemis XR There may be other brand names for this medicine. When This Medicine Should Not Be Used: This medicine is not right for everyone.  Do not use it if you had an allergic reaction to acetaminophen or oxycodone, or if you have serious breathing problems or paralytic ileus. How to Use This Medicine:  
Capsule, Liquid, Tablet, Long Acting Tablet · Your doctor will tell you how much medicine to use. Do not use more than directed. · An overdose can be dangerous. Follow directions carefully so you do not get too much medicine at one time. · Oral liquid: Measure the oral liquid medicine with a marked measuring spoon, oral syringe, or medicine cup. · Swallow the extended-release tablet whole. Do not crush, break, or chew it. Do not lick or wet the tablet before placing it in your mouth. Do not give this medicine through a feeding tube. · This medicine should come with a Medication Guide. Ask your pharmacist for a copy if you do not have one. · Missed dose: If you miss a dose of this medicine, skip the missed dose and go back to your regular dosing schedule. Do not double doses. · Store the medicine in a closed container at room temperature, away from heat, moisture, and direct light. Ask your pharmacist about the best way to dispose of medicine you do not use. Drugs and Foods to Avoid: Ask your doctor or pharmacist before using any other medicine, including over-the-counter medicines, vitamins, and herbal products. · Do not use Xartemis XR if you are using or have used an MAO inhibitor in the past 14 days. · Some medicines can affect how this medicine works. Tell your doctor if you are using any of the following: ¨ Carbamazepine, erythromycin, ketoconazole, lamotrigine, mirtazapine, naltrexone, phenytoin, propranolol, rifampin, ritonavir, tramadol, trazodone, or zidovudine ¨ Birth control pills ¨ Diuretic (water pill) ¨ Medicine to treat depression ¨ Phenothiazine medicine ¨ Triptan medicine to treat migraine headaches · Do not drink alcohol while you are using this medicine. Acetaminophen can damage your liver, and alcohol can increase this risk.  Do not take acetaminophen without asking your doctor if you have 3 or more drinks of alcohol every day. · Tell your doctor if you use anything else that makes you sleepy. Some examples are allergy medicine, narcotic pain medicine, and alcohol. Tell your doctor if you are using buprenorphine, butorphanol, nalbuphine, pentazocine, a benzodiazepine, or a muscle relaxer. Warnings While Using This Medicine: · Tell your doctor if you are pregnant or breastfeeding, or if you have kidney disease, liver disease, heart disease, low blood pressure, breathing problems or lung disease (such as asthma, COPD), thyroid problems, L[place] disease, pancreas or gallbladder problems, prostate problems, trouble urinating, or a stomach problems, or a history of head injury or brain damage, seizures, or alcohol or drug abuse. Tell your doctor if you are allergic to codeine. · This medicine may cause the following problems: 
¨ High risk of overdose, which can lead to death ¨ Respiratory depression (serious breathing problem that can be life-threatening) ¨ Liver problems ¨ Serious skin reactions ¨ Serotonin syndrome (when used with certain medicines) · This medicine may make you dizzy or drowsy. Do not drive or do anything that could be dangerous until you know how this medicine affects you. Sit or lie down if you feel dizzy. Stand up carefully. · This medicine contains acetaminophen. Read the labels of all other medicines you are using to see if they also contain acetaminophen, or ask your doctor or pharmacist. Stacy Calera not use more than 4 grams (4,000 milligrams) total of acetaminophen in one day. · This medicine can be habit-forming. Do not use more than your prescribed dose. Call your doctor if you think your medicine is not working. · Do not stop using this medicine suddenly. Your doctor will need to slowly decrease your dose before you stop it completely. · This medicine could cause infertility.  Talk with your doctor before using this medicine if you plan to have children. · This medicine may cause constipation, especially with long-term use. Ask your doctor if you should use a laxative to prevent and treat constipation. · Keep all medicine out of the reach of children. Never share your medicine with anyone. Possible Side Effects While Using This Medicine:  
Call your doctor right away if you notice any of these side effects: · Allergic reaction: Itching or hives, swelling in your face or hands, swelling or tingling in your mouth or throat, chest tightness, trouble breathing · Anxiety, restlessness, fast heartbeat, fever, muscle spasms, twitching, diarrhea, seeing or hearing things that are not there · Blistering, peeling, red skin rash · Blue lips, fingernails, or skin · Dark urine or pale stools, loss of appetite, stomach pain, yellow skin or eyes · Extreme weakness, shallow breathing, uneven heartbeat, seizures, sweating, or cold or clammy skin · Severe confusion, lightheadedness, dizziness, or fainting · Severe constipation, nausea, or vomiting · Trouble breathing or slow breathing If you notice these less serious side effects, talk with your doctor:  
· Headache · Mild constipation, nausea, or vomiting · Mild sleepiness or drowsiness If you notice other side effects that you think are caused by this medicine, tell your doctor. Call your doctor for medical advice about side effects. You may report side effects to FDA at 0-822-FDA-3088 © 2017 [address] Information is for End User's use only and may not be sold, redistributed or otherwise used for commercial purposes. The above information is an  only. It is not intended as medical advice for individual conditions or treatments. Talk to your doctor, nurse or pharmacist before following any medical regimen to see if it is safe and effective for you. Please provide this summary of care documentation to your next provider. Signatures-by signing, you are acknowledging that this After Visit Summary has been reviewed with you and you have received a copy. Patient Signature:  ____________________________________________________________ Date:  ____________________________________________________________  
  
Negro Pasquale Provider Signature:  ____________________________________________________________ Date:  ____________________________________________________________

## 2017-10-20 NOTE — H&P
History and Physical    Patient: Kika Martinez               Sex: male          DOA: 10/20/2017       YOB: 1945      Age:  81 y.o.        LOS:  LOS: 0 days        HPI:     Kika Martinez is a 81 y.o. male who was admitted to the hospital because of a small bowel obstruction . The pt has a l;kirti h/o SBO . He has  gone to [facility] this year and had surgery for his SBO and then he went home . He has done well up until   A few days ago when he began having abdominal pain and then he decided to come to [facility] . The pt apparently has had multiple surgeries on his abdomen  In the past . Also in the past when he was admitted to [facility] he would pull out his ng tube and go ama. the pt was seen in the er today and the ct scan of the abdomen showed findings consistent  With recurrent perianastomotic  high grade  distal small bowel obstruction . lower right abdomen -upper pelvis probably from local adhesions. he is s/p prior laparotomy  and ileocecal region surgery  including appendectomy  with residual chain surgery. X ray of the abdomen shows abnormal bowel gas pattern  consistent with small bowel obstruction. The pt's wbc was 17.2  The globulin was high at 4. 6.his last mg was 1.6 ekg shows nsr . Pt has a h/o htn . Past Medical History:   Diagnosis Date    Hypertension        Social History:   Tobacco use:smokes daily -5 or 6 cigs daily    Alcohol use:3 cans beer daily    Occupation:none    Family History:mother had heart disease and father  of cancer       Review of Systems    Constitutional:  No fever or weight loss  HEENT:  No headache or visual changes  Cardiovascular:  No chest pain or diaphoresis  Respiratory:  No coughing, wheezing, or shortness of breath.   GI:  Pt had abdominal pain and nausea  :  No hematuria or dysuria  Skin:  No rashes or moles  Neuro:  No seizures or syncope  Hematological:  No bruising or bleeding  Endocrine:  No diabetes or thyroid disease    Physical Exam:      Visit Vitals    BP (!) 169/94 (BP 1 Location: Left arm, BP Patient Position: At rest)    Pulse 97    Temp 98.4 °F (36.9 °C)    Resp 14    Ht 5' 8.5\" (1.74 m)    Wt 54.4 kg (120 lb)    SpO2 93%    BMI 17.98 kg/m2       Physical Exam:    Gen:  Pt has an ng tube in place   HEENT:  Normal cephalic atraumatic, extra-occular movements are intact. Neck:  Supple, No JVD  Lungs:  Clear bilaterally, no wheeze, no rales,   Heart:  Regular Rate and Rhythm, normal S1 and S2, no edema  Abdomen: soft and bowel sounds are diminished . Mildly tender to palpation  . Extremities:  Well perfused, no cyanosis or edema  Neurological:  Awake and alert, CN's are intact, normal strength throughout extremities  Skin:  No rashes or moles  Mental Status:  Normal thought process,   Laboratory Studies:    CMP:   Lab Results   Component Value Date/Time     10/20/2017 08:55 AM    K 3.9 10/20/2017 08:55 AM    CL 93 (L) 10/20/2017 08:55 AM    CO2 31 10/20/2017 08:55 AM    AGAP 14 10/20/2017 08:55 AM     (H) 10/20/2017 08:55 AM    BUN 8 10/20/2017 08:55 AM    CREA 1.04 10/20/2017 08:55 AM    GFRAA >60 10/20/2017 08:55 AM    GFRNA >60 10/20/2017 08:55 AM    CA 9.8 10/20/2017 08:55 AM    ALB 4.7 10/20/2017 08:55 AM    TP 9.3 (H) 10/20/2017 08:55 AM    GLOB 4.6 (H) 10/20/2017 08:55 AM    AGRAT 1.0 10/20/2017 08:55 AM    SGOT 16 10/20/2017 08:55 AM    ALT 16 10/20/2017 08:55 AM     CBC:   Lab Results   Component Value Date/Time    WBC 17.2 (H) 10/20/2017 08:55 AM    HGB 15.9 10/20/2017 08:55 AM    HCT 45.9 10/20/2017 08:55 AM     10/20/2017 08:55 AM       Assessment/Plan     Active Problems:  Small bowel obstruction - acuter . H/o multiple surgeries for small bowel obstructions . htn   H/o cigarette smoking   S/p appendectomy      PLAN:keep the pt npo . Correct the electrolytes as necessary .  Help of surgery appreciated

## 2017-10-20 NOTE — ED NOTES
Pt vomited multiple times when attempting to change into gown. Vomit was approx 500mL of green colored sour smelling liquid. Pt stated \"I feel better\" after vomiting. Pt changed into gown and placed on monitor. PIV inserted. Medications administered per MAR.

## 2017-10-20 NOTE — ED TRIAGE NOTES
Onset of abdominal pain x 2 days, states it started in his R flank and radiates to abdomen, onset of N/V last night

## 2017-10-20 NOTE — ED NOTES
Dr. Marilyn Adam at bedside.  NG tube reviewed, attach to low intermittent suction per verbal order/

## 2017-10-20 NOTE — ED NOTES
Pt currently refusing NG tube. Pt educated on need and purpose of treatment. Pt agrees to discuss again after CT result received. MD garcia.

## 2017-10-20 NOTE — ACP (ADVANCE CARE PLANNING)
Patient has designated ____________ex-wife___________ to participate in his/her discharge plan and to receive any needed information.      Name: Joseph Penn  Address:  Phone number: [phone]

## 2017-10-20 NOTE — ROUTINE PROCESS
1420  Patient arrived from ER via stretcher. Pt able to ambulate steadily from stretcher to bed. Pt NGT placed to LIWS. Admission nurse in room with pt. VSS, no c/o pain. Pt NPO and on room air. Call bell within reach.

## 2017-10-20 NOTE — CONSULTS
General Surgery Consult      Doug Friday  Admit date: 10/20/2017    MRN: [id number]     : 1945     Age: 81 y.o. Attending Physician: Randolph Lam MD, FACS      Subjective:     Doug Friday is a 81 y.o. male who presented with abdominal pain and a picture of bowel obstruction. The pain is described as dull and is 3/10 in intensity. Starts in periumbilical, radiates to Non-radiating. Onset was a few days ago. Symptoms have been unchanged since onset. The patient also gives a history of nausea and vomiting. Patient had multiple abdominal surgeries in the past last one was this year at [facility] for bowel obstruction. Consultation was requested for assistance with evaluation of SBO. A CT scan of abdomen and pelvis showed SBO.     Patient Active Problem List    Diagnosis Date Noted    Ileus ([address] 10/20/2017    Small bowel obstruction 2017    SBO (small bowel obstruction) 2017    HTN (hypertension) 2017    URI (upper respiratory infection) 2017     Past Medical History:   Diagnosis Date    Hypertension       Past Surgical History:   Procedure Laterality Date    ABDOMEN SURGERY PROC UNLISTED      APPENDECTOMY      HX APPENDECTOMY      HX COLONOSCOPY  2015    Repeat colonoscopy in 5 yrs per Dr. Emily Kelly History   Substance Use Topics    Smoking status: Current Every Day Smoker     Packs/day: 0.50     Years: 50.00     Types: Cigarettes    Smokeless tobacco: Never Used    Alcohol use Yes      Comment: 2 beers aday      History   Smoking Status    Current Every Day Smoker    Packs/day: 0.50    Years: 50.00    Types: Cigarettes   Smokeless Tobacco    Never Used     Family History   Problem Relation Age of Onset    Heart Disease Mother     Cancer Father     Cancer Sister     Diabetes Sister       Current Facility-Administered Medications   Medication Dose Route Frequency    lidocaine (XYLOCAINE) 2 % jelly   Mucous Membrane PRN    lactated Ringers infusion  150 mL/hr IntraVENous CONTINUOUS    0.9% sodium chloride infusion  75 mL/hr IntraVENous CONTINUOUS    ondansetron (ZOFRAN) injection 4 mg  4 mg IntraVENous Q6H PRN     Current Outpatient Prescriptions   Medication Sig    aspirin delayed-release 81 mg tablet Take 1 Tab by mouth daily.  hydrochlorothiazide (HYDRODIURIL) 25 mg tablet Take 25 mg by mouth daily.  cholecalciferol (VITAMIN D3) 1,000 unit tablet Take  by mouth daily. No Known Allergies     Review of Systems:  Constitutional: negative  Eyes: negative  Ears, Nose, Mouth, Throat, and Face: negative  Respiratory: negative  Cardiovascular: negative  Gastrointestinal: positive for nausea, vomiting and abdominal pain  Genitourinary:negative  Integument/Breast: negative  Hematologic/Lymphatic: negative  Musculoskeletal:negative  Neurological: negative  Behavioral/Psychiatric: negative  Endocrine: negative  Allergic/Immunologic: negative      Objective:     Visit Vitals    /83    Pulse 99    Temp 98.6 °F (37 °C)    Resp 13    Ht 5' 8.5\" (1.74 m)    Wt 54.4 kg (120 lb)    SpO2 94%    BMI 17.98 kg/m2       Physical Exam:      General:  in no apparent distress   Eyes:  conjunctivae and sclerae normal, pupils equal, round, reactive to light   Throat & Neck: no erythema or exudates noted and neck supple and symmetrical; no palpable masses   Lungs:   clear to auscultation bilaterally   Heart:  Regular rate and rhythm   Abdomen:   flat, soft, nontender, nondistended, no masses or organomegaly. NO abdominal wall hernias.     Extremities: extremities normal, atraumatic, no cyanosis or edema   Skin: Normal.         Imaging and Lab Review:     CBC:   Lab Results   Component Value Date/Time    WBC 17.2 10/20/2017 08:55 AM    RBC 5.33 10/20/2017 08:55 AM    HGB 15.9 10/20/2017 08:55 AM    HCT 45.9 10/20/2017 08:55 AM    PLATELET 708 51/02/6610 08:55 AM     BMP:   Lab Results   Component Value Date/Time    Glucose 128 10/20/2017 08:55 AM    Sodium 138 10/20/2017 08:55 AM    Potassium 3.9 10/20/2017 08:55 AM    Chloride 93 10/20/2017 08:55 AM    CO2 31 10/20/2017 08:55 AM    BUN 8 10/20/2017 08:55 AM    Creatinine 1.04 10/20/2017 08:55 AM    Calcium 9.8 10/20/2017 08:55 AM     CMP:  Lab Results   Component Value Date/Time    Glucose 128 10/20/2017 08:55 AM    Sodium 138 10/20/2017 08:55 AM    Potassium 3.9 10/20/2017 08:55 AM    Chloride 93 10/20/2017 08:55 AM    CO2 31 10/20/2017 08:55 AM    BUN 8 10/20/2017 08:55 AM    Creatinine 1.04 10/20/2017 08:55 AM    Calcium 9.8 10/20/2017 08:55 AM    Anion gap 14 10/20/2017 08:55 AM    BUN/Creatinine ratio 8 10/20/2017 08:55 AM    Alk. phosphatase 102 10/20/2017 08:55 AM    Protein, total 9.3 10/20/2017 08:55 AM    Albumin 4.7 10/20/2017 08:55 AM    Globulin 4.6 10/20/2017 08:55 AM    A-G Ratio 1.0 10/20/2017 08:55 AM       Recent Results (from the past 24 hour(s))   CBC WITH AUTOMATED DIFF    Collection Time: 10/20/17  8:55 AM   Result Value Ref Range    WBC 17.2 (H) 4.6 - 13.2 K/uL    RBC 5.33 4.70 - 5.50 M/uL    HGB 15.9 13.0 - 16.0 g/dL    HCT 45.9 36.0 - 48.0 %    MCV 86.1 74.0 - 97.0 FL    MCH 29.8 24.0 - 34.0 PG    MCHC 34.6 31.0 - 37.0 g/dL    RDW 18.0 (H) 11.6 - 14.5 %    PLATELET 713 877 - 520 K/uL    MPV 9.3 9.2 - 11.8 FL    NEUTROPHILS 91 (H) 40 - 73 %    LYMPHOCYTES 4 (L) 21 - 52 %    MONOCYTES 5 3 - 10 %    EOSINOPHILS 0 0 - 5 %    BASOPHILS 0 0 - 2 %    ABS. NEUTROPHILS 15.7 (H) 1.8 - 8.0 K/UL    ABS. LYMPHOCYTES 0.7 (L) 0.9 - 3.6 K/UL    ABS. MONOCYTES 0.8 0.05 - 1.2 K/UL    ABS. EOSINOPHILS 0.0 0.0 - 0.4 K/UL    ABS. BASOPHILS 0.0 0.0 - 0.06 K/UL    DF AUTOMATED     HEPATIC FUNCTION PANEL    Collection Time: 10/20/17  8:55 AM   Result Value Ref Range    Protein, total 9.3 (H) 6.4 - 8.2 g/dL    Albumin 4.7 3.4 - 5.0 g/dL    Globulin 4.6 (H) 2.0 - 4.0 g/dL    A-G Ratio 1.0 0.8 - 1.7      Bilirubin, total 0.6 0.2 - 1.0 MG/DL    Bilirubin, direct 0.1 0.0 - 0.2 MG/DL    Alk. phosphatase 102 45 - 117 U/L    AST (SGOT) 16 15 - 37 U/L    ALT (SGPT) 16 16 - 61 U/L   LIPASE    Collection Time: 10/20/17  8:55 AM   Result Value Ref Range    Lipase 262 73 - 535 U/L   METABOLIC PANEL, BASIC    Collection Time: 10/20/17  8:55 AM   Result Value Ref Range    Sodium 138 136 - 145 mmol/L    Potassium 3.9 3.5 - 5.5 mmol/L    Chloride 93 (L) 100 - 108 mmol/L    CO2 31 21 - 32 mmol/L    Anion gap 14 3.0 - 18 mmol/L    Glucose 128 (H) 74 - 99 mg/dL    BUN 8 7.0 - 18 MG/DL    Creatinine 1.04 0.6 - 1.3 MG/DL    BUN/Creatinine ratio 8 (L) 12 - 20      GFR est AA >60 >60 ml/min/1.73m2    GFR est non-AA >60 >60 ml/min/1.73m2    Calcium 9.8 8.5 - 10.1 MG/DL   POC CHEM8    Collection Time: 10/20/17  9:31 AM   Result Value Ref Range    CO2, POC 34 (H) 19 - 24 MMOL/L    Glucose,  (H) 74 - 106 MG/DL    BUN, POC 7 7 - 18 MG/DL    Creatinine, POC 0.8 0.6 - 1.3 MG/DL    GFRAA, POC >60 >60 ml/min/1.73m2    GFRNA, POC >60 >60 ml/min/1.73m2    Sodium,  136 - 145 MMOL/L    Potassium, POC 3.9 3.5 - 5.5 MMOL/L    Calcium, ionized (POC) 1.12 1.12 - 1.32 MMOL/L    Chloride, POC 94 (L) 100 - 108 MMOL/L    Anion gap, POC 17 10 - 20      Hematocrit, POC 47 36 - 49 %    Hemoglobin, POC 16.0 12 - 16 G/DL   URINALYSIS W/ RFLX MICROSCOPIC    Collection Time: 10/20/17 11:10 AM   Result Value Ref Range    Color YELLOW      Appearance CLEAR      Specific gravity >1.030 (H) 1.005 - 1.030    pH (UA) 5.5 5.0 - 8.0      Protein NEGATIVE  NEG mg/dL    Glucose NEGATIVE  NEG mg/dL    Ketone TRACE (A) NEG mg/dL    Bilirubin NEGATIVE  NEG      Blood NEGATIVE  NEG      Urobilinogen 1.0 0.2 - 1.0 EU/dL    Nitrites NEGATIVE  NEG      Leukocyte Esterase NEGATIVE  NEG         images and reports reviewed    Assessment:   Shravan Freitas is a 81 y.o. male is presenting with abdominal pain and a picture of bowel obstruction. Though his abdomen is soft, but his WBC is high and has clear obstruction on CT scan.  We should not wait corcoran than 1 to 2 days of observation but the patient is refusing any surgeries. He is refusing NG tube and after we placed the tube he said that he will remove it. Plan:     NPO  IVF for hydration  Correction of any electrolytes abnormalities especially low Potassium and Magnesium  NG tube placement for decompression to low intermittent suction  Daily labs including CBC and CMP  DVT prophylaxis  Ambulation as tolerated  Close observation and possible need for surgeries.      Please call me if you have any questions (cell phone: [phone])     Signed By: Minal Monet MD     October 20, 2017

## 2017-10-20 NOTE — PROGRESS NOTES
[facility]   Discharge Planning/ Assessment    Reasons for Intervention: Chart reviewed and pt verified most of demographic sheet. He Has [place], and [employer]. And he says he has AARP coverage. He see's Dr Guardado at the [place]. He say he is not still , but  from his wife. He and his ex-wife remain close, she will drive and participate in dc process. He calls the [place] when he needs something, and they send people out. At home he says he has a BP machine, a walker , cane crutches, Portable toilet and a CPAP he does not use. His Plan is home.     High Risk Criteria  [] Yes  [x]No   Physician Referral  [] Yes  [x]No        Date    Nursing Referral  [] Yes  [x]No        Date    Patient/Family Request  [] Yes  [x]No        Date       Resources:    Medicare  [x] Yes  []No   Medicaid  [] Yes  [x]No   No Resources  [] Yes  [x]No   Private Insurance  [] Yes  [x]No    Name/Phone Number    Other  [x] Yes  []No        (i.e. Workman's Comp)         Prior Services:    Prior Services  [x] Yes  []No   Home Health  [x] Yes  []No   [address] [place]  [] Yes  [x]No        [facility] [address] Program  [] Yes  [x]No       Meals on Wheels  [] Yes  [x]No   Office on Aging  [] Yes  [x]No   Transportation Services  [] Yes  [x]No   Nursing Home  [] Yes  [x]No        Nursing Home Name    [address]  [] Yes  [x]No        [address] Name    Other       Information Source:      Information obtained from  [x] Patient  [] Parent   [] [address]   [] Child  [] Spouse   [] Significant Other/Partner   [] Friend      [] EMS    [] Nursing Home Chart          [] Other:   Chart Review  [x] Yes  []No     Family/Support System:    Patient lives with  [x] Alone    [] Spouse   [] Significant Other  [] Children  [] Caretaker   [] Parent  [] Sibling     [] Other       Other Support System:    Is the patient responsible for care of others  [] Yes  [x]No   Information of person caring for patient on  discharge    Managers financial affairs independently  [x] Yes  []No   If no, explain:      Status Prior to Admission:    Mental Status  [x] Awake  [] Alert  [] Oriented  [] Quiet/Calm [] Lethargic/Sedated   [] Disoriented  [] Restless/Anxious  [] Combative   Personal Care  [] Dependent  [x] [address]  [] Requires Assistance   Meal Preparation Ability  [x] Independent   [] Standby Assistance   [] Minimal Assistance   [] Moderate Assistance  [] Maximum Assistance     [] Total Assistance   Chores  [x] Independent with Chores   [] [address] [place] Resident   [] Requires Assistance   Bowel/Bladder  [x] Continent  [] Catheter  [] Incontinent  [] Ostomy Self-Care    [] Urine Diversion Self-Care  [] Maximum Assistance     [] Total Assistance   Number of Persons needed for assistance    DME at home  [] Kinga Bacca, Godfrey Grace  [] Kinga Bacca, Straight   [] Commode    [] Bathroom/Grab Bars  [] Hospital Bed  [] Nebulizer  [] Oxygen           [] Raised Toilet Seat  [] Shower Chair  [] Side Rails for Bed   [] Tub Transfer Bench   [] Radha Clipper  [] Lizzy Van, Trey      [] Other:   Vendor      Treatment Presently Receiving:    Current Treatments  [] Chemotherapy  [] Dialysis  [] Insulin  [] IVAB [x] IVF   [] O2  [] PCA   [] PT   [] RT   [] Tube Feedings   [] Wound Care     Psychosocial Evaluation:    Verbalized Knowledge of Disease Process  [] Patient  []Family   Coping with Disease Process  [] Patient  []Family   Requires Further Counseling Coping with Disease Process  [] Patient  []Family     Identified Projected Needs:    Home Health Aid  [] Yes  [x]No   Transportation  [] Yes  [x]No   Education  [] Yes  [x]No        Specific Education     Financial Counseling  [] Yes  [x]No   Inability to Care for Self/Will Require 24 hour care  [] Yes  [x]No   Pain Management  [] Yes  [x]No   Home Infusion Therapy  [] Yes  [x]No   Oxygen Therapy  [] Yes  [x]No   DME  [] Yes  [x]No   Long Term Care Placement  [] Yes  [x]No   Rehab  [] Yes  [x]No   Physical Therapy  [] Yes  [x]No   Needs Anticipated At This Time  [] Yes  [x]No     Intra-Hospital Referral:    [address]  [] Yes  [x]No     [] Yes  [x]No   Patient Representative  [] Yes  [x]No   Staff for Teaching Needs  [] Yes  [x]No   Specialty Teaching Needs     Diabetic Educator  [] Yes  [x]No   Referral for Diabetic Educator Needed  [] Yes  [x]No  If Yes, place order for Nutritionist or Diabetic Consult     Tentative Discharge Plan:    Home with No Services  [x] Yes  []No   Home with [address]  [] Yes  [x]No        If Yes, specify type    Home Care Program  [] Yes  [x]No        If Yes, specify type    Meals on Wheels  [] Yes  [x]No   Office of Aging  [] Yes  [x]No   NHP  [] Yes  [x]No   Return to the Nursing Home  [] Yes  [x]No   Rehab Therapy  [] Yes  [x]No   Acute Rehab  [] Yes  [x]No   Subacute Rehab  [] Yes  [x]No   Private Care  [] Yes  [x]No   Substance Abuse Referral  [] Yes  [x]No   Transportation  [] Yes  [x]No   Chore Service  [] Yes  [x]No   Inpatient Hospice  [] Yes  [x]No   OP RT  [] Yes  [x] No   OP Hemo  [] Yes  [x] No   OP PT  [] Yes  [x]No   Support Group  [] Yes  [x]No   Reach to Recovery  [] Yes  [x]No   OP Oncology Clinic  [] Yes  [x]No   Clinic Appointment  [] Yes  [x]No   DME  [] Yes  [x]No   Comments    Name of D/C Planner or  Given to Patient or Family Mady Wilson. April Garcia RN   Phone Number         [phone]   Date 10/20/17   Time    If you are discharged home, whom do you designate to participate in your discharge plan and receive any information needed?      Enter name of designee Ex-wife        Phone # of designee         Address of designee         Updated         Patient refused to designate any           individual

## 2017-10-20 NOTE — PROGRESS NOTES
Patient has signed the [facility] Transfer Preference Form and one copy to chart, one to pt and one faxed to the [place] at fax # [phone].

## 2017-10-20 NOTE — ED PROVIDER NOTES
HPI Comments: Rochelle Syed is a 81 y.o. Male with h/o recurrent sbo, mult admission with ama, had surgery in April at [address] with partial resection with c/o recurrent right sided abd pain that started 2 days ago that has gotten progressively worse with inability to keep any meds down. Dec urine output. Pain is sharp, constant worse with attempted po intake. No fever, cough, sob, cp. Last bm yesterday and has passed some gas. Has no issues since previous surgery    The history is provided by the spouse, the patient and medical records. Past Medical History:   Diagnosis Date    Hypertension        Past Surgical History:   Procedure Laterality Date    ABDOMEN SURGERY PROC UNLISTED      APPENDECTOMY      HX APPENDECTOMY      HX COLONOSCOPY  1/28/2015    Repeat colonoscopy in 5 yrs per Dr. Osmel Chavez         Family History:   Problem Relation Age of Onset    Heart Disease Mother     Cancer Father     Cancer Sister     Diabetes Sister        Social History     Social History    Marital status:      Spouse name: N/A    Number of children: N/A    Years of education: N/A     Occupational History    Not on file. Social History Main Topics    Smoking status: Current Every Day Smoker     Packs/day: 0.50     Years: 50.00     Types: Cigarettes    Smokeless tobacco: Never Used    Alcohol use Yes      Comment: 2 beers aday    Drug use: No    Sexual activity: Not on file     Other Topics Concern    Not on file     Social History Narrative         ALLERGIES: Review of patient's allergies indicates no known allergies. Review of Systems   Constitutional: Positive for appetite change. Negative for fever. HENT: Negative for sore throat. Eyes: Negative for visual disturbance. Respiratory: Negative for shortness of breath. Cardiovascular: Negative for chest pain. Gastrointestinal: Positive for abdominal pain. Endocrine: Negative for polyuria.    Genitourinary: Negative for difficulty urinating. Musculoskeletal: Negative for gait problem. Skin: Negative for rash. Allergic/Immunologic: Negative for immunocompromised state. Neurological: Positive for light-headedness. Psychiatric/Behavioral: Positive for sleep disturbance. Vitals:    10/20/17 1000 10/20/17 1003 10/20/17 1015 10/20/17 1130   BP: 165/83  151/80 144/83   Pulse: 84 100 96 99   Resp: 16 15 16 13   Temp:       SpO2:  98%  94%   Weight:       Height:                Physical Exam   Constitutional: He is oriented to person, place, and time. Non-toxic appearance. He does not appear ill. No distress. HENT:   Head: Normocephalic and atraumatic. Right Ear: External ear normal.   Left Ear: External ear normal.   Nose: Nose normal.   Mouth/Throat: Oropharynx is clear and moist. No oropharyngeal exudate. Eyes: Conjunctivae are normal.   Neck: Normal range of motion. Cardiovascular: Normal rate, regular rhythm, normal heart sounds and intact distal pulses. Pulmonary/Chest: Effort normal and breath sounds normal. No respiratory distress. Abdominal: Soft. Normal appearance. He exhibits no distension and no mass. There is tenderness. There is rebound and guarding. Musculoskeletal: Normal range of motion. He exhibits no edema. Neurological: He is alert and oriented to person, place, and time. Skin: Skin is warm and dry. He is not diaphoretic. Psychiatric: His behavior is normal.   Nursing note and vitals reviewed.        [facility]  ED Course       Procedures      Vitals:  Patient Vitals for the past 12 hrs:   Temp Pulse Resp BP SpO2   10/20/17 1130 - 99 13 144/83 94 %   10/20/17 1015 - 96 16 151/80 -   10/20/17 1003 - 100 15 - 98 %   10/20/17 1000 - 84 16 165/83 -   10/20/17 0930 - 72 10 143/66 -   10/20/17 0919 - 85 13 126/80 97 %   10/20/17 0900 - 90 - 157/86 100 %   10/20/17 0831 98.6 °F (37 °C) 95 16 (!) 145/105 100 %         Medications ordered:   Medications   lidocaine (XYLOCAINE) 2 % jelly (not administered) lactated Ringers infusion (not administered)   sodium chloride 0.9 % bolus infusion 2,000 mL (0 mL IntraVENous IV Completed 10/20/17 1002)   HYDROmorphone (PF) (DILAUDID) injection 0.5 mg (0.5 mg IntraVENous Given 10/20/17 0907)   metoclopramide HCl (REGLAN) injection 5 mg (5 mg IntraVENous Given 10/20/17 0905)   ondansetron (ZOFRAN) injection 4 mg (4 mg IntraVENous Given 10/20/17 0905)   benzocaine (HURRICAINE) 20 % spray (200 Sprays Topical Given 10/20/17 1122)   iopamidol (ISOVUE 300) 61 % contrast injection 100 mL (100 mL IntraVENous Given 10/20/17 1042)   midazolam (VERSED) injection 2 mg (2 mg IntraVENous Given 10/20/17 1122)         Lab findings:  Recent Results (from the past 12 hour(s))   CBC WITH AUTOMATED DIFF    Collection Time: 10/20/17  8:55 AM   Result Value Ref Range    WBC 17.2 (H) 4.6 - 13.2 K/uL    RBC 5.33 4.70 - 5.50 M/uL    HGB 15.9 13.0 - 16.0 g/dL    HCT 45.9 36.0 - 48.0 %    MCV 86.1 74.0 - 97.0 FL    MCH 29.8 24.0 - 34.0 PG    MCHC 34.6 31.0 - 37.0 g/dL    RDW 18.0 (H) 11.6 - 14.5 %    PLATELET 682 761 - 303 K/uL    MPV 9.3 9.2 - 11.8 FL    NEUTROPHILS 91 (H) 40 - 73 %    LYMPHOCYTES 4 (L) 21 - 52 %    MONOCYTES 5 3 - 10 %    EOSINOPHILS 0 0 - 5 %    BASOPHILS 0 0 - 2 %    ABS. NEUTROPHILS 15.7 (H) 1.8 - 8.0 K/UL    ABS. LYMPHOCYTES 0.7 (L) 0.9 - 3.6 K/UL    ABS. MONOCYTES 0.8 0.05 - 1.2 K/UL    ABS. EOSINOPHILS 0.0 0.0 - 0.4 K/UL    ABS. BASOPHILS 0.0 0.0 - 0.06 K/UL    DF AUTOMATED     HEPATIC FUNCTION PANEL    Collection Time: 10/20/17  8:55 AM   Result Value Ref Range    Protein, total 9.3 (H) 6.4 - 8.2 g/dL    Albumin 4.7 3.4 - 5.0 g/dL    Globulin 4.6 (H) 2.0 - 4.0 g/dL    A-G Ratio 1.0 0.8 - 1.7      Bilirubin, total 0.6 0.2 - 1.0 MG/DL    Bilirubin, direct 0.1 0.0 - 0.2 MG/DL    Alk.  phosphatase 102 45 - 117 U/L    AST (SGOT) 16 15 - 37 U/L    ALT (SGPT) 16 16 - 61 U/L   LIPASE    Collection Time: 10/20/17  8:55 AM   Result Value Ref Range    Lipase 262 73 - 445 U/L   METABOLIC PANEL, BASIC    Collection Time: 10/20/17  8:55 AM   Result Value Ref Range    Sodium 138 136 - 145 mmol/L    Potassium 3.9 3.5 - 5.5 mmol/L    Chloride 93 (L) 100 - 108 mmol/L    CO2 31 21 - 32 mmol/L    Anion gap 14 3.0 - 18 mmol/L    Glucose 128 (H) 74 - 99 mg/dL    BUN 8 7.0 - 18 MG/DL    Creatinine 1.04 0.6 - 1.3 MG/DL    BUN/Creatinine ratio 8 (L) 12 - 20      GFR est AA >60 >60 ml/min/1.73m2    GFR est non-AA >60 >60 ml/min/1.73m2    Calcium 9.8 8.5 - 10.1 MG/DL   POC CHEM8    Collection Time: 10/20/17  9:31 AM   Result Value Ref Range    CO2, POC 34 (H) 19 - 24 MMOL/L    Glucose,  (H) 74 - 106 MG/DL    BUN, POC 7 7 - 18 MG/DL    Creatinine, POC 0.8 0.6 - 1.3 MG/DL    GFRAA, POC >60 >60 ml/min/1.73m2    GFRNA, POC >60 >60 ml/min/1.73m2    Sodium,  136 - 145 MMOL/L    Potassium, POC 3.9 3.5 - 5.5 MMOL/L    Calcium, ionized (POC) 1.12 1.12 - 1.32 MMOL/L    Chloride, POC 94 (L) 100 - 108 MMOL/L    Anion gap, POC 17 10 - 20      Hematocrit, POC 47 36 - 49 %    Hemoglobin, POC 16.0 12 - 16 G/DL   URINALYSIS W/ RFLX MICROSCOPIC    Collection Time: 10/20/17 11:10 AM   Result Value Ref Range    Color YELLOW      Appearance CLEAR      Specific gravity >1.030 (H) 1.005 - 1.030    pH (UA) 5.5 5.0 - 8.0      Protein NEGATIVE  NEG mg/dL    Glucose NEGATIVE  NEG mg/dL    Ketone TRACE (A) NEG mg/dL    Bilirubin NEGATIVE  NEG      Blood NEGATIVE  NEG      Urobilinogen 1.0 0.2 - 1.0 EU/dL    Nitrites NEGATIVE  NEG      Leukocyte Esterase NEGATIVE  NEG         EKG interpretation by ED Physician:      X-Ray, CT or other radiology findings or impressions:  CT ABD PELV W CONT   Final Result      XR CHEST PORT   Final Result      XR ABD (KUB)    (Results Pending)   reviewed above results    Progress notes, Consult notes or additional Procedure notes:   D/w pt results need for ngt which he understands he needs but does not want to stay in long.  I d/w him that I cannot determine how long will have to stay in but if he takes it out too soon he will likely have the same problem over again. He understands he needs to be admitted. D/w Dr Puma Coreas on call for hospitalist who will admit  D/w Rob Pérez on call for surgery who will consult and saw pt in ER    Reevaluation of patient:   Stable for admission    Disposition:  Diagnosis:   1. Small bowel obstruction    2. Acute abdominal pain in right lower quadrant        Disposition: admit    Follow-up Information     None            Patient's Medications   Start Taking    No medications on file   Continue Taking    ASPIRIN DELAYED-RELEASE 81 MG TABLET    Take 1 Tab by mouth daily. CHOLECALCIFEROL (VITAMIN D3) 1,000 UNIT TABLET    Take  by mouth daily. HYDROCHLOROTHIAZIDE (HYDRODIURIL) 25 MG TABLET    Take 25 mg by mouth daily. These Medications have changed    No medications on file   Stop Taking    HYDROCODONE-ACETAMINOPHEN (NORCO) 5-325 MG PER TABLET    Take 1 Tab by mouth every six (6) hours as needed for Pain. Max Daily Amount: 4 Tabs. ONDANSETRON (ZOFRAN ODT) 4 MG DISINTEGRATING TABLET    Take 1 Tab by mouth every eight (8) hours as needed for Nausea. TADALAFIL (CIALIS) 5 MG TABLET    Take 5 mg by mouth daily as needed.

## 2017-10-20 NOTE — ROUTINE PROCESS
Bedside and Verbal shift change report given to Arlette Lindsey RN (oncoming nurse) by Diane Bear RN (offgoing nurse). Report included the following information SBAR, Kardex, ED Summary, [facility] and Recent Results.

## 2017-10-21 ENCOUNTER — APPOINTMENT (OUTPATIENT)
Dept: GENERAL RADIOLOGY | Age: 72
DRG: 353 | End: 2017-10-21
Attending: HOSPITALIST
Payer: MEDICARE

## 2017-10-21 LAB
ALBUMIN SERPL-MCNC: 3.2 G/DL (ref 3.4–5)
ALBUMIN/GLOB SERPL: 1.1 {RATIO} (ref 0.8–1.7)
ALP SERPL-CCNC: 69 U/L (ref 45–117)
ALT SERPL-CCNC: 11 U/L (ref 16–61)
ANION GAP SERPL CALC-SCNC: 8 MMOL/L (ref 3–18)
AST SERPL-CCNC: 7 U/L (ref 15–37)
BASOPHILS # BLD: 0 K/UL (ref 0–0.06)
BASOPHILS NFR BLD: 0 % (ref 0–2)
BILIRUB SERPL-MCNC: 0.8 MG/DL (ref 0.2–1)
BUN SERPL-MCNC: 10 MG/DL (ref 7–18)
BUN/CREAT SERPL: 13 (ref 12–20)
CALCIUM SERPL-MCNC: 8.3 MG/DL (ref 8.5–10.1)
CHLORIDE SERPL-SCNC: 104 MMOL/L (ref 100–108)
CO2 SERPL-SCNC: 32 MMOL/L (ref 21–32)
CREAT SERPL-MCNC: 0.76 MG/DL (ref 0.6–1.3)
DIFFERENTIAL METHOD BLD: ABNORMAL
EOSINOPHIL # BLD: 0 K/UL (ref 0–0.4)
EOSINOPHIL NFR BLD: 1 % (ref 0–5)
ERYTHROCYTE [DISTWIDTH] IN BLOOD BY AUTOMATED COUNT: 18.3 % (ref 11.6–14.5)
GLOBULIN SER CALC-MCNC: 3 G/DL (ref 2–4)
GLUCOSE SERPL-MCNC: 101 MG/DL (ref 74–99)
HCT VFR BLD AUTO: 35.4 % (ref 36–48)
HGB BLD-MCNC: 12.1 G/DL (ref 13–16)
LYMPHOCYTES # BLD: 1.4 K/UL (ref 0.9–3.6)
LYMPHOCYTES NFR BLD: 22 % (ref 21–52)
MAGNESIUM SERPL-MCNC: 2 MG/DL (ref 1.6–2.6)
MCH RBC QN AUTO: 29.5 PG (ref 24–34)
MCHC RBC AUTO-ENTMCNC: 34.2 G/DL (ref 31–37)
MCV RBC AUTO: 86.3 FL (ref 74–97)
MONOCYTES # BLD: 1 K/UL (ref 0.05–1.2)
MONOCYTES NFR BLD: 16 % (ref 3–10)
NEUTS SEG # BLD: 3.7 K/UL (ref 1.8–8)
NEUTS SEG NFR BLD: 61 % (ref 40–73)
PHOSPHATE SERPL-MCNC: 3.6 MG/DL (ref 2.5–4.9)
PLATELET # BLD AUTO: 280 K/UL (ref 135–420)
PMV BLD AUTO: 9.5 FL (ref 9.2–11.8)
POTASSIUM SERPL-SCNC: 3.5 MMOL/L (ref 3.5–5.5)
PROT SERPL-MCNC: 6.2 G/DL (ref 6.4–8.2)
RBC # BLD AUTO: 4.1 M/UL (ref 4.7–5.5)
SODIUM SERPL-SCNC: 144 MMOL/L (ref 136–145)
WBC # BLD AUTO: 6.2 K/UL (ref 4.6–13.2)

## 2017-10-21 PROCEDURE — 65270000029 HC RM PRIVATE

## 2017-10-21 PROCEDURE — 83735 ASSAY OF MAGNESIUM: CPT | Performed by: HOSPITALIST

## 2017-10-21 PROCEDURE — 84100 ASSAY OF PHOSPHORUS: CPT | Performed by: HOSPITALIST

## 2017-10-21 PROCEDURE — 74011250636 HC RX REV CODE- 250/636: Performed by: HOSPITALIST

## 2017-10-21 PROCEDURE — 74000 XR ABD (KUB): CPT

## 2017-10-21 PROCEDURE — 36415 COLL VENOUS BLD VENIPUNCTURE: CPT | Performed by: HOSPITALIST

## 2017-10-21 PROCEDURE — 77030020263 HC SOL INJ SOD CL0.9% LFCR 1000ML

## 2017-10-21 PROCEDURE — 85025 COMPLETE CBC W/AUTO DIFF WBC: CPT | Performed by: HOSPITALIST

## 2017-10-21 PROCEDURE — 74011250637 HC RX REV CODE- 250/637: Performed by: HOSPITALIST

## 2017-10-21 PROCEDURE — 80053 COMPREHEN METABOLIC PANEL: CPT | Performed by: HOSPITALIST

## 2017-10-21 RX ADMIN — MORPHINE SULFATE 2 MG: 2 INJECTION, SOLUTION INTRAMUSCULAR; INTRAVENOUS at 04:31

## 2017-10-21 RX ADMIN — MORPHINE SULFATE 2 MG: 2 INJECTION, SOLUTION INTRAMUSCULAR; INTRAVENOUS at 17:48

## 2017-10-21 RX ADMIN — HYDROCODONE BITARTRATE AND ACETAMINOPHEN 10 MG: 7.5; 325 SOLUTION ORAL at 20:03

## 2017-10-21 RX ADMIN — ENOXAPARIN SODIUM 40 MG: 40 INJECTION SUBCUTANEOUS at 16:35

## 2017-10-21 RX ADMIN — SODIUM CHLORIDE 75 ML/HR: 900 INJECTION, SOLUTION INTRAVENOUS at 03:15

## 2017-10-21 NOTE — PROGRESS NOTES
Nutrition initial assessment/Plan of care      RECOMMENDATIONS:     1. Advance diet when medically indicated  2. Monitor weight, labs and PO intake  3. RD to follow     GOALS:     1. PO intake meets >75% of protein/calorie needs by 10/24  2. Weight Maintenance/gradual weight gain (1-2 lb by 10/28)     ASSESSMENT:     Weight status is classified as underweight per BMI of 18. Patient with 4% weight loss over past six months per documented weights. Currently not meeting nutrition needs due to NPO diet order. Labs noted. Nutrition recommendations listed. RD to follow. Nutrition Diagnoses: Altered GI function related to SBO as evidenced by NPO order. Nutrition Risk:  [x] High  [] Moderate []  Low    SUBJECTIVE/OBJECTIVE:      Patient admitted with SBO. Had laparotomy for bowel resection in April. Per pt, weight is usually around 125 lb. Patient with NPO diet order and NG tube to low intermittent suction. Will monitor. Information Obtained from:    [x] Chart Review   [x] Patient   [] Family/Caregiver   [] Nurse/Physician   [] Interdisciplinary Meeting/Rounds    Diet: NPO  Medications: [x] Reviewed    Allergies: [x] Reviewed   Encounter Diagnoses     ICD-10-CM ICD-9-CM   1. Small bowel obstruction K56.609 560.9   2.  Acute abdominal pain in right lower quadrant R10.31 789.03     338.19     Past Medical History:   Diagnosis Date    Hypertension       Labs:  Lab Results   Component Value Date/Time    Sodium 144 10/21/2017 03:15 AM    Potassium 3.5 10/21/2017 03:15 AM    Chloride 104 10/21/2017 03:15 AM    CO2 32 10/21/2017 03:15 AM    Anion gap 8 10/21/2017 03:15 AM    Glucose 101 10/21/2017 03:15 AM    BUN 10 10/21/2017 03:15 AM    Creatinine 0.76 10/21/2017 03:15 AM    Calcium 8.3 10/21/2017 03:15 AM    Magnesium 2.0 10/21/2017 03:15 AM    Phosphorus 3.6 10/21/2017 03:15 AM    Albumin 3.2 10/21/2017 03:15 AM     Anthropometrics: BMI (calculated): 18  Last 3 Recorded Weights in this Encounter    10/20/17 0831   Weight: 54.4 kg (120 lb)    Ht Readings from Last 1 Encounters:   10/20/17 5' 8.5\" (1.74 m)     Patient Vitals for the past 100 hrs:   % Diet Eaten   10/21/17 0715 0 %     IBW: 157 lb %IBW: 76%    [x] Weight Loss [] Weight Gain [] Weight Stable    Estimated Nutrition Needs: [x] MSJ    Calories: 1900 Kcal Based on:   [x] IBW    Protein:   70-85 g Based on:   [x] IBW    Fluid:       2000 ml Based on:   [x] Actual BW      [x] No Cultural, Protestant or ethnic dietary need identified.     [] Cultural, Protestant and ethnic food preferences identified and addressed     Wt Status:  [] Normal (18.6 - 24.9) [x] Underweight (<18.5) [] Overweight (25 - 29.9) [] Mild Obesity (30 - 34.9)  [] Moderate Obesity (35 - 39.9) [] Morbid Obesity (40+)     Nutrition Problems Identified:   [x] Suboptimal PO intake   [] Food Allergies  [] Difficulty chewing/swallowing/poor dentition  [] Constipation/Diarrhea   [] Nausea/Vomiting   [] None  [] Other:     Plan:   [] Therapeutic Diet  []  Obtained/adjusted food preferences/tolerances and/or snacks options   []  Supplements added   [] Occupational therapy following for feeding techniques  []  HS snack added   []  Modify diet texture   []  Modify diet for food allergies   []  Educate patient   []  Assist with menu selection   []  Monitor PO intake on meal rounds   [x]  Continue inpatient monitoring and intervention   []  Participated in discharge planning/Interdisciplinary rounds/Team meetings   []  Other:     Education Needs:   [x] Not appropriate for teaching at this time due to: NPO   [] Identified and addressed    Nutrition Monitoring and Evaluation:  [x] Continue ongoing monitoring and intervention  [] Tiffani Schwab

## 2017-10-21 NOTE — PROGRESS NOTES
1915 Pt received after report given by Luanne Gonazlez. Pt resting in bed Wife at bedside. NGT in place flushing without problem  1930 Wife asked for a note for work stating the the Pt is in the hospital.  Dario Grimaldo RN [address] [place] [address]  on cal 2000 Dr Freddy Rivers returned call. He stated that Dr Nela Oreilly can write the note in the AM  2005 Informed Pt And wife that Dr Nela Oreilly will write note in the AM  2200 Pt resting in bed pain med given for a level 6 pain  0035 Pt resting in bed no c/o pain  0255 Pt resting in bed no c/o pain  0430 Pt c/o level 6 pain med given    Bedside and Verbal shift change report given to Bro 1765 (oncoming nurse) by Rao Fraser RN (offgoing nurse). Report included the following information SBAR, Kardex and MAR.

## 2017-10-21 NOTE — PROGRESS NOTES
0800  Received pt on bed, NGT on hid right nare. Connected to LIS, draining  Greenish, NPO maintained, jenna instructed. .    1000 no pain, offered to get up but refused at this time. 200  Offered him pain med, he said he is okay right now, instructed him using triflo , raised up to 1000 ml, he refused to get OOB, he said  He does get OOB after  Surgery, explained to him the importance, he is  Having dry cough. 1630  [facility], walk well, dry cough noted, jenna encourage. 1750  Has sore in his abdomen, convince him to  Take Morphine, bell with in reach. 1845  Pain under control with Morphine, on the chair, watching TV, NPO maintained.

## 2017-10-21 NOTE — PROGRESS NOTES
Progress Note      Patient: Juan Carlos Hedrick               Sex: male          DOA: 10/20/2017       YOB: 1945      Age:  81 y.o.        LOS:  LOS: 1 day               Subjective:   Pt has an ng tube in place . Has no nausea this am . Surgery note seen . agree that the pt will eventually need surgery . Pt was informed and seems to be resigned to having his surgery  X ray noted . Objective:      Visit Vitals    /89 (BP 1 Location: Left arm, BP Patient Position: At rest)    Pulse 91    Temp 97.9 °F (36.6 °C)    Resp 16    Ht 5' 8.5\" (1.74 m)    Wt 54.4 kg (120 lb)    SpO2 93%    BMI 17.98 kg/m2       Physical Exam:  Pt is awake and is alert he is not passing flatus . Ng output is high  Heart reg rate and rhythm   Lungs good breath sounds heard   Abdomen soft and bowel is quiet . Neuro nonfocal    Lab/Data Reviewed:  CMP:   Lab Results   Component Value Date/Time     10/21/2017 03:15 AM    K 3.5 10/21/2017 03:15 AM     10/21/2017 03:15 AM    CO2 32 10/21/2017 03:15 AM    AGAP 8 10/21/2017 03:15 AM     (H) 10/21/2017 03:15 AM    BUN 10 10/21/2017 03:15 AM    CREA 0.76 10/21/2017 03:15 AM    GFRAA >60 10/21/2017 03:15 AM    GFRNA >60 10/21/2017 03:15 AM    CA 8.3 (L) 10/21/2017 03:15 AM    MG 2.0 10/21/2017 03:15 AM    PHOS 3.6 10/21/2017 03:15 AM    ALB 3.2 (L) 10/21/2017 03:15 AM    TP 6.2 (L) 10/21/2017 03:15 AM    GLOB 3.0 10/21/2017 03:15 AM    AGRAT 1.1 10/21/2017 03:15 AM    SGOT 7 (L) 10/21/2017 03:15 AM    ALT 11 (L) 10/21/2017 03:15 AM     CBC:   Lab Results   Component Value Date/Time    WBC 6.2 10/21/2017 03:15 AM    HGB 12.1 (L) 10/21/2017 03:15 AM    HCT 35.4 (L) 10/21/2017 03:15 AM     10/21/2017 03:15 AM           Assessment/Plan     Active Problems:  Small bowel obstruction .  H/o recurrent sbo in the past   S/p recent  Laparotomy  for bowel resection at [facility]  htn      Plan: suspect that the pt will need surgery for his sbo in the am as per note from surgery . will follow

## 2017-10-21 NOTE — PROGRESS NOTES
Problem: Falls - Risk of  Goal: *Absence of Falls  Document Marco Fall Risk and appropriate interventions in the flowsheet.    Outcome: Progressing Towards Goal  Fall Risk Interventions:            Medication Interventions: Evaluate medications/consider consulting pharmacy    Elimination Interventions: Patient to call for help with toileting needs

## 2017-10-21 NOTE — PROGRESS NOTES
Patient seen and examined. No issues overnight. Did not pass flatus or bowel movement. NG tube output is high and bilious. Abdomen is soft, non-distended and non-tender. Plan:  Keep NPO  IV fluids  Bowel rest. NG tube  Close observation. I explained to the patient that he may need to go to surgery. He is on board now. I also explained to him that his surgery could be very difficult because of his multiple previous abdominal surgeries, including his recent laparotomy for bowel resection this April at [facility]. Because of that, and the fact that his vitals are normal, and his abdominal exam is normal, I would like to wait one more day hoping that his obstruction will resolve without the need for surgical intervention.    Will follow ahmet

## 2017-10-22 ENCOUNTER — ANESTHESIA (OUTPATIENT)
Dept: SURGERY | Age: 72
DRG: 353 | End: 2017-10-22
Payer: MEDICARE

## 2017-10-22 ENCOUNTER — APPOINTMENT (OUTPATIENT)
Dept: GENERAL RADIOLOGY | Age: 72
DRG: 353 | End: 2017-10-22
Attending: HOSPITALIST
Payer: MEDICARE

## 2017-10-22 ENCOUNTER — ANESTHESIA EVENT (OUTPATIENT)
Dept: SURGERY | Age: 72
DRG: 353 | End: 2017-10-22
Payer: MEDICARE

## 2017-10-22 LAB
ALBUMIN SERPL-MCNC: 3.4 G/DL (ref 3.4–5)
ALBUMIN/GLOB SERPL: 1.1 {RATIO} (ref 0.8–1.7)
ALP SERPL-CCNC: 63 U/L (ref 45–117)
ALT SERPL-CCNC: 11 U/L (ref 16–61)
ANION GAP SERPL CALC-SCNC: 10 MMOL/L (ref 3–18)
AST SERPL-CCNC: 7 U/L (ref 15–37)
ATRIAL RATE: 76 BPM
BASOPHILS # BLD: 0 K/UL (ref 0–0.06)
BASOPHILS NFR BLD: 0 % (ref 0–2)
BILIRUB SERPL-MCNC: 0.5 MG/DL (ref 0.2–1)
BUN SERPL-MCNC: 12 MG/DL (ref 7–18)
BUN/CREAT SERPL: 18 (ref 12–20)
CALCIUM SERPL-MCNC: 8.7 MG/DL (ref 8.5–10.1)
CALCULATED P AXIS, ECG09: 60 DEGREES
CALCULATED R AXIS, ECG10: 4 DEGREES
CALCULATED T AXIS, ECG11: 57 DEGREES
CHLORIDE SERPL-SCNC: 102 MMOL/L (ref 100–108)
CO2 SERPL-SCNC: 31 MMOL/L (ref 21–32)
CREAT SERPL-MCNC: 0.68 MG/DL (ref 0.6–1.3)
DIAGNOSIS, 93000: NORMAL
DIFFERENTIAL METHOD BLD: ABNORMAL
EOSINOPHIL # BLD: 0.1 K/UL (ref 0–0.4)
EOSINOPHIL NFR BLD: 1 % (ref 0–5)
ERYTHROCYTE [DISTWIDTH] IN BLOOD BY AUTOMATED COUNT: 18.5 % (ref 11.6–14.5)
GLOBULIN SER CALC-MCNC: 3.1 G/DL (ref 2–4)
GLUCOSE SERPL-MCNC: 87 MG/DL (ref 74–99)
HCT VFR BLD AUTO: 36.5 % (ref 36–48)
HGB BLD-MCNC: 12 G/DL (ref 13–16)
LYMPHOCYTES # BLD: 1.3 K/UL (ref 0.9–3.6)
LYMPHOCYTES NFR BLD: 21 % (ref 21–52)
MCH RBC QN AUTO: 28.8 PG (ref 24–34)
MCHC RBC AUTO-ENTMCNC: 32.9 G/DL (ref 31–37)
MCV RBC AUTO: 87.5 FL (ref 74–97)
MONOCYTES # BLD: 1.2 K/UL (ref 0.05–1.2)
MONOCYTES NFR BLD: 19 % (ref 3–10)
NEUTS SEG # BLD: 3.7 K/UL (ref 1.8–8)
NEUTS SEG NFR BLD: 59 % (ref 40–73)
P-R INTERVAL, ECG05: 160 MS
PLATELET # BLD AUTO: 286 K/UL (ref 135–420)
PMV BLD AUTO: 9.8 FL (ref 9.2–11.8)
POTASSIUM SERPL-SCNC: 3.2 MMOL/L (ref 3.5–5.5)
PROT SERPL-MCNC: 6.5 G/DL (ref 6.4–8.2)
Q-T INTERVAL, ECG07: 412 MS
QRS DURATION, ECG06: 78 MS
QTC CALCULATION (BEZET), ECG08: 463 MS
RBC # BLD AUTO: 4.17 M/UL (ref 4.7–5.5)
SODIUM SERPL-SCNC: 143 MMOL/L (ref 136–145)
VENTRICULAR RATE, ECG03: 76 BPM
WBC # BLD AUTO: 6.3 K/UL (ref 4.6–13.2)

## 2017-10-22 PROCEDURE — 74011250636 HC RX REV CODE- 250/636: Performed by: NURSE ANESTHETIST, CERTIFIED REGISTERED

## 2017-10-22 PROCEDURE — 77030008477 HC STYL SATN SLP COVD -A: Performed by: ANESTHESIOLOGY

## 2017-10-22 PROCEDURE — 77030011266 HC ELECTRD BLD INSL COVD -A: Performed by: SURGERY

## 2017-10-22 PROCEDURE — 77030002966 HC SUT PDS J&J -A: Performed by: SURGERY

## 2017-10-22 PROCEDURE — 77030008683 HC TU ET CUF COVD -A: Performed by: ANESTHESIOLOGY

## 2017-10-22 PROCEDURE — 80053 COMPREHEN METABOLIC PANEL: CPT | Performed by: HOSPITALIST

## 2017-10-22 PROCEDURE — 76210000016 HC OR PH I REC 1 TO 1.5 HR: Performed by: SURGERY

## 2017-10-22 PROCEDURE — 77030020255 HC SOL INJ LR 1000ML BG

## 2017-10-22 PROCEDURE — 74011250636 HC RX REV CODE- 250/636: Performed by: ANESTHESIOLOGY

## 2017-10-22 PROCEDURE — 77030002996 HC SUT SLK J&J -A: Performed by: SURGERY

## 2017-10-22 PROCEDURE — 74011000272 HC RX REV CODE- 272: Performed by: SURGERY

## 2017-10-22 PROCEDURE — 74011000250 HC RX REV CODE- 250

## 2017-10-22 PROCEDURE — 74011000250 HC RX REV CODE- 250: Performed by: ANESTHESIOLOGY

## 2017-10-22 PROCEDURE — 93005 ELECTROCARDIOGRAM TRACING: CPT

## 2017-10-22 PROCEDURE — 77030013079 HC BLNKT BAIR HGGR 3M -A: Performed by: ANESTHESIOLOGY

## 2017-10-22 PROCEDURE — 74011000250 HC RX REV CODE- 250: Performed by: NURSE ANESTHETIST, CERTIFIED REGISTERED

## 2017-10-22 PROCEDURE — 74011250636 HC RX REV CODE- 250/636: Performed by: HOSPITALIST

## 2017-10-22 PROCEDURE — 85025 COMPLETE CBC W/AUTO DIFF WBC: CPT | Performed by: HOSPITALIST

## 2017-10-22 PROCEDURE — 74011250636 HC RX REV CODE- 250/636

## 2017-10-22 PROCEDURE — 77030003028 HC SUT VCRL J&J -A: Performed by: SURGERY

## 2017-10-22 PROCEDURE — 65270000029 HC RM PRIVATE

## 2017-10-22 PROCEDURE — 77030011640 HC PAD GRND REM COVD -A: Performed by: SURGERY

## 2017-10-22 PROCEDURE — 77030008462 HC STPLR SKN PROX J&J -A: Performed by: SURGERY

## 2017-10-22 PROCEDURE — 77030018842 HC SOL IRR SOD CL 9% BAXT -A: Performed by: SURGERY

## 2017-10-22 PROCEDURE — 76060000033 HC ANESTHESIA 1 TO 1.5 HR: Performed by: SURGERY

## 2017-10-22 PROCEDURE — 74000 XR ABD (KUB): CPT

## 2017-10-22 PROCEDURE — 77030031139 HC SUT VCRL2 J&J -A: Performed by: SURGERY

## 2017-10-22 PROCEDURE — 36415 COLL VENOUS BLD VENIPUNCTURE: CPT | Performed by: HOSPITALIST

## 2017-10-22 PROCEDURE — 76010000149 HC OR TIME 1 TO 1.5 HR: Performed by: SURGERY

## 2017-10-22 PROCEDURE — 77030032490 HC SLV COMPR SCD KNE COVD -B: Performed by: SURGERY

## 2017-10-22 PROCEDURE — 74011250636 HC RX REV CODE- 250/636: Performed by: SURGERY

## 2017-10-22 PROCEDURE — 77030020263 HC SOL INJ SOD CL0.9% LFCR 1000ML

## 2017-10-22 RX ORDER — FENTANYL CITRATE 50 UG/ML
INJECTION, SOLUTION INTRAMUSCULAR; INTRAVENOUS AS NEEDED
Status: DISCONTINUED | OUTPATIENT
Start: 2017-10-22 | End: 2017-10-22 | Stop reason: HOSPADM

## 2017-10-22 RX ORDER — LABETALOL HYDROCHLORIDE 5 MG/ML
5 INJECTION, SOLUTION INTRAVENOUS
Status: DISCONTINUED | OUTPATIENT
Start: 2017-10-22 | End: 2017-10-27 | Stop reason: HOSPADM

## 2017-10-22 RX ORDER — INSULIN LISPRO 100 [IU]/ML
INJECTION, SOLUTION INTRAVENOUS; SUBCUTANEOUS ONCE
Status: DISCONTINUED | OUTPATIENT
Start: 2017-10-22 | End: 2017-10-22 | Stop reason: HOSPADM

## 2017-10-22 RX ORDER — PROPOFOL 10 MG/ML
INJECTION, EMULSION INTRAVENOUS AS NEEDED
Status: DISCONTINUED | OUTPATIENT
Start: 2017-10-22 | End: 2017-10-22 | Stop reason: HOSPADM

## 2017-10-22 RX ORDER — NALOXONE HYDROCHLORIDE 0.4 MG/ML
0.1 INJECTION, SOLUTION INTRAMUSCULAR; INTRAVENOUS; SUBCUTANEOUS AS NEEDED
Status: DISCONTINUED | OUTPATIENT
Start: 2017-10-22 | End: 2017-10-22 | Stop reason: HOSPADM

## 2017-10-22 RX ORDER — HYDROMORPHONE HYDROCHLORIDE 1 MG/ML
1 INJECTION, SOLUTION INTRAMUSCULAR; INTRAVENOUS; SUBCUTANEOUS
Status: DISCONTINUED | OUTPATIENT
Start: 2017-10-22 | End: 2017-10-27 | Stop reason: HOSPADM

## 2017-10-22 RX ORDER — ONDANSETRON 2 MG/ML
INJECTION INTRAMUSCULAR; INTRAVENOUS AS NEEDED
Status: DISCONTINUED | OUTPATIENT
Start: 2017-10-22 | End: 2017-10-22 | Stop reason: HOSPADM

## 2017-10-22 RX ORDER — SODIUM CHLORIDE 0.9 % (FLUSH) 0.9 %
5-10 SYRINGE (ML) INJECTION AS NEEDED
Status: DISCONTINUED | OUTPATIENT
Start: 2017-10-22 | End: 2017-10-22 | Stop reason: HOSPADM

## 2017-10-22 RX ORDER — SUCCINYLCHOLINE CHLORIDE 20 MG/ML
INJECTION INTRAMUSCULAR; INTRAVENOUS AS NEEDED
Status: DISCONTINUED | OUTPATIENT
Start: 2017-10-22 | End: 2017-10-22 | Stop reason: HOSPADM

## 2017-10-22 RX ORDER — POTASSIUM CHLORIDE 7.45 MG/ML
10 INJECTION INTRAVENOUS
Status: COMPLETED | OUTPATIENT
Start: 2017-10-22 | End: 2017-10-22

## 2017-10-22 RX ORDER — LIDOCAINE HYDROCHLORIDE 20 MG/ML
INJECTION, SOLUTION EPIDURAL; INFILTRATION; INTRACAUDAL; PERINEURAL AS NEEDED
Status: DISCONTINUED | OUTPATIENT
Start: 2017-10-22 | End: 2017-10-22 | Stop reason: HOSPADM

## 2017-10-22 RX ORDER — SODIUM CHLORIDE, SODIUM LACTATE, POTASSIUM CHLORIDE, CALCIUM CHLORIDE 600; 310; 30; 20 MG/100ML; MG/100ML; MG/100ML; MG/100ML
75 INJECTION, SOLUTION INTRAVENOUS CONTINUOUS
Status: DISCONTINUED | OUTPATIENT
Start: 2017-10-22 | End: 2017-10-22 | Stop reason: HOSPADM

## 2017-10-22 RX ORDER — KETOROLAC TROMETHAMINE 15 MG/ML
15 INJECTION, SOLUTION INTRAMUSCULAR; INTRAVENOUS EVERY 6 HOURS
Status: DISCONTINUED | OUTPATIENT
Start: 2017-10-22 | End: 2017-10-25

## 2017-10-22 RX ORDER — DEXTROSE 50 % IN WATER (D50W) INTRAVENOUS SYRINGE
25-50 AS NEEDED
Status: DISCONTINUED | OUTPATIENT
Start: 2017-10-22 | End: 2017-10-22 | Stop reason: HOSPADM

## 2017-10-22 RX ORDER — HYDRALAZINE HYDROCHLORIDE 20 MG/ML
10 INJECTION INTRAMUSCULAR; INTRAVENOUS ONCE
Status: COMPLETED | OUTPATIENT
Start: 2017-10-22 | End: 2017-10-22

## 2017-10-22 RX ORDER — NEOSTIGMINE METHYLSULFATE 1 MG/ML
INJECTION INTRAVENOUS AS NEEDED
Status: DISCONTINUED | OUTPATIENT
Start: 2017-10-22 | End: 2017-10-22 | Stop reason: HOSPADM

## 2017-10-22 RX ORDER — GLYCOPYRROLATE 0.2 MG/ML
INJECTION INTRAMUSCULAR; INTRAVENOUS AS NEEDED
Status: DISCONTINUED | OUTPATIENT
Start: 2017-10-22 | End: 2017-10-22 | Stop reason: HOSPADM

## 2017-10-22 RX ORDER — SODIUM CHLORIDE, SODIUM LACTATE, POTASSIUM CHLORIDE, CALCIUM CHLORIDE 600; 310; 30; 20 MG/100ML; MG/100ML; MG/100ML; MG/100ML
100 INJECTION, SOLUTION INTRAVENOUS CONTINUOUS
Status: DISCONTINUED | OUTPATIENT
Start: 2017-10-22 | End: 2017-10-23

## 2017-10-22 RX ORDER — FLUMAZENIL 0.1 MG/ML
0.2 INJECTION INTRAVENOUS
Status: DISCONTINUED | OUTPATIENT
Start: 2017-10-22 | End: 2017-10-22 | Stop reason: HOSPADM

## 2017-10-22 RX ORDER — VECURONIUM BROMIDE FOR INJECTION 1 MG/ML
INJECTION, POWDER, LYOPHILIZED, FOR SOLUTION INTRAVENOUS AS NEEDED
Status: DISCONTINUED | OUTPATIENT
Start: 2017-10-22 | End: 2017-10-22 | Stop reason: HOSPADM

## 2017-10-22 RX ORDER — ONDANSETRON 2 MG/ML
4 INJECTION INTRAMUSCULAR; INTRAVENOUS ONCE
Status: DISCONTINUED | OUTPATIENT
Start: 2017-10-22 | End: 2017-10-22 | Stop reason: HOSPADM

## 2017-10-22 RX ORDER — MAGNESIUM SULFATE 100 %
4 CRYSTALS MISCELLANEOUS AS NEEDED
Status: DISCONTINUED | OUTPATIENT
Start: 2017-10-22 | End: 2017-10-22 | Stop reason: HOSPADM

## 2017-10-22 RX ORDER — POTASSIUM CHLORIDE 7.45 MG/ML
10 INJECTION INTRAVENOUS ONCE
Status: COMPLETED | OUTPATIENT
Start: 2017-10-22 | End: 2017-10-22

## 2017-10-22 RX ORDER — HYDROMORPHONE HYDROCHLORIDE 1 MG/ML
INJECTION, SOLUTION INTRAMUSCULAR; INTRAVENOUS; SUBCUTANEOUS AS NEEDED
Status: DISCONTINUED | OUTPATIENT
Start: 2017-10-22 | End: 2017-10-22 | Stop reason: HOSPADM

## 2017-10-22 RX ORDER — HYDROMORPHONE HYDROCHLORIDE 2 MG/ML
0.5 INJECTION, SOLUTION INTRAMUSCULAR; INTRAVENOUS; SUBCUTANEOUS
Status: DISCONTINUED | OUTPATIENT
Start: 2017-10-22 | End: 2017-10-22 | Stop reason: HOSPADM

## 2017-10-22 RX ADMIN — PROPOFOL 150 MG: 10 INJECTION, EMULSION INTRAVENOUS at 12:04

## 2017-10-22 RX ADMIN — ONDANSETRON 4 MG: 2 INJECTION INTRAMUSCULAR; INTRAVENOUS at 12:11

## 2017-10-22 RX ADMIN — SODIUM CHLORIDE, SODIUM LACTATE, POTASSIUM CHLORIDE, AND CALCIUM CHLORIDE 75 ML/HR: 600; 310; 30; 20 INJECTION, SOLUTION INTRAVENOUS at 10:18

## 2017-10-22 RX ADMIN — HYDROMORPHONE HYDROCHLORIDE 0.5 MG: 2 INJECTION INTRAMUSCULAR; INTRAVENOUS; SUBCUTANEOUS at 13:12

## 2017-10-22 RX ADMIN — FENTANYL CITRATE 100 MCG: 50 INJECTION, SOLUTION INTRAMUSCULAR; INTRAVENOUS at 11:59

## 2017-10-22 RX ADMIN — POTASSIUM CHLORIDE 10 MEQ: 10 INJECTION, SOLUTION INTRAVENOUS at 11:45

## 2017-10-22 RX ADMIN — KETOROLAC TROMETHAMINE 15 MG: 15 INJECTION, SOLUTION INTRAMUSCULAR; INTRAVENOUS at 23:40

## 2017-10-22 RX ADMIN — SUCCINYLCHOLINE CHLORIDE 100 MG: 20 INJECTION INTRAMUSCULAR; INTRAVENOUS at 12:04

## 2017-10-22 RX ADMIN — MORPHINE SULFATE 2 MG: 2 INJECTION, SOLUTION INTRAMUSCULAR; INTRAVENOUS at 17:21

## 2017-10-22 RX ADMIN — NEOSTIGMINE METHYLSULFATE 3 MG: 1 INJECTION INTRAVENOUS at 12:39

## 2017-10-22 RX ADMIN — FAMOTIDINE 20 MG: 10 INJECTION, SOLUTION INTRAVENOUS at 10:18

## 2017-10-22 RX ADMIN — VECURONIUM BROMIDE FOR INJECTION 2 MG: 1 INJECTION, POWDER, LYOPHILIZED, FOR SOLUTION INTRAVENOUS at 12:23

## 2017-10-22 RX ADMIN — LIDOCAINE HYDROCHLORIDE 40 MG: 20 INJECTION, SOLUTION EPIDURAL; INFILTRATION; INTRACAUDAL; PERINEURAL at 12:04

## 2017-10-22 RX ADMIN — HYDRALAZINE HYDROCHLORIDE 10 MG: 20 INJECTION INTRAMUSCULAR; INTRAVENOUS at 13:09

## 2017-10-22 RX ADMIN — SODIUM CHLORIDE 75 ML/HR: 900 INJECTION, SOLUTION INTRAVENOUS at 03:42

## 2017-10-22 RX ADMIN — GLYCOPYRROLATE 0.4 MG: 0.2 INJECTION INTRAMUSCULAR; INTRAVENOUS at 12:39

## 2017-10-22 RX ADMIN — POTASSIUM CHLORIDE 10 MEQ: 10 INJECTION, SOLUTION INTRAVENOUS at 10:21

## 2017-10-22 RX ADMIN — LABETALOL HYDROCHLORIDE 5 MG: 5 INJECTION, SOLUTION INTRAVENOUS at 13:22

## 2017-10-22 RX ADMIN — MORPHINE SULFATE 2 MG: 2 INJECTION, SOLUTION INTRAMUSCULAR; INTRAVENOUS at 09:10

## 2017-10-22 RX ADMIN — HYDROMORPHONE HYDROCHLORIDE 1 MG: 1 INJECTION, SOLUTION INTRAMUSCULAR; INTRAVENOUS; SUBCUTANEOUS at 12:38

## 2017-10-22 RX ADMIN — KETOROLAC TROMETHAMINE 15 MG: 15 INJECTION, SOLUTION INTRAMUSCULAR; INTRAVENOUS at 19:42

## 2017-10-22 RX ADMIN — ENOXAPARIN SODIUM 40 MG: 40 INJECTION SUBCUTANEOUS at 17:20

## 2017-10-22 NOTE — PROGRESS NOTES
2130 Pt received after report given by [facility] RN. Pt resting in bed NGT in place draining at [facility] green fluid. No c/o pain  2350 Pt resting in bed remined Pt to keep head elevated do to NGT  0230 Pt remined again to keep head elevated. 65 Cleaned the floor of NG drainage on the floor. 0540 Pt resting in bed head elevated       Bedside and Verbal shift change report given to Bro Garrett (oncoming nurse) by Bryan Oswald RN (offgoing nurse). Report included the following information SBAR, Kardex, Intake/Output and MAR.

## 2017-10-22 NOTE — PROGRESS NOTES
Problem: Falls - Risk of  Goal: *Absence of Falls  Document Marco Fall Risk and appropriate interventions in the flowsheet.    Outcome: Progressing Towards Goal  Fall Risk Interventions:  Mobility Interventions: Patient to call before getting OOB         Medication Interventions: Patient to call before getting OOB    Elimination Interventions: Call light in reach

## 2017-10-22 NOTE — PROGRESS NOTES
Bedside and Verbal shift change report given to CATHIE Crandall (oncoming nurse) by Marlan Shone (offgoing nurse). Report included the following information SBAR, Kardex, MAR and Recent Results.

## 2017-10-22 NOTE — PERIOP NOTES
Rec'd care of pt from OR via bed. Resp even and unlabored. Attached to monitor. Blood pressure elevated. Fawn Fitzpatrick.ARLEEN at bedside. OR, MAR and anesthesia report acknowledged. NGT noted left nare. Greenish drainage noted - small amt. Will cont to monitor and medicate for elevated blood pressure. 1309  Hydralazine 10mg given for blood pressure of 200/78. Will cont to monitor. 1322  Labetalol 5mg IV given for blood pressure 165/75 per order. Will cont to monitor. 1335  1st attempt made to give report to 2E. Was informed the nurse was at lunch and she will return my call. 1405  TRANSFER - OUT REPORT:    Verbal report given to Chriss Gaytan RN (name) on Nixon Peacock  being transferred to [facility] (unit) for routine post - op       Report consisted of patients Situation, Background, Assessment and   Recommendations(SBAR). Information from the following report(s) SBAR, OR Summary, Intake/Output, MAR and Cardiac Rhythm sinus rhythm was reviewed with the receiving nurse. Lines:   Peripheral IV 10/20/17 Right Antecubital (Active)   Site Assessment Clean, dry, & intact 10/22/2017  1:45 PM   Phlebitis Assessment 0 10/22/2017  1:45 PM   Infiltration Assessment 0 10/22/2017  1:45 PM   Dressing Status Clean, dry, & intact 10/22/2017  1:45 PM   Dressing Type Transparent;Tape 10/22/2017  1:45 PM   Hub Color/Line Status Green; Infusing 10/22/2017  1:45 PM   Action Taken Open ports on tubing capped 10/22/2017 12:57 PM   Alcohol Cap Used Yes 10/22/2017  1:45 PM        Opportunity for questions and clarification was provided.       Patient transported with:   O2 @ 2 liters  Tech

## 2017-10-22 NOTE — ANESTHESIA PREPROCEDURE EVALUATION
Anesthetic History               Review of Systems / Medical History  Patient summary reviewed and pertinent labs reviewed    Pulmonary          Smoker         Neuro/Psych              Cardiovascular    Hypertension: poorly controlled                   GI/Hepatic/Renal               Comments: Small bowel obstruction Endo/Other             Other Findings   Comments:   Risk Factors for Postoperative nausea/vomiting:       History of postoperative nausea/vomiting? NO       Female? NO       Motion sickness? NO       Intended opioid administration for postoperative analgesia? YES      Smoking Abstinence  Current Smoker? YES  Elective Surgery? NO  Seen preoperatively by anesthesiologist or proxy prior to day of surgery? YES  Pt abstained from smoking 24 hours prior to anesthesia?  YES         Physical Exam    Airway  Mallampati: III  TM Distance: 4 - 6 cm  Neck ROM: normal range of motion   Mouth opening: Normal     Cardiovascular    Rhythm: regular  Rate: normal         Dental    Dentition: Full upper dentures     Pulmonary  Breath sounds clear to auscultation               Abdominal  GI exam deferred       Other Findings            Anesthetic Plan    ASA: 3  Anesthesia type: general          Induction: Intravenous  Anesthetic plan and risks discussed with: Patient

## 2017-10-22 NOTE — ROUTINE PROCESS
Bedside and Verbal shift change report given to Mary Castellanos   (oncoming nurse) by Phillip Mohs, RN   (offgoing nurse). Report included the following information SBAR, Kardex, Intake/Output, MAR and Recent Results.

## 2017-10-22 NOTE — PROGRESS NOTES
met with patient completed the initial Spiritual Assessment of the patient, and offered Pastoral Care, see flow sheets for interventions. Patient does not have any Mormon/cultural needs that will affect patients preferences in health care. The patient was informed that chaplains will continue to follow and will provide pastoral care on an as needed/requested basis.       CHANA Ayon [facility]S

## 2017-10-22 NOTE — PROGRESS NOTES
Problem: Falls - Risk of  Goal: *Absence of Falls  Document Marco Fall Risk and appropriate interventions in the flowsheet.    Outcome: Progressing Towards Goal  Fall Risk Interventions:  Mobility Interventions: Patient to call before getting OOB         Medication Interventions: Patient to call before getting OOB, Teach patient to arise slowly    Elimination Interventions: Call light in reach, Patient to call for help with toileting needs, Toilet paper/wipes in reach

## 2017-10-22 NOTE — ANESTHESIA POSTPROCEDURE EVALUATION
Post-Anesthesia Evaluation and Assessment    Patient: Juan Carlso Hedrick MRN: [id number]  SSN: [id number]    YOB: 1945  Age: 81 y.o. Sex: male       Cardiovascular Function/Vital Signs  Visit Vitals    /68 (BP 1 Location: Left arm, BP Patient Position: At rest;Head of bed elevated (Comment degrees); Supine)    Pulse 89    Temp 36.8 °C (98.2 °F)    Resp 14    Ht 5' 8.5\" (1.74 m)    Wt 54.4 kg (120 lb)    SpO2 100%    BMI 17.98 kg/m2       Patient is status post general anesthesia for Procedure(s):  Exploratory laparotomy, Lysis of adhesions, Reduction of internal hernia. .    Nausea/Vomiting: None    Postoperative hydration reviewed and adequate. Pain:  Pain Scale 1: Numeric (0 - 10) (10/22/17 1330)  Pain Intensity 1: 0 (10/22/17 1330)   Managed    Neurological Status:   Neuro (WDL): Within Defined Limits (10/22/17 1330)   At baseline    Mental Status and Level of Consciousness: Arousable    Pulmonary Status:   O2 Device: Nasal cannula (10/22/17 1330)   Adequate oxygenation and airway patent    Complications related to anesthesia: None    Post-anesthesia assessment completed.  No concerns    Signed By: Marin Lance MD     October 22, 2017

## 2017-10-22 NOTE — PROGRESS NOTES
0800  Seen by Dr Alva Rivas for surgery today, maintained NPO, consent signed. 9142  Result of K given to Dr Buffy Henderson, new order given    1000  To surgery    1420  Received from PACU, alert and oriented, came with NGT, taped and secured, drowsy, but telling  Us to remove the NGT out, but told him not to removed, at this time it is now on LCS, self mouth care no pain. 36  Dr Alva Rivas came and talk to  His wife,about surgery, and wife ask Dr Della Swann about work note, been told by MD that work will be given  When pt is ready for discharge, verbalized understanding. 1845  Pain under control after Morphine was given , NGT no drainage since coming back from OR, prettylo  Instructed.

## 2017-10-22 NOTE — BRIEF OP NOTE
BRIEF OPERATIVE NOTE    Date of Procedure: 10/22/2017   Preoperative Diagnosis: small bowel obstruction  Postoperative Diagnosis: * No post-op diagnosis entered *    Procedure(s):  Exploratory laparotomy, Lysis of adhesions, Reduction of internal hernia. Surgeon(s) and Role:     * Nalini Javier MD - Primary         Assistant Staff:       Surgical Staff:  Circ-1: Leo Calles RN  Scrub Tech-1: Oscar Hinojosa  Surg Asst-1: Osiel Johnston  Event Time In   Incision Start 1210   Incision Close      Anesthesia: General   Estimated Blood Loss: Minimal  Specimens: * No specimens in log *   Findings: Adhesions. Internal hernia causing obstruction.     Complications: None  Implants: * No implants in log *

## 2017-10-22 NOTE — PROGRESS NOTES
Patient seen and examined. No issues overnight. No changes. He said first he did not pass any flatus or BM, and than he said may be he had \"one small flatus\". No nausea or vomiting, but still having abdominal cramping and pain that comes and goes. No fever or tachycardia. NG tube out is high and bilious. 3 liters in last 24 hours. Abdomen is soft, non-distended, mildly tender in the suprapubic and RLQ. Imp/Plan:  I am concerned about the very high NG tube output, cramping abdominal pain, and CT scan results. It seems that his obstruction is at the site where he had his recent surgery at [facility]. I discussed this with the patient and we decided to proceed with surgery. I explained to him that it will a laparotomy, with possible bowel resection and even a chance of needing an ostomy (stool bag), thought it is unlikely. The patient understand the risks and will consent for surgery. Will talk to OR team and will find a time today to take him to surgery. Keep NPO  IV fluids  Ambulation.

## 2017-10-22 NOTE — PROGRESS NOTES
2014: Received patient in bed awake,alert and oriented x 4. No signs of distress. Family at the bedside. Call bell within reach. Will  Continue monitoring. 2200: Resting in bed quietly,no other concerns  at this time. Call bell within reach. Will  Monitor. Report given  to CATHIE Crandall with SBAR,RUTH and MAR.

## 2017-10-23 LAB
ANION GAP SERPL CALC-SCNC: 9 MMOL/L (ref 3–18)
BASOPHILS # BLD: 0 K/UL (ref 0–0.06)
BASOPHILS NFR BLD: 0 % (ref 0–2)
BUN SERPL-MCNC: 12 MG/DL (ref 7–18)
BUN/CREAT SERPL: 13 (ref 12–20)
CALCIUM SERPL-MCNC: 8.5 MG/DL (ref 8.5–10.1)
CHLORIDE SERPL-SCNC: 103 MMOL/L (ref 100–108)
CO2 SERPL-SCNC: 31 MMOL/L (ref 21–32)
CREAT SERPL-MCNC: 0.94 MG/DL (ref 0.6–1.3)
DIFFERENTIAL METHOD BLD: ABNORMAL
EOSINOPHIL # BLD: 0 K/UL (ref 0–0.4)
EOSINOPHIL NFR BLD: 0 % (ref 0–5)
ERYTHROCYTE [DISTWIDTH] IN BLOOD BY AUTOMATED COUNT: 19 % (ref 11.6–14.5)
GLUCOSE SERPL-MCNC: 111 MG/DL (ref 74–99)
HCT VFR BLD AUTO: 37.8 % (ref 36–48)
HGB BLD-MCNC: 12.4 G/DL (ref 13–16)
LYMPHOCYTES # BLD: 1 K/UL (ref 0.9–3.6)
LYMPHOCYTES NFR BLD: 17 % (ref 21–52)
MCH RBC QN AUTO: 29 PG (ref 24–34)
MCHC RBC AUTO-ENTMCNC: 32.8 G/DL (ref 31–37)
MCV RBC AUTO: 88.3 FL (ref 74–97)
MONOCYTES # BLD: 1.3 K/UL (ref 0.05–1.2)
MONOCYTES NFR BLD: 20 % (ref 3–10)
NEUTS SEG # BLD: 3.9 K/UL (ref 1.8–8)
NEUTS SEG NFR BLD: 63 % (ref 40–73)
PLATELET # BLD AUTO: 282 K/UL (ref 135–420)
PMV BLD AUTO: 10.1 FL (ref 9.2–11.8)
POTASSIUM SERPL-SCNC: 4.9 MMOL/L (ref 3.5–5.5)
RBC # BLD AUTO: 4.28 M/UL (ref 4.7–5.5)
SODIUM SERPL-SCNC: 143 MMOL/L (ref 136–145)
WBC # BLD AUTO: 6.2 K/UL (ref 4.6–13.2)

## 2017-10-23 PROCEDURE — 80048 BASIC METABOLIC PNL TOTAL CA: CPT | Performed by: SURGERY

## 2017-10-23 PROCEDURE — 65270000029 HC RM PRIVATE

## 2017-10-23 PROCEDURE — 85025 COMPLETE CBC W/AUTO DIFF WBC: CPT | Performed by: SURGERY

## 2017-10-23 PROCEDURE — 30233N1 TRANSFUSION OF NONAUTOLOGOUS RED BLOOD CELLS INTO PERIPHERAL VEIN, PERCUTANEOUS APPROACH: ICD-10-PCS | Performed by: HOSPITALIST

## 2017-10-23 PROCEDURE — 74011250636 HC RX REV CODE- 250/636: Performed by: SURGERY

## 2017-10-23 PROCEDURE — 77010033678 HC OXYGEN DAILY

## 2017-10-23 PROCEDURE — 77030020263 HC SOL INJ SOD CL0.9% LFCR 1000ML

## 2017-10-23 PROCEDURE — 0WQF0ZZ REPAIR ABDOMINAL WALL, OPEN APPROACH: ICD-10-PCS | Performed by: SURGERY

## 2017-10-23 PROCEDURE — 74011250636 HC RX REV CODE- 250/636: Performed by: HOSPITALIST

## 2017-10-23 PROCEDURE — 36415 COLL VENOUS BLD VENIPUNCTURE: CPT | Performed by: SURGERY

## 2017-10-23 RX ADMIN — SODIUM CHLORIDE 75 ML/HR: 900 INJECTION, SOLUTION INTRAVENOUS at 18:20

## 2017-10-23 RX ADMIN — ENOXAPARIN SODIUM 40 MG: 40 INJECTION SUBCUTANEOUS at 17:40

## 2017-10-23 RX ADMIN — SODIUM CHLORIDE 75 ML/HR: 900 INJECTION, SOLUTION INTRAVENOUS at 05:20

## 2017-10-23 RX ADMIN — KETOROLAC TROMETHAMINE 15 MG: 15 INJECTION, SOLUTION INTRAMUSCULAR; INTRAVENOUS at 18:27

## 2017-10-23 RX ADMIN — KETOROLAC TROMETHAMINE 15 MG: 15 INJECTION, SOLUTION INTRAMUSCULAR; INTRAVENOUS at 12:50

## 2017-10-23 RX ADMIN — HYDROMORPHONE HYDROCHLORIDE 1 MG: 1 INJECTION, SOLUTION INTRAMUSCULAR; INTRAVENOUS; SUBCUTANEOUS at 10:18

## 2017-10-23 RX ADMIN — KETOROLAC TROMETHAMINE 15 MG: 15 INJECTION, SOLUTION INTRAMUSCULAR; INTRAVENOUS at 23:18

## 2017-10-23 RX ADMIN — MORPHINE SULFATE 2 MG: 2 INJECTION, SOLUTION INTRAMUSCULAR; INTRAVENOUS at 05:17

## 2017-10-23 NOTE — OP NOTES
[facility]    Name:  Ana Paula Mcihaels  MR#:  [id number]  :  1945  Account #:  [de-identified]  Date of Adm:  10/20/2017  Date of Surgery:  10/22/2017      SURGEON: Burt Mead MD.    PREOPERATIVE DIAGNOSIS: Small-bowel obstruction. POSTOPERATIVE DIAGNOSIS: Small-bowel obstruction secondary to  internal hernia. ANESTHESIA: General.    COMPLICATIONS: None. SPECIMENS REMOVED: None. ESTIMATED BLOOD LOSS: Minimal.    PROCEDURES PERFORMED: Exploratory laparotomy, lysis of  adhesions, reduction of internal hernia. DESCRIPTION OF PROCEDURE: The patient was brought to the  operating room, anesthesia was induced. Scrubbing and draping of the  abdomen was done in the usual manner. A timeout was performed. A  midline laparotomy was performed. The abdomen was entered on the  supraumbilical area where there was no previous scar. Surprisingly,  there was no adhesion once we entered the abdomen. The fascia was  opened completely all the way to the suprapubic area. There was  some adhesion between the small bowel and the anterior abdominal  wall and it was taken down using Metzenbaum. At this point, the small  bowel was significantly dilated. The small bowel was run slowly until  we found an area of adhesive band just proximal to the previous  anastomosis, closing the internal hernia of the distal part of the ileum. This band was taken down with Metzenbaum and then the whole bowel  was opened. There was clearly no narrowing or stricturing. I was able  to milk some of the contents of the small bowel into the right colon  where the previous anastomosis was performed. At this point, the  small bowel was run again from the ligament of Treitz to the  anastomosis between the distal ileum and the colon. There was no  evidence of any kinking or narrowing.  At this point, the abdomen was  placed back into the abdomen and the fascia was closed with a  running #1 PDS suture, and the skin was closed with a skin stapler. Sterile dressing was applied.         MD Elizabeth Reyna  D:  10/22/2017   12:48  T:  10/23/2017   06:37  Job #:  [id number]

## 2017-10-23 NOTE — PROGRESS NOTES
[facility]  Hospitalist Division        Inpatient Daily Progress Note    Daily progress Note    Patient: Kika Martinez MRN: [id number]  CSN: [id number]    YOB: 1945  Age: 81 y.o. Sex: male    DOA: 10/20/2017 LOS:  LOS: 3 days                    Chief Complaint:  Abdominal pain, SBO       Subjective:      Denies nausea or vomiting. Has not passed flatus. Minimal incisional pain . In NAD. Objective:      Visit Vitals    /84 (BP 1 Location: Right arm, BP Patient Position: At rest)    Pulse 85    Temp 98.2 °F (36.8 °C)    Resp 16    Ht 5' 8.5\" (1.74 m)    Wt 54.4 kg (120 lb)    SpO2 94%    BMI 17.98 kg/m2           Physical Exam:  General appearance: alert, cooperative, no distress, appears stated age  Lungs: clear to auscultation bilaterally, no wheezes or rhonchi   Heart: regular rate and rhythm, S1, S2 normal, no murmur, click, rub or gallop  Abdomen: soft, appropriately TTP. Incision well approximated, staples CDI.  Normoactive bowel sounds   Extremities: extremities normal, atraumatic, no cyanosis or edema  Skin: Skin color, texture, turgor normal. No rashes or lesions  Neurologic: Grossly normal  PSY: Mood and affect normal, appropriately behaved      Intake and Output:  Current Shift:  10/23 0701 - 10/23 1900  In: 840 [P.O.:840]  Out: -   Last three shifts:  10/21 1901 - 10/23 0700  In: 2613.8 [I.V.:2573.8]  Out: 0403 [Urine:1050]    Recent Results (from the past 24 hour(s))   CBC WITH AUTOMATED DIFF    Collection Time: 10/23/17  5:25 AM   Result Value Ref Range    WBC 6.2 4.6 - 13.2 K/uL    RBC 4.28 (L) 4.70 - 5.50 M/uL    HGB 12.4 (L) 13.0 - 16.0 g/dL    HCT 37.8 36.0 - 48.0 %    MCV 88.3 74.0 - 97.0 FL    MCH 29.0 24.0 - 34.0 PG    MCHC 32.8 31.0 - 37.0 g/dL    RDW 19.0 (H) 11.6 - 14.5 %    PLATELET 883 576 - 561 K/uL    MPV 10.1 9.2 - 11.8 FL    NEUTROPHILS 63 40 - 73 %    LYMPHOCYTES 17 (L) 21 - 52 %    MONOCYTES 20 (H) 3 - 10 % EOSINOPHILS 0 0 - 5 %    BASOPHILS 0 0 - 2 %    ABS. NEUTROPHILS 3.9 1.8 - 8.0 K/UL    ABS. LYMPHOCYTES 1.0 0.9 - 3.6 K/UL    ABS. MONOCYTES 1.3 (H) 0.05 - 1.2 K/UL    ABS. EOSINOPHILS 0.0 0.0 - 0.4 K/UL    ABS. BASOPHILS 0.0 0.0 - 0.06 K/UL    DF AUTOMATED     METABOLIC PANEL, BASIC    Collection Time: 10/23/17  5:25 AM   Result Value Ref Range    Sodium 143 136 - 145 mmol/L    Potassium 4.9 3.5 - 5.5 mmol/L    Chloride 103 100 - 108 mmol/L    CO2 31 21 - 32 mmol/L    Anion gap 9 3.0 - 18 mmol/L    Glucose 111 (H) 74 - 99 mg/dL    BUN 12 7.0 - 18 MG/DL    Creatinine 0.94 0.6 - 1.3 MG/DL    BUN/Creatinine ratio 13 12 - 20      GFR est AA >60 >60 ml/min/1.73m2    GFR est non-AA >60 >60 ml/min/1.73m2    Calcium 8.5 8.5 - 10.1 MG/DL           Lab Results   Component Value Date/Time    Glucose 111 10/23/2017 05:25 AM    Glucose 87 10/22/2017 03:30 AM    Glucose 101 10/21/2017 03:15 AM    Glucose 128 10/20/2017 08:55 AM    Glucose 100 04/22/2017 04:15 AM        Assessment/Plan:     Patient Active Problem List   Diagnosis Code    SBO (small bowel obstruction) K56.609    HTN (hypertension) I10    URI (upper respiratory infection) J06.9    Small bowel obstruction K56.609    Ileus (HCC) K56.7       A/P:  SBO: Surgery following- NGT removed this am. Clear liquids as per Surgery  HTN: stable off antihypertensives.  Labetalol PRN  DVT prophylaxis: Lovenox  PT/OT   Encourage IS use         STIVEN Napoles [phone]  Pager:  [phone]  Office:  [phone]

## 2017-10-23 NOTE — PROGRESS NOTES
Chart reviewed. Plan remains home when medically stable. Will cont to follow for needs. Gena Arvizu, ext. [phone].

## 2017-10-23 NOTE — ROUTINE PROCESS
Bedside and Verbal shift change report given to Chanelle Reid (oncoming nurse) by Sly Delgadillo (offgoing nurse). Report included the following information SBAR, Kardex, MAR and Recent Results.

## 2017-10-23 NOTE — PROGRESS NOTES
Problem: Falls - Risk of  Goal: *Absence of Falls  Document Marco Fall Risk and appropriate interventions in the flowsheet.    Outcome: Progressing Towards Goal  Fall Risk Interventions:  Mobility Interventions: Patient to call before getting OOB, Utilize gait belt for transfers/ambulation         Medication Interventions: Patient to call before getting OOB, Teach patient to arise slowly    Elimination Interventions: Call light in reach, Patient to call for help with toileting needs, Toilet paper/wipes in reach

## 2017-10-23 NOTE — PROGRESS NOTES
Patient seen and examined. He is doing very well. He slept all night. Has minimal abdominal pain. He feels hungry. Did not pass flatus yet. He has no fever and no tachycardia. NG tube in place. About 400 cc of mucus but no bile. Abdomen is soft and nontender. I removed the dressing. Wound is clean. Plan:  D/C NG tube  Allow clear liquid diet. I explained to the patient that he still have ileus and he should not drink a lot.    Ambulation  DVT prophylaxis  Await return of bowel function

## 2017-10-23 NOTE — PROGRESS NOTES
1913: Received patient in bed awake,alert and oriented x 4. No signs of distress. Bed low and locked. Call bell within reach. Will continue monitoring. 2340: In bed resting quietly,no other concerns at this time. Dagoberto hannah within reach. Will continue monitoring. 0518: In bed resting ,Anamaria Jaeger in the patient room at this time. 4218: In bed sleeping, no  distress. Call bell within reach. Will monitor.

## 2017-10-23 NOTE — CDMP QUERY
This is a patient that carries a diagnosis of SBO ,who is documented to have a height of 5'8 an weight of 120lbs And a BMI of 17    The Center for Disease control and Prevention suggest that a BMI of 17  indicates  a weight status of underweight. Please clarify if possible:       ..Yes this patient is underweight  . .NO this patient is not underweight      Thank-you   Obdulio Damon RN [facility] [phone]

## 2017-10-23 NOTE — PROGRESS NOTES
1195 - received pt in room from Jean Brown RN. Pt resting in bed. AO. Pain rated 6/10.     [id number] - assessment completed. Pt is AOx4. VSS. IV fluid infusing. Dressing is c/d/i. Call bell placed at bedside. 0303 - observed pt resting in bed, watching TV, eating. No distress noted. [phone] - observed pt's daughter visiting in room.

## 2017-10-24 LAB
ANION GAP SERPL CALC-SCNC: 7 MMOL/L (ref 3–18)
BASOPHILS # BLD: 0 K/UL (ref 0–0.06)
BASOPHILS NFR BLD: 0 % (ref 0–2)
BUN SERPL-MCNC: 6 MG/DL (ref 7–18)
BUN/CREAT SERPL: 10 (ref 12–20)
CALCIUM SERPL-MCNC: 8 MG/DL (ref 8.5–10.1)
CHLORIDE SERPL-SCNC: 101 MMOL/L (ref 100–108)
CO2 SERPL-SCNC: 30 MMOL/L (ref 21–32)
CREAT SERPL-MCNC: 0.59 MG/DL (ref 0.6–1.3)
DIFFERENTIAL METHOD BLD: ABNORMAL
EOSINOPHIL # BLD: 0.1 K/UL (ref 0–0.4)
EOSINOPHIL NFR BLD: 1 % (ref 0–5)
ERYTHROCYTE [DISTWIDTH] IN BLOOD BY AUTOMATED COUNT: 17.7 % (ref 11.6–14.5)
GLUCOSE SERPL-MCNC: 102 MG/DL (ref 74–99)
HCT VFR BLD AUTO: 32.9 % (ref 36–48)
HGB BLD-MCNC: 11.3 G/DL (ref 13–16)
LYMPHOCYTES # BLD: 1.5 K/UL (ref 0.9–3.6)
LYMPHOCYTES NFR BLD: 23 % (ref 21–52)
MAGNESIUM SERPL-MCNC: 2.4 MG/DL (ref 1.6–2.6)
MCH RBC QN AUTO: 29.3 PG (ref 24–34)
MCHC RBC AUTO-ENTMCNC: 34.3 G/DL (ref 31–37)
MCV RBC AUTO: 85.2 FL (ref 74–97)
MONOCYTES # BLD: 0.9 K/UL (ref 0.05–1.2)
MONOCYTES NFR BLD: 14 % (ref 3–10)
NEUTS SEG # BLD: 4.1 K/UL (ref 1.8–8)
NEUTS SEG NFR BLD: 62 % (ref 40–73)
PLATELET # BLD AUTO: 235 K/UL (ref 135–420)
PMV BLD AUTO: 9.8 FL (ref 9.2–11.8)
POTASSIUM SERPL-SCNC: 3.1 MMOL/L (ref 3.5–5.5)
RBC # BLD AUTO: 3.86 M/UL (ref 4.7–5.5)
SODIUM SERPL-SCNC: 138 MMOL/L (ref 136–145)
WBC # BLD AUTO: 6.6 K/UL (ref 4.6–13.2)

## 2017-10-24 PROCEDURE — 65270000029 HC RM PRIVATE

## 2017-10-24 PROCEDURE — 97165 OT EVAL LOW COMPLEX 30 MIN: CPT

## 2017-10-24 PROCEDURE — 77030020255 HC SOL INJ LR 1000ML BG

## 2017-10-24 PROCEDURE — 80048 BASIC METABOLIC PNL TOTAL CA: CPT | Performed by: NURSE PRACTITIONER

## 2017-10-24 PROCEDURE — 97161 PT EVAL LOW COMPLEX 20 MIN: CPT

## 2017-10-24 PROCEDURE — 36415 COLL VENOUS BLD VENIPUNCTURE: CPT | Performed by: NURSE PRACTITIONER

## 2017-10-24 PROCEDURE — 74011250636 HC RX REV CODE- 250/636: Performed by: NURSE PRACTITIONER

## 2017-10-24 PROCEDURE — 74011250636 HC RX REV CODE- 250/636: Performed by: SURGERY

## 2017-10-24 PROCEDURE — 85025 COMPLETE CBC W/AUTO DIFF WBC: CPT | Performed by: NURSE PRACTITIONER

## 2017-10-24 PROCEDURE — 97530 THERAPEUTIC ACTIVITIES: CPT

## 2017-10-24 PROCEDURE — 74011250637 HC RX REV CODE- 250/637: Performed by: NURSE PRACTITIONER

## 2017-10-24 PROCEDURE — 74011250636 HC RX REV CODE- 250/636: Performed by: HOSPITALIST

## 2017-10-24 PROCEDURE — 97116 GAIT TRAINING THERAPY: CPT

## 2017-10-24 PROCEDURE — 77030020263 HC SOL INJ SOD CL0.9% LFCR 1000ML

## 2017-10-24 PROCEDURE — 83735 ASSAY OF MAGNESIUM: CPT | Performed by: NURSE PRACTITIONER

## 2017-10-24 RX ORDER — HYDRALAZINE HYDROCHLORIDE 20 MG/ML
10 INJECTION INTRAMUSCULAR; INTRAVENOUS
Status: DISCONTINUED | OUTPATIENT
Start: 2017-10-24 | End: 2017-10-27 | Stop reason: HOSPADM

## 2017-10-24 RX ORDER — POTASSIUM CHLORIDE 20 MEQ/1
40 TABLET, EXTENDED RELEASE ORAL
Status: DISCONTINUED | OUTPATIENT
Start: 2017-10-24 | End: 2017-10-24

## 2017-10-24 RX ORDER — HYDROCHLOROTHIAZIDE 25 MG/1
25 TABLET ORAL DAILY
Status: DISCONTINUED | OUTPATIENT
Start: 2017-10-24 | End: 2017-10-27 | Stop reason: HOSPADM

## 2017-10-24 RX ORDER — POTASSIUM CHLORIDE 20 MEQ/1
40 TABLET, EXTENDED RELEASE ORAL 2 TIMES DAILY
Status: COMPLETED | OUTPATIENT
Start: 2017-10-24 | End: 2017-10-24

## 2017-10-24 RX ORDER — AMLODIPINE BESYLATE 5 MG/1
5 TABLET ORAL DAILY
Status: DISCONTINUED | OUTPATIENT
Start: 2017-10-24 | End: 2017-10-26

## 2017-10-24 RX ADMIN — HYDRALAZINE HYDROCHLORIDE 10 MG: 20 INJECTION INTRAMUSCULAR; INTRAVENOUS at 09:58

## 2017-10-24 RX ADMIN — SODIUM CHLORIDE 75 ML/HR: 900 INJECTION, SOLUTION INTRAVENOUS at 19:24

## 2017-10-24 RX ADMIN — AMLODIPINE BESYLATE 5 MG: 5 TABLET ORAL at 16:37

## 2017-10-24 RX ADMIN — HYDROMORPHONE HYDROCHLORIDE 1 MG: 1 INJECTION, SOLUTION INTRAMUSCULAR; INTRAVENOUS; SUBCUTANEOUS at 13:57

## 2017-10-24 RX ADMIN — KETOROLAC TROMETHAMINE 15 MG: 15 INJECTION, SOLUTION INTRAMUSCULAR; INTRAVENOUS at 23:43

## 2017-10-24 RX ADMIN — KETOROLAC TROMETHAMINE 15 MG: 15 INJECTION, SOLUTION INTRAMUSCULAR; INTRAVENOUS at 11:54

## 2017-10-24 RX ADMIN — KETOROLAC TROMETHAMINE 15 MG: 15 INJECTION, SOLUTION INTRAMUSCULAR; INTRAVENOUS at 05:25

## 2017-10-24 RX ADMIN — KETOROLAC TROMETHAMINE 15 MG: 15 INJECTION, SOLUTION INTRAMUSCULAR; INTRAVENOUS at 18:46

## 2017-10-24 RX ADMIN — ENOXAPARIN SODIUM 40 MG: 40 INJECTION SUBCUTANEOUS at 16:38

## 2017-10-24 RX ADMIN — HYDROMORPHONE HYDROCHLORIDE 1 MG: 1 INJECTION, SOLUTION INTRAMUSCULAR; INTRAVENOUS; SUBCUTANEOUS at 09:24

## 2017-10-24 RX ADMIN — POTASSIUM CHLORIDE 40 MEQ: 20 TABLET, EXTENDED RELEASE ORAL at 18:45

## 2017-10-24 RX ADMIN — POTASSIUM CHLORIDE 40 MEQ: 20 TABLET, EXTENDED RELEASE ORAL at 09:24

## 2017-10-24 RX ADMIN — HYDRALAZINE HYDROCHLORIDE 10 MG: 20 INJECTION INTRAMUSCULAR; INTRAVENOUS at 23:43

## 2017-10-24 RX ADMIN — HYDROMORPHONE HYDROCHLORIDE 1 MG: 1 INJECTION, SOLUTION INTRAMUSCULAR; INTRAVENOUS; SUBCUTANEOUS at 21:51

## 2017-10-24 RX ADMIN — SODIUM CHLORIDE 75 ML/HR: 900 INJECTION, SOLUTION INTRAVENOUS at 05:58

## 2017-10-24 RX ADMIN — HYDROCHLOROTHIAZIDE 25 MG: 25 TABLET ORAL at 09:24

## 2017-10-24 NOTE — PROGRESS NOTES
Inpatient Daily Progress Note    Subjective:    Patient complains only of hunger. Pain is well controlled. He has not had nausea, has not vomitted, has not passed flatus, and has not had a bowel movement, but feels that he will soon. Tolerating clear liquids. Objective:     Physical Exam:  Visit Vitals    BP (!) 168/92 (BP 1 Location: Right arm, BP Patient Position: At rest)    Pulse (!) 101    Temp 97.9 °F (36.6 °C)    Resp 15    Ht 5' 8.5\" (1.74 m)    Wt 54.4 kg (120 lb)    SpO2 93%    BMI 17.98 kg/m2       Gen: Well developed, well nourished 81 y.o. male in no acute distress  Resp: clear to auscultation bilaterally, no wheezes   Cardio: Regular rate and rhythm, no murmurs, clicks, gallops or rubs, no edema  Abdomen: soft, nontender, nondistended, active bowel sounds, no hernias, incision healing well, no drainage or erythema  Psych: alert and oriented to person, place and time      No results found for this or any previous visit (from the past 12 hour(s)).     Assessment:     Paulie Gentile is a 81 y.o. male s/p exlap with lysis of adhesions, doing well, awaiting full resolution of postoperative ileus      Plan:   -encouarge ambulation  -replete lytes  -advance to full liquids; if tolerates well and has return of bowel function anticipate discharge home tomorrow

## 2017-10-24 NOTE — PROGRESS NOTES
1935 Received shift report from Chelsie Gusman RN. Pt lying in bed with no complaints. Bed in lowest position, call bell within reach, will continue to monitor. 2045 Pt complains of pain, asked for pain med to be administered in 1hr.     2145 Pt complains of pain, administered pain med. 2345 Pt's BP elevated (196/105), administered Hydralazine. Pt also complains of pain, administered scheduled Toradol. 0130 Pt having bouts of nausea, does not want anti-nausea meds. 0230 Pt complains of pain, administered pain med.    0600 Pt also complains of pain, administered scheduled Toradol. Bedside and Verbal shift change report given to Josee Gibbons RN (oncoming nurse) by Inez Laird RN (offgoing nurse). Report included the following information SBAR, Kardex, Intake/Output, MAR and Recent Results.

## 2017-10-24 NOTE — PROGRESS NOTES
Problem: Falls - Risk of  Goal: *Absence of Falls  Document Marco Fall Risk and appropriate interventions in the flowsheet.    Outcome: Progressing Towards Goal  Fall Risk Interventions:  Mobility Interventions: Patient to call before getting OOB, Utilize gait belt for transfers/ambulation         Medication Interventions: Evaluate medications/consider consulting pharmacy    Elimination Interventions: Call light in reach, Patient to call for help with toileting needs

## 2017-10-24 NOTE — ROUTINE PROCESS
Bedside and Verbal shift change report given to Radha Green RN (oncoming nurse) by Beryle Heller, RN(offgoing nurse). Report included the following information SBAR, Kardex and MAR.

## 2017-10-24 NOTE — PROGRESS NOTES
Problem: Falls - Risk of  Goal: *Absence of Falls  Document Marco Fall Risk and appropriate interventions in the flowsheet.    Outcome: Progressing Towards Goal  Fall Risk Interventions:  Mobility Interventions: Patient to call before getting OOB         Medication Interventions: Evaluate medications/consider consulting pharmacy    Elimination Interventions: Patient to call for help with toileting needs, Toileting schedule/hourly rounds, Urinal in reach

## 2017-10-24 NOTE — PROGRESS NOTES
1915 Pt received after report given from [address] Pt resting in bed wife at bedside no c/o distress. Staples to abd c/d/i  2300 Pt resting in bed. No c/o distress  0200 Pt resting in bed no sighs of distress. 0430 Pt resting in bed    Bedside and Verbal shift change report given to [address]   (oncoming nurse) by Sushil Olguin RN (offgoing nurse). Report included the following information SBAR, Kardex and MAR.

## 2017-10-24 NOTE — PROGRESS NOTES
NUTRITION follow-up/Plan of care     RECOMMENDATIONS:   1. GI soft diet  2. Ensure BID  3. Monitor weight and PO intake  4. RD to follow    GOALS:   1. Ongoing: PO intake meets >75% of protein/calorie needs by 10/27  2. Ongoing: Maintain weight/gradual weight gain (1-2 lb) by 10/31    Assessment:   Per BMI of 18.0, weight is in the underweight classification. PO intake is poor. Labs noted. Pt with hypokalemia. Nutrition recommendations listed. RD to follow. Nutrition Risk:  [x]   High []  Moderate [] Low    Subjective/objective:   Patient admitted with SBO. He had lysis of adhesions and reduction of internal hernia on 10/22. Per pt, weight is usually around 125 lb. Pt denies N/V and tolerating clear liquid. He is hungry and he is asking for more food. Information Obtained From:   [x] Chart Review  [x] Patient  [] Family/Caregiver  [] Nurse/Physician   [] Patient Rounds/Interdisciplinary Meeting    Diet: Clear liquid  Patient Vitals for the past 100 hrs:   % Diet Eaten   10/22/17 1849 0 %   10/22/17 0800 0 %   10/21/17 1750 0 %   10/21/17 0715 0 %     Medications: [x] Reviewed (KCl, 0.9%NaCL: 75 ml/hr)  Encounter Diagnoses     ICD-10-CM ICD-9-CM   1. Small bowel obstruction K56.609 560.9   2.  Acute abdominal pain in right lower quadrant R10.31 789.03     338.19     Past Medical History:   Diagnosis Date    Hypertension      Labs:  Lab Results   Component Value Date/Time    Sodium 138 10/24/2017 12:00 AM    Potassium 3.1 10/24/2017 12:00 AM    Chloride 101 10/24/2017 12:00 AM    CO2 30 10/24/2017 12:00 AM    Anion gap 7 10/24/2017 12:00 AM    Glucose 102 10/24/2017 12:00 AM    BUN 6 10/24/2017 12:00 AM    Creatinine 0.59 10/24/2017 12:00 AM    Calcium 8.0 10/24/2017 12:00 AM    Magnesium 2.4 10/24/2017 12:00 AM    Phosphorus 3.6 10/21/2017 03:15 AM    Albumin 3.4 10/22/2017 03:30 AM     Anthropometrics: BMI (calculated): 18 Last 3 Recorded Weights in this Encounter    10/20/17 0831   Weight: 54.4 kg (120 lb) Ht Readings from Last 1 Encounters:   10/20/17 5' 8.5\" (1.74 m)     []  Weight Loss  []  Weight Gain  []  Weight Stable   [x]  New wt n/a on record     Estimated Nutrition Needs:   1900 Kcals/day  Protein (g): 86 g    Nutrition Problems Identified:   [x] Suboptimal PO intake   [] Food Allergies  [] Difficulty chewing/swallowing/poor dentition  [] Constipation/Diarrhea   [] Nausea/Vomiting   [] None  [] Other:     Plan:   [x] Therapeutic Diet  []  Obtained/adjusted food preferences/tolerances and/or snacks options   []  Supplements added   [] Occupational therapy following for feeding techniques  []  HS snack added   []  Modify diet texture   []  Modify diet for food allergies   []  Educate patient   []  Assist with menu selection   [x]  Monitor PO intake on meal rounds   [x]  Continue inpatient monitoring and intervention   []  Participated in discharge planning/Interdisciplinary rounds/Team meetings   []  Other:     Education Needs:   [] Not appropriate for teaching at this time due to:   [x] Identified and addressed    Nutrition Monitoring and Evaluation:   [x] Continue inpatient monitoring and interventions    [] Other:     Noemi Harris, [address] [place] [address]  Pager: [phone]

## 2017-10-24 NOTE — PROGRESS NOTES
9017 - assessment completed. AOx4. VS with elevated bp.    0122 - NP Gopal Weiss and Dr. Rehana Lawler rounding in room. Pt resting in bed.    1215 - observed OT working with pt. Up in chair    1330 - observed pt ambulating in hallway with PT. No distress noted. 1542 - pt resting in bed watching TV. No distress noted. 80 - observed pt's wife visiting in room. Pt up to side of bed. No distress noted. 1925 - pt reports that he has thrown up a good amount of emesis. Unable to observe as he used the toilet. Advised pt to call if this happens again.

## 2017-10-24 NOTE — PROGRESS NOTES
Problem: Falls - Risk of  Goal: *Absence of Falls  Document Marco Fall Risk and appropriate interventions in the flowsheet.    Fall Risk Interventions:  Mobility Interventions: Patient to call before getting OOB         Medication Interventions: Evaluate medications/consider consulting pharmacy    Elimination Interventions: Patient to call for help with toileting needs, Toileting schedule/hourly rounds, Urinal in reach

## 2017-10-24 NOTE — PROGRESS NOTES
Problem: Mobility Impaired (Adult and Pediatric)  Goal: *Acute Goals and Plan of Care (Insert Text)  Patient will demonstrate safety and independence of ambulation on 5 stairs and on level ground for 300 fett   Outcome: Resolved/Met Date Met: 10/24/17  physical Therapy EVALUATION and Discharge    Patient: Abdelrahman Minor (23 y.o. male)  Date: 10/24/2017  Primary Diagnosis: Ileus ([address]  small bowel obstruction  Procedure(s) (LRB):  Exploratory laparotomy, Lysis of adhesions, Reduction of internal hernia.  (N/A) 2 Days Post-Op   Precautions:   Fall    ASSESSMENT AND RECOMMENDATIONS:  Based on the objective data described below, the patient presents with good mobility and function with safe bed mobility, transfers and ambulation. Patient educated on the importance of ambulation and maintaining current function verbalized and demonstrated understanding  Reported pain 6-7/10 nursing informed, Jono Chavez RN need for medication. .  As a result skilled physical therapy is not indicated at this time. Discharge Recommendations: None   Further Equipment Recommendations for Discharge: N/A      SUBJECTIVE:   Patient stated I can do everything .     OBJECTIVE DATA SUMMARY:     Past Medical History:   Diagnosis Date    Hypertension      Past Surgical History:   Procedure Laterality Date    ABDOMEN SURGERY PROC UNLISTED      APPENDECTOMY      HX APPENDECTOMY      HX COLONOSCOPY  1/28/2015    Repeat colonoscopy in 5 yrs per Dr. Manzano Jairo     Barriers to Learning/Limitations: None  Compensate with: visual, verbal, tactile, kinesthetic cues/model  GCODES(GP):Mobility  Current  CI= 1-19%   Goal  CI= 1-19%  D/C  CI= 1-19%. The severity rating is based on the Other [employer] Balance Scale4+/5  [employer] Balance Scale4+/5  0: Pt performs 25% or less of standing activity (Max assist) CN, 100% impaired. 1: Pt supports self with upper extremities but requires therapist assistance.  Pt performs 25-50% of effort (Mod assist) CM, 80% to <100% impaired. 1+: Pt supports self with upper extremities but requires therapist assistance. Pt performs >50% effort. (Min assist). CL, 60% to <80% impaired. 2: Pt supports self independently with both upper extremities (walker, crutches, parallel bars). CL, 60% to <80% impaired. 2+: Pt support self independently with 1 upper extremity (cane, crutch, 1 parallel bar). CK, 40% to <60% impaired. 3: Pt stands without upper extremity support for up to 30 seconds. CK, 40% to <60% impaired. 3+: Pt stands without upper extremity support for 30 seconds or greater. CJ, 20% to <40% impaired. 4: Pt independently moves and returns center of gravity 1-2 inches in one plane. CJ, 20% to <40% impaired. 4+: Pt independently moves and returns center of gravity 1-2 inches in multiple planes. CI, 1% to <20% impaired. 5: Pt independently moves and returns center of gravity in all planes greater than 2 inches. CH, 0% impaired. Eval Complexity: History: HIGH Complexity :3+ comorbidities / personal factors will impact the outcome/ POC Exam:MEDIUM Complexity : 3 Standardized tests and measures addressing body structure, function, activity limitation and / or participation in recreation  Presentation: LOW Complexity : Stable, uncomplicated  Clinical Decision Making:Low Complexity [facility] Standing Balance Scale4+/5 Overall Complexity:LOW   Prior Level of Function/Home Situation: independent   Home Situation  Home Environment: Apartment  # Steps to Enter: 0  One/Two Story Residence: One story  Living Alone: Yes  Support Systems: Friends \ neighbors  Patient Expects to be Discharged to[de-identified] Apartment  Current DME Used/Available at Home: Cane, straight, Raised toilet seat, Grab bars  Tub or Shower Type: Shower (has grab bars; no shower chair)  Critical Behavior:  Neurologic State: Alert  Orientation Level: Oriented X4  Cognition: Appropriate decision making; Appropriate for age attention/concentration; Appropriate safety awareness; Follows commands  Safety/Judgement: Awareness of environment; Fall prevention  Psychosocial  Patient Behaviors: Calm; Cooperative  Purposeful Interaction: Yes  Pt Identified Daily Priority: Clinical issues (comment)  Caritas Process: Nurture loving kindness;Establish trust;Create healing environment  Caring Interventions: Reassure; Therapeutic modalities  Reassure: Therapeutic listening; Informing;Caring rounds  Therapeutic Modalities: Humor  Skin Condition/Temp: Dry;Warm     Skin Integrity: Incision (comment)  Skin Integumentary  Skin Color: Appropriate for ethnicity  Skin Condition/Temp: Dry;Warm  Skin Integrity: Incision (comment)  Turgor: Non-tenting  Hair Growth: Present  Varicosities: Absent  Strength:    Strength: Within functional limits (both legs )  Range Of Motion:  AROM: Within functional limits  Functional Mobility:  Bed Mobility:  Supine to Sit: Modified independent  Sit to Supine: Modified independent  Transfers:  Sit to Stand: Modified independent  Stand to Sit: Modified independent  Balance:   Sitting: Intact  Standing: Impaired  Standing - Static: Good  Standing - Dynamic : Fair (fair+)  Ambulation/Gait Training:  Distance (ft): 300 Feet (ft)  Assistive Device:  (push IV pole I'ly)  Ambulation - Level of Assistance: Modified independent  Gait Description (WDL): Exceptions to WDL  Gait Abnormalities: Antalgic; Path deviations  Base of Support: Center of gravity altered  Speed/Sole: Fluctuations  Interventions: Safety awareness training;Verbal cues; Visual/Demos  Pain:  Pain Scale 1: Numeric (0 - 10)  Activity Tolerance:   good  Please refer to the flowsheet for vital signs taken during this treatment.   After treatment:   []         Patient left in no apparent distress sitting up in chair  [x]         Patient left in no apparent distress in bed  [x]         Call bell left within reach  [x]         Nursing notified  []         Caregiver present  [] Bed alarm activated    COMMUNICATION/EDUCATION: verbalized and demonstrated understanding. [x]         Fall prevention education was provided and the patient/caregiver indicated understanding. [x]         Patient/family have participated as able in goal setting and plan of care. [x]         Patient/family agree to work toward stated goals and plan of care. []         Patient understands intent and goals of therapy, but is neutral about his/her participation. []         Patient is unable to participate in goal setting and plan of care.     Thank you for this referral.  Jose Alejandro Guzmán, PT   Time Calculation: 25 mins

## 2017-10-24 NOTE — PROGRESS NOTES
[facility]  Hospitalist Division        Inpatient Daily Progress Note    Daily progress Note    Patient: Juan Carlos Hedrick MRN: [id number]  CSN: [id number]    YOB: 1945  Age: 81 y.o. Sex: male    DOA: 10/20/2017 LOS:  LOS: 4 days                    Chief Complaint:  Abdominal pain, SBO       Subjective:      C/o incisional pain. Denies passing flatus, nausea or vomiting. Objective:      Visit Vitals    BP (!) 168/92 (BP 1 Location: Right arm, BP Patient Position: At rest)    Pulse (!) 101    Temp 97.9 °F (36.6 °C)    Resp 15    Ht 5' 8.5\" (1.74 m)    Wt 54.4 kg (120 lb)    SpO2 93%    BMI 17.98 kg/m2           Physical Exam:  General appearance: alert, cooperative, no distress, appears stated age  Lungs: clear to auscultation throughout   Heart: regular rate and rhythm, S1, S2 normal, no murmur, click, rub or gallop  Abdomen: soft, appropriately TTP. Incision well approximated, staples CDI.  Bowel sounds normal    Extremities: extremities normal, atraumatic, no cyanosis or edema  Skin: Skin color, texture, turgor normal. No rashes or lesions  Neurologic: moves all 4 extremities equally   PSY: appropriately behaved      Intake and Output:  Current Shift:  10/24 0701 - 10/24 1900  In: -   Out: 300 [Urine:300]  Last three shifts:  10/22 1901 - 10/24 0700  In: 2736.3 [P.O.:940; I.V.:1776.3]  Out: 900 [Urine:400]    Recent Results (from the past 24 hour(s))   CBC WITH AUTOMATED DIFF    Collection Time: 10/24/17 12:00 AM   Result Value Ref Range    WBC 6.6 4.6 - 13.2 K/uL    RBC 3.86 (L) 4.70 - 5.50 M/uL    HGB 11.3 (L) 13.0 - 16.0 g/dL    HCT 32.9 (L) 36.0 - 48.0 %    MCV 85.2 74.0 - 97.0 FL    MCH 29.3 24.0 - 34.0 PG    MCHC 34.3 31.0 - 37.0 g/dL    RDW 17.7 (H) 11.6 - 14.5 %    PLATELET 883 471 - 050 K/uL    MPV 9.8 9.2 - 11.8 FL    NEUTROPHILS 62 40 - 73 %    LYMPHOCYTES 23 21 - 52 %    MONOCYTES 14 (H) 3 - 10 %    EOSINOPHILS 1 0 - 5 %    BASOPHILS 0 0 - 2 % ABS. NEUTROPHILS 4.1 1.8 - 8.0 K/UL    ABS. LYMPHOCYTES 1.5 0.9 - 3.6 K/UL    ABS. MONOCYTES 0.9 0.05 - 1.2 K/UL    ABS. EOSINOPHILS 0.1 0.0 - 0.4 K/UL    ABS. BASOPHILS 0.0 0.0 - 0.06 K/UL    DF AUTOMATED     METABOLIC PANEL, BASIC    Collection Time: 10/24/17 12:00 AM   Result Value Ref Range    Sodium 138 136 - 145 mmol/L    Potassium 3.1 (L) 3.5 - 5.5 mmol/L    Chloride 101 100 - 108 mmol/L    CO2 30 21 - 32 mmol/L    Anion gap 7 3.0 - 18 mmol/L    Glucose 102 (H) 74 - 99 mg/dL    BUN 6 (L) 7.0 - 18 MG/DL    Creatinine 0.59 (L) 0.6 - 1.3 MG/DL    BUN/Creatinine ratio 10 (L) 12 - 20      GFR est AA >60 >60 ml/min/1.73m2    GFR est non-AA >60 >60 ml/min/1.73m2    Calcium 8.0 (L) 8.5 - 10.1 MG/DL   MAGNESIUM    Collection Time: 10/24/17 12:00 AM   Result Value Ref Range    Magnesium 2.4 1.6 - 2.6 mg/dL           Lab Results   Component Value Date/Time    Glucose 102 10/24/2017 12:00 AM    Glucose 111 10/23/2017 05:25 AM    Glucose 87 10/22/2017 03:30 AM    Glucose 101 10/21/2017 03:15 AM    Glucose 128 10/20/2017 08:55 AM        Assessment/Plan:     Patient Active Problem List   Diagnosis Code    SBO (small bowel obstruction) K56.609    HTN (hypertension) I10    URI (upper respiratory infection) J06.9    Small bowel obstruction K56.609    Ileus (HCC) K56.7       A/P:  -SBO 2/2 internal hernia: s/p Exploratory laparotomy, lysis of adhesions, reduction of internal hernia- diet per Surgery   -HTN: resume HCTZ. Add Norvasc. Hydralazine PRN for SBP >160  -Hypokalemia: replete.  Monitor BMP closely   -DVT prophylaxis: Lovenox  -PT/OT   -Patient needs to be out of bed to chair and walking as much as possible   -Encourage IS use         BRYAN [facility]bin[facility]  Pager:  [phone]  Office:  [phone]

## 2017-10-24 NOTE — PROGRESS NOTES
Discharge plan is to return home. Tolerating clear liquid diet. Discharge plan is to return home with home care as indicated.   Greg Nolasco RN

## 2017-10-24 NOTE — PROGRESS NOTES
Bedside and Verbal shift change report given to Belkis Manzano RN (oncoming nurse) by Masood Martinez RN  (offgoing nurse). Report included the following information SBAR, Kardex and MAR.

## 2017-10-24 NOTE — PROGRESS NOTES
Problem: Self Care Deficits Care Plan (Adult)  Goal: *Acute Goals and Plan of Care (Insert Text)  Outcome: Resolved/Met Date Met: 10/24/17  Occupational Therapy EVALUATION/discharge    Patient: Deric Nair (25 y.o. male)  Date: 10/24/2017  Primary Diagnosis: Ileus ([address]  small bowel obstruction  Procedure(s) (LRB):  Exploratory laparotomy, Lysis of adhesions, Reduction of internal hernia.  (N/A) 2 Days Post-Op   Precautions:  Fall    ASSESSMENT AND RECOMMENDATIONS:  Based on the objective data described below, the patient presents with good BUE strength & AROM, mod I in transfers, and mod I/independence in basic self care tasks. Pt up in chair on arrival, c/o 3/10 pain. Independent for LB dressing. Mod I to stand and maneuver to bathroom in prep for toileting tasks. Pt engaged in bed mobility with skilled instruction on proper log roll technique to ensure positioning, skin/suture integrity, and reduce pain. Pt demo'd log roll x3 trials with min vc's for sequencing & BUE positioning in prep for EOB/OOB activities. Pt verbalized relief with discomfort utilizing this technique. Pt left supine with HOB elevated, needs within reach, 3/10 pain. Skilled therapy not indicated at this time; performing at [facility] with regard to ADLs and functional mobility. Pt educated on activity pacing and energy conservation techniques; verbalized understanding. Min Cm RN aware. Skilled occupational therapy is not indicated at this time. Discharge Recommendations: Home Health vs. None  Further Equipment Recommendations for Discharge: shower chair     SUBJECTIVE:   Patient stated People tell me I need to slow down.     OBJECTIVE DATA SUMMARY:     Past Medical History:   Diagnosis Date    Hypertension      Past Surgical History:   Procedure Laterality Date    ABDOMEN SURGERY PROC UNLISTED      APPENDECTOMY      HX APPENDECTOMY      HX COLONOSCOPY  1/28/2015    Repeat colonoscopy in 5 yrs per Dr. Rocky Dawson     Barriers to Learning/Limitations: None  Compensate with: visual, verbal, tactile, kinesthetic cues/model    G CODES:  Self Care  Current  CI= 1-19%   Goal  CI= 1-19%   D/C  CI= 1-19%. The severity rating is based on the Other Functional Assessment, MMT, ROM    Eval Complexity: History: LOW Complexity : Brief history review ; Examination: LOW Complexity : 1-3 performance deficits relating to physical, cognitive , or psychosocial skils that result in activity limitations and / or participation restrictions ; Decision Making:LOW Complexity : No comorbidities that affect functional and no verbal or physical assistance needed to complete eval tasks      Prior Level of Function/Home Situation: Pt was independent with basic self care tasks and functional mobility PTA. Home Situation  Home Environment: Apartment  # Steps to Enter: 0  One/Two Story Residence: One story  Living Alone: Yes  Support Systems: Friends \ neighbors  Patient Expects to be Discharged to[de-identified] Apartment  Current DME Used/Available at Home: Cane, straight, Raised toilet seat, Grab bars  Tub or Shower Type: Shower (has grab bars; no shower chair)  [x]     Right hand dominant   []     Left hand dominant    Cognitive/Behavioral Status:  Neurologic State: Alert  Orientation Level: Oriented X4  Cognition: Appropriate decision making; Appropriate for age attention/concentration; Appropriate safety awareness; Follows commands  Safety/Judgement: Awareness of environment; Fall prevention     Skin: Intact (BUEs)  Edema: None noted (BUEs)    Vision/Perceptual:    Acuity: Within Defined Limits      Coordination:  Coordination: Within functional limits (BUEs)  Fine Motor Skills-Upper: Right Intact; Left Intact    Gross Motor Skills-Upper: Right Intact; Left Intact     Balance:  Sitting: Intact  Standing: Impaired  Standing - Static: Good  Standing - Dynamic : Fair (Fair+)     Strength:  Strength:  Within functional limits (BUEs: 5/5)    Range of Motion:  AROM: Within functional limits (BUEs: full shoulder/elbow flexion)    Functional Mobility and Transfers for ADLs:  Bed Mobility:  Supine to Sit: Modified independent  Sit to Supine: Modified independent    Transfers:  Sit to Stand: Modified independent   Toilet Transfer : Modified independent    ADL Assessment:  Feeding: Independent  Oral Facial Hygiene/Grooming: Independent  Bathing: Modified independent  Upper Body Dressing: Independent  Lower Body Dressing: Modified independent  Toileting: Modified independent    Cognitive Retraining  Safety/Judgement: Awareness of environment; Fall prevention    Therapeutic Activity:  Pt engaged in bed mobility with skilled instruction on proper log roll technique to ensure positioning, skin integrity, and reduce pain/discomfort when transferring in/out of bed. Pt demo'd log roll x3 trials with min vc's for sequencing and BUE positioning in prep for EOB/OOB activities. Pt verbalized relief with discomfort utilizing this technique. Pain:  Pre-treatment pain level: 3/10  Post treatment pain level: 3/10  Pain Scale 1: Numeric (0 - 10)    Activity Tolerance:  Fair+  Please refer to the flowsheet for vital signs taken during this treatment. After treatment:   []  Patient left in no apparent distress sitting up in chair  [x]  Patient left in no apparent distress in bed  [x]  Call bell left within reach  [x]  Nursing notified  []  Caregiver present  []  Bed alarm activated    COMMUNICATION/EDUCATION: Pt educated on role of OT, POC, and home safety. He verbalized understanding. Communication/Collaboration:  [x]      Home safety education was provided and the patient/caregiver indicated understanding. [x]      Patient/family have participated as able and agree with findings and recommendations. []      Patient is unable to participate in plan of care at this time.     Janeth Abbott MS OTR/L  Time Calculation: 19 mins

## 2017-10-25 ENCOUNTER — APPOINTMENT (OUTPATIENT)
Dept: GENERAL RADIOLOGY | Age: 72
DRG: 353 | End: 2017-10-25
Attending: SURGERY
Payer: MEDICARE

## 2017-10-25 LAB
ANION GAP SERPL CALC-SCNC: 7 MMOL/L (ref 3–18)
BASOPHILS # BLD: 0 K/UL (ref 0–0.06)
BASOPHILS NFR BLD: 0 % (ref 0–2)
BUN SERPL-MCNC: 7 MG/DL (ref 7–18)
BUN/CREAT SERPL: 11 (ref 12–20)
CALCIUM SERPL-MCNC: 8.8 MG/DL (ref 8.5–10.1)
CHLORIDE SERPL-SCNC: 97 MMOL/L (ref 100–108)
CO2 SERPL-SCNC: 33 MMOL/L (ref 21–32)
CREAT SERPL-MCNC: 0.64 MG/DL (ref 0.6–1.3)
DIFFERENTIAL METHOD BLD: ABNORMAL
EOSINOPHIL # BLD: 0 K/UL (ref 0–0.4)
EOSINOPHIL NFR BLD: 0 % (ref 0–5)
ERYTHROCYTE [DISTWIDTH] IN BLOOD BY AUTOMATED COUNT: 18 % (ref 11.6–14.5)
GLUCOSE SERPL-MCNC: 96 MG/DL (ref 74–99)
HCT VFR BLD AUTO: 33.8 % (ref 36–48)
HGB BLD-MCNC: 11.4 G/DL (ref 13–16)
LYMPHOCYTES # BLD: 1.1 K/UL (ref 0.9–3.6)
LYMPHOCYTES NFR BLD: 15 % (ref 21–52)
MCH RBC QN AUTO: 29.1 PG (ref 24–34)
MCHC RBC AUTO-ENTMCNC: 33.7 G/DL (ref 31–37)
MCV RBC AUTO: 86.2 FL (ref 74–97)
MONOCYTES # BLD: 1 K/UL (ref 0.05–1.2)
MONOCYTES NFR BLD: 13 % (ref 3–10)
NEUTS SEG # BLD: 5.3 K/UL (ref 1.8–8)
NEUTS SEG NFR BLD: 72 % (ref 40–73)
PLATELET # BLD AUTO: 281 K/UL (ref 135–420)
PMV BLD AUTO: 10.1 FL (ref 9.2–11.8)
POTASSIUM SERPL-SCNC: 4.1 MMOL/L (ref 3.5–5.5)
RBC # BLD AUTO: 3.92 M/UL (ref 4.7–5.5)
SODIUM SERPL-SCNC: 137 MMOL/L (ref 136–145)
WBC # BLD AUTO: 7.4 K/UL (ref 4.6–13.2)

## 2017-10-25 PROCEDURE — 74011250636 HC RX REV CODE- 250/636: Performed by: HOSPITALIST

## 2017-10-25 PROCEDURE — 74011250637 HC RX REV CODE- 250/637: Performed by: SURGERY

## 2017-10-25 PROCEDURE — 77030020263 HC SOL INJ SOD CL0.9% LFCR 1000ML

## 2017-10-25 PROCEDURE — 74011250636 HC RX REV CODE- 250/636: Performed by: SURGERY

## 2017-10-25 PROCEDURE — 65270000029 HC RM PRIVATE

## 2017-10-25 PROCEDURE — 80048 BASIC METABOLIC PNL TOTAL CA: CPT | Performed by: NURSE PRACTITIONER

## 2017-10-25 PROCEDURE — 85025 COMPLETE CBC W/AUTO DIFF WBC: CPT | Performed by: NURSE PRACTITIONER

## 2017-10-25 PROCEDURE — 36415 COLL VENOUS BLD VENIPUNCTURE: CPT | Performed by: NURSE PRACTITIONER

## 2017-10-25 PROCEDURE — 74011250637 HC RX REV CODE- 250/637: Performed by: NURSE PRACTITIONER

## 2017-10-25 PROCEDURE — 74020 XR ABD FLAT/ ERECT: CPT

## 2017-10-25 PROCEDURE — 74011250636 HC RX REV CODE- 250/636: Performed by: NURSE PRACTITIONER

## 2017-10-25 RX ORDER — OXYCODONE AND ACETAMINOPHEN 5; 325 MG/1; MG/1
1-2 TABLET ORAL
Status: DISCONTINUED | OUTPATIENT
Start: 2017-10-25 | End: 2017-10-27 | Stop reason: HOSPADM

## 2017-10-25 RX ORDER — ACETAMINOPHEN 325 MG/1
650 TABLET ORAL
Status: DISCONTINUED | OUTPATIENT
Start: 2017-10-25 | End: 2017-10-27 | Stop reason: HOSPADM

## 2017-10-25 RX ADMIN — HYDROCHLOROTHIAZIDE 25 MG: 25 TABLET ORAL at 09:18

## 2017-10-25 RX ADMIN — SODIUM CHLORIDE 100 ML/HR: 900 INJECTION, SOLUTION INTRAVENOUS at 17:42

## 2017-10-25 RX ADMIN — HYDRALAZINE HYDROCHLORIDE 10 MG: 20 INJECTION INTRAMUSCULAR; INTRAVENOUS at 23:28

## 2017-10-25 RX ADMIN — OXYCODONE HYDROCHLORIDE AND ACETAMINOPHEN 1 TABLET: 5; 325 TABLET ORAL at 17:42

## 2017-10-25 RX ADMIN — HYDROMORPHONE HYDROCHLORIDE 1 MG: 1 INJECTION, SOLUTION INTRAMUSCULAR; INTRAVENOUS; SUBCUTANEOUS at 02:22

## 2017-10-25 RX ADMIN — OXYCODONE HYDROCHLORIDE AND ACETAMINOPHEN 1 TABLET: 5; 325 TABLET ORAL at 21:35

## 2017-10-25 RX ADMIN — SODIUM CHLORIDE 100 ML/HR: 900 INJECTION, SOLUTION INTRAVENOUS at 09:33

## 2017-10-25 RX ADMIN — AMLODIPINE BESYLATE 5 MG: 5 TABLET ORAL at 09:20

## 2017-10-25 RX ADMIN — ENOXAPARIN SODIUM 40 MG: 40 INJECTION SUBCUTANEOUS at 17:42

## 2017-10-25 RX ADMIN — KETOROLAC TROMETHAMINE 15 MG: 15 INJECTION, SOLUTION INTRAMUSCULAR; INTRAVENOUS at 05:56

## 2017-10-25 NOTE — PROGRESS NOTES
Problem: Falls - Risk of  Goal: *Absence of Falls  Document Marco Fall Risk and appropriate interventions in the flowsheet.    Outcome: Progressing Towards Goal  Fall Risk Interventions:  Mobility Interventions: Patient to call before getting OOB         Medication Interventions: Teach patient to arise slowly    Elimination Interventions: Patient to call for help with toileting needs, Toileting schedule/hourly rounds, Urinal in reach

## 2017-10-25 NOTE — PROGRESS NOTES
Patient seen and examined. He was doing fairly well until last night and 2 in the morning. He was tolerating liquid diet until early this morning. He had one episode of vomiting. Otherwise he has no nausea. He's passing flatus. He does not feel distended. He has no fever, but he has a mild tachycardia. His abdomen is soft and non tender, and also its non distended. Wound is healing well    Impression/plan:    Most likely the patient has ileus.    We keep NPO Except for sips of clear liquids  Will order a KUB  If the patient had another episode of vomiting or has nausea, then we should replace the NG tube  I would increase the IV fluids slightly  Will follow very closely

## 2017-10-25 NOTE — PROGRESS NOTES
Received bedside shift report from Yue Nam RN. Pt is resting in bed, no complaints of pain. White board updated, call bell within reach. [id number] [phone] Pt left floor with transport for xray. NAD noted. 0908 Pt reports that he had a bowel movement and is feeling better. 0940 Pt states that he is feeling better and wants to try sipping on some ginger ale. Pt also states that he is having some mild pain, but not enough to warrant IV Dilaudid. Pt ambulating to restroom. NAD noted. 1022 Pt sipping on ginger ale, no complaints of nausea, still passing gas. Pt states that he is going to attempt to take a nap. 1316 Pt sleeping in bed. Safety measures in place. 1534 Received verbal orders from Dr. Manuel Marshall for PO PRN Q4H Percocet 5-325 1-2 tab for moderate pain and tylenol 650mg PO PRN for fever, headache, and mild pain with read back. Bedside shift change report given to Cruz Jaramillo RN (oncoming nurse) by Tracie Diaz RN (offgoing nurse). Report included the following information SBAR, Kardex, OR Summary, Intake/Output, MAR and Recent Results.

## 2017-10-25 NOTE — PROGRESS NOTES
[facility]  Hospitalist Division        Inpatient Daily Progress Note    Daily progress Note    Patient: Cayla Rick MRN: [id number]  CSN: [id number]    YOB: 1945  Age: 81 y.o. Sex: male    DOA: 10/20/2017 LOS:  LOS: 5 days                    Chief Complaint:  Abdominal pain, SBO       Subjective:      Reports passing flatus and stool since seeing Dr. Amy Villegas this morning. Objective:      Visit Vitals    /85    Pulse 86    Temp 98.2 °F (36.8 °C)    Resp 18    Ht 5' 8.5\" (1.74 m)    Wt 54.4 kg (120 lb)    SpO2 97%    BMI 17.98 kg/m2           Physical Exam:  General appearance: alert, cooperative, no distress, appears stated age  Lungs: clear to auscultation bilaterally, no wheezes   Heart: regular rate and rhythm, S1, S2 normal, no murmur, click, rub or gallop  Abdomen: soft, appropriately TTP. Octavio CDI. Normoactive bowel sounds    Extremities: extremities normal, atraumatic, no cyanosis or edema  Skin: Skin color, texture, turgor normal. No rashes or lesions  Neurologic: moves all 4 extremities equally   PSY: appropriately behaved      Intake and Output:  Current Shift:  10/25 0701 - 10/25 1900  In: 360 [P.O.:30; I.V.:330]  Out: 100 [Urine:100]  Last three shifts:  10/23 1901 - 10/25 0700  In: 2775 [P.O.:90; I.V.:2685]  Out: 850 [Urine:850]    Recent Results (from the past 24 hour(s))   CBC WITH AUTOMATED DIFF    Collection Time: 10/25/17  5:35 AM   Result Value Ref Range    WBC 7.4 4.6 - 13.2 K/uL    RBC 3.92 (L) 4.70 - 5.50 M/uL    HGB 11.4 (L) 13.0 - 16.0 g/dL    HCT 33.8 (L) 36.0 - 48.0 %    MCV 86.2 74.0 - 97.0 FL    MCH 29.1 24.0 - 34.0 PG    MCHC 33.7 31.0 - 37.0 g/dL    RDW 18.0 (H) 11.6 - 14.5 %    PLATELET 663 867 - 641 K/uL    MPV 10.1 9.2 - 11.8 FL    NEUTROPHILS 72 40 - 73 %    LYMPHOCYTES 15 (L) 21 - 52 %    MONOCYTES 13 (H) 3 - 10 %    EOSINOPHILS 0 0 - 5 %    BASOPHILS 0 0 - 2 %    ABS. NEUTROPHILS 5.3 1.8 - 8.0 K/UL    ABS. LYMPHOCYTES 1.1 0.9 - 3.6 K/UL    ABS. MONOCYTES 1.0 0.05 - 1.2 K/UL    ABS. EOSINOPHILS 0.0 0.0 - 0.4 K/UL    ABS. BASOPHILS 0.0 0.0 - 0.06 K/UL    DF AUTOMATED     METABOLIC PANEL, BASIC    Collection Time: 10/25/17  5:35 AM   Result Value Ref Range    Sodium 137 136 - 145 mmol/L    Potassium 4.1 3.5 - 5.5 mmol/L    Chloride 97 (L) 100 - 108 mmol/L    CO2 33 (H) 21 - 32 mmol/L    Anion gap 7 3.0 - 18 mmol/L    Glucose 96 74 - 99 mg/dL    BUN 7 7.0 - 18 MG/DL    Creatinine 0.64 0.6 - 1.3 MG/DL    BUN/Creatinine ratio 11 (L) 12 - 20      GFR est AA >60 >60 ml/min/1.73m2    GFR est non-AA >60 >60 ml/min/1.73m2    Calcium 8.8 8.5 - 10.1 MG/DL           Lab Results   Component Value Date/Time    Glucose 96 10/25/2017 05:35 AM    Glucose 102 10/24/2017 12:00 AM    Glucose 111 10/23/2017 05:25 AM    Glucose 87 10/22/2017 03:30 AM    Glucose 101 10/21/2017 03:15 AM        Assessment/Plan:     Patient Active Problem List   Diagnosis Code    SBO (small bowel obstruction) K56.609    HTN (hypertension) I10    URI (upper respiratory infection) J06.9    Small bowel obstruction K56.609    Ileus (HCC) K56.7       A/P:  -SBO 2/2 internal hernia: s/p Exploratory laparotomy, lysis of adhesions, reduction of internal hernia- post op ileus- Surgery   -HTN: continue amlodipine, HCTZ.  Hydralazine PRN for SBP >160  -Hypokalemia: resolved   -DVT prophylaxis: Lovenox  -PT/OT   - Continue frequent ambulation and IS use         STIVEN Archuleta 85  Pager:  [phone]  Office:  [phone]

## 2017-10-25 NOTE — PROGRESS NOTES
attempted to complete a follow up visit with patient in room 2206 and do a Spiritual assessment of patient, but found him resting peacefully at this time.     Chaplains will continue to follow and will provide pastoral care on an as needed/requested basis    Chaplain Rafa Herring   Board Certified [address] [place] [address]   [phone]

## 2017-10-26 ENCOUNTER — APPOINTMENT (OUTPATIENT)
Dept: GENERAL RADIOLOGY | Age: 72
DRG: 353 | End: 2017-10-26
Attending: NURSE PRACTITIONER
Payer: MEDICARE

## 2017-10-26 LAB
ANION GAP SERPL CALC-SCNC: 13 MMOL/L (ref 3–18)
BASOPHILS # BLD: 0 K/UL (ref 0–0.06)
BASOPHILS NFR BLD: 0 % (ref 0–2)
BUN SERPL-MCNC: 6 MG/DL (ref 7–18)
BUN/CREAT SERPL: 11 (ref 12–20)
CALCIUM SERPL-MCNC: 8.5 MG/DL (ref 8.5–10.1)
CHLORIDE SERPL-SCNC: 96 MMOL/L (ref 100–108)
CO2 SERPL-SCNC: 27 MMOL/L (ref 21–32)
CREAT SERPL-MCNC: 0.53 MG/DL (ref 0.6–1.3)
DIFFERENTIAL METHOD BLD: ABNORMAL
EOSINOPHIL # BLD: 0.1 K/UL (ref 0–0.4)
EOSINOPHIL NFR BLD: 1 % (ref 0–5)
ERYTHROCYTE [DISTWIDTH] IN BLOOD BY AUTOMATED COUNT: 17.5 % (ref 11.6–14.5)
GLUCOSE SERPL-MCNC: 85 MG/DL (ref 74–99)
HCT VFR BLD AUTO: 32.9 % (ref 36–48)
HGB BLD-MCNC: 11.1 G/DL (ref 13–16)
LYMPHOCYTES # BLD: 1.6 K/UL (ref 0.9–3.6)
LYMPHOCYTES NFR BLD: 18 % (ref 21–52)
MCH RBC QN AUTO: 29.3 PG (ref 24–34)
MCHC RBC AUTO-ENTMCNC: 33.7 G/DL (ref 31–37)
MCV RBC AUTO: 86.8 FL (ref 74–97)
MONOCYTES # BLD: 1.2 K/UL (ref 0.05–1.2)
MONOCYTES NFR BLD: 14 % (ref 3–10)
NEUTS SEG # BLD: 5.7 K/UL (ref 1.8–8)
NEUTS SEG NFR BLD: 67 % (ref 40–73)
PLATELET # BLD AUTO: 310 K/UL (ref 135–420)
PMV BLD AUTO: 9.7 FL (ref 9.2–11.8)
POTASSIUM SERPL-SCNC: 3.4 MMOL/L (ref 3.5–5.5)
RBC # BLD AUTO: 3.79 M/UL (ref 4.7–5.5)
SODIUM SERPL-SCNC: 136 MMOL/L (ref 136–145)
WBC # BLD AUTO: 8.6 K/UL (ref 4.6–13.2)

## 2017-10-26 PROCEDURE — 74011250637 HC RX REV CODE- 250/637: Performed by: SURGERY

## 2017-10-26 PROCEDURE — 80048 BASIC METABOLIC PNL TOTAL CA: CPT | Performed by: NURSE PRACTITIONER

## 2017-10-26 PROCEDURE — 85025 COMPLETE CBC W/AUTO DIFF WBC: CPT | Performed by: NURSE PRACTITIONER

## 2017-10-26 PROCEDURE — 74011250637 HC RX REV CODE- 250/637: Performed by: NURSE PRACTITIONER

## 2017-10-26 PROCEDURE — 74011250636 HC RX REV CODE- 250/636: Performed by: HOSPITALIST

## 2017-10-26 PROCEDURE — 74011250636 HC RX REV CODE- 250/636: Performed by: SURGERY

## 2017-10-26 PROCEDURE — 74020 XR ABD FLAT/ ERECT: CPT

## 2017-10-26 PROCEDURE — 77030020263 HC SOL INJ SOD CL0.9% LFCR 1000ML

## 2017-10-26 PROCEDURE — 65270000029 HC RM PRIVATE

## 2017-10-26 PROCEDURE — 36415 COLL VENOUS BLD VENIPUNCTURE: CPT | Performed by: NURSE PRACTITIONER

## 2017-10-26 PROCEDURE — 74011250636 HC RX REV CODE- 250/636: Performed by: NURSE PRACTITIONER

## 2017-10-26 RX ORDER — MELATONIN
1000 DAILY
Status: DISCONTINUED | OUTPATIENT
Start: 2017-10-26 | End: 2017-10-27 | Stop reason: HOSPADM

## 2017-10-26 RX ORDER — AMLODIPINE BESYLATE 10 MG/1
10 TABLET ORAL DAILY
Status: DISCONTINUED | OUTPATIENT
Start: 2017-10-27 | End: 2017-10-27 | Stop reason: HOSPADM

## 2017-10-26 RX ORDER — METOCLOPRAMIDE HYDROCHLORIDE 5 MG/ML
10 INJECTION INTRAMUSCULAR; INTRAVENOUS EVERY 6 HOURS
Status: DISCONTINUED | OUTPATIENT
Start: 2017-10-26 | End: 2017-10-27 | Stop reason: HOSPADM

## 2017-10-26 RX ADMIN — METOCLOPRAMIDE 10 MG: 5 INJECTION, SOLUTION INTRAMUSCULAR; INTRAVENOUS at 18:20

## 2017-10-26 RX ADMIN — HYDROMORPHONE HYDROCHLORIDE 1 MG: 1 INJECTION, SOLUTION INTRAMUSCULAR; INTRAVENOUS; SUBCUTANEOUS at 00:57

## 2017-10-26 RX ADMIN — HYDRALAZINE HYDROCHLORIDE 10 MG: 20 INJECTION INTRAMUSCULAR; INTRAVENOUS at 10:15

## 2017-10-26 RX ADMIN — METOCLOPRAMIDE 10 MG: 5 INJECTION, SOLUTION INTRAMUSCULAR; INTRAVENOUS at 12:08

## 2017-10-26 RX ADMIN — HYDROCHLOROTHIAZIDE 25 MG: 25 TABLET ORAL at 08:29

## 2017-10-26 RX ADMIN — METOCLOPRAMIDE 10 MG: 5 INJECTION, SOLUTION INTRAMUSCULAR; INTRAVENOUS at 23:32

## 2017-10-26 RX ADMIN — OXYCODONE HYDROCHLORIDE AND ACETAMINOPHEN 2 TABLET: 5; 325 TABLET ORAL at 23:32

## 2017-10-26 RX ADMIN — CHOLECALCIFEROL TAB 25 MCG (1000 UNIT) 1000 UNITS: 25 TAB at 18:20

## 2017-10-26 RX ADMIN — AMLODIPINE BESYLATE 5 MG: 5 TABLET ORAL at 08:29

## 2017-10-26 RX ADMIN — ENOXAPARIN SODIUM 40 MG: 40 INJECTION SUBCUTANEOUS at 18:20

## 2017-10-26 RX ADMIN — METOCLOPRAMIDE 10 MG: 5 INJECTION, SOLUTION INTRAMUSCULAR; INTRAVENOUS at 08:29

## 2017-10-26 NOTE — PROGRESS NOTES
Problem: Falls - Risk of  Goal: *Absence of Falls  Document Marco Fall Risk and appropriate interventions in the flowsheet.    Outcome: Progressing Towards Goal  Fall Risk Interventions:  Mobility Interventions: Communicate number of staff needed for ambulation/transfer         Medication Interventions: Teach patient to arise slowly    Elimination Interventions: Call light in reach

## 2017-10-26 NOTE — PROGRESS NOTES
[facility]  Hospitalist Division        Inpatient Daily Progress Note    Daily progress Note    Patient: Juan Carlos Hedrick MRN: [id number]  CSN: [id number]    YOB: 1945  Age: 81 y.o. Sex: male    DOA: 10/20/2017 LOS:  LOS: 6 days                    Chief Complaint:  Abdominal pain, SBO       Subjective:      Continues to pass flatus. C/o decreased appetite this morning     Objective:      Visit Vitals    /84    Pulse 94    Temp 98.1 °F (36.7 °C)    Resp 15    Ht 5' 8.5\" (1.74 m)    Wt 54.4 kg (120 lb)    SpO2 95%    BMI 17.98 kg/m2           Physical Exam:  General appearance: alert, cooperative, no distress, appears stated age  Lungs: clear to auscultation throughout   Heart: regular rate and rhythm, S1, S2 normal, no murmur, click, rub or gallop  Abdomen: soft, non distended, mildy TTP. Octavio CDI.  Normoactive bowel sounds    Extremities: extremities normal, atraumatic, no cyanosis or edema  Skin: Skin color, texture, turgor normal. No rashes or lesions  Neurologic: moves all 4 extremities equally   PSY: appropriately behaved      Intake and Output:  Current Shift:     Last three shifts:  10/24 1901 - 10/26 0700  In: 3642.5 [P.O.:360; I.V.:3282.5]  Out: 325 [Urine:325]    Recent Results (from the past 24 hour(s))   METABOLIC PANEL, BASIC    Collection Time: 10/26/17  4:20 AM   Result Value Ref Range    Sodium 136 136 - 145 mmol/L    Potassium 3.4 (L) 3.5 - 5.5 mmol/L    Chloride 96 (L) 100 - 108 mmol/L    CO2 27 21 - 32 mmol/L    Anion gap 13 3.0 - 18 mmol/L    Glucose 85 74 - 99 mg/dL    BUN 6 (L) 7.0 - 18 MG/DL    Creatinine 0.53 (L) 0.6 - 1.3 MG/DL    BUN/Creatinine ratio 11 (L) 12 - 20      GFR est AA >60 >60 ml/min/1.73m2    GFR est non-AA >60 >60 ml/min/1.73m2    Calcium 8.5 8.5 - 10.1 MG/DL   CBC WITH AUTOMATED DIFF    Collection Time: 10/26/17  4:20 AM   Result Value Ref Range    WBC 8.6 4.6 - 13.2 K/uL    RBC 3.79 (L) 4.70 - 5.50 M/uL    HGB 11.1 (L) 13.0 - 16.0 g/dL    HCT 32.9 (L) 36.0 - 48.0 %    MCV 86.8 74.0 - 97.0 FL    MCH 29.3 24.0 - 34.0 PG    MCHC 33.7 31.0 - 37.0 g/dL    RDW 17.5 (H) 11.6 - 14.5 %    PLATELET 363 881 - 739 K/uL    MPV 9.7 9.2 - 11.8 FL    NEUTROPHILS 67 40 - 73 %    LYMPHOCYTES 18 (L) 21 - 52 %    MONOCYTES 14 (H) 3 - 10 %    EOSINOPHILS 1 0 - 5 %    BASOPHILS 0 0 - 2 %    ABS. NEUTROPHILS 5.7 1.8 - 8.0 K/UL    ABS. LYMPHOCYTES 1.6 0.9 - 3.6 K/UL    ABS. MONOCYTES 1.2 0.05 - 1.2 K/UL    ABS. EOSINOPHILS 0.1 0.0 - 0.4 K/UL    ABS. BASOPHILS 0.0 0.0 - 0.06 K/UL    DF AUTOMATED             Lab Results   Component Value Date/Time    Glucose 85 10/26/2017 04:20 AM    Glucose 96 10/25/2017 05:35 AM    Glucose 102 10/24/2017 12:00 AM    Glucose 111 10/23/2017 05:25 AM    Glucose 87 10/22/2017 03:30 AM        Assessment/Plan:     Patient Active Problem List   Diagnosis Code    SBO (small bowel obstruction) K56.609    HTN (hypertension) I10    URI (upper respiratory infection) J06.9    Small bowel obstruction K56.609    Ileus (HCC) K56.7       A/P:  - SBO 2/2 internal hernia: s/p Exploratory laparotomy, lysis of adhesions, reduction of internal hernia  - HTN: continue amlodipine, HCTZ.  Hydralazine PRN for SBP >160  - Hypokalemia: resolved   - DVT prophylaxis: Lovenox  - Continue frequent ambulation and IS use   -1630: decreased appetite today, decreased flatus- Abd xray ordered         BRYAN Srinivasan-CAROL Wells 85  Pager:  [phone]  Office:  [phone]

## 2017-10-26 NOTE — ROUTINE PROCESS
Bedside and Verbal shift change report given to Naa curry (oncoming nurse) by Ya Rausch RN (offgoing nurse). Report included the following information SBAR, Kardex, Intake/Output and MAR.

## 2017-10-26 NOTE — PROGRESS NOTES
0800 received pt, resting in bed, oriented x4, pain controlled at this time, ivf at 25, staples midline clean dry intact and open to air.    0851 call from cna, pt's bp elevated and pt complaining of hiccups. 1015 bp recheck 173/84, 10 mg hydralazine given     1030 no med to be given for hiccups per Vicky Mccauley b/c med can cause constipation. 1130 pt bp recheck 150/82    1700 upon further investigation, pt had two BMs today that he didn't tell us about. 1930 Bedside and Verbal shift change report given to judith RN (oncoming nurse) by Sun Davies RN   (offgoing nurse). Report included the following information Kardex, MAR and Recent Results.

## 2017-10-26 NOTE — PROGRESS NOTES
Patient diet advanced to Regular, if he tolerate regular diet surgeon notes states he will be able to discharge today.   Vipin Wells RN

## 2017-10-26 NOTE — PROGRESS NOTES
Patient seen and examined. He is feeling well. Passing flatus and had 3 BM yesterday. No nausea or vomiting. Said he would like some solid food. No fever or tachycardia. Abdomen is soft and non-tender. Wound is healing well. Imp/Plan:  Resolving ileus after laparotomy and SBO. Though the patient is feeling well and has no nausea or vomiting and is having bowel movements, I am slightly concerned about his hiccups (Though they are not frequent). I believe it is safe to give him regular diet and observe him closely. If he tolerates and feel well, than probably he can be discharged home today. If not, than may be he is developing a new early ileus (Unlikely)  Will follow closely.

## 2017-10-27 VITALS
SYSTOLIC BLOOD PRESSURE: 106 MMHG | OXYGEN SATURATION: 94 % | HEART RATE: 86 BPM | DIASTOLIC BLOOD PRESSURE: 64 MMHG | BODY MASS INDEX: 17.77 KG/M2 | RESPIRATION RATE: 16 BRPM | HEIGHT: 69 IN | WEIGHT: 120 LBS | TEMPERATURE: 98.3 F

## 2017-10-27 LAB
ANION GAP SERPL CALC-SCNC: 12 MMOL/L (ref 3–18)
BASOPHILS # BLD: 0 K/UL (ref 0–0.06)
BASOPHILS NFR BLD: 0 % (ref 0–2)
BUN SERPL-MCNC: 6 MG/DL (ref 7–18)
BUN/CREAT SERPL: 11 (ref 12–20)
CALCIUM SERPL-MCNC: 8 MG/DL (ref 8.5–10.1)
CHLORIDE SERPL-SCNC: 97 MMOL/L (ref 100–108)
CO2 SERPL-SCNC: 25 MMOL/L (ref 21–32)
CREAT SERPL-MCNC: 0.56 MG/DL (ref 0.6–1.3)
DIFFERENTIAL METHOD BLD: ABNORMAL
EOSINOPHIL # BLD: 0.1 K/UL (ref 0–0.4)
EOSINOPHIL NFR BLD: 2 % (ref 0–5)
ERYTHROCYTE [DISTWIDTH] IN BLOOD BY AUTOMATED COUNT: 17.3 % (ref 11.6–14.5)
GLUCOSE SERPL-MCNC: 82 MG/DL (ref 74–99)
HCT VFR BLD AUTO: 32.4 % (ref 36–48)
HGB BLD-MCNC: 11.1 G/DL (ref 13–16)
LYMPHOCYTES # BLD: 2.2 K/UL (ref 0.9–3.6)
LYMPHOCYTES NFR BLD: 34 % (ref 21–52)
MCH RBC QN AUTO: 29.2 PG (ref 24–34)
MCHC RBC AUTO-ENTMCNC: 34.3 G/DL (ref 31–37)
MCV RBC AUTO: 85.3 FL (ref 74–97)
MONOCYTES # BLD: 1 K/UL (ref 0.05–1.2)
MONOCYTES NFR BLD: 16 % (ref 3–10)
NEUTS SEG # BLD: 3.1 K/UL (ref 1.8–8)
NEUTS SEG NFR BLD: 48 % (ref 40–73)
PLATELET # BLD AUTO: 332 K/UL (ref 135–420)
PMV BLD AUTO: 9.6 FL (ref 9.2–11.8)
POTASSIUM SERPL-SCNC: 3.3 MMOL/L (ref 3.5–5.5)
RBC # BLD AUTO: 3.8 M/UL (ref 4.7–5.5)
SODIUM SERPL-SCNC: 134 MMOL/L (ref 136–145)
WBC # BLD AUTO: 6.4 K/UL (ref 4.6–13.2)

## 2017-10-27 PROCEDURE — 85025 COMPLETE CBC W/AUTO DIFF WBC: CPT | Performed by: NURSE PRACTITIONER

## 2017-10-27 PROCEDURE — 74011250636 HC RX REV CODE- 250/636: Performed by: SURGERY

## 2017-10-27 PROCEDURE — 74011250637 HC RX REV CODE- 250/637: Performed by: SURGERY

## 2017-10-27 PROCEDURE — 36415 COLL VENOUS BLD VENIPUNCTURE: CPT | Performed by: NURSE PRACTITIONER

## 2017-10-27 PROCEDURE — 74011250636 HC RX REV CODE- 250/636: Performed by: NURSE PRACTITIONER

## 2017-10-27 PROCEDURE — 80048 BASIC METABOLIC PNL TOTAL CA: CPT | Performed by: NURSE PRACTITIONER

## 2017-10-27 PROCEDURE — 74011250637 HC RX REV CODE- 250/637: Performed by: HOSPITALIST

## 2017-10-27 PROCEDURE — 74011250637 HC RX REV CODE- 250/637: Performed by: NURSE PRACTITIONER

## 2017-10-27 RX ORDER — OXYCODONE AND ACETAMINOPHEN 5; 325 MG/1; MG/1
1 TABLET ORAL
Qty: 14 TAB | Refills: 0 | Status: SHIPPED | OUTPATIENT
Start: 2017-10-27 | End: 2017-12-14

## 2017-10-27 RX ORDER — AMLODIPINE BESYLATE 10 MG/1
10 TABLET ORAL DAILY
Qty: 30 TAB | Refills: 0 | Status: SHIPPED | OUTPATIENT
Start: 2017-10-27

## 2017-10-27 RX ADMIN — HYDRALAZINE HYDROCHLORIDE 10 MG: 20 INJECTION INTRAMUSCULAR; INTRAVENOUS at 00:35

## 2017-10-27 RX ADMIN — CHOLECALCIFEROL TAB 25 MCG (1000 UNIT) 1000 UNITS: 25 TAB at 10:22

## 2017-10-27 RX ADMIN — HYDROCHLOROTHIAZIDE 25 MG: 25 TABLET ORAL at 10:22

## 2017-10-27 RX ADMIN — METOCLOPRAMIDE 10 MG: 5 INJECTION, SOLUTION INTRAMUSCULAR; INTRAVENOUS at 06:23

## 2017-10-27 RX ADMIN — OXYCODONE HYDROCHLORIDE AND ACETAMINOPHEN 2 TABLET: 5; 325 TABLET ORAL at 06:22

## 2017-10-27 RX ADMIN — AMLODIPINE BESYLATE 10 MG: 10 TABLET ORAL at 10:22

## 2017-10-27 NOTE — DISCHARGE INSTRUCTIONS
DISCHARGE SUMMARY from Nurse    PATIENT INSTRUCTIONS:    After general anesthesia or intravenous sedation, for 24 hours or while taking prescription Narcotics:  · Limit your activities  · Do not drive and operate hazardous machinery  · Do not make important personal or business decisions  · Do  not drink alcoholic beverages  · If you have not urinated within 8 hours after discharge, please contact your surgeon on call. Report the following to your surgeon:  · Excessive pain, swelling, redness or odor of or around the surgical area  · Temperature over 100.5  · Nausea and vomiting lasting longer than 4 hours or if unable to take medications  · Any signs of decreased circulation or nerve impairment to extremity: change in color, persistent  numbness, tingling, coldness or increase pain  · Any questions    What to do at Home:  Recommended activity: No lifting, Driving, or Strenuous exercise until cleared by surgeon. If you experience any of the following symptoms severe pain, nausea and vomiting, fever above 100.5, bleeding or drainage from incision, shortness of breath, please follow up with Dr. Filiberto Oliveira. *  Please give a list of your current medications to your Primary Care Provider. *  Please update this list whenever your medications are discontinued, doses are      changed, or new medications (including over-the-counter products) are added. *  Please carry medication information at all times in case of emergency situations. These are general instructions for a healthy lifestyle:    No smoking/ No tobacco products/ Avoid exposure to second hand smoke  Surgeon General's Warning:  Quitting smoking now greatly reduces serious risk to your health.     Obesity, smoking, and sedentary lifestyle greatly increases your risk for illness    A healthy diet, regular physical exercise & weight monitoring are important for maintaining a healthy lifestyle    You may be retaining fluid if you have a history of heart failure or if you experience any of the following symptoms:  Weight gain of 3 pounds or more overnight or 5 pounds in a week, increased swelling in our hands or feet or shortness of breath while lying flat in bed. Please call your doctor as soon as you notice any of these symptoms; do not wait until your next office visit. Recognize signs and symptoms of STROKE:    F-face looks uneven    A-arms unable to move or move unevenly    S-speech slurred or non-existent    T-time-call 911 as soon as signs and symptoms begin-DO NOT go       Back to bed or wait to see if you get better-TIME IS BRAIN. Warning Signs of HEART ATTACK     Call 911 if you have these symptoms:   Chest discomfort. Most heart attacks involve discomfort in the center of the chest that lasts more than a few minutes, or that goes away and comes back. It can feel like uncomfortable pressure, squeezing, fullness, or pain.  Discomfort in other areas of the upper body. Symptoms can include pain or discomfort in one or both arms, the back, neck, jaw, or stomach.  Shortness of breath with or without chest discomfort.  Other signs may include breaking out in a cold sweat, nausea, or lightheadedness. Don't wait more than five minutes to call 911 - MINUTES MATTER! Fast action can save your life. Calling 911 is almost always the fastest way to get lifesaving treatment. Emergency Medical Services staff can begin treatment when they arrive -- up to an hour sooner than if someone gets to the hospital by car. The discharge information has been reviewed with the patient. The patient verbalized understanding. Discharge medications reviewed with the patient and appropriate educational materials and side effects teaching were provided. Patient armband removed and shredded.   ___________________________________________________________________________________________________________________________________ Instructions Following Surgery    Patient: Deneen Posey MRN: [id number]  SSN: [id number]    YOB: 1945  Age: 81 y.o. Sex: male      Activity  · As tolerated, walking encourage, stairs are okay  · Avoid strenuous activities - no lifting anything heavier than 15 pounds. · You may shower at home    Diet  · Regular diet    Pain  · Take pain medication as directed by your doctor  ·  Call your doctor if pain is NOT relieved by medication    Call your doctor if  · Excessive bleeding that does not stop after holding mild pressure over the area  · Temperature of 101 degrees F or above  · Redness,excessive swelling or bruising, and/or green or yellow, smelly discharge from incision  · If nausea and vomiting continues    Follow-Up Phone Calls  · Call the office at [phone] to make your follow-up appointment    Appointment date/time: 2 weeks after the surgery. Dr. Colette Luciano cell phone number is [phone]. Please call me if you have any concerns or questions.

## 2017-10-27 NOTE — PROGRESS NOTES
1911 Received patient from Titi Torres RN. Patient is alert and oriented x 4.   2300 Patient resting with eyes closed. 0300 Patient ambulated hallway x 1. Tolerated well.  0700 Bedside and Verbal shift change report given to CATHIE Hurley (oncoming nurse) by Jimmy Long RN (offgoing nurse). Report included the following information SBAR, Kardex, Intake/Output, MAR and Recent Results.

## 2017-10-27 NOTE — PROGRESS NOTES
Patient seen and examined. He is doing well. Fasting status and multiple bowel movement. Has no nausea or vomiting. He has no fever. Has mild tachycardia. Abdomen is soft, non tender and non distended.     Plan:  Discharge home today  Follow up with me in 2 weeks  Discharge instructions are written  Appreciate medicine help

## 2017-10-27 NOTE — ANCILLARY DISCHARGE INSTRUCTIONS
Core measure follow up appointment scheduled on 11/24/17 @ 2pm. This is the soonest appointment available.

## 2017-10-27 NOTE — ROUTINE PROCESS
0730 Bedside, Verbal and Written shift change report given to zuleima lion (oncoming nurse) by Prem Goldstein (offgoing nurse). Report included the following information SBAR and Kardex. Pt resting in bed, denies pain, alert and oriented, awake, bed locked in low position, call bell in reach.     Carlos Castaneda working on discharge

## 2017-10-27 NOTE — PROGRESS NOTES
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

## 2017-10-28 NOTE — DISCHARGE SUMMARY
[facility]    Discharge Summary    Patient: Zoe Auguste MRN: [id number]  [facility]: [id number]    YOB: 1945  Age: 81 y.o. Sex: male    DOA: 10/20/2017 LOS:  LOS: 7 days   Discharge Date: 10/27/2017     Admission Diagnoses: Ileus ([facility] [address]  small bowel obstruction    Discharge Diagnoses:    Problem List as of 10/27/2017  Date Reviewed: 2/11/2015          Codes Class Noted - Resolved    Ileus ([facility] [address] ICD-10-CM: K56.7  ICD-9-CM: 560.1  10/20/2017 - Present        Small bowel obstruction ICD-10-CM: K56.609  ICD-9-CM: 560.9  4/21/2017 - Present        SBO (small bowel obstruction) ICD-10-CM: K56.609  ICD-9-CM: 560.9  3/30/2017 - Present        HTN (hypertension) ICD-10-CM: I10  ICD-9-CM: 401.9  3/30/2017 - Present        URI (upper respiratory infection) ICD-10-CM: J06.9  ICD-9-CM: 465.9  3/30/2017 - Present              Discharge Condition: Stable    Discharge To: Home    Consults: General Surgery and Hospitalist    Hospital Course:   Zoe Auguste is a 81 y.o. male who was admitted to the hospital because of a small bowel obstruction. Patient with history of SBO. Patient with SBO earlier this year at [facility], surgical repair and doing well after until a few days prior to presentation when he began having abdominal pain. In ED, X ray of the abdomen showed abnormal bowel gas pattern consistent with small bowel obstruction and CT abd showed recurrent perianastomotic high grade distal small bowel obstruction. Lower right abdomen -upper pelvis probably from local adhesions. The pt's wbc was 17.2. Patient was admitted for ongoing medical management. General Surgery was consulted. NGT was placed. Patient remained NPO. Despite conservative medical management, patient's SBO did not resolve. Patient was taken to OR 10/22 for exploratory laparotomy, lysis of adhesions, reduction of internal hernia.  Following surgery patient developed post- op ileus, which resolved with bowel rest. Patient is stable for discharge home with instructions to follow up as listed below. Physical Exam:  General appearance: alert, cooperative, no distress, appears stated age  Lungs: clear to auscultation bilaterally, no wheezes   Heart: regular rate and rhythm, S1, S2 normal, no murmur, click, rub or gallop  Abdomen: soft, non-tender. Bowel sounds normal. Surgical incision CDI. Staples present   Extremities: extremities normal, atraumatic, no cyanosis or edema  Skin: Skin color, texture, turgor normal. No rashes or lesions  Neurologic: Grossly normal  PSY: Mood and affect normal, appropriately behaved    Significant Diagnostic Studies:   No results found for this or any previous visit (from the past 24 hour(s)). Discharge Medications:     Discharge Medication List as of 10/27/2017 11:26 AM      START taking these medications    Details   amLODIPine (NORVASC) 10 mg tablet Take 1 Tab by mouth daily. , Print, Disp-30 Tab, R-0      oxyCODONE-acetaminophen (PERCOCET) 5-325 mg per tablet Take 1 Tab by mouth every four (4) hours as needed. Max Daily Amount: 6 Tabs., Print, Disp-14 Tab, R-0         CONTINUE these medications which have NOT CHANGED    Details   aspirin delayed-release 81 mg tablet Take 1 Tab by mouth daily. , Print, Disp-90 Tab, R-3      hydrochlorothiazide (HYDRODIURIL) 25 mg tablet Take 25 mg by mouth daily. , Historical Med      cholecalciferol (VITAMIN D3) 1,000 unit tablet Take  by mouth daily. , Historical Med               Activity: Activity as tolerated    Diet: Mechanical soft diet- advance as tolerated     Wound Care: Keep wound clean and dry    Follow-up: PCP within 1 week  General surgery in 2 weeks- [phone]    Discharge time: 50 minutes   Miguel Angel Carrizales NP  10/28/2017, 7:43 AM

## 2017-11-01 NOTE — PROGRESS NOTES
[address] November 1, 2017       RE: Kika Martinez      To Whom It May Concern,    This is to certify that Kika Martinez may may return to work on December 4th, 2017. Please feel free to contact my office if you have any questions or concerns. Thank you for your assistance in this matter.       Sincerely,      Kurt Penn NP  [phone]

## 2017-11-13 ENCOUNTER — OFFICE VISIT (OUTPATIENT)
Dept: SURGERY | Age: 72
End: 2017-11-13

## 2017-11-13 VITALS
SYSTOLIC BLOOD PRESSURE: 153 MMHG | BODY MASS INDEX: 17.03 KG/M2 | HEART RATE: 100 BPM | RESPIRATION RATE: 20 BRPM | WEIGHT: 115 LBS | TEMPERATURE: 96.5 F | DIASTOLIC BLOOD PRESSURE: 95 MMHG | HEIGHT: 69 IN

## 2017-11-13 DIAGNOSIS — Z09 POSTOPERATIVE EXAMINATION: Primary | ICD-10-CM

## 2017-11-13 NOTE — MR AVS SNAPSHOT
Visit Information Date & Time Provider Department Dept. Phone Encounter #  
 11/13/2017  8:45 AM Jean Phoenix, MD [facility] [phone] [id number] Upcoming Health Maintenance Date Due Hepatitis C Screening 1945 DTaP/Tdap/Td series (1 - Tdap) 10/10/1968 ZOSTER VACCINE AGE 60> 8/10/2007 GLAUCOMA SCREENING Q2Y 10/10/2012 Pneumococcal 65+ Low/Medium Risk (1 of 2 - PCV13) 10/10/2012 MEDICARE YEARLY EXAM 10/10/2012 Influenza Age 5 to Adult 8/1/2017 COLONOSCOPY 1/5/2020 Allergies as of 11/13/2017  Review Complete On: 11/13/2017 By: Carolyn Perdomo No Known Allergies Current Immunizations  Reviewed on 4/9/2017 Name Date Influenza Vaccine 9/15/2016 Not reviewed this visit Vitals BP Pulse Temp Resp Height(growth percentile) Weight(growth percentile) (!) 153/95 (BP 1 Location: Left arm, BP Patient Position: At rest) 100 96.5 °F (35.8 °C) (Oral) 20 5' 8.5\" (1.74 m) 115 lb (52.2 kg) BMI Smoking Status 17.23 kg/m2 Current Every Day Smoker Vitals History BMI and BSA Data Body Mass Index Body Surface Area  
 17.23 kg/m 2 1.59 m 2 Preferred Pharmacy Pharmacy Name Phone [place] [address] [phone] Your Updated Medication List  
  
   
This list is accurate as of: 11/13/17  9:00 AM.  Always use your most recent med list. amLODIPine 10 mg tablet Commonly known as:  Curt Mend Take 1 Tab by mouth daily. aspirin delayed-release 81 mg tablet Take 1 Tab by mouth daily. hydroCHLOROthiazide 25 mg tablet Commonly known as:  HYDRODIURIL Take 25 mg by mouth daily. oxyCODONE-acetaminophen 5-325 mg per tablet Commonly known as:  PERCOCET Take 1 Tab by mouth every four (4) hours as needed. Max Daily Amount: 6 Tabs. VITAMIN D3 1,000 unit tablet Generic drug:  cholecalciferol Take  by mouth daily. Introducing \[facility] [place] [facility]\"" & HEALTH SERVICES! Patti Rodriguez introduces [email or web address] patient portal. Now you can access parts of your medical record, email your doctor's office, and request medication refills online. 1. In your internet browser, go to [email or web address] 2. Click on the First Time User? Click Here link in the Sign In box. You will see the New Member Sign Up page. 3. Enter your [email or web address] Access Code exactly as it appears below. You will not need to use this code after youve completed the sign-up process. If you do not sign up before the expiration date, you must request a new code. · [email or web address] Access Code: [email or web address] Expires: 1/24/2018  9:56 AM 
 
4. Enter the last four digits of your Social Security Number (xxxx) and Date of Birth (mm/dd/yyyy) as indicated and click Submit. You will be taken to the next sign-up page. 5. Create a [email or web address] ID. This will be your [email or web address] login ID and cannot be changed, so think of one that is secure and easy to remember. 6. Create a [email or web address] password. You can change your password at any time. 7. Enter your Password Reset Question and Answer. This can be used at a later time if you forget your password. 8. Enter your e-mail address. You will receive e-mail notification when new information is available in [address] 9. Click Sign Up. You can now view and download portions of your medical record. 10. Click the Download Summary menu link to download a portable copy of your medical information. If you have questions, please visit the Frequently Asked Questions section of the [email or web address] website. Remember, [email or web address] is NOT to be used for urgent needs. For medical emergencies, dial 911. Now available from your iPhone and Android! Please provide this summary of care documentation to your next provider. Your primary care clinician is listed as ANDIE A ROSS-CLUNIS III. If you have any questions after today's visit, please call [phone].

## 2017-11-13 NOTE — PROGRESS NOTES
Patient seen and examined. He is doing very well. Tolerating a regular diet. His abdomen is soft and non tender. His wound is healing well. I removed the staplers.   Follow up as needed

## 2017-12-14 ENCOUNTER — HOSPITAL ENCOUNTER (EMERGENCY)
Age: 72
Discharge: HOME | End: 2017-12-14
Attending: EMERGENCY MEDICINE
Payer: MEDICARE

## 2017-12-14 ENCOUNTER — APPOINTMENT (OUTPATIENT)
Dept: GENERAL RADIOLOGY | Age: 72
End: 2017-12-14
Attending: PHYSICIAN ASSISTANT
Payer: MEDICARE

## 2017-12-14 VITALS
HEIGHT: 68 IN | HEART RATE: 95 BPM | TEMPERATURE: 97.5 F | OXYGEN SATURATION: 97 % | RESPIRATION RATE: 15 BRPM | SYSTOLIC BLOOD PRESSURE: 150 MMHG | BODY MASS INDEX: 17.88 KG/M2 | WEIGHT: 118 LBS | DIASTOLIC BLOOD PRESSURE: 60 MMHG
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
## 2017-12-14 DIAGNOSIS — S83.92XA SPRAIN OF LEFT KNEE, UNSPECIFIED LIGAMENT, INITIAL ENCOUNTER: ICD-10-CM

## 2017-12-14 DIAGNOSIS — S63.602A SPRAIN OF LEFT THUMB, UNSPECIFIED SITE OF FINGER, INITIAL ENCOUNTER: Primary | ICD-10-CM

## 2017-12-14 DIAGNOSIS — S80.02XA CONTUSION OF LEFT KNEE, INITIAL ENCOUNTER: ICD-10-CM

## 2017-12-14 PROCEDURE — 73130 X-RAY EXAM OF HAND: CPT

## 2017-12-14 PROCEDURE — 73560 X-RAY EXAM OF KNEE 1 OR 2: CPT

## 2017-12-14 PROCEDURE — 99283 EMERGENCY DEPT VISIT LOW MDM: CPT

## 2017-12-14 RX ORDER — NAPROXEN 500 MG/1
500 TABLET ORAL 2 TIMES DAILY WITH MEALS
Qty: 20 TAB | Refills: 0 | Status: SHIPPED | OUTPATIENT
Start: 2017-12-14 | End: 2017-12-24
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
## 2017-12-14 NOTE — ED NOTES
I have reviewed discharge instructions with the patient. The patient verbalized understanding. Patient armband removed and given to patient to take home. Patient was informed of the privacy risks if armband lost or stolen    The patient Georgiana Ferrell is a 81 y.o. male who  has a past medical history of Hypertension. Yolanda Lopez   The patient's medications were reviewed and discussed prior to discharge. The patient was very interactive and did understand their medications. The following medications were discussed in details with the patient. The patient said [facility]. [unfilled]    Kem Herzog RN    MyChart Activation    Thank you for requesting access to [employer]. Please follow the instructions below to securely access and download your online medical record. [employer] allows you to send messages to your doctor, view your test results, renew your prescriptions, schedule appointments, and more. How Do I Sign Up? 1. In your internet browser, go to [email or web address]  2. Click on the First Time User? Click Here link in the Sign In box. You will be redirect to the New Member Sign Up page. 3. Enter your [employer] Access Code exactly as it appears below. You will not need to use this code after youve completed the sign-up process. If you do not sign up before the expiration date, you must request a new code. [employer] Access Code: [unfilled] (This is the date your [employer] access code will )    4. Enter the last four digits of your Social Security Number (xxxx) and Date of Birth (mm/dd/yyyy) as indicated and click Submit. You will be taken to the next sign-up page. 5. Create a [employer] ID. This will be your [employer] login ID and cannot be changed, so think of one that is secure and easy to remember. 6. Create a [employer] password. You can change your password at any time. 7. Enter your Password Reset Question and Answer.  This can be used at a later time if you forget your password. 8. Enter your e-mail address. You will receive e-mail notification when new information is available in [address] 9. Click Sign Up. You can now view and download portions of your medical record. 10. Click the Download Summary menu link to download a portable copy of your medical information. Additional Information    If you have questions, please visit the Frequently Asked Questions section of the [employer] website at [email or web address] Remember, [employer] is NOT to be used for urgent needs. For medical emergencies, dial 911.

## 2017-12-14 NOTE — DISCHARGE INSTRUCTIONS

## 2018-11-05 ENCOUNTER — HOSPITAL ENCOUNTER (EMERGENCY)
Age: 73
Discharge: HOME | End: 2018-11-05
Attending: EMERGENCY MEDICINE
Payer: SUBSIDIZED

## 2018-11-05 ENCOUNTER — APPOINTMENT (OUTPATIENT)
Dept: CT IMAGING | Age: 73
End: 2018-11-05
Attending: EMERGENCY MEDICINE
Payer: SUBSIDIZED

## 2018-11-05 ENCOUNTER — APPOINTMENT (OUTPATIENT)
Dept: GENERAL RADIOLOGY | Age: 73
End: 2018-11-05
Attending: EMERGENCY MEDICINE
Payer: SUBSIDIZED

## 2018-11-05 VITALS
WEIGHT: 112 LBS | OXYGEN SATURATION: 87 % | DIASTOLIC BLOOD PRESSURE: 84 MMHG | HEIGHT: 68 IN | SYSTOLIC BLOOD PRESSURE: 160 MMHG | TEMPERATURE: 97.7 F | RESPIRATION RATE: 20 BRPM | HEART RATE: 86 BPM | BODY MASS INDEX: 16.97 KG/M2

## 2018-11-05 DIAGNOSIS — R74.8 ELEVATED LIPASE: Primary | ICD-10-CM

## 2018-11-05 DIAGNOSIS — K86.1 CHRONIC PANCREATITIS, UNSPECIFIED PANCREATITIS TYPE (HCC): ICD-10-CM

## 2018-11-05 LAB
ALBUMIN SERPL-MCNC: 3.7 G/DL (ref 3.4–5)
ALBUMIN/GLOB SERPL: 1 {RATIO} (ref 0.8–1.7)
ALP SERPL-CCNC: 101 U/L (ref 45–117)
ALT SERPL-CCNC: 27 U/L (ref 16–61)
ANION GAP SERPL CALC-SCNC: 5 MMOL/L (ref 3–18)
APPEARANCE UR: CLEAR
AST SERPL-CCNC: 22 U/L (ref 15–37)
BASOPHILS # BLD: 0 K/UL (ref 0–0.1)
BASOPHILS NFR BLD: 0 % (ref 0–2)
BILIRUB SERPL-MCNC: 0.5 MG/DL (ref 0.2–1)
BILIRUB UR QL: NEGATIVE
BUN SERPL-MCNC: 5 MG/DL (ref 7–18)
BUN/CREAT SERPL: 7 (ref 12–20)
CALCIUM SERPL-MCNC: 9.1 MG/DL (ref 8.5–10.1)
CHLORIDE SERPL-SCNC: 101 MMOL/L (ref 100–108)
CK MB CFR SERPL CALC: NORMAL % (ref 0–4)
CK MB SERPL-MCNC: <1 NG/ML (ref 5–25)
CK SERPL-CCNC: 52 U/L (ref 39–308)
CO2 SERPL-SCNC: 29 MMOL/L (ref 21–32)
COLOR UR: YELLOW
CREAT SERPL-MCNC: 0.74 MG/DL (ref 0.6–1.3)
DIFFERENTIAL METHOD BLD: ABNORMAL
EOSINOPHIL # BLD: 0.1 K/UL (ref 0–0.4)
EOSINOPHIL NFR BLD: 1 % (ref 0–5)
ERYTHROCYTE [DISTWIDTH] IN BLOOD BY AUTOMATED COUNT: 15.3 % (ref 11.6–14.5)
GLOBULIN SER CALC-MCNC: 3.8 G/DL (ref 2–4)
GLUCOSE SERPL-MCNC: 135 MG/DL (ref 74–99)
GLUCOSE UR STRIP.AUTO-MCNC: NEGATIVE MG/DL
HCT VFR BLD AUTO: 39.1 % (ref 36–48)
HGB BLD-MCNC: 13.5 G/DL (ref 13–16)
HGB UR QL STRIP: NEGATIVE
KETONES UR QL STRIP.AUTO: NEGATIVE MG/DL
LEUKOCYTE ESTERASE UR QL STRIP.AUTO: NEGATIVE
LIPASE SERPL-CCNC: 1856 U/L (ref 73–393)
LYMPHOCYTES # BLD: 1.8 K/UL (ref 0.9–3.6)
LYMPHOCYTES NFR BLD: 23 % (ref 21–52)
MCH RBC QN AUTO: 29.7 PG (ref 24–34)
MCHC RBC AUTO-ENTMCNC: 34.5 G/DL (ref 31–37)
MCV RBC AUTO: 85.9 FL (ref 74–97)
MONOCYTES # BLD: 0.5 K/UL (ref 0.05–1.2)
MONOCYTES NFR BLD: 6 % (ref 3–10)
NEUTS SEG # BLD: 5.3 K/UL (ref 1.8–8)
NEUTS SEG NFR BLD: 70 % (ref 40–73)
NITRITE UR QL STRIP.AUTO: NEGATIVE
PH UR STRIP: 5.5 [PH] (ref 5–8)
PLATELET # BLD AUTO: 315 K/UL (ref 135–420)
PMV BLD AUTO: 8.4 FL (ref 9.2–11.8)
POTASSIUM SERPL-SCNC: 3.8 MMOL/L (ref 3.5–5.5)
PROT SERPL-MCNC: 7.5 G/DL (ref 6.4–8.2)
PROT UR STRIP-MCNC: NEGATIVE MG/DL
RBC # BLD AUTO: 4.55 M/UL (ref 4.7–5.5)
SODIUM SERPL-SCNC: 135 MMOL/L (ref 136–145)
SP GR UR REFRACTOMETRY: 1.02 (ref 1–1.03)
TROPONIN I SERPL-MCNC: <0.02 NG/ML (ref 0–0.04)
UROBILINOGEN UR QL STRIP.AUTO: 0.2 EU/DL (ref 0.2–1)
WBC # BLD AUTO: 7.6 K/UL (ref 4.6–13.2)

## 2018-11-05 PROCEDURE — 93005 ELECTROCARDIOGRAM TRACING: CPT

## 2018-11-05 PROCEDURE — 94640 AIRWAY INHALATION TREATMENT: CPT

## 2018-11-05 PROCEDURE — 83690 ASSAY OF LIPASE: CPT | Performed by: EMERGENCY MEDICINE

## 2018-11-05 PROCEDURE — 85025 COMPLETE CBC W/AUTO DIFF WBC: CPT | Performed by: EMERGENCY MEDICINE

## 2018-11-05 PROCEDURE — 71045 X-RAY EXAM CHEST 1 VIEW: CPT

## 2018-11-05 PROCEDURE — 74011000250 HC RX REV CODE- 250: Performed by: EMERGENCY MEDICINE

## 2018-11-05 PROCEDURE — 74011636320 HC RX REV CODE- 636/320: Performed by: EMERGENCY MEDICINE

## 2018-11-05 PROCEDURE — 74177 CT ABD & PELVIS W/CONTRAST: CPT

## 2018-11-05 PROCEDURE — 82550 ASSAY OF CK (CPK): CPT | Performed by: EMERGENCY MEDICINE

## 2018-11-05 PROCEDURE — 77030029684 HC NEB SM VOL KT MONA -A

## 2018-11-05 PROCEDURE — 99285 EMERGENCY DEPT VISIT HI MDM: CPT

## 2018-11-05 PROCEDURE — 81003 URINALYSIS AUTO W/O SCOPE: CPT | Performed by: EMERGENCY MEDICINE

## 2018-11-05 PROCEDURE — 80053 COMPREHEN METABOLIC PANEL: CPT | Performed by: EMERGENCY MEDICINE

## 2018-11-05 RX ORDER — IPRATROPIUM BROMIDE AND ALBUTEROL SULFATE 2.5; .5 MG/3ML; MG/3ML
3 SOLUTION RESPIRATORY (INHALATION)
Status: COMPLETED | OUTPATIENT
Start: 2018-11-05 | End: 2018-11-05

## 2018-11-05 RX ADMIN — IPRATROPIUM BROMIDE AND ALBUTEROL SULFATE 3 ML: .5; 3 SOLUTION RESPIRATORY (INHALATION) at 16:24

## 2018-11-05 RX ADMIN — IOPAMIDOL 100 ML: 612 INJECTION, SOLUTION INTRAVENOUS at 16:57

## 2018-11-05 NOTE — DISCHARGE INSTRUCTIONS
Pancreatitis: Care Instructions  Your Care Instructions    The pancreas is an organ behind the stomach. It makes hormones and enzymes to help your body digest food. But if these enzymes attack the pancreas, it can get inflamed. This is called pancreatitis. Most cases are caused by gallstones or by heavy alcohol use. If you take care of yourself at home, it will help you get better. It will also help you avoid more problems with your pancreas. Follow-up care is a key part of your treatment and safety. Be sure to make and go to all appointments, and call your doctor if you are having problems. It's also a good idea to know your test results and keep a list of the medicines you take. How can you care for yourself at home? · Drink clear liquids and eat bland foods until you feel better. Dukes foods include rice, dry toast, and crackers. They also include bananas and applesauce. · Eat a low-fat diet until your doctor says your pancreas is healed. · Do not drink alcohol. Tell your doctor if you need help to quit. Counseling, support groups, and sometimes medicines can help you stay sober. · Be safe with medicines. Read and follow all instructions on the label. ? If the doctor gave you a prescription medicine for pain, take it as prescribed. ? If you are not taking a prescription pain medicine, ask your doctor if you can take an over-the-counter medicine. · If your doctor prescribed antibiotics, take them as directed. Do not stop taking them just because you feel better. You need to take the full course of antibiotics. · Get extra rest until you feel better. To prevent future problems with your pancreas  · Do not drink alcohol. · Tell your doctors and pharmacist that you've had pancreatitis. They can help you avoid medicines that may cause this problem again. When should you call for help? Call 911 anytime you think you may need emergency care.  For example, call if:    · You vomit blood or what looks like coffee grounds.     · Your stools are maroon or very bloody.    Call your doctor now or seek immediate medical care if:    · You have new or worse belly pain.     · Your stools are black and look like tar, or they have streaks of blood.     · You are vomiting.    Watch closely for changes in your health, and be sure to contact your doctor if:    · You do not get better as expected. Where can you learn more? Go to http://vale-peng.info/. Enter S094 in the search box to learn more about \"Pancreatitis: Care Instructions. \"  Current as of: March 28, 2018  Content Version: 11.8  © 1943-6976 [employer]. Care instructions adapted under license by [employer] (which disclaims liability or warranty for this information). If you have questions about a medical condition or this instruction, always ask your healthcare professional. [address] any warranty or liability for your use of this information.

## 2018-11-05 NOTE — ED NOTES
I have reviewed discharge instructions with the patient. The patient verbalized understanding. Patient armband removed and shredded. Patient redressed self and ambulated independently out of care area

## 2018-11-05 NOTE — ED PROVIDER NOTES
EMERGENCY DEPARTMENT HISTORY AND PHYSICAL EXAM 
 
3:54 PM 
 
 
Date: 11/5/2018 Patient Name: Paul Hood History of Presenting Illness Chief Complaint Patient presents with  Chest Pain  Abdominal Pain History Provided By: Patient Chief Complaint: Pain Duration: 3 Months Timing:  Waxing and Waning Location: Upper abd, chest, upper mid back Quality: nagging pain, \"like it's on fire\" Severity: 10 out of 10 Modifying Factors: worse at night, when lying flat Associated Symptoms: appetite change, cough Additional History (Context): Paul Hood is a 72 y.o. male with hypertension and hernia who presents with waxing and waning, severe upper abd pain x3 months. Pt also has pain in his mid upper back and in his chest, described \"like it's on fire\". The pains occur at separate times but sometimes they'll all happen at once, and are worse at night and lying flat. Associated sx include appetite change, white productive cough. He denies vomiting. Pt uses tobacco, 2 ppd. PSHx hernia repair 4-5 months ago. PCP: Akbar Guardado MD 
 
Current Outpatient Medications Medication Sig Dispense Refill  amLODIPine (NORVASC) 10 mg tablet Take 1 Tab by mouth daily. 30 Tab 0  
 aspirin delayed-release 81 mg tablet Take 1 Tab by mouth daily. 90 Tab 3  
 hydrochlorothiazide (HYDRODIURIL) 25 mg tablet Take 25 mg by mouth daily.  cholecalciferol (VITAMIN D3) 1,000 unit tablet Take  by mouth daily. Past History Past Medical History: 
Past Medical History:  
Diagnosis Date  Hypertension Past Surgical History: 
Past Surgical History:  
Procedure Laterality Date [address] UNLISTED  APPENDECTOMY  HX APPENDECTOMY  HX COLONOSCOPY  1/28/2015 Repeat colonoscopy in 5 yrs per Dr. Jackie Espinoza  HX HERNIA REPAIR  10/22/2017 Ex lap. lysis of adhesions adn reductions of internal hernia done 10/22/2017 Family History: Family History Problem Relation Age of Onset  Heart Disease Mother  Cancer Father  Cancer Sister  Diabetes Sister Social History: 
Social History Tobacco Use  Smoking status: Current Every Day Smoker Packs/day: 0.50 Years: 50.00 Pack years: 25.00 Types: Cigarettes  Smokeless tobacco: Never Used Substance Use Topics  Alcohol use: Yes Comment: 2 beers aday  Drug use: No  
 
 
Allergies: 
No Known Allergies Review of Systems Review of Systems Constitutional: Positive for appetite change and unexpected weight change. Negative for fever. Respiratory: Positive for cough. Cardiovascular: Positive for chest pain. Negative for leg swelling. Gastrointestinal: Positive for abdominal pain. Negative for vomiting. Musculoskeletal: Positive for back pain and myalgias. All other systems reviewed and are negative. Physical Exam  
 
Visit Vitals /88 (BP 1 Location: Right arm, BP Patient Position: At rest) Pulse 94 Temp 97.7 °F (36.5 °C) Resp 16 Ht 5' 8\" (1.727 m) Wt 50.8 kg (112 lb) SpO2 98% BMI 17.03 kg/m² Physical Exam  
Constitutional: He is oriented to person, place, and time. Thin, appears chronically ill HENT:  
Head: Normocephalic and atraumatic. Neck: Neck supple. No JVD present. Cardiovascular: Normal rate and regular rhythm. Pulmonary/Chest: Effort normal. No respiratory distress. He has wheezes. Diffuse expiratory wheeze No retractions Abdominal: Soft. He exhibits no distension. There is no tenderness. There is no rebound and no guarding. Musculoskeletal: He exhibits no edema. No joint tenderness Neurological: He is alert and oriented to person, place, and time. Skin: Skin is warm and dry. No erythema. Psychiatric: Judgment normal.  
 
 
 
Diagnostic Study Results Labs - Recent Results (from the past 12 hour(s)) EKG, 12 LEAD, INITIAL Collection Time: 11/05/18  3:26 PM  
Result Value Ref Range Ventricular Rate 87 BPM  
 Atrial Rate 87 BPM  
 P-R Interval 154 ms QRS Duration 78 ms Q-T Interval 354 ms QTC Calculation (Bezet) 425 ms Calculated P Axis 53 degrees Calculated R Axis 27 degrees Calculated T Axis 76 degrees Diagnosis Normal sinus rhythm Anteroseptal infarct , age undetermined Abnormal ECG When compared with ECG of 22-OCT-2017 09:41, Anteroseptal infarct is now present CBC WITH AUTOMATED DIFF Collection Time: 11/05/18  3:45 PM  
Result Value Ref Range WBC 7.6 4.6 - 13.2 K/uL  
 RBC 4.55 (L) 4.70 - 5.50 M/uL  
 HGB 13.5 13.0 - 16.0 g/dL HCT 39.1 36.0 - 48.0 % MCV 85.9 74.0 - 97.0 FL  
 MCH 29.7 24.0 - 34.0 PG  
 MCHC 34.5 31.0 - 37.0 g/dL  
 RDW 15.3 (H) 11.6 - 14.5 % PLATELET 510 800 - 771 K/uL MPV 8.4 (L) 9.2 - 11.8 FL  
 NEUTROPHILS 70 40 - 73 % LYMPHOCYTES 23 21 - 52 % MONOCYTES 6 3 - 10 % EOSINOPHILS 1 0 - 5 % BASOPHILS 0 0 - 2 %  
 ABS. NEUTROPHILS 5.3 1.8 - 8.0 K/UL  
 ABS. LYMPHOCYTES 1.8 0.9 - 3.6 K/UL  
 ABS. MONOCYTES 0.5 0.05 - 1.2 K/UL  
 ABS. EOSINOPHILS 0.1 0.0 - 0.4 K/UL  
 ABS. BASOPHILS 0.0 0.0 - 0.1 K/UL  
 DF AUTOMATED METABOLIC PANEL, COMPREHENSIVE Collection Time: 11/05/18  3:45 PM  
Result Value Ref Range Sodium 135 (L) 136 - 145 mmol/L Potassium 3.8 3.5 - 5.5 mmol/L Chloride 101 100 - 108 mmol/L  
 CO2 29 21 - 32 mmol/L Anion gap 5 3.0 - 18 mmol/L Glucose 135 (H) 74 - 99 mg/dL BUN 5 (L) 7.0 - 18 MG/DL Creatinine 0.74 0.6 - 1.3 MG/DL  
 BUN/Creatinine ratio 7 (L) 12 - 20 GFR est AA >60 >60 ml/min/1.73m2 GFR est non-AA >60 >60 ml/min/1.73m2 Calcium 9.1 8.5 - 10.1 MG/DL Bilirubin, total 0.5 0.2 - 1.0 MG/DL  
 ALT (SGPT) 27 16 - 61 U/L  
 AST (SGOT) 22 15 - 37 U/L Alk. phosphatase 101 45 - 117 U/L Protein, total 7.5 6.4 - 8.2 g/dL Albumin 3.7 3.4 - 5.0 g/dL Globulin 3.8 2.0 - 4.0 g/dL A-G Ratio 1.0 0.8 - 1.7 LIPASE Collection Time: 11/05/18  3:45 PM  
Result Value Ref Range Lipase 1,856 (H) 73 - 393 U/L  
CARDIAC PANEL,(CK, CKMB & TROPONIN) Collection Time: 11/05/18  3:45 PM  
Result Value Ref Range CK 52 39 - 308 U/L  
 CK - MB <1.0 <3.6 ng/ml CK-MB Index  0.0 - 4.0 % CALCULATION NOT PERFORMED WHEN RESULT IS BELOW LINEAR LIMIT Troponin-I, Qt. <0.02 0.0 - 0.045 NG/ML Radiologic Studies -  
CT CHEST ABD PELV W CONT Final Result XR CHEST PORT Final Result Ct Chest Abd Pelv W Cont Result Date: 11/5/2018 EXAM: CT of the chest, abdomen, and pelvis CLINICAL INDICATION/HISTORY: Chest and abdominal pain. COMPARISON: 10/20/2017. TECHNIQUE: Axial CT imaging of the chest, abdomen, and pelvis was performed with intravenous contrast. Multiplanar reformats were generated. One or more dose reduction techniques were used on this CT: automated exposure control, adjustment of the mAs and/or kVp according to patient size, and iterative reconstruction techniques. The specific techniques used on this CT exam have been documented in the patient's electronic medical record. _______________ FINDINGS: CHEST: LUNGS: No suspicious nodule or mass. No abnormal opacities. PLEURA: Normal, with no effusion or pneumothorax. AIRWAY: Normal. MEDIASTINUM: Normal heart size. No pericardial effusion. Mild atherosclerosis of the thoracic aorta. CHEST LYMPH NODES: No enlarged lymph nodes. ABDOMEN/PELVIS: LIVER, BILIARY: Liver is normal. No biliary dilation. Gallbladder is unremarkable. PANCREAS: Coarse calcifications at the pancreatic head with chronic mild dilatation of the pancreatic duct. SPLEEN: Normal. ADRENALS: Normal. KIDNEYS: Normal. ABDOMEN/PELVIS LYMPH NODES: No enlarged lymph nodes. GASTROINTESTINAL TRACT: No bowel dilation or wall thickening. There are postsurgical changes of previous right partial colectomy. PELVIC ORGANS: Unremarkable.  VASCULATURE: Atherosclerosis throughout the abdominal aorta and major branch vessels. BONES: No acute or aggressive osseous abnormalities identified. SUPERFICIAL SOFT TISSUES: Unremarkable. _______________ IMPRESSION: 1. No evidence of acute abnormality, hollow viscus perforation, or inflammatory process in the chest, abdomen, or pelvis. 2. Sequelae of chronic pancreatitis, similar to prior imaging. 3. Atherosclerosis of the thoracic and abdominal aorta including involvement of major branch vessels. Xr Chest [facility] Result Date: 11/5/2018 EXAM: Chest, portable erect, one view INDICATION: Chest pain COMPARISON: 10/20/2017 _______________ FINDINGS: Aorta is tortuous, cardiac silhouette and pulmonary vessels are normal. Lungs are clear. Costophrenic angles are sharp. No pneumothorax. No acute change. _______________ IMPRESSION: No active disease or interval change. Medical Decision Making I am the first provider for this patient. I reviewed the vital signs, available nursing notes, past medical history, past surgical history, family history and social history. Vital Signs-Reviewed the patient's vital signs. Pulse Oximetry Analysis -  98% on room air (Interpretation) nonhypoxic Cardiac Monitor: 
Rate: 94 Rhythm:  Normal Sinus Rhythm EKG: Interpreted by the EP. Time Interpreted: 5566 Rate: 87 Rhythm: Normal Sinus Rhythm Interpretation: normal intervals, no ST changes Comparison: 10/22/17 Records Reviewed: Nursing Notes (Time of Review: 3:54 PM) 
 
ED Course: Progress Notes, Reevaluation, and Consults: 
 
Provider Notes (Medical Decision Making): 71 y/o male presents with chest pain and abd pain. Will screen for mass, intra-abdominal process. ?reflux as burning pain, not consistent with dissection or PE. Noted wheeze on exam, will give neb, screen for pna.  
 
 
5:32 PM 
Discussed results with pt, noted elevated lipase and chronic pancreatitis. Advised clear liquid diet, will have GI follow up. Pt tolerating po, no vomiting, stable for dc home. Discussed return precautions. Diagnosis Clinical Impression: 1. Elevated lipase 2. Chronic pancreatitis, unspecified pancreatitis type ([address] Disposition: Discharge Follow-up Information Follow up With Specialties Details Why Contact Info Yareli Guardado MD Internal Medicine Schedule an appointment as soon as possible for a visit in 2 days As needed, Follow up from emergency department [place] [id number] [facility] 
[phone] Pardeep Thompson MD Gastroenterology Schedule an appointment as soon as possible for a visit in 2 days As needed, Follow up from emergency department [facility] [address] [id number] 
[phone] Medication List  
  
ASK your doctor about these medications   
amLODIPine 10 mg tablet Commonly known as:  Gabi Dykes Take 1 Tab by mouth daily. aspirin delayed-release 81 mg tablet Take 1 Tab by mouth daily. hydroCHLOROthiazide 25 mg tablet Commonly known as:  HYDRODIURIL 
  
VITAMIN D3 1,000 unit tablet Generic drug:  cholecalciferol 
  
  
 
_______________________________ Attestations: 
Scribe Attestation Elisabet Doan acting as a scribe for and in the presence of Ry Champion DO November 05, 2018 at 5:27 PM 
    
Provider Attestation:     
I personally performed the services described in the documentation, reviewed the documentation, as recorded by the scribe in my presence, and it accurately and completely records my words and actions. November 05, 2018 at 5:27 PM - Ry Champion DO   
_______________________________

## 2018-11-05 NOTE — ED NOTES
Purposeful rounding completed: 
 
Side rails up x 1:  YES Bed in low position and wheels locked: YES Call bell within reach: YES Comfort addressed: YES Toileting needs addressed: YES Plan of care reviewed/updated with patient and or family members: YES 
IV site assessed: YES Pain assessed and addressed: YES, 5

## 2018-11-05 NOTE — ED TRIAGE NOTES
Pt presents to ED w/ c/o \"burning\" chest pain, abdominal pain, and back pain. Pt states this has been going on for 3 months and he can usually \"walk it off\" but today it is worse. States he is also losing a lot of weight.

## 2018-11-05 NOTE — ED NOTES
I performed a brief evaluation, including history and physical, of the patient here in triage and I have determined that pt will need further treatment and evaluation from the main side ER physician. I have placed initial orders to help in expediting patients care. November 05, 2018 at 3:31 PM - ABIGAIL Atkins There were no vitals taken for this visit.

## 2018-11-06 LAB
ATRIAL RATE: 87 BPM
CALCULATED P AXIS, ECG09: 53 DEGREES
CALCULATED R AXIS, ECG10: 27 DEGREES
CALCULATED T AXIS, ECG11: 76 DEGREES
DIAGNOSIS, 93000: NORMAL
P-R INTERVAL, ECG05: 154 MS
Q-T INTERVAL, ECG07: 354 MS
QRS DURATION, ECG06: 78 MS
QTC CALCULATION (BEZET), ECG08: 425 MS
VENTRICULAR RATE, ECG03: 87 BPM

## 2019-02-05 ENCOUNTER — HOSPITAL ENCOUNTER (INPATIENT)
Age: 74
LOS: 1 days | Discharge: LEFT BEFORE DISCHARGE | DRG: 438 | End: 2019-02-07
Attending: EMERGENCY MEDICINE | Admitting: HOSPITALIST
Payer: MEDICARE

## 2019-02-05 DIAGNOSIS — K85.90 ACUTE RECURRENT PANCREATITIS: Primary | ICD-10-CM

## 2019-02-05 DIAGNOSIS — K86.2 PANCREATIC CYST: ICD-10-CM

## 2019-02-05 DIAGNOSIS — K81.0 ACUTE CHOLECYSTITIS: ICD-10-CM

## 2019-02-05 PROCEDURE — 99284 EMERGENCY DEPT VISIT MOD MDM: CPT

## 2019-02-05 PROCEDURE — 96365 THER/PROPH/DIAG IV INF INIT: CPT

## 2019-02-05 PROCEDURE — 96375 TX/PRO/DX INJ NEW DRUG ADDON: CPT

## 2019-02-05 PROCEDURE — 93005 ELECTROCARDIOGRAM TRACING: CPT

## 2019-02-05 RX ORDER — FAMOTIDINE 10 MG/ML
20 INJECTION INTRAVENOUS
Status: COMPLETED | OUTPATIENT
Start: 2019-02-05 | End: 2019-02-06

## 2019-02-06 ENCOUNTER — APPOINTMENT (OUTPATIENT)
Dept: CT IMAGING | Age: 74
DRG: 438 | End: 2019-02-06
Attending: EMERGENCY MEDICINE
Payer: MEDICARE

## 2019-02-06 ENCOUNTER — APPOINTMENT (OUTPATIENT)
Dept: MRI IMAGING | Age: 74
DRG: 438 | End: 2019-02-06
Attending: HOSPITALIST
Payer: MEDICARE

## 2019-02-06 ENCOUNTER — APPOINTMENT (OUTPATIENT)
Dept: ULTRASOUND IMAGING | Age: 74
DRG: 438 | End: 2019-02-06
Attending: EMERGENCY MEDICINE
Payer: MEDICARE

## 2019-02-06 PROBLEM — K85.10 GALL STONE PANCREATITIS: Status: ACTIVE | Noted: 2019-02-06

## 2019-02-06 LAB
ALBUMIN SERPL-MCNC: 4.3 G/DL (ref 3.4–5)
ALBUMIN/GLOB SERPL: 1.2 {RATIO} (ref 0.8–1.7)
ALP SERPL-CCNC: 677 U/L (ref 45–117)
ALT SERPL-CCNC: 325 U/L (ref 16–61)
ANION GAP SERPL CALC-SCNC: 12 MMOL/L (ref 3–18)
AST SERPL-CCNC: 418 U/L (ref 15–37)
ATRIAL RATE: 59 BPM
BASOPHILS # BLD: 0 K/UL (ref 0–0.1)
BASOPHILS NFR BLD: 0 % (ref 0–2)
BILIRUB SERPL-MCNC: 3.8 MG/DL (ref 0.2–1)
BUN SERPL-MCNC: 10 MG/DL (ref 7–18)
BUN/CREAT SERPL: 16 (ref 12–20)
CALCIUM SERPL-MCNC: 11 MG/DL (ref 8.5–10.1)
CALCULATED P AXIS, ECG09: 39 DEGREES
CALCULATED R AXIS, ECG10: 31 DEGREES
CALCULATED T AXIS, ECG11: 72 DEGREES
CHLORIDE SERPL-SCNC: 92 MMOL/L (ref 100–108)
CO2 SERPL-SCNC: 32 MMOL/L (ref 21–32)
CREAT SERPL-MCNC: 0.64 MG/DL (ref 0.6–1.3)
DIAGNOSIS, 93000: NORMAL
DIFFERENTIAL METHOD BLD: ABNORMAL
EOSINOPHIL # BLD: 0.1 K/UL (ref 0–0.4)
EOSINOPHIL NFR BLD: 1 % (ref 0–5)
ERYTHROCYTE [DISTWIDTH] IN BLOOD BY AUTOMATED COUNT: 18.2 % (ref 11.6–14.5)
GLOBULIN SER CALC-MCNC: 3.5 G/DL (ref 2–4)
GLUCOSE SERPL-MCNC: 150 MG/DL (ref 74–99)
HCT VFR BLD AUTO: 39.4 % (ref 36–48)
HGB BLD-MCNC: 14.3 G/DL (ref 13–16)
INR PPP: 1 (ref 0.8–1.2)
LACTATE BLD-SCNC: 1.38 MMOL/L (ref 0.4–2)
LIPASE SERPL-CCNC: 3001 U/L (ref 73–393)
LYMPHOCYTES # BLD: 1.4 K/UL (ref 0.9–3.6)
LYMPHOCYTES NFR BLD: 15 % (ref 21–52)
MCH RBC QN AUTO: 30.9 PG (ref 24–34)
MCHC RBC AUTO-ENTMCNC: 36.3 G/DL (ref 31–37)
MCV RBC AUTO: 85.1 FL (ref 74–97)
MONOCYTES # BLD: 0.8 K/UL (ref 0.05–1.2)
MONOCYTES NFR BLD: 8 % (ref 3–10)
NEUTS SEG # BLD: 7.3 K/UL (ref 1.8–8)
NEUTS SEG NFR BLD: 76 % (ref 40–73)
P-R INTERVAL, ECG05: 144 MS
PLATELET # BLD AUTO: 398 K/UL (ref 135–420)
PMV BLD AUTO: 9.9 FL (ref 9.2–11.8)
POTASSIUM SERPL-SCNC: 4.1 MMOL/L (ref 3.5–5.5)
PROT SERPL-MCNC: 7.8 G/DL (ref 6.4–8.2)
PROTHROMBIN TIME: 12.4 SEC (ref 11.5–15.2)
Q-T INTERVAL, ECG07: 406 MS
QRS DURATION, ECG06: 88 MS
QTC CALCULATION (BEZET), ECG08: 401 MS
RBC # BLD AUTO: 4.63 M/UL (ref 4.7–5.5)
SODIUM SERPL-SCNC: 136 MMOL/L (ref 136–145)
TROPONIN I SERPL-MCNC: <0.02 NG/ML (ref 0–0.04)
VENTRICULAR RATE, ECG03: 59 BPM
WBC # BLD AUTO: 9.6 K/UL (ref 4.6–13.2)

## 2019-02-06 PROCEDURE — 80053 COMPREHEN METABOLIC PANEL: CPT

## 2019-02-06 PROCEDURE — 74011000250 HC RX REV CODE- 250: Performed by: EMERGENCY MEDICINE

## 2019-02-06 PROCEDURE — 74011250636 HC RX REV CODE- 250/636: Performed by: HOSPITALIST

## 2019-02-06 PROCEDURE — 85025 COMPLETE CBC W/AUTO DIFF WBC: CPT

## 2019-02-06 PROCEDURE — 87040 BLOOD CULTURE FOR BACTERIA: CPT

## 2019-02-06 PROCEDURE — 74011250637 HC RX REV CODE- 250/637: Performed by: EMERGENCY MEDICINE

## 2019-02-06 PROCEDURE — 87186 SC STD MICRODIL/AGAR DIL: CPT

## 2019-02-06 PROCEDURE — 76705 ECHO EXAM OF ABDOMEN: CPT

## 2019-02-06 PROCEDURE — 74011000258 HC RX REV CODE- 258: Performed by: EMERGENCY MEDICINE

## 2019-02-06 PROCEDURE — 74181 MRI ABDOMEN W/O CONTRAST: CPT

## 2019-02-06 PROCEDURE — 65270000029 HC RM PRIVATE

## 2019-02-06 PROCEDURE — 74011636320 HC RX REV CODE- 636/320: Performed by: EMERGENCY MEDICINE

## 2019-02-06 PROCEDURE — 85610 PROTHROMBIN TIME: CPT

## 2019-02-06 PROCEDURE — 74011000258 HC RX REV CODE- 258: Performed by: HOSPITALIST

## 2019-02-06 PROCEDURE — 74011250636 HC RX REV CODE- 250/636: Performed by: EMERGENCY MEDICINE

## 2019-02-06 PROCEDURE — 84484 ASSAY OF TROPONIN QUANT: CPT

## 2019-02-06 PROCEDURE — 83690 ASSAY OF LIPASE: CPT

## 2019-02-06 PROCEDURE — 74177 CT ABD & PELVIS W/CONTRAST: CPT

## 2019-02-06 PROCEDURE — 83605 ASSAY OF LACTIC ACID: CPT

## 2019-02-06 PROCEDURE — 87077 CULTURE AEROBIC IDENTIFY: CPT

## 2019-02-06 RX ORDER — HEPARIN SODIUM 5000 [USP'U]/ML
5000 INJECTION, SOLUTION INTRAVENOUS; SUBCUTANEOUS EVERY 8 HOURS
Status: DISCONTINUED | OUTPATIENT
Start: 2019-02-06 | End: 2019-02-07 | Stop reason: HOSPADM

## 2019-02-06 RX ORDER — ONDANSETRON 2 MG/ML
4 INJECTION INTRAMUSCULAR; INTRAVENOUS
Status: DISCONTINUED | OUTPATIENT
Start: 2019-02-06 | End: 2019-02-07 | Stop reason: HOSPADM

## 2019-02-06 RX ORDER — MORPHINE SULFATE 4 MG/ML
4 INJECTION INTRAVENOUS
Status: COMPLETED | OUTPATIENT
Start: 2019-02-06 | End: 2019-02-06

## 2019-02-06 RX ORDER — TRAMADOL HYDROCHLORIDE 50 MG/1
50 TABLET ORAL
Qty: 8 TAB | Refills: 0 | Status: SHIPPED | OUTPATIENT
Start: 2019-02-06 | End: 2019-03-20

## 2019-02-06 RX ORDER — ACETAMINOPHEN 500 MG
1000 TABLET ORAL
Qty: 40 TAB | Refills: 0 | Status: SHIPPED | OUTPATIENT
Start: 2019-02-06 | End: 2019-02-12

## 2019-02-06 RX ORDER — ONDANSETRON 2 MG/ML
4 INJECTION INTRAMUSCULAR; INTRAVENOUS
Status: COMPLETED | OUTPATIENT
Start: 2019-02-06 | End: 2019-02-06

## 2019-02-06 RX ORDER — HYDRALAZINE HYDROCHLORIDE 20 MG/ML
10 INJECTION INTRAMUSCULAR; INTRAVENOUS
Status: DISCONTINUED | OUTPATIENT
Start: 2019-02-06 | End: 2019-02-07 | Stop reason: HOSPADM

## 2019-02-06 RX ORDER — ACETAMINOPHEN 325 MG/1
650 TABLET ORAL
Status: DISCONTINUED | OUTPATIENT
Start: 2019-02-06 | End: 2019-02-07 | Stop reason: HOSPADM

## 2019-02-06 RX ORDER — SODIUM CHLORIDE 0.9 % (FLUSH) 0.9 %
5-10 SYRINGE (ML) INJECTION AS NEEDED
Status: DISCONTINUED | OUTPATIENT
Start: 2019-02-06 | End: 2019-02-07 | Stop reason: HOSPADM

## 2019-02-06 RX ORDER — SODIUM CHLORIDE 9 MG/ML
75 INJECTION, SOLUTION INTRAVENOUS CONTINUOUS
Status: DISCONTINUED | OUTPATIENT
Start: 2019-02-06 | End: 2019-02-07 | Stop reason: HOSPADM

## 2019-02-06 RX ORDER — MORPHINE SULFATE 1 MG/ML
2 INJECTION, SOLUTION EPIDURAL; INTRATHECAL; INTRAVENOUS
Status: DISCONTINUED | OUTPATIENT
Start: 2019-02-06 | End: 2019-02-07 | Stop reason: HOSPADM

## 2019-02-06 RX ADMIN — IOPAMIDOL 80 ML: 612 INJECTION, SOLUTION INTRAVENOUS at 01:28

## 2019-02-06 RX ADMIN — PIPERACILLIN SODIUM,TAZOBACTAM SODIUM 3.38 G: 3; .375 INJECTION, POWDER, FOR SOLUTION INTRAVENOUS at 18:28

## 2019-02-06 RX ADMIN — MORPHINE SULFATE 4 MG: 4 INJECTION INTRAVENOUS at 02:31

## 2019-02-06 RX ADMIN — HEPARIN SODIUM 5000 UNITS: 5000 INJECTION INTRAVENOUS; SUBCUTANEOUS at 09:31

## 2019-02-06 RX ADMIN — SODIUM CHLORIDE 75 ML/HR: 900 INJECTION, SOLUTION INTRAVENOUS at 09:37

## 2019-02-06 RX ADMIN — SODIUM CHLORIDE 75 ML/HR: 900 INJECTION, SOLUTION INTRAVENOUS at 13:03

## 2019-02-06 RX ADMIN — PIPERACILLIN SODIUM,TAZOBACTAM SODIUM 3.38 G: 3; .375 INJECTION, POWDER, FOR SOLUTION INTRAVENOUS at 05:57

## 2019-02-06 RX ADMIN — SODIUM CHLORIDE 1000 ML: 900 INJECTION, SOLUTION INTRAVENOUS at 06:00

## 2019-02-06 RX ADMIN — ONDANSETRON 4 MG: 2 INJECTION INTRAMUSCULAR; INTRAVENOUS at 02:31

## 2019-02-06 RX ADMIN — FAMOTIDINE 20 MG: 10 INJECTION, SOLUTION INTRAVENOUS at 00:21

## 2019-02-06 RX ADMIN — PIPERACILLIN SODIUM,TAZOBACTAM SODIUM 3.38 G: 3; .375 INJECTION, POWDER, FOR SOLUTION INTRAVENOUS at 13:03

## 2019-02-06 RX ADMIN — LIDOCAINE HYDROCHLORIDE 40 ML: 20 SOLUTION ORAL; TOPICAL at 00:19

## 2019-02-06 RX ADMIN — HEPARIN SODIUM 5000 UNITS: 5000 INJECTION INTRAVENOUS; SUBCUTANEOUS at 23:54
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
## 2019-02-06 NOTE — ED PROVIDER NOTES
Florence Cabrera is a 72 y.o. Male with h/o pancreatitis, states he drank some alcohol last night and had onset of upper abd, chest pain tonight with no nvd, melena. Fcs, sob. Feels similar to when had pancreatitis in past. Nothing taken had not drank alcohol in several weeks The history is provided by the patient and medical records. Past Medical History:  
Diagnosis Date  Hypertension Past Surgical History:  
Procedure Laterality Date [address] UNLISTED  APPENDECTOMY  HX APPENDECTOMY  HX COLONOSCOPY  1/28/2015 Repeat colonoscopy in 5 yrs per Dr. Brit Reardon  HX HERNIA REPAIR  10/22/2017 Ex lap. lysis of adhesions adn reductions of internal hernia done 10/22/2017 Family History:  
Problem Relation Age of Onset  Heart Disease Mother  Cancer Father  Cancer Sister  Diabetes Sister Social History Socioeconomic History  Marital status:  Spouse name: Not on file  Number of children: Not on file  Years of education: Not on file  Highest education level: Not on file Social Needs  Financial resource strain: Not on file  Food insecurity - worry: Not on file  Food insecurity - inability: Not on file  Transportation needs - medical: Not on file  Transportation needs - non-medical: Not on file Occupational History  Not on file Tobacco Use  Smoking status: Current Every Day Smoker Packs/day: 0.50 Years: 50.00 Pack years: 25.00 Types: Cigarettes  Smokeless tobacco: Never Used Substance and Sexual Activity  Alcohol use: Yes Comment: 2 beers aday  Drug use: No  
 Sexual activity: Not on file Other Topics Concern  Not on file Social History Narrative  Not on file ALLERGIES: Patient has no known allergies. Review of Systems Constitutional: Negative for diaphoresis and fever. HENT: Negative for sore throat. Eyes: Negative for visual disturbance. Respiratory: Negative for shortness of breath. Cardiovascular: Negative for chest pain. Gastrointestinal: Positive for abdominal pain. Negative for abdominal distention and blood in stool. Endocrine: Negative for polyuria. Genitourinary: Negative for difficulty urinating. Musculoskeletal: Negative for gait problem. Skin: Negative for rash. Allergic/Immunologic: Negative for immunocompromised state. Neurological: Negative for syncope. Psychiatric/Behavioral: Positive for sleep disturbance. Vitals:  
 02/06/19 5423 02/06/19 0025 02/06/19 0304 02/06/19 0976 BP:  (!) 204/103 163/85 (!) 148/97 Pulse: 64  78 Resp: 18  16 Temp: 98.5 °F (36.9 °C) SpO2:   100% Weight:      
Height:      
      
 
Physical Exam  
Constitutional: He is oriented to person, place, and time. Non-toxic appearance. He does not have a sickly appearance. He does not appear ill. He appears distressed. HENT:  
Head: Normocephalic and atraumatic. Right Ear: External ear normal.  
Left Ear: External ear normal.  
Nose: Nose normal.  
Mouth/Throat: Oropharynx is clear and moist. No oropharyngeal exudate. Eyes: Conjunctivae are normal.  
Neck: Normal range of motion. Cardiovascular: Normal rate, regular rhythm, normal heart sounds and intact distal pulses. Pulmonary/Chest: Effort normal and breath sounds normal. No respiratory distress. Abdominal: Soft. Normal appearance. He exhibits no pulsatile midline mass. There is no hepatosplenomegaly. There is tenderness in the epigastric area and left upper quadrant. There is no rigidity, no rebound, no guarding, no CVA tenderness, no tenderness at McBurney's point and negative Estrada's sign. Musculoskeletal: Normal range of motion. He exhibits no edema. Neurological: He is alert and oriented to person, place, and time. Skin: Skin is warm and dry. He is not diaphoretic.   
Psychiatric: His behavior is normal.  
 Nursing note and vitals reviewed. MDM Procedures Vitals: 
Patient Vitals for the past 12 hrs: 
 Temp Pulse Resp BP SpO2  
02/06/19 0326    (!) 148/97   
02/06/19 0304  78 16 163/85 100 % 02/06/19 0025    (!) 204/103   
02/06/19 0023 98.5 °F (36.9 °C) 64 18    
02/05/19 2320 97.9 °F (36.6 °C) 63 14 (!) 187/106 97 % Medications ordered:  
Medications  
sodium chloride (NS) flush 5-10 mL (not administered)  
sodium chloride 0.9 % bolus infusion 1,000 mL (not administered)  
piperacillin-tazobactam (ZOSYN) 3.375 g in 0.9% sodium chloride (MBP/ADV) 100 mL MBP (not administered)  
mylanta/viscous lidocaine (GI COCKTAIL) (40 mL Oral Given 2/6/19 0019) famotidine (PF) (PEPCID) injection 20 mg (20 mg IntraVENous Given 2/6/19 0021) iopamidol (ISOVUE 300) 61 % contrast injection 100 mL (80 mL IntraVENous Given 2/6/19 0128) morphine injection 4 mg (4 mg IntraVENous Given 2/6/19 0231) ondansetron (ZOFRAN) injection 4 mg (4 mg IntraVENous Given 2/6/19 0231) Lab findings: 
Recent Results (from the past 12 hour(s)) EKG, 12 LEAD, INITIAL Collection Time: 02/05/19 11:38 PM  
Result Value Ref Range Ventricular Rate 59 BPM  
 Atrial Rate 59 BPM  
 P-R Interval 144 ms QRS Duration 88 ms Q-T Interval 406 ms QTC Calculation (Bezet) 401 ms Calculated P Axis 39 degrees Calculated R Axis 31 degrees Calculated T Axis 72 degrees Diagnosis Sinus bradycardia Septal infarct (cited on or before 28-MAR-2017) Abnormal ECG When compared with ECG of 05-NOV-2018 15:26, No significant change was found CBC WITH AUTOMATED DIFF Collection Time: 02/06/19 12:10 AM  
Result Value Ref Range WBC 9.6 4.6 - 13.2 K/uL  
 RBC 4.63 (L) 4.70 - 5.50 M/uL  
 HGB 14.3 13.0 - 16.0 g/dL HCT 39.4 36.0 - 48.0 % MCV 85.1 74.0 - 97.0 FL  
 MCH 30.9 24.0 - 34.0 PG  
 MCHC 36.3 31.0 - 37.0 g/dL  
 RDW 18.2 (H) 11.6 - 14.5 % PLATELET 056 805 - 225 K/uL MPV 9.9 9.2 - 11.8 FL  
 NEUTROPHILS 76 (H) 40 - 73 % LYMPHOCYTES 15 (L) 21 - 52 % MONOCYTES 8 3 - 10 % EOSINOPHILS 1 0 - 5 % BASOPHILS 0 0 - 2 %  
 ABS. NEUTROPHILS 7.3 1.8 - 8.0 K/UL  
 ABS. LYMPHOCYTES 1.4 0.9 - 3.6 K/UL  
 ABS. MONOCYTES 0.8 0.05 - 1.2 K/UL  
 ABS. EOSINOPHILS 0.1 0.0 - 0.4 K/UL  
 ABS. BASOPHILS 0.0 0.0 - 0.1 K/UL  
 DF AUTOMATED METABOLIC PANEL, COMPREHENSIVE Collection Time: 02/06/19 12:10 AM  
Result Value Ref Range Sodium 136 136 - 145 mmol/L Potassium 4.1 3.5 - 5.5 mmol/L Chloride 92 (L) 100 - 108 mmol/L  
 CO2 32 21 - 32 mmol/L Anion gap 12 3.0 - 18 mmol/L Glucose 150 (H) 74 - 99 mg/dL BUN 10 7.0 - 18 MG/DL Creatinine 0.64 0.6 - 1.3 MG/DL  
 BUN/Creatinine ratio 16 12 - 20 GFR est AA >60 >60 ml/min/1.73m2 GFR est non-AA >60 >60 ml/min/1.73m2 Calcium 11.0 (H) 8.5 - 10.1 MG/DL Bilirubin, total 3.8 (H) 0.2 - 1.0 MG/DL  
 ALT (SGPT) 325 (H) 16 - 61 U/L  
 AST (SGOT) 418 (H) 15 - 37 U/L Alk. phosphatase 677 (H) 45 - 117 U/L Protein, total 7.8 6.4 - 8.2 g/dL Albumin 4.3 3.4 - 5.0 g/dL Globulin 3.5 2.0 - 4.0 g/dL A-G Ratio 1.2 0.8 - 1.7 LIPASE Collection Time: 02/06/19 12:10 AM  
Result Value Ref Range Lipase 3,001 (H) 73 - 393 U/L  
TROPONIN I Collection Time: 02/06/19 12:10 AM  
Result Value Ref Range Troponin-I, QT <0.02 0.0 - 0.045 NG/ML  
 
 
EKG interpretation by ED Physician: 
Sinus yahir with no acute st tw changes Rate 59, pr 144, qtc 401 No sig changes Cardiac monitor: yahir with reg rhythm, no ectopy Pulse ox: 97% ra X-Ray, CT or other radiology findings or impressions: 
CT ABD PELV W CONT    (Results Pending) US ABD LTD    (Results Pending) XR CHEST PORT    (Results Pending) 1.7cm fluid collection in the pancreatic head, new since 11/5. No capsule. Chronic pancreatic calcification Us: gb wall 3mm, sludge in gb and cbd. cbd 8mm.  Pos murphys sign during exam 
 
 Progress notes, Consult notes or additional Procedure notes:  
abd soft. Still with mild epigastric ttp. Noted new abnl lfts. Will need us to eval for biliary obstruction, gallstones Repeat exam with mod ttp ruq, epigastric area. Will need admission for further treatment, possible ercp which was dw pt Will do sepsis screen given possible infection D/w dr Marlin Abad on call for surgery rec medical admission and will consult along with mrcp as well D/w dr Antwon Pickering on call for hospitalist who will admit Reevaluation of patient:  
stable Disposition: 
Diagnosis: 1. Acute recurrent pancreatitis 2. Pancreatic cyst   
3. Acute cholecystitis Disposition: admit Follow-up Information Follow up With Specialties Details Why Contact Info Douglas Guardado MD Internal Medicine Schedule an appointment as soon as possible for a visit  [address] [facility] 
[phone] uDstin Tate MD Gastroenterology Schedule an appointment as soon as possible for a visit  [address] [facility] [id number] 
[phone] [facility] EMERGENCY DEPT Emergency Medicine  If symptoms worsen [address] 
[phone] Medication List  
  
START taking these medications   
acetaminophen 500 mg tablet Commonly known as:  [address] Take 2 Tabs by mouth every six (6) hours as needed for Pain. 
  
traMADol 50 mg tablet Commonly known as:  ULTRAM 
Take 1 Tab by mouth every six (6) hours as needed for Pain. Max Daily Amount: 200 mg. ASK your doctor about these medications   
amLODIPine 10 mg tablet Commonly known as:  Caralee Dupes Take 1 Tab by mouth daily. aspirin delayed-release 81 mg tablet Take 1 Tab by mouth daily. hydroCHLOROthiazide 25 mg tablet Commonly known as:  HYDRODIURIL 
  
VITAMIN D3 1,000 unit tablet Generic drug:  cholecalciferol Where to Get Your Medications Information about where to get these medications is not yet available Ask your nurse or doctor about these medications · acetaminophen 500 mg tablet · traMADol 50 mg tablet

## 2019-02-06 NOTE — ED NOTES
Spoke with MRI who states they have a patient on the table and to take patient to ready bed and they will get patient for MRI from the floor
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
## 2019-02-06 NOTE — PROGRESS NOTES
completed the initial Spiritual Assessment of the patient  In bed 5 of the emergency room and offered Pastoral Care, support. Patient does not have any Druze/cultural needs that will affect patients preferences in health care. Chaplains will continue to follow and will provide pastoral care on an as needed/requested basis. Dov Hardin Board Certified [employer] Spiritual Care Department [phone]

## 2019-02-06 NOTE — DISCHARGE INSTRUCTIONS
Patient Education   DO NOT DRINK ANY MORE ALCOHOL AT ALL IN THE FUTURE!!!     Pancreatitis: Care Instructions  Your Care Instructions    The pancreas is an organ behind the stomach. It makes hormones and enzymes to help your body digest food. But if these enzymes attack the pancreas, it can get inflamed. This is called pancreatitis. Most cases are caused by gallstones or by heavy alcohol use. If you take care of yourself at home, it will help you get better. It will also help you avoid more problems with your pancreas. Follow-up care is a key part of your treatment and safety. Be sure to make and go to all appointments, and call your doctor if you are having problems. It's also a good idea to know your test results and keep a list of the medicines you take. How can you care for yourself at home? · Drink clear liquids and eat bland foods until you feel better. [place] foods include rice, dry toast, and crackers. They also include bananas and applesauce. · Eat a low-fat diet until your doctor says your pancreas is healed. · Do not drink alcohol. Tell your doctor if you need help to quit. Counseling, support groups, and sometimes medicines can help you stay sober. · Be safe with medicines. Read and follow all instructions on the label. ? If the doctor gave you a prescription medicine for pain, take it as prescribed. ? If you are not taking a prescription pain medicine, ask your doctor if you can take an over-the-counter medicine. · If your doctor prescribed antibiotics, take them as directed. Do not stop taking them just because you feel better. You need to take the full course of antibiotics. · Get extra rest until you feel better. To prevent future problems with your pancreas  · Do not drink alcohol. · Tell your doctors and pharmacist that you've had pancreatitis. They can help you avoid medicines that may cause this problem again. When should you call for help?   Call 911 anytime you think you may need emergency care. For example, call if:    · You vomit blood or what looks like coffee grounds.     · Your stools are maroon or very bloody.    Call your doctor now or seek immediate medical care if:    · You have new or worse belly pain.     · Your stools are black and look like tar, or they have streaks of blood.     · You are vomiting.    Watch closely for changes in your health, and be sure to contact your doctor if:    · You do not get better as expected. Where can you learn more? Go to http://vale-peng.info/. Enter B772 in the search box to learn more about \"Pancreatitis: Care Instructions. \"  Current as of: March 27, 2018  Content Version: 11.9  © 4214-5458 [employer]. Care instructions adapted under license by [employer] (which disclaims liability or warranty for this information). If you have questions about a medical condition or this instruction, always ask your healthcare professional. [place] [address] any warranty or liability for your use of this information.

## 2019-02-06 NOTE — ED NOTES
TRANSFER - ED to INPATIENT REPORT: 
 
SBAR report made available to receiving floor on this patient being transferred to [address] [place] [address] ([id number])  for routine progression of care Admitting diagnosis Gall stone pancreatitis [K85.10] Information from the following report(s) SBAR, ED Summary and MAR was made available to receiving floor. Lines:  
Peripheral IV 02/06/19 Right Antecubital (Active) Site Assessment Clean, dry, & intact 2/6/2019 12:04 AM  
Phlebitis Assessment 0 2/6/2019 12:04 AM  
Infiltration Assessment 0 2/6/2019 12:04 AM  
Dressing Status Clean, dry, & intact 2/6/2019 12:04 AM  
Dressing Type Transparent 2/6/2019 12:04 AM  
Hub Color/Line Status Patent; Flushed 2/6/2019 12:04 AM  
Action Taken Blood drawn 2/6/2019 12:04 AM  
  
 
 
 
Patient is oriented to time, place, person and situation Valuables transported with patient =Monitored (most recent) Vitals w/ MEWS Score (last day) Date/Time MEWS Score Pulse Resp Temp BP Level of Consciousness SpO2  
 02/06/19 07:19:18  1  60  15  97.9 °F (36.6 °C)  162/112  (Abnormal)   Alert  99 % 02/06/19 07:10:59    63  18    170/97  (Abnormal)     96 % 02/06/19 03:26:38          148/97  (Abnormal)       
 02/06/19 03:04:46    78  16    163/85  Alert  100 % 02/06/19 00:25:46          204/103  (Abnormal)       
 02/06/19 00:23:12    64  18  98.5 °F (36.9 °C)    Alert    
 02/05/19 2320  0  63  14  97.9 °F (36.6 °C)  187/106  (Abnormal)   Alert  97 % Septic Patients:  
 
Lactic Acid Lab Results Component Value Date LACPOC 1.38 02/06/2019 LACPOC 1.2 04/21/2017 ALL LACTIC ACIDS GREATER THAN 2 MUST BE REPEATED POC WITHIN 4 HOURS OR PRIOR TO ADMISSION

## 2019-02-06 NOTE — ED NOTES
Pt transported to  for admission. Bedside report given to [facility] RN with all questions answered. Pt in no distress at time of admission.

## 2019-02-06 NOTE — PROGRESS NOTES
Reason for Admission:   Gallstone pancreatitis RRAT Score:   9 Plan for utilizing home health:   possible Likelihood of Readmission:  mod Transition of Care Plan:   Spoke with pt, he states he lives alone. He states he is independent with adls and amb. He designates his ex wife for dcp he seems a little confused. Called number he gave me for her,  Was wrong number called number in chart  Was out of service. [facility] to help cover hosp stay or pay any copays he might have. He still refuses. He refuses any snf if needed , agrees to hh if needed. Will have to go through [place] if needs hh services, they will likely not  Send for a couple of weeks. Plan home for now. Patient has designated _ex wife_______________________ to participate in his/her discharge plan and to receive any needed information. Name: Judith Garcia Address: 
Phone number:unknown.

## 2019-02-06 NOTE — PROGRESS NOTES
Nutrition initial assessment/Plan of care RECOMMENDATIONS:  
1. Advance diet when medically indicated and per Pt tolerance 2. Ensure Enlive TID once diet advanced 3. Monitor labs, weight and PO intake 4. RD to follow GOALS:  
1. PO intake meets >75% of protein/calorie needs by 2/9 
2. Weight Maintenance/gradual gain (1-2 lb/week) by 2/13 ASSESSMENT:  
Wt status is classified as underweight per BMI of 14.5. Noted Pt was 112 lb on (11/5/2018) equating to a 15 lb or 13% weight loss x 3 months per documented weight records. Currently NPO. Labs noted. Pt w/ elevated Bili (3.8), elevated LFTs and elvated lipase (3001). Nutrition recommendations listed. RD to follow. Nutrition Diagnoses:  
Unintentional weight loss related to inadequate energy intake PTA as evidenced by a 15 lb or 13% weight loss x 3 months per documented weight records. Altered GI Function related to gallstone pancreatitis as evidenced by a elevated LFTs and lipase level (3001). Meets Criteria for Acute Malnutrition  
[x] Severe Malnutrition, as evidenced by: 
 [x] Moderate muscle wasting, loss of subcutaneous fat 
 [x] Nutritional intake of <50% of recommended intake for >5 days [x] Weight loss of >1-2% in 1 week, >5% in 1 month, >7.5% in 3 months, or >10% in 6 months 
 [] Moderate-severe edema Nutrition Risk:  [x] High  [] Moderate []  Low SUBJECTIVE/OBJECTIVE:  
 Pt admitted for gallstone pancreatitis. Noted Pt was 112 lb on (11/5/2018) equating to a 15 lb or 13% weight loss x 3 months per documented weight records. Pt seen in room; appears thin with noted muscle wasting at temporal and clavicle regions as well as SQ fat loss of extremities. Denies having any food allergies or problems chewing/swallowing. Reports normally having a fair appetite, consuming 2 meals per day at home. However noted a decrease over the past 5 days or so. Denies having any N/V/ABD pain during visit.  Stated he was having problems with constipation for some time now, but has improved drastically over the past 2 weeks. Will monitor. Information Obtained from:  
 [x] Chart Review [x] Patient 
 [] Family/Caregiver 
 [] Nurse/Physician 
 [] Interdisciplinary Meeting/Rounds Diet: NPO Medications: [x] Reviewed Allergies: [x] Reviewed Encounter Diagnoses ICD-10-CM ICD-9-CM 1. Acute recurrent pancreatitis K85.90 577.0 2. Pancreatic cyst K86.2 577.2 3. Acute cholecystitis K81.0 575.0 Past Medical History:  
Diagnosis Date  Hypertension Labs:   
Lab Results Component Value Date/Time Sodium 136 02/06/2019 12:10 AM  
 Potassium 4.1 02/06/2019 12:10 AM  
 Chloride 92 (L) 02/06/2019 12:10 AM  
 CO2 32 02/06/2019 12:10 AM  
 Anion gap 12 02/06/2019 12:10 AM  
 Glucose 150 (H) 02/06/2019 12:10 AM  
 BUN 10 02/06/2019 12:10 AM  
 Creatinine 0.64 02/06/2019 12:10 AM  
 Calcium 11.0 (H) 02/06/2019 12:10 AM  
 Magnesium 2.4 10/24/2017 12:00 AM  
 Phosphorus 3.6 10/21/2017 03:15 AM  
 Albumin 4.3 02/06/2019 12:10 AM  
 
Anthropometrics: BMI (calculated): 14.5 Last 3 Recorded Weights in this Encounter 02/05/19 2320 Weight: 44 kg (97 lb) Ht Readings from Last 1 Encounters:  
02/05/19 5' 8.5\" (1.74 m) Weight Metrics 2/5/2019 11/5/2018 12/14/2017 11/13/2017 10/20/2017 4/21/2017 4/9/2017 Weight 97 lb 112 lb 118 lb 115 lb 120 lb 125 lb 125 lb BMI 14.53 kg/m2 17.03 kg/m2 17.94 kg/m2 17.23 kg/m2 17.98 kg/m2 18.73 kg/m2 18.73 kg/m2 No data found. IBW: 154 lb %IBW: 63% UBW: 101-110 lb recently [x] Weight Loss [] Weight Gain [] Weight Stable Estimated Nutrition Needs: [x] MSJ  [] Other: 
Calories: 3687-6262 kcal Based on:   [x] Actual BW   
Protein:   65 g Based on:   [x] Actual BW Fluid:       6428-1425 ml Based on:   [x] Actual BW  
 
 [x] No Cultural, Orthodoxy or ethnic dietary need identified. [] Cultural, Orthodoxy and ethnic food preferences identified and addressed Wt Status:  [] Normal (18.6 - 24.9) [x] Underweight (<18.5) [] Overweight (25 - 29.9) [] Mild Obesity (30 - 34.9)  [] Moderate Obesity (35 - 39.9) [] Morbid Obesity (40+) Nutrition Problems Identified:  
[x] Suboptimal PO intake v/s NPO 
[] Food Allergies [] Difficulty chewing/swallowing/poor dentition 
[] Constipation/Diarrhea  
[] Nausea/Vomiting  
[] None 
[] Other:  
 
Plan:  
[] Therapeutic Diet 
[]  Obtained/adjusted food preferences/tolerances and/or snacks options  
[]  Supplements added  
[] Occupational therapy following for feeding techniques []  HS snack added  
[]  Modify diet texture  
[]  Modify diet for food allergies []  Educate patient  
[]  Assist with menu selection []  Monitor PO intake on meal rounds  
[x]  Continue inpatient monitoring and intervention  
[]  Participated in discharge planning/Interdisciplinary rounds/Team meetings  
[]  Other:  
 
Education Needs: 
 [x] Not appropriate for teaching at this time due to: NPO 
 [] Identified and addressed Nutrition Monitoring and Evaluation: 
[x] Continue ongoing monitoring and intervention 
[] Tiffani Amin

## 2019-02-07 VITALS
OXYGEN SATURATION: 99 % | DIASTOLIC BLOOD PRESSURE: 75 MMHG | HEART RATE: 75 BPM | RESPIRATION RATE: 16 BRPM | HEIGHT: 69 IN | BODY MASS INDEX: 14.37 KG/M2 | SYSTOLIC BLOOD PRESSURE: 162 MMHG | WEIGHT: 97 LBS | TEMPERATURE: 98.4 F

## 2019-02-07 LAB
ALBUMIN SERPL-MCNC: 3 G/DL (ref 3.4–5)
ALBUMIN/GLOB SERPL: 1.1 {RATIO} (ref 0.8–1.7)
ALP SERPL-CCNC: 451 U/L (ref 45–117)
ALT SERPL-CCNC: 169 U/L (ref 16–61)
ANION GAP SERPL CALC-SCNC: 8 MMOL/L (ref 3–18)
APTT PPP: 50.3 SEC (ref 23–36.4)
AST SERPL-CCNC: 73 U/L (ref 15–37)
BILIRUB SERPL-MCNC: 1.9 MG/DL (ref 0.2–1)
BUN SERPL-MCNC: 11 MG/DL (ref 7–18)
BUN/CREAT SERPL: 22 (ref 12–20)
CALCIUM SERPL-MCNC: 8.6 MG/DL (ref 8.5–10.1)
CHLORIDE SERPL-SCNC: 101 MMOL/L (ref 100–108)
CHOLEST SERPL-MCNC: 144 MG/DL
CO2 SERPL-SCNC: 30 MMOL/L (ref 21–32)
CREAT SERPL-MCNC: 0.51 MG/DL (ref 0.6–1.3)
ERYTHROCYTE [DISTWIDTH] IN BLOOD BY AUTOMATED COUNT: 18.4 % (ref 11.6–14.5)
GLOBULIN SER CALC-MCNC: 2.8 G/DL (ref 2–4)
GLUCOSE SERPL-MCNC: 113 MG/DL (ref 74–99)
HCT VFR BLD AUTO: 29.9 % (ref 36–48)
HDLC SERPL-MCNC: 33 MG/DL (ref 40–60)
HDLC SERPL: 4.4 {RATIO} (ref 0–5)
HGB BLD-MCNC: 10.4 G/DL (ref 13–16)
LDLC SERPL CALC-MCNC: 98 MG/DL (ref 0–100)
LIPID PROFILE,FLP: ABNORMAL
MCH RBC QN AUTO: 30.2 PG (ref 24–34)
MCHC RBC AUTO-ENTMCNC: 34.8 G/DL (ref 31–37)
MCV RBC AUTO: 86.9 FL (ref 74–97)
PLATELET # BLD AUTO: 346 K/UL (ref 135–420)
PMV BLD AUTO: 9.7 FL (ref 9.2–11.8)
POTASSIUM SERPL-SCNC: 3.6 MMOL/L (ref 3.5–5.5)
PROT SERPL-MCNC: 5.8 G/DL (ref 6.4–8.2)
RBC # BLD AUTO: 3.44 M/UL (ref 4.7–5.5)
SODIUM SERPL-SCNC: 139 MMOL/L (ref 136–145)
TRIGL SERPL-MCNC: 65 MG/DL (ref ?–150)
VLDLC SERPL CALC-MCNC: 13 MG/DL
WBC # BLD AUTO: 5.8 K/UL (ref 4.6–13.2)

## 2019-02-07 PROCEDURE — 85730 THROMBOPLASTIN TIME PARTIAL: CPT

## 2019-02-07 PROCEDURE — 74011250636 HC RX REV CODE- 250/636: Performed by: HOSPITALIST

## 2019-02-07 PROCEDURE — 85027 COMPLETE CBC AUTOMATED: CPT

## 2019-02-07 PROCEDURE — 36415 COLL VENOUS BLD VENIPUNCTURE: CPT

## 2019-02-07 PROCEDURE — 80053 COMPREHEN METABOLIC PANEL: CPT

## 2019-02-07 PROCEDURE — 74011000258 HC RX REV CODE- 258: Performed by: HOSPITALIST

## 2019-02-07 PROCEDURE — 80061 LIPID PANEL: CPT

## 2019-02-07 RX ADMIN — PIPERACILLIN SODIUM,TAZOBACTAM SODIUM 3.38 G: 3; .375 INJECTION, POWDER, FOR SOLUTION INTRAVENOUS at 10:00

## 2019-02-07 RX ADMIN — PIPERACILLIN SODIUM,TAZOBACTAM SODIUM 3.38 G: 3; .375 INJECTION, POWDER, FOR SOLUTION INTRAVENOUS at 01:57

## 2019-02-07 NOTE — PROGRESS NOTES
Problem: Falls - Risk of 
Goal: *Absence of Falls Document Osmel Lopez Fall Risk and appropriate interventions in the flowsheet. Outcome: Progressing Towards Goal 
Fall Risk Interventions: 
  
 
  
 
Medication Interventions: Assess postural VS orthostatic hypotension, Evaluate medications/consider consulting pharmacy, Patient to call before getting OOB, Teach patient to arise slowly

## 2019-02-07 NOTE — ROUTINE PROCESS
Bedside and Verbal shift change report given to [address] FARLEYRN (oncoming nurse) by Park Barton RN BSN (offgoing nurse). Report given with SBAR, Kardex, Intake/Output, MAR and Recent Results.

## 2019-02-07 NOTE — ROUTINE PROCESS
Assumed care of patient at 1930 report received from [address] [place] Nik[address] FARLEY RN. All safety precautions in place - safety maintained. Received pt sitting - up in the side of bed alert & oriented and in NAD. Denies pain & nausea. Pt refused IVF infusion but okay to hang Zosyn IV. Patient not on tele. 2130 -  Patient ambulating in room, denies pain. 2340 -  Patient ambulates in hallway. 0100 -  Linens & gown changed. CHG wipes to patient. 0400 -  Patient in bed resting quietly and no apparent distress will continue to monitor.

## 2019-02-07 NOTE — PROGRESS NOTES
1400 pt has continued to leave the unit, code purple activated, pt intercepted and brought back to unit. Nurse, mother baby unit manager, and  spoke to him about regulations and safety concerns. Pt states he is leaving.  Nurse removed PIV, pt signed AMA form, pt vacated the unit, pfreeman rn

## 2019-02-07 NOTE — PROGRESS NOTES
Contacted pts ex wife. Her number is . She states she  Still assists pt. She would be able to pick him up at [place]. He also has a dtr who can assist. Her name is michelle garland. Her # is [phone]. She states pt  At baseline is able to take care of himself. He takes the bus to the [place]. Plan remains hme, ex wife to transport.

## 2019-02-07 NOTE — PROGRESS NOTES
0800 rcvd pt after report, pt denies pain in no acute distress. Pt continues to leave the unit. Pt is dressed in plain clothing, no hospital gown.  Pt is npo but states he is going to eat something, nurse discouraged this behavior, pt states he knows his body and he will be fine, nurse will confer with MD about all noted, Rahat BRAND 
 
1030 pt off unit unauthorized, nurse will inform pt upon return that he is a clear liquid diet,,, pfreeman rn

## 2019-02-09 ENCOUNTER — HOSPITAL ENCOUNTER (INPATIENT)
Age: 74
LOS: 3 days | Discharge: HOME | DRG: 438 | End: 2019-02-12
Attending: EMERGENCY MEDICINE | Admitting: HOSPITALIST
Payer: MEDICARE

## 2019-02-09 DIAGNOSIS — R74.01 ELEVATED TRANSAMINASE LEVEL: ICD-10-CM

## 2019-02-09 DIAGNOSIS — K86.89 PANCREATITIS DUE TO OBSTRUCTION OF PANCREATIC DUCT: Primary | ICD-10-CM

## 2019-02-09 DIAGNOSIS — K86.1 ACUTE ON CHRONIC PANCREATITIS (HCC): ICD-10-CM

## 2019-02-09 DIAGNOSIS — E80.6 HYPERBILIRUBINEMIA: ICD-10-CM

## 2019-02-09 DIAGNOSIS — K85.90 ACUTE ON CHRONIC PANCREATITIS (HCC): ICD-10-CM

## 2019-02-09 DIAGNOSIS — K85.90 PANCREATITIS DUE TO OBSTRUCTION OF PANCREATIC DUCT: Primary | ICD-10-CM

## 2019-02-09 DIAGNOSIS — K86.2 PANCREATIC CYST: ICD-10-CM

## 2019-02-09 PROBLEM — R63.6 UNDERWEIGHT: Status: ACTIVE | Noted: 2019-02-09

## 2019-02-09 PROBLEM — E46 MALNOURISHED (HCC): Status: ACTIVE | Noted: 2019-02-09

## 2019-02-09 PROBLEM — I10 HYPERTENSION: Status: ACTIVE | Noted: 2019-02-09

## 2019-02-09 LAB
ALBUMIN SERPL-MCNC: 4 G/DL (ref 3.4–5)
ALBUMIN/GLOB SERPL: 0.9 {RATIO} (ref 0.8–1.7)
ALP SERPL-CCNC: 850 U/L (ref 45–117)
ALT SERPL-CCNC: 481 U/L (ref 16–61)
ANION GAP SERPL CALC-SCNC: 7 MMOL/L (ref 3–18)
APPEARANCE UR: CLEAR
AST SERPL-CCNC: 621 U/L (ref 15–37)
BASOPHILS # BLD: 0 K/UL (ref 0–0.1)
BASOPHILS NFR BLD: 0 % (ref 0–2)
BILIRUB SERPL-MCNC: 2.8 MG/DL (ref 0.2–1)
BILIRUB UR QL: ABNORMAL
BUN SERPL-MCNC: 8 MG/DL (ref 7–18)
BUN/CREAT SERPL: 11 (ref 12–20)
CALCIUM SERPL-MCNC: 11 MG/DL (ref 8.5–10.1)
CHLORIDE SERPL-SCNC: 93 MMOL/L (ref 100–108)
CO2 SERPL-SCNC: 35 MMOL/L (ref 21–32)
COLOR UR: ABNORMAL
CREAT SERPL-MCNC: 0.71 MG/DL (ref 0.6–1.3)
DIFFERENTIAL METHOD BLD: ABNORMAL
EOSINOPHIL # BLD: 0.1 K/UL (ref 0–0.4)
EOSINOPHIL NFR BLD: 1 % (ref 0–5)
ERYTHROCYTE [DISTWIDTH] IN BLOOD BY AUTOMATED COUNT: 18 % (ref 11.6–14.5)
GLOBULIN SER CALC-MCNC: 4.5 G/DL (ref 2–4)
GLUCOSE SERPL-MCNC: 129 MG/DL (ref 74–99)
GLUCOSE UR STRIP.AUTO-MCNC: NEGATIVE MG/DL
HCT VFR BLD AUTO: 41.5 % (ref 36–48)
HGB BLD-MCNC: 14.4 G/DL (ref 13–16)
HGB UR QL STRIP: NEGATIVE
KETONES UR QL STRIP.AUTO: NEGATIVE MG/DL
LEUKOCYTE ESTERASE UR QL STRIP.AUTO: NEGATIVE
LIPASE SERPL-CCNC: 2109 U/L (ref 73–393)
LYMPHOCYTES # BLD: 1.6 K/UL (ref 0.9–3.6)
LYMPHOCYTES NFR BLD: 14 % (ref 21–52)
MCH RBC QN AUTO: 30.6 PG (ref 24–34)
MCHC RBC AUTO-ENTMCNC: 34.7 G/DL (ref 31–37)
MCV RBC AUTO: 88.1 FL (ref 74–97)
MONOCYTES # BLD: 1 K/UL (ref 0.05–1.2)
MONOCYTES NFR BLD: 9 % (ref 3–10)
NEUTS SEG # BLD: 8.4 K/UL (ref 1.8–8)
NEUTS SEG NFR BLD: 76 % (ref 40–73)
NITRITE UR QL STRIP.AUTO: NEGATIVE
PH UR STRIP: 8.5 [PH] (ref 5–8)
PLATELET # BLD AUTO: 417 K/UL (ref 135–420)
PMV BLD AUTO: 9.7 FL (ref 9.2–11.8)
POTASSIUM SERPL-SCNC: 3.6 MMOL/L (ref 3.5–5.5)
PROT SERPL-MCNC: 8.5 G/DL (ref 6.4–8.2)
PROT UR STRIP-MCNC: NEGATIVE MG/DL
RBC # BLD AUTO: 4.71 M/UL (ref 4.7–5.5)
SODIUM SERPL-SCNC: 135 MMOL/L (ref 136–145)
SP GR UR REFRACTOMETRY: 1.01 (ref 1–1.03)
TROPONIN I SERPL-MCNC: <0.02 NG/ML (ref 0–0.04)
UROBILINOGEN UR QL STRIP.AUTO: 1 EU/DL (ref 0.2–1)
WBC # BLD AUTO: 11.1 K/UL (ref 4.6–13.2)

## 2019-02-09 PROCEDURE — 65270000029 HC RM PRIVATE

## 2019-02-09 PROCEDURE — 85025 COMPLETE CBC W/AUTO DIFF WBC: CPT

## 2019-02-09 PROCEDURE — 74011250636 HC RX REV CODE- 250/636: Performed by: PHYSICIAN ASSISTANT

## 2019-02-09 PROCEDURE — 80053 COMPREHEN METABOLIC PANEL: CPT

## 2019-02-09 PROCEDURE — 96375 TX/PRO/DX INJ NEW DRUG ADDON: CPT

## 2019-02-09 PROCEDURE — 84484 ASSAY OF TROPONIN QUANT: CPT

## 2019-02-09 PROCEDURE — 80074 ACUTE HEPATITIS PANEL: CPT

## 2019-02-09 PROCEDURE — 74011250636 HC RX REV CODE- 250/636: Performed by: EMERGENCY MEDICINE

## 2019-02-09 PROCEDURE — 99285 EMERGENCY DEPT VISIT HI MDM: CPT

## 2019-02-09 PROCEDURE — 74011250636 HC RX REV CODE- 250/636: Performed by: HOSPITALIST

## 2019-02-09 PROCEDURE — 93005 ELECTROCARDIOGRAM TRACING: CPT

## 2019-02-09 PROCEDURE — 96361 HYDRATE IV INFUSION ADD-ON: CPT

## 2019-02-09 PROCEDURE — 81003 URINALYSIS AUTO W/O SCOPE: CPT

## 2019-02-09 PROCEDURE — 83690 ASSAY OF LIPASE: CPT

## 2019-02-09 PROCEDURE — 96374 THER/PROPH/DIAG INJ IV PUSH: CPT

## 2019-02-09 RX ORDER — ONDANSETRON 2 MG/ML
4 INJECTION INTRAMUSCULAR; INTRAVENOUS
Status: COMPLETED | OUTPATIENT
Start: 2019-02-09 | End: 2019-02-09

## 2019-02-09 RX ORDER — MORPHINE SULFATE 4 MG/ML
4 INJECTION INTRAVENOUS
Status: COMPLETED | OUTPATIENT
Start: 2019-02-09 | End: 2019-02-09

## 2019-02-09 RX ORDER — SODIUM CHLORIDE 0.9 % (FLUSH) 0.9 %
5-40 SYRINGE (ML) INJECTION EVERY 8 HOURS
Status: DISCONTINUED | OUTPATIENT
Start: 2019-02-09 | End: 2019-02-12 | Stop reason: HOSPADM

## 2019-02-09 RX ORDER — HEPARIN SODIUM 5000 [USP'U]/ML
5000 INJECTION, SOLUTION INTRAVENOUS; SUBCUTANEOUS EVERY 8 HOURS
Status: DISCONTINUED | OUTPATIENT
Start: 2019-02-09 | End: 2019-02-12 | Stop reason: HOSPADM

## 2019-02-09 RX ORDER — HYDROMORPHONE HYDROCHLORIDE 1 MG/ML
0.5 INJECTION, SOLUTION INTRAMUSCULAR; INTRAVENOUS; SUBCUTANEOUS
Status: DISCONTINUED | OUTPATIENT
Start: 2019-02-09 | End: 2019-02-12 | Stop reason: HOSPADM

## 2019-02-09 RX ORDER — SODIUM CHLORIDE 0.9 % (FLUSH) 0.9 %
5-40 SYRINGE (ML) INJECTION AS NEEDED
Status: DISCONTINUED | OUTPATIENT
Start: 2019-02-09 | End: 2019-02-12 | Stop reason: HOSPADM

## 2019-02-09 RX ORDER — SODIUM CHLORIDE 9 MG/ML
75 INJECTION, SOLUTION INTRAVENOUS CONTINUOUS
Status: DISCONTINUED | OUTPATIENT
Start: 2019-02-09 | End: 2019-02-10

## 2019-02-09 RX ADMIN — HEPARIN SODIUM 5000 UNITS: 5000 INJECTION INTRAVENOUS; SUBCUTANEOUS at 19:06

## 2019-02-09 RX ADMIN — Medication 10 ML: at 22:00

## 2019-02-09 RX ADMIN — Medication 10 ML: at 18:03

## 2019-02-09 RX ADMIN — SODIUM CHLORIDE 150 ML/HR: 900 INJECTION, SOLUTION INTRAVENOUS at 19:10

## 2019-02-09 RX ADMIN — ONDANSETRON 4 MG: 2 INJECTION INTRAMUSCULAR; INTRAVENOUS at 15:03

## 2019-02-09 RX ADMIN — HYDROMORPHONE HYDROCHLORIDE 0.5 MG: 1 INJECTION, SOLUTION INTRAMUSCULAR; INTRAVENOUS; SUBCUTANEOUS at 19:04

## 2019-02-09 RX ADMIN — SODIUM CHLORIDE 1000 ML: 900 INJECTION, SOLUTION INTRAVENOUS at 14:20

## 2019-02-09 RX ADMIN — MORPHINE SULFATE 4 MG: 4 INJECTION INTRAVENOUS at 15:06

## 2019-02-09 NOTE — ED NOTES
TRANSFER - ED to INPATIENT REPORT: 
 
SBAR report made available to receiving floor on this patient being transferred to [facility] (2100  for routine progression of care Admitting diagnosis Pancreatitis due to obstruction of pancreatic duct [K85.90, K86.89] Information from the following report(s) SBAR was made available to receiving floor. Lines:  
Peripheral IV 02/09/19 Right Forearm (Active) Site Assessment Clean, dry, & intact 2/9/2019  2:03 PM  
Phlebitis Assessment 0 2/9/2019  2:03 PM  
Infiltration Assessment 0 2/9/2019  2:03 PM  
Dressing Status Clean, dry, & intact 2/9/2019  2:03 PM  
  
 
Medication list confirmed with patient Opportunity for questions and clarification was provided. Patient is oriented to time, place, person and situation Patient is  continent and ambulatory with assist 
  
Valuables transported with patient Patient transported with: 
 Monitor Registered Nurse MAP (Monitor): 116 =Monitored (most recent) Vitals w/ MEWS Score (last day) Date/Time MEWS Score Pulse Resp Temp BP Level of Consciousness SpO2  
 02/09/19 1600  1  62  16  97.9 °F (36.6 °C)  179/92  (Abnormal)   Alert  100 % 02/09/19 1530    74  15    178/97  (Abnormal)     100 % 02/09/19 1515    63  11    172/87    98 % 02/09/19 1430          195/91  (Abnormal)     99 % 02/09/19 1415          174/91  (Abnormal)     99 % 02/09/19 1259  1  65  18  97 °F (36.1 °C)  158/99  (Abnormal)   Alert  98 % Septic Patients:  
 
Lactic Acid Lab Results Component Value Date LACPO 1.38 02/06/2019 LACPOC 1.2 04/21/2017  
 (Most recent on top) Repeat drawn: NO Time: NA  
 
ALL LACTIC ACIDS GREATER THAN 2 MUST BE REPEATED POC WITHIN 4 HOURS OR PRIOR TO ADMISSION Total Fluid Bolus initiated and documented on MAR: NA All ordered antibiotics initiated within first 3 hours of TIME ZERO?   NA

## 2019-02-09 NOTE — ED PROVIDER NOTES
EMERGENCY DEPARTMENT HISTORY AND PHYSICAL EXAM 
 
2:12 PM 
 
 
Date: 2/9/2019 Patient Name: Alvarez Wright History of Presenting Illness Chief Complaint Patient presents with  Abdominal Pain History Provided By: Patient Additional History (Context): Alvarez Wright is a 72 y.o. male with a history of pancreatitis who presents with worsening moderate upper abdominal pain for the past 5 months. The patient states his abdominal pain has been worsening for the past two months. The patient was admitted on 2/5/19 for pancreatitis, but had left AMA on 2/7/19 because he felt that he wasn't being seen by any doctors. Denies any vomiting. Denies any other associated signs or symptoms. The patient states he has stopped drinking alcohol months ago. Patient states nothing changes his pain. Was instructed to eat bland foods. PCP: Douglas Guardado MD 
 
Chief Complaint: Abdominal Pain Duration:  5 months Timing:  Worsening Location: Upper abdomen Quality: Burning Severity: Moderate Modifying Factors: Patient states nothing changes his pain. Associated Symptoms: Denies vomiting. Denies any other associated signs or symptoms. Current Facility-Administered Medications Medication Dose Route Frequency Provider Last Rate Last Dose  sodium chloride 0.9 % bolus infusion 1,000 mL  1,000 mL IntraVENous ONCE Nevaeh Arora PA-C Current Outpatient Medications Medication Sig Dispense Refill  acetaminophen (TYLENOL EXTRA STRENGTH) 500 mg tablet Take 2 Tabs by mouth every six (6) hours as needed for Pain. 40 Tab 0  
 traMADol (ULTRAM) 50 mg tablet Take 1 Tab by mouth every six (6) hours as needed for Pain. Max Daily Amount: 200 mg. 8 Tab 0  
 amLODIPine (NORVASC) 10 mg tablet Take 1 Tab by mouth daily. 30 Tab 0  
 aspirin delayed-release 81 mg tablet Take 1 Tab by mouth daily. 90 Tab 3  
 hydrochlorothiazide (HYDRODIURIL) 25 mg tablet Take 25 mg by mouth daily.  cholecalciferol (VITAMIN D3) 1,000 unit tablet Take  by mouth daily. Past History Past Medical History: 
Past Medical History:  
Diagnosis Date  Hypertension Past Surgical History: 
Past Surgical History:  
Procedure Laterality Date [address] UNLISTED  APPENDECTOMY  HX APPENDECTOMY  HX COLONOSCOPY  1/28/2015 Repeat colonoscopy in 5 yrs per Dr. Thao Reed  HX HERNIA REPAIR  10/22/2017 Ex lap. lysis of adhesions adn reductions of internal hernia done 10/22/2017 Family History: 
Family History Problem Relation Age of Onset  Heart Disease Mother  Cancer Father  Cancer Sister  Diabetes Sister Social History: 
Social History Tobacco Use  Smoking status: Current Every Day Smoker Packs/day: 0.50 Years: 50.00 Pack years: 25.00 Types: Cigarettes  Smokeless tobacco: Never Used Substance Use Topics  Alcohol use: Yes Comment: 2 beers aday  Drug use: No  
 
 
Allergies: 
No Known Allergies Review of Systems Review of Systems Constitutional: Negative for chills. HENT: Negative for congestion and sore throat. Respiratory: Negative for cough and shortness of breath. Cardiovascular: Negative for chest pain. Gastrointestinal: Positive for abdominal pain. Negative for diarrhea, nausea and vomiting. Genitourinary: Negative for dysuria. Musculoskeletal: Negative for back pain. Skin: Negative for rash. Neurological: Negative for dizziness and headaches. All other systems reviewed and are negative. Physical Exam  
 
Visit Vitals BP (!) 158/99 (BP 1 Location: Right arm, BP Patient Position: At rest) Pulse 65 Temp 97 °F (36.1 °C) Resp 18 Ht 5' 8\" (1.727 m) Wt 45.8 kg (101 lb) SpO2 98% BMI 15.36 kg/m² Physical Exam 
General Exam: Patient is a well developed and well nourished in no distress. Patient does not appear acutely ill or toxic. Eye Exam: Lids and conjunctiva are normal 
ENT Exam: The general head and facial exam is normal.  The neck is supple without meningeal signs. No significant adenopathy. Pulmonary Exam: No respiratory distress. The respiratory rate is normal.  No stridor. The breath sounds are equal bilaterally. There are no wheezes, rales, or rhonchi noted. Cardiac Exam: The cardiac rate and rhythm are normal.  No significant murmurs, rubs, or gallops. The peripheral pulses are normal. 
Abdominal Exam: Mild epigastric and RUQ tenderness. Abdomen is soft and non-distended. No pulsatile masses. There is no rebound or guarding noted. Skin and Soft Tissue: The skin is warm and dry, without significant abnormality. Good color. Musculoskeletal Exam: No peripheral edema. The musculoskeletal exam of the lower extremities is normal without significant local tenderness. Psychiatric: Normal adult with appropriate demeanor and interpersonal interaction. Is oriented to person, place, and time. Diagnostic Study Results Labs - Recent Results (from the past 12 hour(s)) CBC WITH AUTOMATED DIFF Collection Time: 02/09/19  1:20 PM  
Result Value Ref Range WBC 11.1 4.6 - 13.2 K/uL  
 RBC 4.71 4.70 - 5.50 M/uL  
 HGB 14.4 13.0 - 16.0 g/dL HCT 41.5 36.0 - 48.0 % MCV 88.1 74.0 - 97.0 FL  
 MCH 30.6 24.0 - 34.0 PG  
 MCHC 34.7 31.0 - 37.0 g/dL  
 RDW 18.0 (H) 11.6 - 14.5 % PLATELET 093 118 - 682 K/uL MPV 9.7 9.2 - 11.8 FL  
 NEUTROPHILS 76 (H) 40 - 73 % LYMPHOCYTES 14 (L) 21 - 52 % MONOCYTES 9 3 - 10 % EOSINOPHILS 1 0 - 5 % BASOPHILS 0 0 - 2 %  
 ABS. NEUTROPHILS 8.4 (H) 1.8 - 8.0 K/UL  
 ABS. LYMPHOCYTES 1.6 0.9 - 3.6 K/UL  
 ABS. MONOCYTES 1.0 0.05 - 1.2 K/UL  
 ABS. EOSINOPHILS 0.1 0.0 - 0.4 K/UL  
 ABS. BASOPHILS 0.0 0.0 - 0.1 K/UL  
 DF AUTOMATED Radiologic Studies - No orders to display Medical Decision Making It should be noted that I, Manuel Kam MD will be the provider of record for this patient. I reviewed the vital signs, available nursing notes, past medical history, past surgical history, family history and social history. Vital Signs-Reviewed the patient's vital signs. Pulse Oximetry Analysis -  98% on room air (Interpretation) WNL EKG: Interpreted by the EP. Time Interpreted: 5759 Rate: 66 Rhythm: Normal Sinus Rhythm Interpretation: No STEMI Records Reviewed: Nursing Notes and Old Medical Records (Time of Review: 2:12 PM) 
 
ED Course: Progress Notes, Reevaluation, and Consults: 
 
2:56 PM Discussed results and recommendations for admission. Agrees and states he will be compliant with recommendations. He had left before because he though a doctor did not see him. 3:05 PM Consult: I discussed care with Dr. Prince Vivas (Hospitalist). It was a standard discussion including patient history, chief complaint, available diagnostic results, and predicted treatment course. Accepts admission. Aware GI consult is pending. He will contact Dr. Jose Schneider for General Surgery consult.  
 
- GI paged. Dr. Leta Holloway made aware of pt and will speak to Dr. Rafael Gauthier regarding consult as it is the end of my shift. Provider Notes (Medical Decision Making): Pt with h/o pancreatitis, recent admission and left AMA with abd pain. Similar presentation to previous. Recent imaging reviewed. Labs with elevated lipase and bili and LFTs. Pt agreeable to admission. Does not meet sepsis criteria. Abdomen mildly tender but no peritoneal signs. Will defer repeat imaging to inpatient team. Hospitalist to admit with GI and Gen Surg to consult. For Hospitalized Patients: 
 
1. Hospitalization Decision Time: The decision to hospitalize the patient was made by Manuel Kam MD at 2:57 PM on 2/9/2019 2.  Aspirin: Aspirin was not given because the patient did not present with a stroke at the time of their Emergency Department evaluation Diagnosis Clinical Impression: ICD-10-CM ICD-9-CM 1. Pancreatitis due to obstruction of pancreatic duct K85.90 577.0 K86.89 577.8 2. Acute on chronic pancreatitis (HCC) K85.90 577.0 K86.1 577.1 3. Pancreatic cyst K86.2 577.2 4. Hyperbilirubinemia E80.6 782.4 5. Elevated transaminase level R74.0 790.4 Disposition: Admit Follow-up Information None Medication List  
  
ASK your doctor about these medications   
acetaminophen 500 mg tablet Commonly known as:  [address] Take 2 Tabs by mouth every six (6) hours as needed for Pain. amLODIPine 10 mg tablet Commonly known as:  August Lute Take 1 Tab by mouth daily. aspirin delayed-release 81 mg tablet Take 1 Tab by mouth daily. hydroCHLOROthiazide 25 mg tablet Commonly known as:  HYDRODIURIL 
  
traMADol 50 mg tablet Commonly known as:  ULTRAM 
Take 1 Tab by mouth every six (6) hours as needed for Pain. Max Daily Amount: 200 mg. VITAMIN D3 1,000 unit tablet Generic drug:  cholecalciferol 
  
  
 
_______________________________ Scribe Attestation Bailee Cunningham acting as a scribe for and in the presence of Tere Rosa MD     
February 09, 2019 at 2:12 PM 
    
Provider Attestation:     
I personally performed the services described in the documentation, reviewed the documentation, as recorded by the scribe in my presence, and it accurately and completely records my words and actions. February 09, 2019 at 2:12 PM - Tere Rosa MD   
 
 
_______________________________

## 2019-02-09 NOTE — ED NOTES
Pt taken up to 2116 and TOT CATHIE Daugherty with no further questions or orders. Pt was in the bathroom when leaving the room and CATHIE Daugherty is aware.

## 2019-02-09 NOTE — H&P
Internal Medicine History and Physical 
   
 
 
Subjective HPI: Penny Dean is a 72 y.o. male with a PMHx listed below who presented to the ED with several months (8) of epigastric abdominal pain and associated back pain. He was recently admitted on 02/06 for abdominal pain along with evidence of pancreatitis. He had a MRI/MRCP done that showed mildly dilated main pancreatic duct along with evidence of chronic pancreatitis. There was mild intrahepatic biliary dilatation with appearance of extrinsic compression on distal CBD at the level of the pancreatic head. It also showed a simple appearing cyst in the pancreatic head that measured 1.6cm, suggestive of a pseudocyst or IPMN. Overall, the MRI was consistent with acute on chronic pancreatitis. There was evidence of gallbladder sludge on US but no gallstones. Unfortunately, the patient left AMA on the 7th due to feeling as if no one was taking care of him. He comes back today with similar symptoms. He admits to over 40lb weight loss over these past 8 months. He has an appetite but just can't eat bc of the symptoms of burning and epigastric/back pain. He admits to previous history of drinking, but he does not use anymore. He admits to pack a day of smoking. In the ED, his LFTs were all elevated, including ALT of 481 (up from 169 on the 7th) and AST of 621 (up from 73 on the 7th) and total bilirubin of 2.8 (up from 1.9 on the 7th). Of note, all his LFTs were up on the 6th, similar to today, and improved overnight at that time with fluids alone. His lipase was 2109 today, which is down from 3001 from the 6th. He denies any vomiting and states bowel habits are OK. He admits to SBO in 2017 where he had exploratory laparotomy with lysis of adhesions and reduction of internal hernia to relieve. PMHx: 
Past Medical History:  
Diagnosis Date  Hypertension PSurgHx: 
Past Surgical History:  
Procedure Laterality Date [address] UNLISTED    
  APPENDECTOMY  HX APPENDECTOMY  HX COLONOSCOPY  1/28/2015 Repeat colonoscopy in 5 yrs per Dr. Brit Reardon  HX HERNIA REPAIR  10/22/2017 Ex lap. lysis of adhesions adn reductions of internal hernia done 10/22/2017 SocialHx: 
Social History Socioeconomic History  Marital status:  Spouse name: Not on file  Number of children: Not on file  Years of education: Not on file  Highest education level: Not on file Social Needs  Financial resource strain: Not on file  Food insecurity - worry: Not on file  Food insecurity - inability: Not on file  Transportation needs - medical: Not on file  Transportation needs - non-medical: Not on file Occupational History  Not on file Tobacco Use  Smoking status: Current Every Day Smoker Packs/day: 0.50 Years: 50.00 Pack years: 25.00 Types: Cigarettes  Smokeless tobacco: Never Used Substance and Sexual Activity  Alcohol use: Yes Comment: 2 beers aday  Drug use: No  
 Sexual activity: Not on file Other Topics Concern  Not on file Social History Narrative  Not on file Allergies: 
No Known Allergies FamilyHx: 
Family History Problem Relation Age of Onset  Heart Disease Mother  Cancer Father  Cancer Sister  Diabetes Sister Prior to Admission Medications Prescriptions Last Dose Informant Patient Reported? Taking?  
acetaminophen (TYLENOL EXTRA STRENGTH) 500 mg tablet   No No  
Sig: Take 2 Tabs by mouth every six (6) hours as needed for Pain. amLODIPine (NORVASC) 10 mg tablet   No No  
Sig: Take 1 Tab by mouth daily. aspirin delayed-release 81 mg tablet   No No  
Sig: Take 1 Tab by mouth daily. cholecalciferol (VITAMIN D3) 1,000 unit tablet   Yes No  
Sig: Take  by mouth daily. hydrochlorothiazide (HYDRODIURIL) 25 mg tablet   Yes No  
Sig: Take 25 mg by mouth daily.   
traMADol (ULTRAM) 50 mg tablet   No No  
 Sig: Take 1 Tab by mouth every six (6) hours as needed for Pain. Max Daily Amount: 200 mg. Facility-Administered Medications: None Review of Systems: 
CONST: Fever, weight loss, fatigue or chills HEENT: Recent changes in vision, vertigo, epistaxis, dysphagia and hoarseness CV: Chest pain, palpitations, edema and varicosities RESP: Cough, shortness of breath, wheezing, hemoptysis, snoring and reactive airway disease GI: Nausea, vomiting, abdominal pain, change in bowel habits, hematochezia, melena, and GERD  
: Hematuria, dysuria, frequency, urgency, nocturia and stress urinary incontinence MS: Weakness, joint pain and arthritis ENDO: Polyuria, polydipsia, polyphagia, poor wound healing, heat intolerance, cold intolerance LYMPH/HEME: Anemia, bruising and history of blood transfusions INTEG: Dermatitis, abnormal moles NEURO: Dizziness, headache, fainting, seizures and stroke PSYCH: Anxiety and depression Objective Visit Vitals BP (!) 178/97 Pulse 74 Temp 97 °F (36.1 °C) Resp 15 Ht 5' 8\" (1.727 m) Wt 45.8 kg (101 lb) SpO2 100% BMI 15.36 kg/m² Physical Exam: 
General Appearance: NAD, conversant HENT: normocephalic/atraumatic, moist mucus membranes Lungs: CTA with normal respiratory effort Cardiovascular: RRR, no m/r/g, capillary refill < 2 sec, B/L DP/PT pulses +3/4 Abdomen: soft, + epigastric TTP, midline scar, normal bowel sounds Extremities: no cyanosis, no peripheral edema Neuro: moves all extremities, no focal deficits Psych: appropriate affect, alert and oriented to person, place and time Laboratory Studies: 
BMP:  
Lab Results Component Value Date/Time   (L) 02/09/2019 01:20 PM  
 K 3.6 02/09/2019 01:20 PM  
 CL 93 (L) 02/09/2019 01:20 PM  
 CO2 35 (H) 02/09/2019 01:20 PM  
 AGAP 7 02/09/2019 01:20 PM  
  (H) 02/09/2019 01:20 PM  
 BUN 8 02/09/2019 01:20 PM  
 CREA 0.71 02/09/2019 01:20 PM  
 GFRAA >60 02/09/2019 01:20 PM  
 GFRNA >60 02/09/2019 01:20 PM  
 
CBC:  
Lab Results Component Value Date/Time WBC 11.1 02/09/2019 01:20 PM  
 HGB 14.4 02/09/2019 01:20 PM  
 HCT 41.5 02/09/2019 01:20 PM  
  02/09/2019 01:20 PM  
 
 
Imaging Reviewed: 
No results found. Assessment/Plan Active Hospital Problems Diagnosis Date Noted  Pancreatitis due to obstruction of pancreatic duct 02/09/2019  Hypertension 02/09/2019  Acute on chronic pancreatitis ([facility] [address] 02/09/2019  Pancreatic cyst 02/09/2019  Hyperbilirubinemia 02/09/2019  Elevated transaminase level 02/09/2019  Underweight 02/09/2019  
 
- Cont aggressive IVFs 
- Pain control PRN 
- Keep NPO for now - Repeat US abd 
- Trend LFTs 
- GI consulted - No evidence of active infections, observe off antibx - Cont acceptable home medications for chronic conditions  
- DVT protocol I have personally reviewed all pertinent labs, films and EKGs that have officially resulted. I reviewed available electronic documentation outlining the initial presentation as well as the emergency room physician's encounter. Mee Melchor DO Internal Medicine, Hospitalist 
Pager: [phone] [facility]

## 2019-02-09 NOTE — ED TRIAGE NOTES
C/o abdominal pain that feels like pancreatitis. I performed a brief evaluation, including history and physical, of the patient here in triage and I have determined that pt will need further treatment and evaluation from the main side ER physician. I have placed initial orders to help in expediting patients care. February 09, 2019 at 1:03 PM - Pankaj Johnson PA-C Visit Vitals BP (!) 158/99 (BP 1 Location: Right arm, BP Patient Position: At rest) Pulse 65 Temp 97 °F (36.1 °C) Resp 18 Ht 5' 8\" (1.727 m) Wt 45.8 kg (101 lb) SpO2 98% BMI 15.36 kg/m²
\"my pancreas is inflammed. \" 

29

## 2019-02-10 LAB
ALBUMIN SERPL-MCNC: 3.8 G/DL (ref 3.4–5)
ALBUMIN/GLOB SERPL: 1 {RATIO} (ref 0.8–1.7)
ALP SERPL-CCNC: 709 U/L (ref 45–117)
ALT SERPL-CCNC: 356 U/L (ref 16–61)
ANION GAP SERPL CALC-SCNC: 8 MMOL/L (ref 3–18)
AST SERPL-CCNC: 238 U/L (ref 15–37)
ATRIAL RATE: 66 BPM
BACTERIA SPEC CULT: ABNORMAL
BASOPHILS # BLD: 0 K/UL (ref 0–0.1)
BASOPHILS NFR BLD: 0 % (ref 0–2)
BILIRUB SERPL-MCNC: 2.1 MG/DL (ref 0.2–1)
BUN SERPL-MCNC: 7 MG/DL (ref 7–18)
BUN/CREAT SERPL: 11 (ref 12–20)
CALCIUM SERPL-MCNC: 9.8 MG/DL (ref 8.5–10.1)
CALCULATED P AXIS, ECG09: 37 DEGREES
CALCULATED R AXIS, ECG10: 65 DEGREES
CALCULATED T AXIS, ECG11: 76 DEGREES
CHLORIDE SERPL-SCNC: 100 MMOL/L (ref 100–108)
CO2 SERPL-SCNC: 30 MMOL/L (ref 21–32)
CREAT SERPL-MCNC: 0.61 MG/DL (ref 0.6–1.3)
DIAGNOSIS, 93000: NORMAL
DIFFERENTIAL METHOD BLD: ABNORMAL
EOSINOPHIL # BLD: 0.1 K/UL (ref 0–0.4)
EOSINOPHIL NFR BLD: 2 % (ref 0–5)
ERYTHROCYTE [DISTWIDTH] IN BLOOD BY AUTOMATED COUNT: 18.1 % (ref 11.6–14.5)
GLOBULIN SER CALC-MCNC: 4 G/DL (ref 2–4)
GLUCOSE SERPL-MCNC: 80 MG/DL (ref 74–99)
GRAM STN SPEC: ABNORMAL
HCT VFR BLD AUTO: 41.1 % (ref 36–48)
HGB BLD-MCNC: 13.9 G/DL (ref 13–16)
LYMPHOCYTES # BLD: 2.2 K/UL (ref 0.9–3.6)
LYMPHOCYTES NFR BLD: 31 % (ref 21–52)
MCH RBC QN AUTO: 30.1 PG (ref 24–34)
MCHC RBC AUTO-ENTMCNC: 33.8 G/DL (ref 31–37)
MCV RBC AUTO: 89 FL (ref 74–97)
MONOCYTES # BLD: 0.4 K/UL (ref 0.05–1.2)
MONOCYTES NFR BLD: 6 % (ref 3–10)
NEUTS SEG # BLD: 4.3 K/UL (ref 1.8–8)
NEUTS SEG NFR BLD: 61 % (ref 40–73)
P-R INTERVAL, ECG05: 132 MS
PLATELET # BLD AUTO: 353 K/UL (ref 135–420)
PMV BLD AUTO: 9.8 FL (ref 9.2–11.8)
POTASSIUM SERPL-SCNC: 3.9 MMOL/L (ref 3.5–5.5)
PROT SERPL-MCNC: 7.8 G/DL (ref 6.4–8.2)
Q-T INTERVAL, ECG07: 402 MS
QRS DURATION, ECG06: 82 MS
QTC CALCULATION (BEZET), ECG08: 421 MS
RBC # BLD AUTO: 4.62 M/UL (ref 4.7–5.5)
SERVICE CMNT-IMP: ABNORMAL
SODIUM SERPL-SCNC: 138 MMOL/L (ref 136–145)
VENTRICULAR RATE, ECG03: 66 BPM
WBC # BLD AUTO: 7 K/UL (ref 4.6–13.2)

## 2019-02-10 PROCEDURE — 36415 COLL VENOUS BLD VENIPUNCTURE: CPT

## 2019-02-10 PROCEDURE — 65270000029 HC RM PRIVATE

## 2019-02-10 PROCEDURE — 74011250636 HC RX REV CODE- 250/636: Performed by: HOSPITALIST

## 2019-02-10 PROCEDURE — 74011000258 HC RX REV CODE- 258: Performed by: HOSPITALIST

## 2019-02-10 PROCEDURE — 85025 COMPLETE CBC W/AUTO DIFF WBC: CPT

## 2019-02-10 PROCEDURE — 80053 COMPREHEN METABOLIC PANEL: CPT

## 2019-02-10 RX ORDER — DEXTROSE MONOHYDRATE AND SODIUM CHLORIDE 5; .45 G/100ML; G/100ML
75 INJECTION, SOLUTION INTRAVENOUS CONTINUOUS
Status: DISCONTINUED | OUTPATIENT
Start: 2019-02-10 | End: 2019-02-12 | Stop reason: HOSPADM

## 2019-02-10 RX ADMIN — HYDROMORPHONE HYDROCHLORIDE 0.5 MG: 1 INJECTION, SOLUTION INTRAMUSCULAR; INTRAVENOUS; SUBCUTANEOUS at 14:16

## 2019-02-10 RX ADMIN — HEPARIN SODIUM 5000 UNITS: 5000 INJECTION INTRAVENOUS; SUBCUTANEOUS at 09:51

## 2019-02-10 RX ADMIN — HEPARIN SODIUM 5000 UNITS: 5000 INJECTION INTRAVENOUS; SUBCUTANEOUS at 17:31

## 2019-02-10 RX ADMIN — Medication 10 ML: at 13:56

## 2019-02-10 RX ADMIN — HYDROMORPHONE HYDROCHLORIDE 0.5 MG: 1 INJECTION, SOLUTION INTRAMUSCULAR; INTRAVENOUS; SUBCUTANEOUS at 03:26

## 2019-02-10 RX ADMIN — DEXTROSE MONOHYDRATE AND SODIUM CHLORIDE 75 ML/HR: 5; .45 INJECTION, SOLUTION INTRAVENOUS at 13:56

## 2019-02-10 RX ADMIN — SODIUM CHLORIDE 150 ML/HR: 900 INJECTION, SOLUTION INTRAVENOUS at 08:16

## 2019-02-10 RX ADMIN — HYDROMORPHONE HYDROCHLORIDE 0.5 MG: 1 INJECTION, SOLUTION INTRAMUSCULAR; INTRAVENOUS; SUBCUTANEOUS at 23:58

## 2019-02-10 RX ADMIN — SODIUM CHLORIDE 150 ML/HR: 900 INJECTION, SOLUTION INTRAVENOUS at 01:51

## 2019-02-10 RX ADMIN — HEPARIN SODIUM 5000 UNITS: 5000 INJECTION INTRAVENOUS; SUBCUTANEOUS at 03:26

## 2019-02-10 NOTE — ROUTINE PROCESS
Assume care of patient from ED admitted to room 2116. Patient received in bed awake. Patient A&Ox4, denies pain at this time, denies discomfort. No distress noted. Frequently use items within reach. Bed locked in low position. Call bell within reach and patient verbalized understanding of use for assistance and needs. 1904- PRN Dilaudid given for pain at a 8/10. Oncoming nurse will follow up for pain reassessment.

## 2019-02-10 NOTE — PROGRESS NOTES
2001: Received patient in bed awake,alert and oriented x4. No signs of distress. Bed low and locked. Call bell within reach. Will continue monitoring. 0200: In  bed sleeping,will monitor. 8727: In bed sleeping uneventful night. no other concern  At this time,call bell and valuables within reach. Will continue monitoring. Patient Vitals for the past 12 hrs: 
 Temp Pulse Resp BP SpO2  
02/10/19 0626 97.9 °F (36.6 °C) 60 15 147/79 93 % 02/09/19 2232 98.2 °F (36.8 °C) 62 16 (!) 174/91 96 %

## 2019-02-10 NOTE — CONSULTS
Gastroenterology Consult    Patient: Nikia Mondragon MRN: [id number]  SSN: [id number]    YOB: 1945  Age: 72 y.o. Sex: male        Assessment:   1) Abdominal pain: appears to be secondary to acute exacerbation of chronic pancreatitis, now complicated by biliary obstruction  2) Chronic pancreatitis  3) Elevated liver enzymes  4) Biliary obstruction on MRCP  5) Prior ETOH use, ongoing tobacco use  6) Weight loss  7) 16mm pancreatic head cystic lesion  7) History colon polyp (TVA in 2015), overdue for surveillance which should be a the 3 year ramone    Mr Bernabe Briseno has had evidence of chronic calcific pancreatitis on imaging for the past 2 years although it has not seemed to cause problems until the past few months with pain in Nov2018 and again this month. I suspect the underlying cause of his chronic pancreatitis is chronic smoking and ETOH use. He previously had normal liver enzymes and a nondilated CBD but now has evidence of biliary obstruction by labs, CBD 8mm (borderline at his age), and narrowing of the distal duct on imaging. Likely that the narrowing is due to chronic pancreatitis, whether from stricture or external compression from the parenchymal calcifications in the pancreatic head. No mass lesion is seen but neoplastic process (distal bile duct, pancreatic head, ampulla) remains in the differential. The cyst is unlikely the cause of any obstruction and may be a pseudocyst related to his pancreatitis in nov2018 (new since then) or potentially an IPMN. It appears he has had smoldering acute on chronic pancreatitis over the past week or so but feeling much better today.   -I recommend ERCP to evaluate and treat the distal biliary obstruction/compression. I discussed the procedure in detail with the patient and reviewed the risks.  The fact that he has had no pain despite PD dilation and pancreatic calcifications for the past 2 years argues against the need for pancreatic duct intervention for now.   -He will need an EUS to evaluate the cyst and pancreatic head to exclude mass lesion; this could be done during his hospitalization or arranged as an outpatient  -He was counseled on the need to stop smoking and not drink alcohol any more      Plan:   -Continue IV fluids  -OK for low fat diet today; NPO after midnight for ERCP  -Send acute hepatitis panel  -He will need an EUS arranged as outpatient or inpatient  -He needs to stop smoking  -He will need a colonoscopy arranged as an outpatient  -A trial of pancreatic enzymes may be reasonable if he has persistent pain  -Will check fecal elastase      Subjective:      Yossi Pastrana is a 72 y.o. male who is being seen for abdominal pain. He reports he has had upper abdominal pain on and off for the past 1-2 months but increased in severity and became more constant over the past 2 weeks or so. He was admitted last week for the same with elevated lipase to 3001 and abnormal imaging as noted below but he ultimately left AMA before further evaluation and care. He returned to the hospital yesterday for ongoing abdominal pain and again found to have elevated lipase, to 2109 and was admitted. Note is made of ER visit in [place] [address] for similar symptoms with elevated lipase at that time as well. Liver enzymes have been elevated since 2/6 in mixed pattern but more cholestatic with Bilirubin: 3.8-1.9-2.8-2.1; ALP: [phone]. ASt/ALT currently 238/356. Previous to this, liver enzymes were normal.   Imaging was obtained at his last hospitalization:  CT abd/Pelvis 2/6 showed a focal cystic structure involving the pancreatic head measuring1.8 x 1.4 cm (from 0.6cm on prior study). Multiple dense calcifications are seen involving the pancreatic head. The main pancreatic duct measured 5 mm. Calcifications are seen involving the pancreatic tail. Scan also notes dense arterial calcifications involving the arterial system relatively diffusely.  There is evidence of severe narrowing and/or occlusion involving pelvic vessels  MRI/MRCP without contrast 2/5 showed mild intrahepatic biliary dilation with CBD 8mm. Narrowing of common duct in the pancreatic head region felt to be likely secondary to extrinsic compression. No filling defects or irregularity seen. Gallbladder is unremarkable. PD is mildly dilated at 5mm. Unilocular cyst in the superior pancreatic head measures 1.6 cm. No solid mass identified. He has had pancreatic head and uncinate calcifications since at least 2017 on review on imaging. He reports that he has a hard time eating because of the pain but no nausea or vomiting. Has had about 40 pounds weight loss over the past year or so. He has no diarrhea but has had some mild constipation at times. No blood in the stool. He has been treated with IVF and pain control and has been kept NPO and tells me he feels great this morning with minimal ongoing pain. He is hungry and wants to eat. He has no known history of acute pancreatitis in the past (until [id number]) and no family history of pancreatitis or pancreatic cancer. He reports he drank beer for many years, sometime more heavily than others, sometimes 40 oz at at time,  but states he was not a \"wino\" and hasn't drank any for about 9 months. He has smoked for 50 years and continues to smoke 1/2 ppd.    Other notable history:   SBO Oct2017 requiring laparotomy with lysis of adhesions and reduction of internal hernia  Colonoscopy (Dr Rosario Camera) [id number] with one 5mm tubulovillous adenoma         Past Medical History  Past Medical History:   Diagnosis Date    Hypertension    -Pancreatic calcifications  -SBO s/p laparotomy with RICH oct2017    Past Surgical History  Past Surgical History:   Procedure Laterality Date    ABDOMEN SURGERY PROC UNLISTED      APPENDECTOMY      HX APPENDECTOMY      HX COLONOSCOPY  1/28/2015    Repeat colonoscopy in 5 yrs per Dr. [facility]  10/22/2017    Ex lap. lysis of adhesions adn reductions of internal hernia done 10/22/2017       Family History  Family History   Problem Relation Age of Onset    Heart Disease Mother     Cancer Father     Cancer Sister     Diabetes Sister          Social History  Social History     Socioeconomic History    Marital status:      Spouse name: Not on file    Number of children: Not on file    Years of education: Not on file    Highest education level: Not on file   Social Needs    Financial resource strain: Not on file    Food insecurity - worry: Not on file    Food insecurity - inability: Not on file   [employer] needs - medical: Not on file   [employer] needs - non-medical: Not on file   Occupational History    Not on file   Tobacco Use    Smoking status: Current Every Day Smoker     Packs/day: 0.50     Years: 50.00     Pack years: 25.00     Types: Cigarettes    Smokeless tobacco: Never Used   Substance and Sexual Activity    Alcohol use: Yes     Comment: 2 beers aday    Drug use: No    Sexual activity: Not on file   Other Topics Concern    Not on file   Social History Narrative    Not on file         Medications  Current Facility-Administered Medications   Medication Dose Route Frequency    sodium chloride (NS) flush 5-40 mL  5-40 mL IntraVENous Q8H    sodium chloride (NS) flush 5-40 mL  5-40 mL IntraVENous PRN    0.9% sodium chloride infusion  150 mL/hr IntraVENous CONTINUOUS    HYDROmorphone (PF) (DILAUDID) injection 0.5 mg  0.5 mg IntraVENous Q3H PRN    heparin (porcine) injection 5,000 Units  5,000 Units SubCUTAneous [facility]        Hospital Problems  Date Reviewed: 2/11/2015          Codes Class Noted POA    * (Principal) Pancreatitis due to obstruction of pancreatic duct ICD-10-CM: K85.90, K86.89  ICD-9-CM: 577.0, 577.8  2/9/2019 Unknown        Hypertension ICD-10-CM: I10  ICD-9-CM: 401.9  2/9/2019 Unknown        Acute on chronic pancreatitis ([facility] [address] ICD-10-CM: K85.90, K86.1  ICD-9-CM: 577.0, 577.1  2/9/2019 Unknown Pancreatic cyst ICD-10-CM: K86.2  ICD-9-CM: 577.2  2/9/2019 Unknown        Hyperbilirubinemia ICD-10-CM: E80.6  ICD-9-CM: 782.4  2/9/2019 Unknown        Elevated transaminase level ICD-10-CM: R74.0  ICD-9-CM: 790.4  2/9/2019 Unknown        Underweight ICD-10-CM: R63.6  ICD-9-CM: 783.22  2/9/2019 Unknown            No Known Allergies    Review of Systems:  Pertinent items are noted in the History of Present Illness. Objective:     Physical Exam:  Visit Vitals  /79   Pulse 60   Temp 97.9 °F (36.6 °C)   Resp 15   Ht 5' 8\" (1.727 m)   Wt 45.8 kg (101 lb)   SpO2 93%   BMI 15.36 kg/m²     General appearance: alert, cooperative, no distress, appears stated age; thin  Head: Normocephalic, without obvious abnormality, atraumatic  Eyes: negative for pallor/icterus  Throat: Lips, mucosa, and tongue normal.    Neck: supple, symmetrical, trachea midline, no adenopathy, thyroid: not enlarged, symmetric, no tenderness/mass/nodules   Lungs: clear to auscultation bilaterally  Heart: regular rate and rhythm, S1, S2 normal, no murmur, click, rub or gallop  Abdomen: soft, minimal tenderness with deep palpation. Bowel sounds normal. No masses,  no organomegaly  Extremities: extremities normal, atraumatic, no cyanosis or edema    Recent Results (from the past 24 hour(s))   CBC WITH AUTOMATED DIFF    Collection Time: 02/09/19  1:20 PM   Result Value Ref Range    WBC 11.1 4.6 - 13.2 K/uL    RBC 4.71 4.70 - 5.50 M/uL    HGB 14.4 13.0 - 16.0 g/dL    HCT 41.5 36.0 - 48.0 %    MCV 88.1 74.0 - 97.0 FL    MCH 30.6 24.0 - 34.0 PG    MCHC 34.7 31.0 - 37.0 g/dL    RDW 18.0 (H) 11.6 - 14.5 %    PLATELET 565 525 - 557 K/uL    MPV 9.7 9.2 - 11.8 FL    NEUTROPHILS 76 (H) 40 - 73 %    LYMPHOCYTES 14 (L) 21 - 52 %    MONOCYTES 9 3 - 10 %    EOSINOPHILS 1 0 - 5 %    BASOPHILS 0 0 - 2 %    ABS. NEUTROPHILS 8.4 (H) 1.8 - 8.0 K/UL    ABS. LYMPHOCYTES 1.6 0.9 - 3.6 K/UL    ABS. MONOCYTES 1.0 0.05 - 1.2 K/UL    ABS.  EOSINOPHILS 0.1 0.0 - 0.4 K/UL    ABS. BASOPHILS 0.0 0.0 - 0.1 K/UL    DF AUTOMATED     METABOLIC PANEL, COMPREHENSIVE    Collection Time: 02/09/19  1:20 PM   Result Value Ref Range    Sodium 135 (L) 136 - 145 mmol/L    Potassium 3.6 3.5 - 5.5 mmol/L    Chloride 93 (L) 100 - 108 mmol/L    CO2 35 (H) 21 - 32 mmol/L    Anion gap 7 3.0 - 18 mmol/L    Glucose 129 (H) 74 - 99 mg/dL    BUN 8 7.0 - 18 MG/DL    Creatinine 0.71 0.6 - 1.3 MG/DL    BUN/Creatinine ratio 11 (L) 12 - 20      GFR est AA >60 >60 ml/min/1.73m2    GFR est non-AA >60 >60 ml/min/1.73m2    Calcium 11.0 (H) 8.5 - 10.1 MG/DL    Bilirubin, total 2.8 (H) 0.2 - 1.0 MG/DL    ALT (SGPT) 481 (H) 16 - 61 U/L    AST (SGOT) 621 (H) 15 - 37 U/L    Alk.  phosphatase 850 (H) 45 - 117 U/L    Protein, total 8.5 (H) 6.4 - 8.2 g/dL    Albumin 4.0 3.4 - 5.0 g/dL    Globulin 4.5 (H) 2.0 - 4.0 g/dL    A-G Ratio 0.9 0.8 - 1.7     LIPASE    Collection Time: 02/09/19  1:20 PM   Result Value Ref Range    Lipase 2,109 (H) 73 - 393 U/L   URINALYSIS W/ RFLX MICROSCOPIC    Collection Time: 02/09/19  1:20 PM   Result Value Ref Range    Color DARK YELLOW      Appearance CLEAR      Specific gravity 1.013 1.005 - 1.030      pH (UA) 8.5 (H) 5.0 - 8.0      Protein NEGATIVE  NEG mg/dL    Glucose NEGATIVE  NEG mg/dL    Ketone NEGATIVE  NEG mg/dL    Bilirubin SMALL (A) NEG      Blood NEGATIVE  NEG      Urobilinogen 1.0 0.2 - 1.0 EU/dL    Nitrites NEGATIVE  NEG      Leukocyte Esterase NEGATIVE  NEG     TROPONIN I    Collection Time: 02/09/19  1:20 PM   Result Value Ref Range    Troponin-I, QT <0.02 0.0 - 0.045 NG/ML   EKG, 12 LEAD, INITIAL    Collection Time: 02/09/19  2:54 PM   Result Value Ref Range    Ventricular Rate 66 BPM    Atrial Rate 66 BPM    P-R Interval 132 ms    QRS Duration 82 ms    Q-T Interval 402 ms    QTC Calculation (Bezet) 421 ms    Calculated P Axis 37 degrees    Calculated R Axis 65 degrees    Calculated T Axis 76 degrees    Diagnosis       Normal sinus rhythm  Anteroseptal infarct (cited on or before 28-MAR-2017)  Abnormal ECG  When compared with ECG of 05-FEB-2019 23:38,  No significant change was found     METABOLIC PANEL, COMPREHENSIVE    Collection Time: 02/10/19  2:30 AM   Result Value Ref Range    Sodium 138 136 - 145 mmol/L    Potassium 3.9 3.5 - 5.5 mmol/L    Chloride 100 100 - 108 mmol/L    CO2 30 21 - 32 mmol/L    Anion gap 8 3.0 - 18 mmol/L    Glucose 80 74 - 99 mg/dL    BUN 7 7.0 - 18 MG/DL    Creatinine 0.61 0.6 - 1.3 MG/DL    BUN/Creatinine ratio 11 (L) 12 - 20      GFR est AA >60 >60 ml/min/1.73m2    GFR est non-AA >60 >60 ml/min/1.73m2    Calcium 9.8 8.5 - 10.1 MG/DL    Bilirubin, total 2.1 (H) 0.2 - 1.0 MG/DL    ALT (SGPT) 356 (H) 16 - 61 U/L    AST (SGOT) 238 (H) 15 - 37 U/L    Alk. phosphatase 709 (H) 45 - 117 U/L    Protein, total 7.8 6.4 - 8.2 g/dL    Albumin 3.8 3.4 - 5.0 g/dL    Globulin 4.0 2.0 - 4.0 g/dL    A-G Ratio 1.0 0.8 - 1.7     CBC WITH AUTOMATED DIFF    Collection Time: 02/10/19  2:30 AM   Result Value Ref Range    WBC 7.0 4.6 - 13.2 K/uL    RBC 4.62 (L) 4.70 - 5.50 M/uL    HGB 13.9 13.0 - 16.0 g/dL    HCT 41.1 36.0 - 48.0 %    MCV 89.0 74.0 - 97.0 FL    MCH 30.1 24.0 - 34.0 PG    MCHC 33.8 31.0 - 37.0 g/dL    RDW 18.1 (H) 11.6 - 14.5 %    PLATELET 196 964 - 684 K/uL    MPV 9.8 9.2 - 11.8 FL    NEUTROPHILS 61 40 - 73 %    LYMPHOCYTES 31 21 - 52 %    MONOCYTES 6 3 - 10 %    EOSINOPHILS 2 0 - 5 %    BASOPHILS 0 0 - 2 %    ABS. NEUTROPHILS 4.3 1.8 - 8.0 K/UL    ABS. LYMPHOCYTES 2.2 0.9 - 3.6 K/UL    ABS. MONOCYTES 0.4 0.05 - 1.2 K/UL    ABS. EOSINOPHILS 0.1 0.0 - 0.4 K/UL    ABS.  BASOPHILS 0.0 0.0 - 0.1 K/UL    DF AUTOMATED           Signed By: Joslyn Jones MD     February 10, 2019

## 2019-02-10 NOTE — ROUTINE PROCESS
Bedside and Verbal shift change report given to CATHIE Gomez (oncoming nurse) by Anna Garcia (offgoing nurse). Report included the following information SBAR, Kardex, MAR and Recent Results.

## 2019-02-10 NOTE — PROGRESS NOTES
Reason for Readmission:     pancreatitis RRAT Score and Risk Level:  13 Level of Readmission:     
   
Care Conference scheduled:    
    
Resources/supports as identified by patient/family: none Top Challenges facing patient (as identified by patient/family and CM): Finances/Medication cost?      
Transportation Support system or lack thereof?  none Living arrangements?       alone Self-care/ADLs/Cognition?   independent Current Advanced Directive/Advance Care Plan:  none Plan for utilizing home health:   no 
          
Likelihood of additional readmission: Mod sec non compliance Transition of Care Plan:    Based on readmission, the patient's previous Plan of Care 
 has been evaluated and/or modified. The current Transition of Care Plan is:        
 
Spoke with pt, he states he lives alone. He states he is independent with adls and amb. He designates his ex wife for dcp. He states his pcp is from [place]. He refuses any snf if needed , agrees to hh if needed. Will have to go through [place] if needs hh services, they will likely not  Send for a couple of weeks. Plan home for now. He signed ou AMA from here last week because he  Wanted to smoke, he kept eloping. [facility]. Confirmation obtained. Refused to sign , signed one last wk, when here. Patient has designated ___ex wife_____________________ to participate in his/her discharge plan and to receive any needed information. Name: María Arias Address: 
Phone number: [phone] [place]  [phone]

## 2019-02-10 NOTE — PROGRESS NOTES
Internal Medicine Progress Note Patient's Name: Lopez Lewis Admit Date: 2/9/2019 Length of Stay: 1 Assessment/Plan Active Hospital Problems Diagnosis Date Noted  Pancreatitis due to obstruction of pancreatic duct 02/09/2019  Hypertension 02/09/2019  Acute on chronic pancreatitis ([address] 02/09/2019  Pancreatic cyst 02/09/2019  Hyperbilirubinemia 02/09/2019  Elevated transaminase level 02/09/2019  Underweight 02/09/2019  
 
- Cont IV fluids - Pain control PRN 
- LFTs improving - Trend LFTs - Awaiting GI eval 
- Will likely need ERCP/EUS 
- Cont acceptable home medications for chronic conditions  
- DVT protocol I have personally reviewed all pertinent labs and films that have officially resulted over the last 24 hours. I have personally checked for all pending labs that are awaiting final results. Subjective Pt s/e @ bedside No major events overnight Feeling much better Minimal abd pain Hungry Denies CP or SOB Objective Visit Vitals /79 Pulse 60 Temp 97.9 °F (36.6 °C) Resp 15 Ht 5' 8\" (1.727 m) Wt 45.8 kg (101 lb) SpO2 93% BMI 15.36 kg/m² Physical Exam: 
General Appearance: NAD, conversant Lungs: CTA with normal respiratory effort CV: RRR, no m/r/g Abdomen: soft, mild epigastric pain, normal bowel sounds Extremities: no cyanosis, no peripheral edema Neuro: No focal deficits, motor/sensory intact Lab/Data Reviewed: 
CMP:  
Lab Results Component Value Date/Time   02/10/2019 02:30 AM  
 K 3.9 02/10/2019 02:30 AM  
  02/10/2019 02:30 AM  
 CO2 30 02/10/2019 02:30 AM  
 AGAP 8 02/10/2019 02:30 AM  
 GLU 80 02/10/2019 02:30 AM  
 BUN 7 02/10/2019 02:30 AM  
 CREA 0.61 02/10/2019 02:30 AM  
 GFRAA >60 02/10/2019 02:30 AM  
 GFRNA >60 02/10/2019 02:30 AM  
 CA 9.8 02/10/2019 02:30 AM  
 ALB 3.8 02/10/2019 02:30 AM  
 TP 7.8 02/10/2019 02:30 AM  
 GLOB 4.0 02/10/2019 02:30 AM  
 AGRAT 1.0 02/10/2019 02:30 AM  
 SGOT 238 (H) 02/10/2019 02:30 AM  
  (H) 02/10/2019 02:30 AM  
 
CBC:  
Lab Results Component Value Date/Time WBC 7.0 02/10/2019 02:30 AM  
 HGB 13.9 02/10/2019 02:30 AM  
 HCT 41.1 02/10/2019 02:30 AM  
  02/10/2019 02:30 AM  
 
 
Imaging Reviewed: 
No results found. Medications Reviewed: 
Current Facility-Administered Medications Medication Dose Route Frequency  sodium chloride (NS) flush 5-40 mL  5-40 mL IntraVENous Q8H  
 sodium chloride (NS) flush 5-40 mL  5-40 mL IntraVENous PRN  
 0.9% sodium chloride infusion  150 mL/hr IntraVENous CONTINUOUS  
 HYDROmorphone (PF) (DILAUDID) injection 0.5 mg  0.5 mg IntraVENous Q3H PRN  
 heparin (porcine) injection 5,000 Units  5,000 Units SubCUTAneous Q8H Falguni Covert,  Internal Medicine, Hospitalist 
Pager: [phone] [facility]

## 2019-02-10 NOTE — ROUTINE PROCESS
Assume care of patient from [facility]. Patient received in bed awake. Patient A&Ox4, denies pain at this time and discomfort. No distress noted. Patient is NPO. Frequently use items within reach. Bed locked in low position. Call bell within reach and patient verbalized understanding of use for assistance and needs.

## 2019-02-10 NOTE — PROGRESS NOTES
Problem: Falls - Risk of 
Goal: *Absence of Falls Document Aaron Soler Fall Risk and appropriate interventions in the flowsheet. Outcome: Progressing Towards Goal 
Fall Risk Interventions: 
  
 
  
 
Medication Interventions: Patient to call before getting OOB, Teach patient to arise slowly

## 2019-02-10 NOTE — PROGRESS NOTES
Problem: Pressure Injury - Risk of 
Goal: *Prevention of pressure injury Document Osorio Scale and appropriate interventions in the flowsheet. Outcome: Progressing Towards Goal 
Pressure Injury Interventions: Activity Interventions: Increase time out of bed, Pressure redistribution bed/mattress(bed type) Mobility Interventions: HOB 30 degrees or less, Pressure redistribution bed/mattress (bed type) Nutrition Interventions: Document food/fluid/supplement intake

## 2019-02-10 NOTE — PROGRESS NOTES
Problem: Falls - Risk of 
Goal: *Absence of Falls Document Orvel Buffy Fall Risk and appropriate interventions in the flowsheet. Outcome: Progressing Towards Goal 
Fall Risk Interventions: 
  
 
  
 
Medication Interventions: Patient to call before getting OOB, Utilize gait belt for transfers/ambulation Problem: Pressure Injury - Risk of 
Goal: *Prevention of pressure injury Document Osorio Scale and appropriate interventions in the flowsheet. Outcome: Progressing Towards Goal 
Pressure Injury Interventions: Activity Interventions: Increase time out of bed, Pressure redistribution bed/mattress(bed type) Mobility Interventions: Pressure redistribution bed/mattress (bed type) Nutrition Interventions: Document food/fluid/supplement intake

## 2019-02-10 NOTE — ROUTINE PROCESS
Bedside and Verbal shift change report given to Ramesh RN (oncoming nurse) by Tami Anderson RN (offgoing nurse). Report included the following information SBAR, Kardex, Intake/Output, MAR and Recent Results.

## 2019-02-10 NOTE — PROGRESS NOTES
Problem: Falls - Risk of 
Goal: *Absence of Falls Document Xiomara Ken Fall Risk and appropriate interventions in the flowsheet. Outcome: Progressing Towards Goal 
Fall Risk Interventions: 
  
 
  
 
  
 
  
 
  
 
 
 
Problem: Pressure Injury - Risk of 
Goal: *Prevention of pressure injury Document Osorio Scale and appropriate interventions in the flowsheet. Outcome: Progressing Towards Goal 
Pressure Injury Interventions:

## 2019-02-11 ENCOUNTER — ANESTHESIA EVENT (OUTPATIENT)
Dept: ENDOSCOPY | Age: 74
DRG: 438 | End: 2019-02-11
Payer: MEDICARE

## 2019-02-11 ENCOUNTER — APPOINTMENT (OUTPATIENT)
Dept: GENERAL RADIOLOGY | Age: 74
DRG: 438 | End: 2019-02-11
Attending: INTERNAL MEDICINE
Payer: MEDICARE

## 2019-02-11 ENCOUNTER — ANESTHESIA (OUTPATIENT)
Dept: ENDOSCOPY | Age: 74
DRG: 438 | End: 2019-02-11
Payer: MEDICARE

## 2019-02-11 LAB
ALBUMIN SERPL-MCNC: 3.2 G/DL (ref 3.4–5)
ALBUMIN/GLOB SERPL: 1.1 {RATIO} (ref 0.8–1.7)
ALP SERPL-CCNC: 544 U/L (ref 45–117)
ALT SERPL-CCNC: 215 U/L (ref 16–61)
ANION GAP SERPL CALC-SCNC: 7 MMOL/L (ref 3–18)
AST SERPL-CCNC: 91 U/L (ref 15–37)
BILIRUB SERPL-MCNC: 1.5 MG/DL (ref 0.2–1)
BUN SERPL-MCNC: 10 MG/DL (ref 7–18)
BUN/CREAT SERPL: 17 (ref 12–20)
CALCIUM SERPL-MCNC: 8.3 MG/DL (ref 8.5–10.1)
CHLORIDE SERPL-SCNC: 103 MMOL/L (ref 100–108)
CO2 SERPL-SCNC: 30 MMOL/L (ref 21–32)
CREAT SERPL-MCNC: 0.58 MG/DL (ref 0.6–1.3)
GLOBULIN SER CALC-MCNC: 3 G/DL (ref 2–4)
GLUCOSE SERPL-MCNC: 107 MG/DL (ref 74–99)
HAV IGM SER QL: NEGATIVE
HBV CORE IGM SER QL: NEGATIVE
HBV SURFACE AG SER QL: 0.27 INDEX
HBV SURFACE AG SER QL: NEGATIVE
HCV AB SER IA-ACNC: 0.04 INDEX
HCV AB SERPL QL IA: NEGATIVE
HCV COMMENT,HCGAC: NORMAL
POTASSIUM SERPL-SCNC: 3.5 MMOL/L (ref 3.5–5.5)
PROT SERPL-MCNC: 6.2 G/DL (ref 6.4–8.2)
SODIUM SERPL-SCNC: 140 MMOL/L (ref 136–145)
SP1: NORMAL
SP2: NORMAL
SP3: NORMAL

## 2019-02-11 PROCEDURE — 74330 X-RAY BILE/PANC ENDOSCOPY: CPT

## 2019-02-11 PROCEDURE — 74011250636 HC RX REV CODE- 250/636: Performed by: INTERNAL MEDICINE

## 2019-02-11 PROCEDURE — 77030006565 HC BG DRN BILE BARD -A: Performed by: INTERNAL MEDICINE

## 2019-02-11 PROCEDURE — 88112 CYTOPATH CELL ENHANCE TECH: CPT

## 2019-02-11 PROCEDURE — 74011250636 HC RX REV CODE- 250/636

## 2019-02-11 PROCEDURE — 36415 COLL VENOUS BLD VENIPUNCTURE: CPT

## 2019-02-11 PROCEDURE — 76060000032 HC ANESTHESIA 0.5 TO 1 HR: Performed by: INTERNAL MEDICINE

## 2019-02-11 PROCEDURE — 77030012596 HC SPHNTOM BILI BSC -E: Performed by: INTERNAL MEDICINE

## 2019-02-11 PROCEDURE — 65270000029 HC RM PRIVATE

## 2019-02-11 PROCEDURE — 77010033678 HC OXYGEN DAILY

## 2019-02-11 PROCEDURE — 74011000258 HC RX REV CODE- 258: Performed by: HOSPITALIST

## 2019-02-11 PROCEDURE — 80053 COMPREHEN METABOLIC PANEL: CPT

## 2019-02-11 PROCEDURE — 77030031670 HC DEV INFL ENDOTEK BIG60 MRTM -B: Performed by: INTERNAL MEDICINE

## 2019-02-11 PROCEDURE — 76040000007: Performed by: INTERNAL MEDICINE

## 2019-02-11 PROCEDURE — 0F798DZ DILATION OF COMMON BILE DUCT WITH INTRALUMINAL DEVICE, VIA NATURAL OR ARTIFICIAL OPENING ENDOSCOPIC: ICD-10-PCS | Performed by: INTERNAL MEDICINE

## 2019-02-11 PROCEDURE — 77030036933 HC BRSH RX CYTO WREGD BSC -C: Performed by: INTERNAL MEDICINE

## 2019-02-11 PROCEDURE — C2625 STENT, NON-COR, TEM W/DEL SY: HCPCS | Performed by: INTERNAL MEDICINE

## 2019-02-11 PROCEDURE — 77030007288 HC DEV LOK BILI BSC -A: Performed by: INTERNAL MEDICINE

## 2019-02-11 PROCEDURE — 74011250636 HC RX REV CODE- 250/636: Performed by: HOSPITALIST

## 2019-02-11 PROCEDURE — 0FD98ZX EXTRACTION OF COMMON BILE DUCT, VIA NATURAL OR ARTIFICIAL OPENING ENDOSCOPIC, DIAGNOSTIC: ICD-10-PCS | Performed by: INTERNAL MEDICINE

## 2019-02-11 PROCEDURE — 77030013333: Performed by: INTERNAL MEDICINE

## 2019-02-11 PROCEDURE — BF111ZZ FLUOROSCOPY OF BILIARY AND PANCREATIC DUCTS USING LOW OSMOLAR CONTRAST: ICD-10-PCS | Performed by: INTERNAL MEDICINE

## 2019-02-11 DEVICE — BILIARY STENT WITH NAVIFLEXTM RX DELIVERY SYSTEM
Type: IMPLANTABLE DEVICE | Site: BILE DUCT | Status: FUNCTIONAL
Brand: ADVANIX™ BILIARY

## 2019-02-11 RX ORDER — MAGNESIUM SULFATE 100 %
4 CRYSTALS MISCELLANEOUS AS NEEDED
Status: DISCONTINUED | OUTPATIENT
Start: 2019-02-11 | End: 2019-02-11 | Stop reason: HOSPADM

## 2019-02-11 RX ORDER — PROPOFOL 10 MG/ML
INJECTION, EMULSION INTRAVENOUS AS NEEDED
Status: DISCONTINUED | OUTPATIENT
Start: 2019-02-11 | End: 2019-02-11 | Stop reason: HOSPADM

## 2019-02-11 RX ORDER — LIDOCAINE HYDROCHLORIDE 20 MG/ML
INJECTION, SOLUTION EPIDURAL; INFILTRATION; INTRACAUDAL; PERINEURAL AS NEEDED
Status: DISCONTINUED | OUTPATIENT
Start: 2019-02-11 | End: 2019-02-11 | Stop reason: HOSPADM

## 2019-02-11 RX ORDER — ONDANSETRON 2 MG/ML
INJECTION INTRAMUSCULAR; INTRAVENOUS AS NEEDED
Status: DISCONTINUED | OUTPATIENT
Start: 2019-02-11 | End: 2019-02-11 | Stop reason: HOSPADM

## 2019-02-11 RX ORDER — SODIUM CHLORIDE, SODIUM LACTATE, POTASSIUM CHLORIDE, CALCIUM CHLORIDE 600; 310; 30; 20 MG/100ML; MG/100ML; MG/100ML; MG/100ML
75 INJECTION, SOLUTION INTRAVENOUS CONTINUOUS
Status: DISCONTINUED | OUTPATIENT
Start: 2019-02-11 | End: 2019-02-11 | Stop reason: HOSPADM

## 2019-02-11 RX ORDER — SODIUM CHLORIDE 0.9 % (FLUSH) 0.9 %
5-40 SYRINGE (ML) INJECTION EVERY 8 HOURS
Status: DISCONTINUED | OUTPATIENT
Start: 2019-02-11 | End: 2019-02-11 | Stop reason: HOSPADM

## 2019-02-11 RX ORDER — NALOXONE HYDROCHLORIDE 0.4 MG/ML
0.1 INJECTION, SOLUTION INTRAMUSCULAR; INTRAVENOUS; SUBCUTANEOUS AS NEEDED
Status: DISCONTINUED | OUTPATIENT
Start: 2019-02-11 | End: 2019-02-11 | Stop reason: HOSPADM

## 2019-02-11 RX ORDER — SUCCINYLCHOLINE CHLORIDE 20 MG/ML
INJECTION INTRAMUSCULAR; INTRAVENOUS AS NEEDED
Status: DISCONTINUED | OUTPATIENT
Start: 2019-02-11 | End: 2019-02-11 | Stop reason: HOSPADM

## 2019-02-11 RX ORDER — FENTANYL CITRATE 50 UG/ML
INJECTION, SOLUTION INTRAMUSCULAR; INTRAVENOUS AS NEEDED
Status: DISCONTINUED | OUTPATIENT
Start: 2019-02-11 | End: 2019-02-11 | Stop reason: HOSPADM

## 2019-02-11 RX ORDER — DEXTROSE MONOHYDRATE 25 G/50ML
25-50 INJECTION, SOLUTION INTRAVENOUS AS NEEDED
Status: DISCONTINUED | OUTPATIENT
Start: 2019-02-11 | End: 2019-02-11 | Stop reason: HOSPADM

## 2019-02-11 RX ORDER — METRONIDAZOLE 500 MG/100ML
500 INJECTION, SOLUTION INTRAVENOUS ONCE
Status: COMPLETED | OUTPATIENT
Start: 2019-02-11 | End: 2019-02-11

## 2019-02-11 RX ORDER — ONDANSETRON 2 MG/ML
4 INJECTION INTRAMUSCULAR; INTRAVENOUS ONCE
Status: DISCONTINUED | OUTPATIENT
Start: 2019-02-11 | End: 2019-02-11 | Stop reason: HOSPADM

## 2019-02-11 RX ORDER — SODIUM CHLORIDE 0.9 % (FLUSH) 0.9 %
5-40 SYRINGE (ML) INJECTION AS NEEDED
Status: DISCONTINUED | OUTPATIENT
Start: 2019-02-11 | End: 2019-02-11 | Stop reason: HOSPADM

## 2019-02-11 RX ORDER — FLUMAZENIL 0.1 MG/ML
0.2 INJECTION INTRAVENOUS
Status: DISCONTINUED | OUTPATIENT
Start: 2019-02-11 | End: 2019-02-11 | Stop reason: HOSPADM

## 2019-02-11 RX ORDER — INSULIN LISPRO 100 [IU]/ML
INJECTION, SOLUTION INTRAVENOUS; SUBCUTANEOUS ONCE
Status: DISCONTINUED | OUTPATIENT
Start: 2019-02-11 | End: 2019-02-11 | Stop reason: HOSPADM

## 2019-02-11 RX ORDER — LEVOFLOXACIN 5 MG/ML
500 INJECTION, SOLUTION INTRAVENOUS ONCE
Status: COMPLETED | OUTPATIENT
Start: 2019-02-11 | End: 2019-02-11

## 2019-02-11 RX ORDER — SODIUM CHLORIDE, SODIUM LACTATE, POTASSIUM CHLORIDE, CALCIUM CHLORIDE 600; 310; 30; 20 MG/100ML; MG/100ML; MG/100ML; MG/100ML
INJECTION, SOLUTION INTRAVENOUS
Status: DISCONTINUED | OUTPATIENT
Start: 2019-02-11 | End: 2019-02-11 | Stop reason: HOSPADM

## 2019-02-11 RX ADMIN — ONDANSETRON 4 MG: 2 INJECTION INTRAMUSCULAR; INTRAVENOUS at 15:28

## 2019-02-11 RX ADMIN — HEPARIN SODIUM 5000 UNITS: 5000 INJECTION INTRAVENOUS; SUBCUTANEOUS at 18:02

## 2019-02-11 RX ADMIN — Medication 10 ML: at 06:45

## 2019-02-11 RX ADMIN — HYDROMORPHONE HYDROCHLORIDE 0.5 MG: 1 INJECTION, SOLUTION INTRAMUSCULAR; INTRAVENOUS; SUBCUTANEOUS at 22:53

## 2019-02-11 RX ADMIN — HYDROMORPHONE HYDROCHLORIDE 0.5 MG: 1 INJECTION, SOLUTION INTRAMUSCULAR; INTRAVENOUS; SUBCUTANEOUS at 03:08

## 2019-02-11 RX ADMIN — FENTANYL CITRATE 100 MCG: 50 INJECTION, SOLUTION INTRAMUSCULAR; INTRAVENOUS at 15:14

## 2019-02-11 RX ADMIN — PROPOFOL 150 MG: 10 INJECTION, EMULSION INTRAVENOUS at 15:20

## 2019-02-11 RX ADMIN — LIDOCAINE HYDROCHLORIDE 100 MG: 20 INJECTION, SOLUTION EPIDURAL; INFILTRATION; INTRACAUDAL; PERINEURAL at 15:20

## 2019-02-11 RX ADMIN — HEPARIN SODIUM 5000 UNITS: 5000 INJECTION INTRAVENOUS; SUBCUTANEOUS at 10:16

## 2019-02-11 RX ADMIN — DEXTROSE MONOHYDRATE AND SODIUM CHLORIDE 75 ML/HR: 5; .45 INJECTION, SOLUTION INTRAVENOUS at 03:07

## 2019-02-11 RX ADMIN — SUCCINYLCHOLINE CHLORIDE 100 MG: 20 INJECTION INTRAMUSCULAR; INTRAVENOUS at 15:20

## 2019-02-11 RX ADMIN — SODIUM CHLORIDE, SODIUM LACTATE, POTASSIUM CHLORIDE, CALCIUM CHLORIDE: 600; 310; 30; 20 INJECTION, SOLUTION INTRAVENOUS at 15:09

## 2019-02-11 RX ADMIN — LEVOFLOXACIN 500 MG: 5 INJECTION, SOLUTION INTRAVENOUS at 15:11

## 2019-02-11 RX ADMIN — HEPARIN SODIUM 5000 UNITS: 5000 INJECTION INTRAVENOUS; SUBCUTANEOUS at 03:12

## 2019-02-11 RX ADMIN — METRONIDAZOLE 500 MG: 500 INJECTION, SOLUTION INTRAVENOUS at 15:47

## 2019-02-11 NOTE — ROUTINE PROCESS
Bedside and Verbal shift change report given to [address] [place] [address] (oncoming nurse) by Ed Lundberg RN 
 (offgoing nurse). Report included the following information SBAR, Kardex and MAR.

## 2019-02-11 NOTE — ROUTINE PROCESS
Patient taken to recovery by this RN and CRNA, bedside report given to Juno. RN  Patient stable and on monitor. SBAR completed.

## 2019-02-11 NOTE — ROUTINE PROCESS
Bedside and Verbal shift change report given to Nato Kee RN (oncoming nurse) by Mila Benitez RN (offgoing nurse). Report included the following information SBAR, Kardex, Intake/Output, MAR and Recent Results.

## 2019-02-11 NOTE — PROCEDURES
ERCP Procedure Note    Patient: Nikia Mondragon MRN: [id number]  SSN: [id number]    YOB: 1945  Age: 72 y.o. Sex: male      Date/Time:  2/11/2019 4:12 PM    Procedure: ERCP with sphincterotomy, bile duct brushing, and stent insertion under fluoroscopy (directed and interpreted by endoscopist)    IMPRESSION:   1. Stricture at GE junction, dilated with a 51 Fr Savary over a guidewire. 2. Distal CBD stricture. 3. Successful biliary sphincterotomy. 4. Successful brushing of bile duct stricture. 5. Successful insertion of a 7 Fr double-barbed stent. RECOMMENDATIONS:  1. Check biliary ductal brushings   2. Monitor LFTs. 3. Get a Ca 19-9 level. 4. Full liquids today. 5. Schedule EUS of pancreas with Dr Taina Rangel. Indication: Epigastric pain, weight loss, calcific chronic pancreatitis, cystic lesion in the head of the pancreas, dilated pancreatic duct, narrowing of distal bile duct. :  Juan Voss MD  Referring Provider:   Marivel Guardado MD  History: The history and physical exam were reviewed and updated. Endoscope: [facility] endo (ERCP) Duodenoscopes [id number]  Extent of Exam: Second portion of the duodenum  ASA: Per Anesthesia  Anethesia/Sedation: Monitored Anesthesia Care (MAC)    Description of the procedure: The procedure was discussed with the patient including risks, benefits, alternatives including risks of iv sedation, bleeding, perforation, aspiration and pancreatitis. A safety timeout was performed. The patient was placed in the swimmer's position. A bite block was placed. The patient was given sedation via monitored anesthesia care. The patients vital signs were monitored at all times including heart rate/rhythm, blood pressure and oxygen saturation. The endoscope was then passed under direct visualization to the second portion of the duodenum. The ampulla was identified.   The common bile duct was cannulated using a 40 Jagtome with a .035 guidewire. A cholangiogram was performed using 50% conray. A sphincterotomy was performed using ERBE at standard settings. No immediate complications were noted. A 12mm extraction balloon was then dragged several times through the common bile duct and passed without difficulty through the sphincterotomy. A completion, balloon cholangiogram was performed. The endoscope was then slowly withdrawn while visualizing the mucosa. In the stomach a retroflexion was performed and gastric fundus and cardia visualized. The endoscope was then slowly withdrawn. The patient was then transferred to recovery in stable condition. Findings:    Esophagus: A stricture was noted in the distal esophagus that could not be intubated with the duodenoscope. It was dilated using a 51 Fr Savary dilator over a guidewire. Stomach: The gastric mucosa was normal with no ulceration, mass, stricture. Duodenum: The duodenum mucosa was normal with no ulceration, mass,  stricture and no evidence of villous atrophy. Ampulla: The major papilla was without deformity, swelling, erythema or friability. The adjacent duodenal mucosa was normal.    Cholangiogram: A discrete stricture was noted in the distal CBD measuring about 10mm. There was mild proximal biliary ductal dilation. The stricture was brushed. A 7 Fr/7cm, double-barbed stent was inserted without difficulty. Pancreatogram: Not attempted. Specimens:   ID Type Source Tests Collected by Time Destination   1 : bile duct brushings  x2 slides and one bottle cytolyte Other Common bile duct brushing  Nixon Ibanez MD 2/11/2019 1548 Cytology               Complications:   None; patient tolerated the procedure well.     EBL:Minimal        Osbaldo Saenz MD  February 11, 2019  4:12 PM

## 2019-02-11 NOTE — ANESTHESIA PREPROCEDURE EVALUATION
Anesthetic History No history of anesthetic complications Review of Systems / Medical History Patient summary reviewed and pertinent labs reviewed Pulmonary COPD Neuro/Psych Within defined limits Cardiovascular Hypertension: well controlled Exercise tolerance: <4 METS 
  
GI/Hepatic/Renal 
  
 
 
 
 
 
 Endo/Other Within defined limits Other Findings Physical Exam 
 
Airway Mallampati: III 
TM Distance: 4 - 6 cm Neck ROM: decreased range of motion Mouth opening: Diminished (comment) Cardiovascular Rhythm: regular Rate: normal 
 
 
 
 Dental 
 
Dentition: Poor dentition Pulmonary Breath sounds clear to auscultation Abdominal 
GI exam deferred Other Findings Anesthetic Plan ASA: 3, emergent Anesthesia type: general 
 
 
 
 
Induction: Intravenous Anesthetic plan and risks discussed with: Patient

## 2019-02-11 NOTE — PROGRESS NOTES
0000 Dr Sarbjit Bonds notified of pt's /82. Patient complain of back pain. Will give patient Dilaudid for pain. NPO after MN for ERCP. 7320 Patient requested pain med. I asked charge nurse Sister Julian Deion to accompany me to patient's room to witness my giving patient his pain med. Patient complained after the last time I gave his pain med that he did not feel that he got his pain med. Patient's room smelled cigarette smoke as we entered his room but patient denied smoking. Patient was angry when confronted about smoking in the room by Sis. Ngocua & was yelling at [facility] to get out of his room. Security called by Sister & cigarettes & lighter confiscated by security. Informed patient that his Cigarettes & lighter was place in his chart & that he can have it back upon discharge.

## 2019-02-11 NOTE — ANESTHESIA POSTPROCEDURE EVALUATION
Procedure(s): ENDOSCOPIC RETROGRADE CHOLANGIOPANCREATOGRAPHY (ERCP) ESOPHAGOGASTRODUODENOSCOPY (EGD). Anesthesia Post Evaluation Multimodal analgesia: multimodal analgesia used between 6 hours prior to anesthesia start to PACU discharge Patient location during evaluation: bedside Patient participation: complete - patient participated Level of consciousness: awake Pain management: adequate Airway patency: patent Anesthetic complications: no 
Cardiovascular status: stable Respiratory status: acceptable Hydration status: acceptable Post anesthesia nausea and vomiting:  controlled Visit Vitals BP (!) 155/95 (BP 1 Location: Left arm, BP Patient Position: At rest) Pulse 98 Temp 36.3 °C (97.3 °F) Resp 18 Ht 5' 8\" (1.727 m) Wt 45.8 kg (101 lb) SpO2 100% BMI 15.36 kg/m²

## 2019-02-11 NOTE — PROGRESS NOTES
Problem: Falls - Risk of 
Goal: *Absence of Falls Document Glenham Rank Fall Risk and appropriate interventions in the flowsheet. Outcome: Progressing Towards Goal 
Fall Risk Interventions: 
  
 
  
 
Medication Interventions: Teach patient to arise slowly

## 2019-02-11 NOTE — PROGRESS NOTES
Problem: Falls - Risk of 
Goal: *Absence of Falls Document Mely Bryson Fall Risk and appropriate interventions in the flowsheet. Outcome: Progressing Towards Goal 
Fall Risk Interventions: 
  
 
  
 
Medication Interventions: Teach patient to arise slowly Problem: Pressure Injury - Risk of 
Goal: *Prevention of pressure injury Document Osorio Scale and appropriate interventions in the flowsheet. Outcome: Progressing Towards Goal 
Pressure Injury Interventions: Activity Interventions: Increase time out of bed, Pressure redistribution bed/mattress(bed type) Mobility Interventions: Pressure redistribution bed/mattress (bed type) Nutrition Interventions: Document food/fluid/supplement intake

## 2019-02-11 NOTE — PROGRESS NOTES
2000 Patient received resting quietly in bed. Alert & Oriented x4. Call light in reach & side rails up x3. Patient reminded nothing to eat or drink after midnight - for ERCP tomorrow. 2358 Patient given Dilaudid IV for complain of back pain. Dilaudid pushed slowly over 1-2 min. IV & after it was given patient stated that he did not feel like he felt the drug just like he did before. Will have someone witness my giving patient pain med next time.

## 2019-02-11 NOTE — PERIOP NOTES
[address] care of patient upon arrival to PACU. Patient drowsy, responsive to verbal stimuli. Placed on monitor x3. VSS. 1448  Patient fully awake and oriented. 2100 notified he will be returning to room shortly.

## 2019-02-11 NOTE — BRIEF OP NOTE
TRANSFER - OUT REPORT: 
 
Verbal report given to Tyrell(name) on Kecia Zuniga  being transferred to [facility](unit) for routine post - op Report consisted of patients Situation, Background, Assessment and  
Recommendations(SBAR). Information from the following report(s) Recent Results was reviewed with the receiving nurse. Lines:  
Peripheral IV 02/09/19 Right Forearm (Active) Site Assessment Clean, dry, & intact 2/11/2019  8:25 AM  
Phlebitis Assessment 0 2/11/2019  8:25 AM  
Infiltration Assessment 0 2/11/2019  8:25 AM  
Dressing Status Clean, dry, & intact 2/11/2019  8:25 AM  
Dressing Type Tape;Transparent 2/11/2019  8:25 AM  
Hub Color/Line Status Blue; Infusing 2/11/2019  8:25 AM  
Action Taken Open ports on tubing capped 2/11/2019  8:25 AM  
Alcohol Cap Used Yes 2/11/2019  8:25 AM  
  
 
Opportunity for questions and clarification was provided. Patient transported with: 
 [employer]

## 2019-02-11 NOTE — PROGRESS NOTES
Assumed care of pt from [place] , RN pt awake in bed A&O x 4 , no distress noted and denies pain. Frequently used items and call bell within reach. Patient verbalized understanding to use call bell for any needs or assistance. Bed locked in lowest position. Pt anxious about procedure. 5 Pt constantly asked if he could leave now that his procedure is done. MD paged and made aware and stated he will look at results and evaluate.

## 2019-02-11 NOTE — PROGRESS NOTES
Problem: Pressure Injury - Risk of 
Goal: *Prevention of pressure injury Document Osorio Scale and appropriate interventions in the flowsheet. Outcome: Progressing Towards Goal 
Pressure Injury Interventions: Activity Interventions: Increase time out of bed, Pressure redistribution bed/mattress(bed type) Mobility Interventions: Pressure redistribution bed/mattress (bed type) Nutrition Interventions: Document food/fluid/supplement intake

## 2019-02-11 NOTE — PROGRESS NOTES
Nutrition initial assessment/Plan of care RECOMMENDATIONS:  
1. Low Fat/Low Fiber diet 2. Ensure Enlive TID 3. Monitor labs, weight and PO intake 4. RD to follow GOALS:  
1. PO intake meets >75% of protein/calorie needs by 2/13 2. Weight Maintenance/gradual gain (1-2 lb/week) by 2/17 ASSESSMENT: 
Wt status is classified as underweight per BMI of 15.4. Noted Pt was 112 lb on (11/5/2018) equating to ~ 15 lb or 13% weight loss x 3 months per documented weight records. Tolerating diet. Labs noted. Pt w/ elevated Bili (2.1), elevated LFTs and elevated lipase (2109). Nutrition recommendations listed. RD to follow. Nutrition Diagnoses:  
Unintentional weight loss related to inadequate energy intake PTA as evidenced by ~15 lb or 13% weight loss x 3 months per documented weight records. Altered GI Function related to gallstone pancreatitis as evidenced by a elevated LFTs and lipase level (2109). Meets Criteria for Acute Malnutrition  
[x] Severe Malnutrition, as evidenced by: 
 [x] Moderate muscle wasting, loss of subcutaneous fat 
 [] Nutritional intake of <50% of recommended intake for >5 days [x] Weight loss of >1-2% in 1 week, >5% in 1 month, >7.5% in 3 months, or >10% in 6 months 
 [] Moderate-severe edema Nutrition Risk:  [x] High  [] Moderate []  Low SUBJECTIVE/OBJECTIVE: 
 Pt admitted for pancreatitis d/t obstruction. Familiar with Pt from admission earlier this week. Noted Pt was 112 lb on (11/5/2018) equating to a~15 lb or 13% weight loss x 3 months per documented weight records. GI consulted: Plan for ERCP. Pt seen in room; appears thin with noted muscle wasting at temporal and clavicle regions as well as SQ fat loss of extremities. Denies having any food allergies or problems chewing/swallowing. Reports normally having a fair appetite, consuming 2 meals per day at home.  However noted a decrease over the past week or so but is good now. Denies having any N/V/ABD pain during visit. . Will monitor. Information Obtained from:  
 [x] Chart Review [x] Patient 
 [] Family/Caregiver 
 [] Nurse/Physician 
 [] Interdisciplinary Meeting/Rounds Diet: Low fat/low fiber diet Medications: [x] Reviewed Allergies: [x] Reviewed Encounter Diagnoses ICD-10-CM ICD-9-CM 1. Pancreatitis due to obstruction of pancreatic duct K85.90 577.0 K86.89 577.8 2. Acute on chronic pancreatitis (HCC) K85.90 577.0 K86.1 577.1 3. Pancreatic cyst K86.2 577.2 4. Hyperbilirubinemia E80.6 782.4 5. Elevated transaminase level R74.0 790.4 Past Medical History:  
Diagnosis Date  Hypertension Labs:   
Lab Results Component Value Date/Time Sodium 138 02/10/2019 02:30 AM  
 Potassium 3.9 02/10/2019 02:30 AM  
 Chloride 100 02/10/2019 02:30 AM  
 CO2 30 02/10/2019 02:30 AM  
 Anion gap 8 02/10/2019 02:30 AM  
 Glucose 80 02/10/2019 02:30 AM  
 BUN 7 02/10/2019 02:30 AM  
 Creatinine 0.61 02/10/2019 02:30 AM  
 Calcium 9.8 02/10/2019 02:30 AM  
 Magnesium 2.4 10/24/2017 12:00 AM  
 Phosphorus 3.6 10/21/2017 03:15 AM  
 Albumin 3.8 02/10/2019 02:30 AM  
 
Anthropometrics: BMI (calculated): 15.4 Last 3 Recorded Weights in this Encounter 02/09/19 1259 Weight: 45.8 kg (101 lb) Ht Readings from Last 1 Encounters:  
02/09/19 5' 8\" (1.727 m) Weight Metrics 2/9/2019 2/7/2019 11/5/2018 12/14/2017 11/13/2017 10/20/2017 4/21/2017 Weight 101 lb 97 lb 112 lb 118 lb 115 lb 120 lb 125 lb BMI 15.36 kg/m2 14.53 kg/m2 17.03 kg/m2 17.94 kg/m2 17.23 kg/m2 17.98 kg/m2 18.73 kg/m2 Patient Vitals for the past 100 hrs: 
 % Diet Eaten 02/10/19 1730 100 % 02/10/19 1300 90 % IBW: 154 lb %IBW: 65% UBW: 101-110 lb recently [x] Weight Loss [] Weight Gain [] Weight Stable Estimated Nutrition Needs: [x] MSJ  [] Other: 
Calories: 7323-0519 kcal Based on:   [x] Actual BW   
Protein:   65 g Based on:   [x] Actual BW   
 Fluid:       7264-6739 ml Based on:   [x] Actual BW  
 
 [x] No Cultural, Confucianist or ethnic dietary need identified. [] Cultural, Confucianist and ethnic food preferences identified and addressed Wt Status:  [] Normal (18.6 - 24.9) [x] Underweight (<18.5) [] Overweight (25 - 29.9) [] Mild Obesity (30 - 34.9)  [] Moderate Obesity (35 - 39.9) [] Morbid Obesity (40+) Nutrition Problems Identified:  
[] Suboptimal PO intake 
[] Food Allergies [] Difficulty chewing/swallowing/poor dentition 
[] Constipation/Diarrhea  
[] Nausea/Vomiting  
[x] None 
[] Other:  
 
Plan:  
[x] Therapeutic Diet 
[]  Obtained/adjusted food preferences/tolerances and/or snacks options  
[]  Supplements added  
[] Occupational therapy following for feeding techniques []  HS snack added  
[]  Modify diet texture  
[]  Modify diet for food allergies [x]  Educate patient  
[]  Assist with menu selection  
[x]  Monitor PO intake on meal rounds  
[x]  Continue inpatient monitoring and intervention  
[]  Participated in discharge planning/Interdisciplinary rounds/Team meetings  
[]  Other:  
 
Education Needs: 
 [x] Not appropriate for teaching at this time 
 [] Identified and addressed Nutrition Monitoring and Evaluation: 
[x] Continue ongoing monitoring and intervention 
[] Other Lucy Garcia

## 2019-02-11 NOTE — PROGRESS NOTES
Internal Medicine Progress Note Patient's Name: Nikia Mondragon Admit Date: 2/9/2019 Length of Stay: 2 Assessment/Plan Active Hospital Problems Diagnosis Date Noted  Pancreatitis due to obstruction of pancreatic duct 02/09/2019  Hypertension 02/09/2019  Acute on chronic pancreatitis ([address] 02/09/2019  Pancreatic cyst 02/09/2019  Hyperbilirubinemia 02/09/2019  Elevated transaminase level 02/09/2019  Underweight 02/09/2019  
 
- Cont IV fluids - Pain control PRN 
- ERCP today Subjective NAD, ERCP today Objective Visit Vitals BP (!) 166/99 (BP 1 Location: Right arm) Pulse 61 Temp 98 °F (36.7 °C) Resp 18 Ht 5' 8\" (1.727 m) Wt 45.8 kg (101 lb) SpO2 96% BMI 15.36 kg/m² Physical Exam: 
General Appearance: NAD, conversant Lungs: CTA with normal respiratory effort CV: RRR, no m/r/g Abdomen: soft, mild epigastric pain, normal bowel sounds Extremities: no cyanosis, no peripheral edema Neuro: No focal deficits, motor/sensory intact Lab/Data Reviewed: 
CMP:  
Lab Results Component Value Date/Time  02/11/2019 04:50 AM  
 K 3.5 02/11/2019 04:50 AM  
  02/11/2019 04:50 AM  
 CO2 30 02/11/2019 04:50 AM  
 AGAP 7 02/11/2019 04:50 AM  
  (H) 02/11/2019 04:50 AM  
 BUN 10 02/11/2019 04:50 AM  
 CREA 0.58 (L) 02/11/2019 04:50 AM  
 GFRAA >60 02/11/2019 04:50 AM  
 GFRNA >60 02/11/2019 04:50 AM  
 CA 8.3 (L) 02/11/2019 04:50 AM  
 ALB 3.2 (L) 02/11/2019 04:50 AM  
 TP 6.2 (L) 02/11/2019 04:50 AM  
 GLOB 3.0 02/11/2019 04:50 AM  
 AGRAT 1.1 02/11/2019 04:50 AM  
 SGOT 91 (H) 02/11/2019 04:50 AM  
  (H) 02/11/2019 04:50 AM  
 
CBC:  
No results found for: WBC, HGB, HGBEXT, HCT, HCTEXT, PLT, PLTEXT, HGBEXT, HCTEXT, PLTEXT Imaging Reviewed: 
No results found. Medications Reviewed: 
Current Facility-Administered Medications Medication Dose Route Frequency  levoFLOXacin (LEVAQUIN) 500 mg in D5W IVPB  500 mg IntraVENous ONCE  
 metroNIDAZOLE (FLAGYL) IVPB premix 500 mg  500 mg IntraVENous ONCE  
 dextrose 5 % - 0.45% NaCl infusion  75 mL/hr IntraVENous CONTINUOUS  
 influenza vaccine 2018-19 (6 mos+)(PF) (FLUARIX QUAD/FLULAVAL QUAD) injection 0.5 mL  0.5 mL IntraMUSCular PRIOR TO DISCHARGE  sodium chloride (NS) flush 5-40 mL  5-40 mL IntraVENous Q8H  
 sodium chloride (NS) flush 5-40 mL  5-40 mL IntraVENous PRN  
 HYDROmorphone (PF) (DILAUDID) injection 0.5 mg  0.5 mg IntraVENous Q3H PRN  
 heparin (porcine) injection 5,000 Units  5,000 Units SubCUTAneous Q8H

## 2019-02-12 VITALS
HEIGHT: 68 IN | DIASTOLIC BLOOD PRESSURE: 69 MMHG | HEART RATE: 62 BPM | BODY MASS INDEX: 15.31 KG/M2 | WEIGHT: 101 LBS | SYSTOLIC BLOOD PRESSURE: 156 MMHG | TEMPERATURE: 98.2 F | RESPIRATION RATE: 18 BRPM | OXYGEN SATURATION: 100 %

## 2019-02-12 LAB
ALBUMIN SERPL-MCNC: 3.4 G/DL (ref 3.4–5)
ALBUMIN/GLOB SERPL: 1 {RATIO} (ref 0.8–1.7)
ALP SERPL-CCNC: 467 U/L (ref 45–117)
ALT SERPL-CCNC: 175 U/L (ref 16–61)
ANION GAP SERPL CALC-SCNC: 6 MMOL/L (ref 3–18)
AST SERPL-CCNC: 44 U/L (ref 15–37)
BACTERIA SPEC CULT: NORMAL
BILIRUB SERPL-MCNC: 1.3 MG/DL (ref 0.2–1)
BUN SERPL-MCNC: 9 MG/DL (ref 7–18)
BUN/CREAT SERPL: 15 (ref 12–20)
CALCIUM SERPL-MCNC: 8.4 MG/DL (ref 8.5–10.1)
CHLORIDE SERPL-SCNC: 100 MMOL/L (ref 100–108)
CO2 SERPL-SCNC: 32 MMOL/L (ref 21–32)
CREAT SERPL-MCNC: 0.59 MG/DL (ref 0.6–1.3)
ERYTHROCYTE [DISTWIDTH] IN BLOOD BY AUTOMATED COUNT: 17.8 % (ref 11.6–14.5)
GLOBULIN SER CALC-MCNC: 3.3 G/DL (ref 2–4)
GLUCOSE SERPL-MCNC: 84 MG/DL (ref 74–99)
HCT VFR BLD AUTO: 32.4 % (ref 36–48)
HGB BLD-MCNC: 11 G/DL (ref 13–16)
MCH RBC QN AUTO: 29.5 PG (ref 24–34)
MCHC RBC AUTO-ENTMCNC: 34 G/DL (ref 31–37)
MCV RBC AUTO: 86.9 FL (ref 74–97)
PLATELET # BLD AUTO: 367 K/UL (ref 135–420)
PMV BLD AUTO: 9.8 FL (ref 9.2–11.8)
POTASSIUM SERPL-SCNC: 3.3 MMOL/L (ref 3.5–5.5)
PROT SERPL-MCNC: 6.7 G/DL (ref 6.4–8.2)
RBC # BLD AUTO: 3.73 M/UL (ref 4.7–5.5)
SERVICE CMNT-IMP: NORMAL
SODIUM SERPL-SCNC: 138 MMOL/L (ref 136–145)
WBC # BLD AUTO: 9.3 K/UL (ref 4.6–13.2)

## 2019-02-12 PROCEDURE — 74011250636 HC RX REV CODE- 250/636: Performed by: FAMILY MEDICINE

## 2019-02-12 PROCEDURE — 77010033678 HC OXYGEN DAILY

## 2019-02-12 PROCEDURE — 86301 IMMUNOASSAY TUMOR CA 19-9: CPT

## 2019-02-12 PROCEDURE — 85027 COMPLETE CBC AUTOMATED: CPT

## 2019-02-12 PROCEDURE — 74011250636 HC RX REV CODE- 250/636: Performed by: HOSPITALIST

## 2019-02-12 PROCEDURE — 80053 COMPREHEN METABOLIC PANEL: CPT

## 2019-02-12 PROCEDURE — 90686 IIV4 VACC NO PRSV 0.5 ML IM: CPT | Performed by: HOSPITALIST

## 2019-02-12 PROCEDURE — 36415 COLL VENOUS BLD VENIPUNCTURE: CPT

## 2019-02-12 PROCEDURE — 90471 IMMUNIZATION ADMIN: CPT

## 2019-02-12 RX ORDER — LABETALOL HCL 20 MG/4 ML
10 SYRINGE (ML) INTRAVENOUS ONCE
Status: COMPLETED | OUTPATIENT
Start: 2019-02-12 | End: 2019-02-12

## 2019-02-12 RX ADMIN — Medication 10 ML: at 05:39

## 2019-02-12 RX ADMIN — HYDROMORPHONE HYDROCHLORIDE 0.5 MG: 1 INJECTION, SOLUTION INTRAMUSCULAR; INTRAVENOUS; SUBCUTANEOUS at 02:05

## 2019-02-12 RX ADMIN — INFLUENZA VIRUS VACCINE 0.5 ML: 15; 15; 15; 15 SUSPENSION INTRAMUSCULAR at 09:09

## 2019-02-12 RX ADMIN — Medication 10 ML: at 02:06

## 2019-02-12 RX ADMIN — LABETALOL 20 MG/4 ML (5 MG/ML) INTRAVENOUS SYRINGE 10 MG: at 06:24

## 2019-02-12 RX ADMIN — HYDROMORPHONE HYDROCHLORIDE 0.5 MG: 1 INJECTION, SOLUTION INTRAMUSCULAR; INTRAVENOUS; SUBCUTANEOUS at 08:19

## 2019-02-12 RX ADMIN — HYDROMORPHONE HYDROCHLORIDE 0.5 MG: 1 INJECTION, SOLUTION INTRAMUSCULAR; INTRAVENOUS; SUBCUTANEOUS at 05:37

## 2019-02-12 RX ADMIN — HEPARIN SODIUM 5000 UNITS: 5000 INJECTION INTRAVENOUS; SUBCUTANEOUS at 02:06

## 2019-02-12 NOTE — PROGRESS NOTES
Patient received sitting up in bed. A&Ox4, c/o lower back pain 7/10; see MAR for prn Dilaudid to be given. No distress noted. Frequently use items within reach. Bed locked in low position. Call bell within reach and Patient verbalized understanding of use for assistance and needs. 0806- Patient made aware of Flu vaccine to be given prior to discharge and agrees to receiving. Given Influenza teaching. Patient tolerated well. See MAR.  
 
0940- Pt discharge home; no distress noted, accompanied by VITOR Lehman to front entrance. Patient had requested to ambulate; gait steady. Pt has discharge instructions and belongings. Voiced understanding of discharge instructions.

## 2019-02-12 NOTE — PROGRESS NOTES
Problem: Falls - Risk of 
Goal: *Absence of Falls Document Osmel Lopez Fall Risk and appropriate interventions in the flowsheet. Outcome: Progressing Towards Goal 
Fall Risk Interventions: 
  
 
  
 
Medication Interventions: Teach patient to arise slowly Problem: Pressure Injury - Risk of 
Goal: *Prevention of pressure injury Document Osorio Scale and appropriate interventions in the flowsheet. Outcome: Progressing Towards Goal 
Pressure Injury Interventions: Activity Interventions: Increase time out of bed Mobility Interventions: Pressure redistribution bed/mattress (bed type) Nutrition Interventions: Document food/fluid/supplement intake

## 2019-02-12 NOTE — CDMP QUERY
The medical record reflects the following: 
 
Risk: 72 y male admitted with pancreatitis due to obstruction of pancreatic duct Clinical Indicators: 2/10 Dietician note Meets Criteria for Acute severe malnutrition 
-Underweight with BMI 15.36 
-Moderate muscle wasting, loss of subcutaneous fat,     Weight loss of >1-2% in 1 week, >5% in 1 month,  
  
Treatment: 1. Low Fat/Low Fiber diet 2. Ensure Enlive TID 3. Monitor labs, weight and PO intake 4. RD to follow Please clarify if this patient is being treated/managed for: 
 
=>Acute severe malnutrition =>Other Explanation of clinical findings =>Unable to Determine (no explanation of clinical findings) Please clarify and document your clinical opinion in the progress notes and discharge summary including the definitive and/or presumptive diagnosis, (suspected or probable), related to the above clinical findings. Please include clinical findings supporting your diagnosis. If you DECLINE this query or would like to communicate with [facility], please utilize the \"Stere[employer] message box\" at the TOP of the Progress Note on the right. Thank you, 
Ti Ferrer RN [facility]   [phone]

## 2019-02-12 NOTE — MED STUDENT NOTES
Today is hospital day #4 for Mr. Jordon Khalil who was admitted for pancreatitis due to obstruction of pancreatic duct. He is a 71 yo male with a history of chronic pancreatitis, alcohol abuse, and ongoing tobacco use who returned to the ED on 2/9 complaining of worsening, burning upper abdominal pain x 2 months not a/w vomiting or other s/sxs after leaving on 2/5 due to complaints of not being seen by doctors. ED eval - elevated LFTs, lipase > 3xULN, CT, U/S, MRCP - Chronic pancreatitis; , dilated main pancreatic duct,  Pancreatic cystic structure. Hospital course involved aggressive IVFs, NPO, pain control. HCT dropped appropriately with introduction of IVF. GE performed an ERCP (2/11) and treated the distal biliary obstruction with sphincterotomy and stenting. He still needs EUS to evaluate the cystic structure on the pancreatic head to exclude mass lesion. Get a Ca 19-9 level. At 2200 last night, patient refused continuous fluids, despite education. At 7875, episode of elevated BP treated with labetalol 10 mg IV. Current Facility-Administered Medications:  
  dextrose 5 % - 0.45% NaCl infusion, 75 mL/hr, IntraVENous, CONTINUOUS, May Darrel Rogers H, DO, Last Rate: 75 mL/hr at  
[facility]  HYDROmorphone (PF) (DILAUDID) injection 0.5 mg, 0.5 mg, IntraVENous, Q3H PRN, Blanka Shagufta H, DO, 0.5 mg at Visit Vitals /69 Pulse 62 Temp 98.2 °F (36.8 °C) Resp 18 Ht 5' 8\" (1.727 m) Wt 45.8 kg (101 lb) SpO2 100% BMI 15.36 kg/m² Labs: CBC W/O DIFF Collection Time: 02/12/19  4:45 AM  
Result Value Ref Range WBC 9.3 4.6 - 13.2 K/uL  
 RBC 3.73 (L) 4.70 - 5.50 M/uL  
 HGB 11.0 (L) 13.0 - 16.0 g/dL HCT 32.4 (L) 36.0 - 48.0 % MCV 86.9 74.0 - 97.0 FL  
 MCH 29.5 24.0 - 34.0 PG  
 MCHC 34.0 31.0 - 37.0 g/dL  
 RDW 17.8 (H) 11.6 - 14.5 % PLATELET 886 119 - 792 K/uL MPV 9.8 9.2 - 11.8 FL  
 
   
METABOLIC PANEL, COMPREHENSIVE  Collection Time: 02/12/19  4:45 AM  
 Result Value Ref Range Sodium 138 136 - 145 mmol/L Potassium 3.3 (L) 3.5 - 5.5 mmol/L Chloride 100 100 - 108 mmol/L  
 CO2 32 21 - 32 mmol/L Anion gap 6 3.0 - 18 mmol/L Glucose 84 74 - 99 mg/dL BUN 9 7.0 - 18 MG/DL Creatinine 0.59 (L) 0.6 - 1.3 MG/DL  
 BUN/Creatinine ratio 15 12 - 20 GFR est AA >60 >60 ml/min/1.73m2 GFR est non-AA >60 >60 ml/min/1.73m2 Calcium 8.4 (L) 8.5 - 10.1 MG/DL Bilirubin, total 1.3 (H) 0.2 - 1.0 MG/DL  
 ALT (SGPT) 175 (H) 16 - 61 U/L  
 AST (SGOT) 44 (H) 15 - 37 U/L Alk. phosphatase 467 (H) 45 - 117 U/L Protein, total 6.7 6.4 - 8.2 g/dL Albumin 3.4 3.4 - 5.0 g/dL Globulin 3.3 2.0 - 4.0 g/dL A-G Ratio 1.0 0.8 - 1.7 Blood cx - AEROBIC AND ANAEROBIC BOTTLES STAPHYLOCOCCUS CAPITIS Hepatitis panel - negative; C - pending. CT-abdomen (2/6) - Chronic pancreatitis changes involving the pancreas including dense 
calcifications and a dilated main pancreatic duct. There is a focal cystic 
structure involving the pancreatic head. Differential would include both an IPMN 
or other cystic pancreatic tumor and pseudocyst  
  
 Dense arterial calcifications involving the arterial system relatively 
diffusely. There is evidence of severe narrowing and/or occlusion involving 
pelvic vessels 
  
Stable biliary dilatation  
  
ERCP -  
1. Distal common duct stricture, benign in appearance, likely from extrinsic 
compression from chronic pancreatitis given CT appearance. 2. Biliary stent in good position. MRCP (2/6) -  
1. Mild intrahepatic biliary dilation. CBD diameter upper limits of normal. 
There appears to be extrinsic compression on distal CBD at the level of the 
pancreatic head.  
  
2. Main pancreatic duct is irregular and mildly dilated. Coarse parenchymal 
calcifications seen in the head of pancreas on CT, suggestive of chronic 
calcific pancreatitis. No discrete mass lesion identified.  Simple appearing cyst 
 in the pancreatic head also noted, measures 1.6 cm. This may be a pancreatic 
pseudocyst or likely benign cystic lesion such as IPMN. Increased T2 signal in 
the pancreatic parenchyma is suggestive of acute pancreatitis superimposed upon 
chronic pancreatitis findings.  
  
Recommendation: Although no solid pancreatic head mass is identified, it is 
difficult to completely exclude in the presence of ductal dilation. ERCP/EUS 
could be performed after resolution of acute pancreatitis. Follow-up pancreas 
protocol CT or MRI could also be performed in 3 months time. Assessment/Plan: 1. Pancreatitis due to obstruction of pancreatic duct - GE has successfully removed obstruction and stented the biliary duct. Continue patients on NPO, IVF, and analgesia as needed. Refeed when patient is ready. 2. Pancreatic cystic structure - patient is to have cystic structure evaluated in the hospital or outpatient with EUS to exclude mass lesion. If a pseudocyst, may require drainage. Trend CBC to monitor for infection. *ATTENTION:  This note has been created by a medical student for educational purposes only. Please do not refer to the content of this note for clinical decision-making, billing, or other purposes. Please see attending physicians note to obtain clinical information on this patient. *

## 2019-02-12 NOTE — PROGRESS NOTES
Bedside and Verbal shift change report given to Ramy Britt RN (oncoming nurse) by Hugh Mireles RN (offgoing nurse). Report included the following information SBAR, Kardex, Intake/Output, MAR and Recent Results.

## 2019-02-12 NOTE — DISCHARGE INSTRUCTIONS
DISCHARGE SUMMARY from Nurse    PATIENT INSTRUCTIONS:    After general anesthesia or intravenous sedation, for 24 hours or while taking prescription Narcotics:  · Limit your activities  · Do not drive and operate hazardous machinery  · Do not make important personal or business decisions  · Do  not drink alcoholic beverages  · If you have not urinated within 8 hours after discharge, please contact your surgeon on call. Report the following to your surgeon:  · Excessive pain, swelling, redness or odor of or around the surgical area  · Temperature over 100.5  · Nausea and vomiting lasting longer than 4 hours or if unable to take medications  · Any signs of decreased circulation or nerve impairment to extremity: change in color, persistent  numbness, tingling, coldness or increase pain  · Any questions    What to do at Home:  * Recommended activity: as tolerated    * If you experience any of the following symptoms as listed in your teachings for Pancreatitis under \"When Should You Call For Help?\", please follow up with your Primary Care Physician and/or call 911. *  Please give a list of your current medications to your Primary Care Provider. *  Please update this list whenever your medications are discontinued, doses are      changed, or new medications (including over-the-counter products) are added. *  Please carry medication information at all times in case of emergency situations. These are general instructions for a healthy lifestyle:    No smoking/ No tobacco products/ Avoid exposure to second hand smoke  Surgeon General's Warning:  Quitting smoking now greatly reduces serious risk to your health.     Obesity, smoking, and sedentary lifestyle greatly increases your risk for illness    A healthy diet, regular physical exercise & weight monitoring are important for maintaining a healthy lifestyle    You may be retaining fluid if you have a history of heart failure or if you experience any of the following symptoms:  Weight gain of 3 pounds or more overnight or 5 pounds in a week, increased swelling in our hands or feet or shortness of breath while lying flat in bed. Please call your doctor as soon as you notice any of these symptoms; do not wait until your next office visit. Recognize signs and symptoms of STROKE:    F-face looks uneven    A-arms unable to move or move unevenly    S-speech slurred or non-existent    T-time-call 911 as soon as signs and symptoms begin-DO NOT go       Back to bed or wait to see if you get better-TIME IS BRAIN. Warning Signs of HEART ATTACK     Call 911 if you have these symptoms:   Chest discomfort. Most heart attacks involve discomfort in the center of the chest that lasts more than a few minutes, or that goes away and comes back. It can feel like uncomfortable pressure, squeezing, fullness, or pain.  Discomfort in other areas of the upper body. Symptoms can include pain or discomfort in one or both arms, the back, neck, jaw, or stomach.  Shortness of breath with or without chest discomfort.  Other signs may include breaking out in a cold sweat, nausea, or lightheadedness. Don't wait more than five minutes to call 911 - MINUTES MATTER! Fast action can save your life. Calling 911 is almost always the fastest way to get lifesaving treatment. Emergency Medical Services staff can begin treatment when they arrive -- up to an hour sooner than if someone gets to the hospital by car. Patient armband removed and shredded    The discharge information has been reviewed with the patient. The patient verbalized understanding. Discharge medications reviewed with the patient and appropriate educational materials and side effects teaching were provided.   ___________________________________________________________________________________________________________________________________

## 2019-02-12 NOTE — PROGRESS NOTES
1 - Assumed pt care from [address]. Pt in bed, alert and oriented x 4. Not in any form of distress. Denies pain. Frequent use items and call bell within reach. Verbalized understanding to call for assistance. Bed locked in lowest position. 2200 - Pt refused Continuous fluids. Educated patient on importance of this intervention. Pt still refused. 6355 - Pt's /92, asymptomatic. Paged Dr. Noé Serrano. MD returned page. Notified MD of pt's BP. Order received to administer labetalol 10 mg IV once. 8545 - Administered labetalol 10 mg IV. 0645 - BP - 156/69.  
 
0810 - Bedside and Verbal shift change report given to Paulie Caraballo RN (oncoming nurse) by Caty Cheng RN (offgoing nurse). Report included the following information SBAR, Kardex, Intake/Output, MAR and Recent Results.

## 2019-02-13 LAB — CANCER AG19-9 SERPL-ACNC: 155 U/ML (ref 0–35)

## 2019-02-15 NOTE — DISCHARGE SUMMARY
Please Note, this Discharge Summary was entered in Error. This summary speaks to the Hospitalization previous to this Hospitalization several days previously. Discharge Summary    Patient: Isma Washington               Sex: male          DOA: 2/9/2019         YOB: 1945      Age:  72 y.o.        LOS:  LOS: 3 days                Admit Date: 2/9/2019    Discharge Date: 2/15/2019    Admission Diagnoses: Pancreatitis due to obstruction of pancreatic duct [K85.90, K86.89]    Discharge Diagnoses:    Problem List as of 2/12/2019 Date Reviewed: 2/11/2015          Codes Class Noted - Resolved    * (Principal) Pancreatitis due to obstruction of pancreatic duct ICD-10-CM: K85.90, K86.89  ICD-9-CM: 577.0, 577.8  2/9/2019 - Present        Hypertension ICD-10-CM: I10  ICD-9-CM: 401.9  2/9/2019 - Present        Acute on chronic pancreatitis ([facility] ICD-10-CM: K85.90, K86.1  ICD-9-CM: 577.0, 577.1  2/9/2019 - Present        Pancreatic cyst ICD-10-CM: K86.2  ICD-9-CM: 577.2  2/9/2019 - Present        Hyperbilirubinemia ICD-10-CM: E80.6  ICD-9-CM: 782.4  2/9/2019 - Present        Elevated transaminase level ICD-10-CM: R74.0  ICD-9-CM: 790.4  2/9/2019 - Present        Underweight ICD-10-CM: R63.6  ICD-9-CM: 783.22  2/9/2019 - Present        Gall stone pancreatitis ICD-10-CM: K85.10  ICD-9-CM: 014.0, 574.20  2/6/2019 - Present        Ileus ([facility] ICD-10-CM: K56.7  ICD-9-CM: 560.1  10/20/2017 - Present        [facility] bowel obstruction ([facility] ICD-10-CM: B94.738  ICD-9-CM: 560.9  4/21/2017 - Present        SBO (small bowel obstruction) ([facility] ICD-10-CM: I40.243  ICD-9-CM: 560.9  3/30/2017 - Present        URI (upper respiratory infection) ICD-10-CM: J06.9  ICD-9-CM: 465.9  3/30/2017 - Present              Discharge Condition:  Improved    Hospital Course:  Mr Yazmin Bradley is a 72year old male who presented to the ER with abdominal pain. He has a known history of Pancreatitis and has had biliary stent placed.   He presented to the ER and was admitted for ongoing management. Mr Jayla Shaw had GI consultation and underwent ERCP. There was common bile duct stricture identified and this is improved with sphincterotomy. He tolerated this procedure well. Mr Josh Azevedo decided that his desire was to leave AMA. It was recommended that he stay for Endoscopic ultrasound. He refused this advice and he left the Hospital before it could be discussed with his MD.    Consults: Gastroenterology   Treatment Team: Consulting Provider: Hedy Lee MD    Significant Diagnostic Studies: ERCP    Discharge Medications:     amLODIPine 10 mg tablet   Commonly known as: Ortiz Barges   Your last dose was: Your next dose is:       Take 1 Tab by mouth daily. 10 mg        aspirin delayed-release 81 mg tablet   Your last dose was: Your next dose is:       Take 1 Tab by mouth daily. 81 mg        hydroCHLOROthiazide 25 mg tablet   Commonly known as: HYDRODIURIL   Your last dose was: Your next dose is:       Take 25 mg by mouth daily. 25 mg        traMADol 50 mg tablet   Commonly known as: ULTRAM   Your last dose was: Your next dose is:       Take 1 Tab by mouth every six (6) hours as needed for Pain. Max Daily Amount: 200 mg.  50 mg        VITAMIN D3 1,000 unit tablet   Generic drug: cholecalciferol   Your last dose was: Your next dose is:       Take by mouth daily.                  Activity: Activity as tolerated    Diet: Regular Diet    Wound Care: None needed    Follow-up: Follow up with PCP in 1 week, patient to arrange.

## 2019-02-15 NOTE — DISCHARGE SUMMARY
Discharge Summary    Patient: Xavier Freire               Sex: male          DOA: 2/9/2019         YOB: 1945      Age:  72 y.o.        LOS:  LOS: 3 days                Admit Date: 2/6/2019    Discharge Date: 2/7/2019    Admission Diagnoses: Pancreatitis due to obstruction of pancreatic duct [K85.90, K86.89]    Discharge Diagnoses:    Problem List as of 2/12/2019 Date Reviewed: 2/11/2015          Codes Class Noted - Resolved    * (Principal) Pancreatitis due to obstruction of pancreatic duct ICD-10-CM: K85.90, K86.89  ICD-9-CM: 577.0, 577.8  2/9/2019 - Present        Hypertension ICD-10-CM: I10  ICD-9-CM: 401.9  2/9/2019 - Present        Acute on chronic pancreatitis ([facility] ICD-10-CM: K85.90, K86.1  ICD-9-CM: 843.1, 577.1  2/9/2019 - Present        Pancreatic cyst ICD-10-CM: K86.2  ICD-9-CM: 577.2  2/9/2019 - Present        Hyperbilirubinemia ICD-10-CM: E80.6  ICD-9-CM: 782.4  2/9/2019 - Present        Elevated transaminase level ICD-10-CM: R74.0  ICD-9-CM: 790.4  2/9/2019 - Present        Underweight ICD-10-CM: R63.6  ICD-9-CM: 783.22  2/9/2019 - Present        Gall stone pancreatitis ICD-10-CM: K85.10  ICD-9-CM: 210.7, 574.20  2/6/2019 - Present        Ileus ([facility] ICD-10-CM: K56.7  ICD-9-CM: 560.1  10/20/2017 - Present        Small bowel obstruction ([facility] ICD-10-CM: U03.897  ICD-9-CM: 560.9  4/21/2017 - Present        SBO (small bowel obstruction) ([facility] ICD-10-CM: K20.060  ICD-9-CM: 560.9  3/30/2017 - Present        URI (upper respiratory infection) ICD-10-CM: J06.9  ICD-9-CM: 465.9  3/30/2017 - Present              Discharge Condition:  Improved    Hospital Course:  Mr Mai Cline is a 72year old male who presented to the ER with abdominal pain. He has a known history of Pancreatitis and has had biliary stent placed. He presented to the ER and was admitted for ongoing management. Mr Vane Farr had GI consultation and underwent ERCP.   There was common bile duct stricture identified and this is improved with sphincterotomy. He tolerated this procedure well. Mr Harsh Van decided that his desire was to leave [facility]. It was recommended that he stay for Endoscopic ultrasound. He refused this advice and he left the Hospital before it could be discussed with his MD.    Consults: Gastroenterology   Treatment Team: Consulting Provider: Wilbert Olsen MD    Significant Diagnostic Studies: ERCP    Discharge Medications:     amLODIPine 10 mg tablet   Commonly known as: Leward Brown   Your last dose was: Your next dose is:       Take 1 Tab by mouth daily. 10 mg        aspirin delayed-release 81 mg tablet   Your last dose was: Your next dose is:       Take 1 Tab by mouth daily. 81 mg        hydroCHLOROthiazide 25 mg tablet   Commonly known as: HYDRODIURIL   Your last dose was: Your next dose is:       Take 25 mg by mouth daily. 25 mg        traMADol 50 mg tablet   Commonly known as: ULTRAM   Your last dose was: Your next dose is:       Take 1 Tab by mouth every six (6) hours as needed for Pain. Max Daily Amount: 200 mg.  50 mg        VITAMIN D3 1,000 unit tablet   Generic drug: cholecalciferol   Your last dose was: Your next dose is:       Take by mouth daily.                  Activity: Activity as tolerated    Diet: Regular Diet    Wound Care: None needed    Follow-up: Follow up with PCP in 1 week, patient to arrange.

## 2019-02-17 NOTE — DISCHARGE SUMMARY
Discharge Summary    Patient: Annemarie Garzon               Sex: male          DOA: 2/9/2019         YOB: 1945      Age:  72 y.o.        LOS:  LOS: 3 days                Admit Date: 2/9/2019    Discharge Date: 2/12/2019    Discharge Medications:     Discharge Medication List as of 2/12/2019  9:20 AM      CONTINUE these medications which have NOT CHANGED    Details   traMADol (ULTRAM) 50 mg tablet Take 1 Tab by mouth every six (6) hours as needed for Pain. Max Daily Amount: 200 mg., Print, Disp-8 Tab, R-0      amLODIPine (NORVASC) 10 mg tablet Take 1 Tab by mouth daily. , Print, Disp-30 Tab, R-0      aspirin delayed-release 81 mg tablet Take 1 Tab by mouth daily. , Print, Disp-90 Tab, R-3      hydrochlorothiazide (HYDRODIURIL) 25 mg tablet Take 25 mg by mouth daily. , Historical Med      cholecalciferol (VITAMIN D3) 1,000 unit tablet Take  by mouth daily. , Historical Med         STOP taking these medications       acetaminophen (TYLENOL EXTRA STRENGTH) 500 mg tablet Comments:   Reason for Stopping: Follow-up: PCP    Discharge Condition: Stable    Activity: Activity as tolerated    Diet: Resume previous diet    Labs:  Labs: Results:       Chemistry No results for input(s): GLU, NA, K, CL, CO2, BUN, CREA, CA, AGAP, BUCR, TBIL, GPT, AP, TP, ALB, GLOB, AGRAT in the last 72 hours. CBC w/Diff No results for input(s): WBC, RBC, HGB, HCT, PLT, GRANS, LYMPH, EOS, HGBEXT, HCTEXT, PLTEXT in the last 72 hours. Cardiac Enzymes No results for input(s): CPK, CKND1, VAN in the last 72 hours. No lab exists for component: CKRMB, TROIP   Coagulation No results for input(s): PTP, INR, APTT in the last 72 hours.     No lab exists for component: INREXT    Lipid Panel Lab Results   Component Value Date/Time    Cholesterol, total 144 02/07/2019 03:50 AM    HDL Cholesterol 33 (L) 02/07/2019 03:50 AM    LDL, calculated 98 02/07/2019 03:50 AM    VLDL, calculated 13 02/07/2019 03:50 AM    Triglyceride 65 02/07/2019 03:50 AM    CHOL/HDL Ratio 4.4 02/07/2019 03:50 AM      BNP No results for input(s): BNPP in the last 72 hours. Liver Enzymes No results for input(s): TP, ALB, TBIL, AP, SGOT, GPT in the last 72 hours. No lab exists for component: DBIL   Thyroid Studies Lab Results   Component Value Date/Time    TSH 1.88 04/09/2017 04:57 AM          Imaging:      ERCP  IMPRESSION:   1. Stricture at GE junction, dilated with a 51 Fr Savary over a guidewire. 2. Distal CBD stricture. 3. Successful biliary sphincterotomy. 4. Successful brushing of bile duct stricture. 5. Successful insertion of a 7 Fr double-barbed stent        Consults: Gastroenterology    Treatment Team: Treatment Team: Consulting Provider: Yeny Eagle MD; Utilization Review: Bernabe Terry RN; Care Manager: Manjula Sandoval    Significant Diagnostic Studies: labs: No results found for this or any previous visit (from the past 25 hour(s)).       Discharge diagnoses:    Problem List as of 2/12/2019 Date Reviewed: 2/11/2015          Codes Class Noted - Resolved    * (Principal) Pancreatitis due to obstruction of pancreatic duct ICD-10-CM: K85.90, K86.89  ICD-9-CM: 420.5, 577.8  2/9/2019 - Present        Hypertension ICD-10-CM: I10  ICD-9-CM: 401.9  2/9/2019 - Present        Acute on chronic pancreatitis ([facility] ICD-10-CM: K85.90, K86.1  ICD-9-CM: 577.0, 577.1  2/9/2019 - Present        Pancreatic cyst ICD-10-CM: K86.2  ICD-9-CM: 577.2  2/9/2019 - Present        Hyperbilirubinemia ICD-10-CM: E80.6  ICD-9-CM: 782.4  2/9/2019 - Present        Elevated transaminase level ICD-10-CM: R74.0  ICD-9-CM: 790.4  2/9/2019 - Present        Underweight ICD-10-CM: R63.6  ICD-9-CM: 783.22  2/9/2019 - Present        Gall stone pancreatitis ICD-10-CM: K85.10  ICD-9-CM: 313.3, 574.20  2/6/2019 - Present        Ileus ([facility].) ICD-10-CM: K56.7  ICD-9-CM: 560.1  10/20/2017 - Present        Small bowel obstruction ([facility] ICD-10-CM: R91.721  ICD-9-CM: 560.9 4/21/2017 - Present        SBO (small bowel obstruction) ([facility] ICD-10-CM: C73.850  ICD-9-CM: 560.9  3/30/2017 - Present        URI (upper respiratory infection) ICD-10-CM: J06.9  ICD-9-CM: 465.9  3/30/2017 - Present            Hospital Course:   Major issues addressed during hospitalization outlined  below. Justin Pabon is a 72 y.o. male with a PMHx listed below who presented to the ED with several months (8) of epigastric abdominal pain and associated back pain. He was recently admitted on 02/06 for abdominal pain along with evidence of pancreatitis. He had a MRI/MRCP done that showed mildly dilated main pancreatic duct along with evidence of chronic pancreatitis. There was mild intrahepatic biliary dilatation with appearance of extrinsic compression on distal CBD at the level of the pancreatic head. It also showed a simple appearing cyst in the pancreatic head that measured 1.6cm, suggestive of a pseudocyst or IPMN. Overall, the MRI was consistent with acute on chronic pancreatitis. There was evidence of gallbladder sludge on US but no gallstones. Unfortunately, the patient left AMA on the 7th due to feeling as if no one was taking care of him. He comes back today with similar symptoms. He admits to over 40lb weight loss over these past 8 months. He has an appetite but just can't eat bc of the symptoms of burning and epigastric/back pain. He admits to previous history of drinking, but he does not use anymore. He admits to pack a day of smoking. In the ED, his LFTs were all elevated, including ALT of 481 (up from 169 on the 7th) and AST of 621 (up from 73 on the 7th) and total bilirubin of 2.8 (up from 1.9 on the 7th). Of note, all his LFTs were up on the 6th, similar to today, and improved overnight at that time with fluids alone. His lipase was 2109 today, which is down from 3001 from the 6th. He denies any vomiting and states bowel habits are OK.  He admits to SBO in 2017 where he had exploratory laparotomy with lysis of adhesions and reduction of internal hernia to relieve. GI was consulted for pancreatitis. Hep panel sent , patient underwent ERCP and biliary stricture found. Biliary sphincterotomy , stenting and brushing done. LFT's were monitored and it was ensured patient could tolerate diet. Plan was EUS as an OP. F/u with Dr. Karol Govea GI recommended. Ca19-9 level sent off.       Ifrah Vallejo MD  February 17, 2019        Total time spent 40 minutes

## 2019-02-21 ENCOUNTER — HOSPITAL ENCOUNTER (OUTPATIENT)
Dept: LAB | Age: 74
Discharge: HOME | End: 2019-02-21
Payer: SUBSIDIZED

## 2019-02-21 LAB
ALBUMIN SERPL-MCNC: 4.6 G/DL (ref 3.4–5)
ALBUMIN/GLOB SERPL: 1.4 {RATIO} (ref 0.8–1.7)
ALP SERPL-CCNC: 325 U/L (ref 45–117)
ALT SERPL-CCNC: 62 U/L (ref 16–61)
ANION GAP SERPL CALC-SCNC: 7 MMOL/L (ref 3–18)
AST SERPL-CCNC: 17 U/L (ref 15–37)
BASOPHILS # BLD: 0 K/UL (ref 0–0.1)
BASOPHILS NFR BLD: 0 % (ref 0–2)
BILIRUB SERPL-MCNC: 1.1 MG/DL (ref 0.2–1)
BUN SERPL-MCNC: 16 MG/DL (ref 7–18)
BUN/CREAT SERPL: 23 (ref 12–20)
CALCIUM SERPL-MCNC: 10.4 MG/DL (ref 8.5–10.1)
CHLORIDE SERPL-SCNC: 98 MMOL/L (ref 100–108)
CO2 SERPL-SCNC: 31 MMOL/L (ref 21–32)
CREAT SERPL-MCNC: 0.7 MG/DL (ref 0.6–1.3)
DIFFERENTIAL METHOD BLD: ABNORMAL
EOSINOPHIL # BLD: 0.2 K/UL (ref 0–0.4)
EOSINOPHIL NFR BLD: 2 % (ref 0–5)
ERYTHROCYTE [DISTWIDTH] IN BLOOD BY AUTOMATED COUNT: 16.8 % (ref 11.6–14.5)
GLOBULIN SER CALC-MCNC: 3.4 G/DL (ref 2–4)
GLUCOSE SERPL-MCNC: 98 MG/DL (ref 74–99)
HCT VFR BLD AUTO: 39.2 % (ref 36–48)
HGB BLD-MCNC: 13.3 G/DL (ref 13–16)
LIPASE SERPL-CCNC: 771 U/L (ref 73–393)
LYMPHOCYTES # BLD: 2.1 K/UL (ref 0.9–3.6)
LYMPHOCYTES NFR BLD: 23 % (ref 21–52)
MCH RBC QN AUTO: 30.2 PG (ref 24–34)
MCHC RBC AUTO-ENTMCNC: 33.9 G/DL (ref 31–37)
MCV RBC AUTO: 88.9 FL (ref 74–97)
MONOCYTES # BLD: 0.7 K/UL (ref 0.05–1.2)
MONOCYTES NFR BLD: 8 % (ref 3–10)
NEUTS SEG # BLD: 6.1 K/UL (ref 1.8–8)
NEUTS SEG NFR BLD: 67 % (ref 40–73)
PLATELET # BLD AUTO: 456 K/UL (ref 135–420)
PMV BLD AUTO: 9.6 FL (ref 9.2–11.8)
POTASSIUM SERPL-SCNC: 4.7 MMOL/L (ref 3.5–5.5)
PROT SERPL-MCNC: 8 G/DL (ref 6.4–8.2)
RBC # BLD AUTO: 4.41 M/UL (ref 4.7–5.5)
SODIUM SERPL-SCNC: 136 MMOL/L (ref 136–145)
WBC # BLD AUTO: 9 K/UL (ref 4.6–13.2)

## 2019-02-21 PROCEDURE — 36415 COLL VENOUS BLD VENIPUNCTURE: CPT

## 2019-02-21 PROCEDURE — 85025 COMPLETE CBC W/AUTO DIFF WBC: CPT

## 2019-02-21 PROCEDURE — 86301 IMMUNOASSAY TUMOR CA 19-9: CPT

## 2019-02-21 PROCEDURE — 80053 COMPREHEN METABOLIC PANEL: CPT

## 2019-02-21 PROCEDURE — 83690 ASSAY OF LIPASE: CPT

## 2019-02-22 LAB — CANCER AG19-9 SERPL-ACNC: 52 U/ML (ref 0–35)

## 2019-03-05 ENCOUNTER — HOSPITAL ENCOUNTER (OUTPATIENT)
Dept: LAB | Age: 74
Discharge: HOME | End: 2019-03-05
Payer: SUBSIDIZED

## 2019-03-05 DIAGNOSIS — K86.1 CHRONIC PANCREATITIS (HCC): ICD-10-CM

## 2019-03-05 LAB
ALBUMIN SERPL-MCNC: 4 G/DL (ref 3.4–5)
ALBUMIN/GLOB SERPL: 1.3 {RATIO} (ref 0.8–1.7)
ALP SERPL-CCNC: 159 U/L (ref 45–117)
ALT SERPL-CCNC: 32 U/L (ref 16–61)
AMYLASE SERPL-CCNC: 67 U/L (ref 25–115)
AST SERPL-CCNC: 13 U/L (ref 15–37)
BILIRUB DIRECT SERPL-MCNC: 0.6 MG/DL (ref 0–0.2)
BILIRUB SERPL-MCNC: 0.6 MG/DL (ref 0.2–1)
GLOBULIN SER CALC-MCNC: 3.2 G/DL (ref 2–4)
LIPASE SERPL-CCNC: 138 U/L (ref 73–393)
PROT SERPL-MCNC: 7.2 G/DL (ref 6.4–8.2)

## 2019-03-05 PROCEDURE — 82150 ASSAY OF AMYLASE: CPT

## 2019-03-05 PROCEDURE — 36415 COLL VENOUS BLD VENIPUNCTURE: CPT

## 2019-03-05 PROCEDURE — 83690 ASSAY OF LIPASE: CPT

## 2019-03-05 PROCEDURE — 80076 HEPATIC FUNCTION PANEL: CPT

## 2019-03-06 ENCOUNTER — HOSPITAL ENCOUNTER (OUTPATIENT)
Dept: LAB | Age: 74
Discharge: HOME | End: 2019-03-06
Payer: SUBSIDIZED

## 2019-03-06 PROCEDURE — 83520 IMMUNOASSAY QUANT NOS NONAB: CPT

## 2019-03-07 LAB — ELASTASE PANC STL-MCNT: 178 UG ELAST./G

## 2019-03-20 ENCOUNTER — ANESTHESIA EVENT (OUTPATIENT)
Dept: ENDOSCOPY | Age: 74
End: 2019-03-20
Payer: MEDICARE

## 2019-03-20 RX ORDER — BISMUTH SUBSALICYLATE 262 MG
1 TABLET,CHEWABLE ORAL DAILY
COMMUNITY
End: 2019-06-11

## 2019-03-20 NOTE — PERIOP NOTES
PAT - SURGICAL PRE-ADMISSION INSTRUCTIONS    NAME:  Song Duque                                                          TODAY'S DATE:  3/20/2019    SURGERY DATE:  3/21/2019                                  SURGERY ARRIVAL TIME:   0930    1. Do NOT eat or drink anything, including candy or gum, after MIDNIGHT on 03/20/2019 , unless you have specific instructions from your Surgeon or Anesthesia Provider to do so. 2. No smoking on the day of surgery. 3. No alcohol 24 hours prior to the day of surgery. 4. No recreational drugs for one week prior to the day of surgery. 5. Leave all valuables, including money/purse, at home. 6. Remove all jewelry, nail polish, makeup (including mascara); no lotions, powders, deodorant, or perfume/cologne/after shave. 7. Glasses/Contact lenses and Dentures may be worn to the hospital.  They will be removed prior to surgery. 8. Call your doctor if symptoms of a cold or illness develop within 24 ours prior to surgery. 9. AN ADULT MUST DRIVE YOU HOME AFTER OUTPATIENT SURGERY. 10. If you are having an OUTPATIENT procedure, please make arrangements for a responsible adult to be with you for 24 hours after your surgery. 11. If you are admitted to the hospital, you will be assigned to a bed after surgery is complete. Normally a family member will not be able to see you until you are in your assigned bed. 15. Family is encouraged to accompany you to the hospital.  We ask visitors in the treatment area to be limited to ONE person at a time to ensure patient privacy. EXCEPTIONS WILL BE MADE AS NEEDED. 15. Children under 12 are discouraged from entering the treatment area and need to be supervised by an adult when in the waiting room. Special Instructions: Take these medications the morning of surgery with a sip of water:  Amlodipine          These surgical instructions were reviewed with Yee Oswald the PAT phone call. Directions:   On the morning of surgery, please go to the [address] [place] [address]. Enter the building from the [facility] entrance, 1st floor (next to the Emergency Room entrance). Take the elevator to the 2nd floor. Sign in at the Registration Desk.     If you have any questions and/or concerns, please do not hesitate to call:  (Prior to the day of surgery)  [facility] unit:  [phone]  (Day of surgery)  [facility] unit:  [phone]

## 2019-03-21 ENCOUNTER — ANESTHESIA (OUTPATIENT)
Dept: ENDOSCOPY | Age: 74
End: 2019-03-21
Payer: MEDICARE

## 2019-03-21 ENCOUNTER — HOSPITAL ENCOUNTER (OUTPATIENT)
Age: 74
Setting detail: OUTPATIENT SURGERY
Discharge: HOME | End: 2019-03-21
Attending: INTERNAL MEDICINE | Admitting: INTERNAL MEDICINE
Payer: MEDICARE

## 2019-03-21 VITALS
RESPIRATION RATE: 18 BRPM | HEIGHT: 69 IN | SYSTOLIC BLOOD PRESSURE: 140 MMHG | TEMPERATURE: 97.4 F | HEART RATE: 89 BPM | BODY MASS INDEX: 16.36 KG/M2 | OXYGEN SATURATION: 96 % | DIASTOLIC BLOOD PRESSURE: 78 MMHG | WEIGHT: 110.44 LBS

## 2019-03-21 PROCEDURE — 76040000007: Performed by: INTERNAL MEDICINE

## 2019-03-21 PROCEDURE — 77030019957 HC CUF BLN GASTSCP OCOA -B: Performed by: INTERNAL MEDICINE

## 2019-03-21 PROCEDURE — 76060000032 HC ANESTHESIA 0.5 TO 1 HR: Performed by: INTERNAL MEDICINE

## 2019-03-21 PROCEDURE — 74011250636 HC RX REV CODE- 250/636

## 2019-03-21 PROCEDURE — 74011250636 HC RX REV CODE- 250/636: Performed by: NURSE ANESTHETIST, CERTIFIED REGISTERED

## 2019-03-21 PROCEDURE — 77030019988 HC FCPS ENDOSC DISP BSC -B: Performed by: INTERNAL MEDICINE

## 2019-03-21 PROCEDURE — 77030029384 HC SNR POLYP CAPTVR II BSC -B: Performed by: INTERNAL MEDICINE

## 2019-03-21 RX ORDER — DEXTROMETHORPHAN/PSEUDOEPHED 2.5-7.5/.8
1.2 DROPS ORAL
Status: CANCELLED | OUTPATIENT
Start: 2019-03-21

## 2019-03-21 RX ORDER — SODIUM CHLORIDE, SODIUM LACTATE, POTASSIUM CHLORIDE, CALCIUM CHLORIDE 600; 310; 30; 20 MG/100ML; MG/100ML; MG/100ML; MG/100ML
100 INJECTION, SOLUTION INTRAVENOUS CONTINUOUS
Status: DISCONTINUED | OUTPATIENT
Start: 2019-03-21 | End: 2019-03-21 | Stop reason: HOSPADM

## 2019-03-21 RX ORDER — EPINEPHRINE 0.1 MG/ML
1 INJECTION INTRACARDIAC; INTRAVENOUS
Status: CANCELLED | OUTPATIENT
Start: 2019-03-21 | End: 2019-03-22

## 2019-03-21 RX ORDER — SODIUM CHLORIDE, SODIUM LACTATE, POTASSIUM CHLORIDE, CALCIUM CHLORIDE 600; 310; 30; 20 MG/100ML; MG/100ML; MG/100ML; MG/100ML
25 INJECTION, SOLUTION INTRAVENOUS CONTINUOUS
Status: CANCELLED | OUTPATIENT
Start: 2019-03-21

## 2019-03-21 RX ORDER — FLUMAZENIL 0.1 MG/ML
0.2 INJECTION INTRAVENOUS
Status: CANCELLED | OUTPATIENT
Start: 2019-03-21 | End: 2019-03-21

## 2019-03-21 RX ORDER — ATROPINE SULFATE 0.1 MG/ML
0.5 INJECTION INTRAVENOUS
Status: CANCELLED | OUTPATIENT
Start: 2019-03-21 | End: 2019-03-22

## 2019-03-21 RX ORDER — SODIUM CHLORIDE 0.9 % (FLUSH) 0.9 %
5-40 SYRINGE (ML) INJECTION EVERY 8 HOURS
Status: CANCELLED | OUTPATIENT
Start: 2019-03-21

## 2019-03-21 RX ORDER — ONDANSETRON 2 MG/ML
4 INJECTION INTRAMUSCULAR; INTRAVENOUS ONCE
Status: CANCELLED | OUTPATIENT
Start: 2019-03-21 | End: 2019-03-21

## 2019-03-21 RX ORDER — NALOXONE HYDROCHLORIDE 0.4 MG/ML
0.4 INJECTION, SOLUTION INTRAMUSCULAR; INTRAVENOUS; SUBCUTANEOUS
Status: CANCELLED | OUTPATIENT
Start: 2019-03-21 | End: 2019-03-21

## 2019-03-21 RX ORDER — LIDOCAINE HYDROCHLORIDE 20 MG/ML
INJECTION, SOLUTION EPIDURAL; INFILTRATION; INTRACAUDAL; PERINEURAL AS NEEDED
Status: DISCONTINUED | OUTPATIENT
Start: 2019-03-21 | End: 2019-03-21 | Stop reason: HOSPADM

## 2019-03-21 RX ORDER — SODIUM CHLORIDE 0.9 % (FLUSH) 0.9 %
5-40 SYRINGE (ML) INJECTION AS NEEDED
Status: CANCELLED | OUTPATIENT
Start: 2019-03-21

## 2019-03-21 RX ORDER — PROPOFOL 10 MG/ML
INJECTION, EMULSION INTRAVENOUS AS NEEDED
Status: DISCONTINUED | OUTPATIENT
Start: 2019-03-21 | End: 2019-03-21 | Stop reason: HOSPADM

## 2019-03-21 RX ADMIN — PROPOFOL 30 MG: 10 INJECTION, EMULSION INTRAVENOUS at 11:56

## 2019-03-21 RX ADMIN — PROPOFOL 20 MG: 10 INJECTION, EMULSION INTRAVENOUS at 11:49

## 2019-03-21 RX ADMIN — PROPOFOL 50 MG: 10 INJECTION, EMULSION INTRAVENOUS at 11:41

## 2019-03-21 RX ADMIN — PROPOFOL 20 MG: 10 INJECTION, EMULSION INTRAVENOUS at 11:54

## 2019-03-21 RX ADMIN — PROPOFOL 50 MG: 10 INJECTION, EMULSION INTRAVENOUS at 11:40

## 2019-03-21 RX ADMIN — PROPOFOL 30 MG: 10 INJECTION, EMULSION INTRAVENOUS at 12:04

## 2019-03-21 RX ADMIN — PROPOFOL 30 MG: 10 INJECTION, EMULSION INTRAVENOUS at 11:48

## 2019-03-21 RX ADMIN — SODIUM CHLORIDE, SODIUM LACTATE, POTASSIUM CHLORIDE, AND CALCIUM CHLORIDE 100 ML/HR: 600; 310; 30; 20 INJECTION, SOLUTION INTRAVENOUS at 10:42

## 2019-03-21 RX ADMIN — PROPOFOL 30 MG: 10 INJECTION, EMULSION INTRAVENOUS at 12:00

## 2019-03-21 RX ADMIN — PROPOFOL 50 MG: 10 INJECTION, EMULSION INTRAVENOUS at 11:46

## 2019-03-21 RX ADMIN — LIDOCAINE HYDROCHLORIDE 40 MG: 20 INJECTION, SOLUTION EPIDURAL; INFILTRATION; INTRACAUDAL; PERINEURAL at 11:40

## 2019-03-21 NOTE — PERIOP NOTES
Phase 2 Recovery Summary  Patient arrived to Phase 2 at 1224  Report received from Carlin Verdugo Andrewsad:    03/20/19 1332 03/21/19 1020 03/21/19 1222 03/21/19 1224   BP:  (!) 173/99  140/78   Pulse:  84  89   Resp:  18  18   Temp:  97.4 °F (36.3 °C)     SpO2:  100% 98% 96%   Weight: 47.6 kg (105 lb) 50.1 kg (110 lb 7 oz)     Height: 5' 8.5\" (1.74 m) 5' 8.5\" (1.74 m)         oriented to time, place, person and situation    Lines and Drains  Peripheral Intravenous Line:   Peripheral IV 03/21/19 Right; Inner Arm (Active)   Site Assessment Clean, dry, & intact 3/21/2019 12:25 PM   Phlebitis Assessment 0 3/21/2019 12:25 PM   Infiltration Assessment 0 3/21/2019 12:25 PM   Dressing Status Clean, dry, & intact 3/21/2019 12:25 PM   Dressing Type Tape;Transparent 3/21/2019 12:25 PM   Hub Color/Line Status Pink; Infusing 3/21/2019 12:25 PM       Wound  Wound Abdomen Mid (Active)   Number of days: 515      Patient arrived to phase 2 from [facility]. Alert and oriented x4. No complaints of pain, nausea or dizziness. Vital signs taken. Patient ambulated from bed to chair.  brought back to recovery room. Dr. Aga Dumont came and spoke with patient. IV removed and patient allowed to get dressed. Reviewed discharge instructions. Wife signed for discharge. Patient transported to vehicle via wheelchair.      Patient discharged to home with wife  at [address] [place] [address] Drive

## 2019-03-21 NOTE — PERIOP NOTES
Pre-Op Summary    Pt arrived walked here from home, and is oriented to time, place, person and situation. Patient with steady gait with none assistive devices. Visit Vitals  BP (!) 173/99   Pulse 84   Temp 97.4 °F (36.3 °C)   Resp 18   Ht 5' 8.5\" (1.74 m)   Wt 50.1 kg (110 lb 7 oz)   SpO2 100%   BMI 16.55 kg/m²       Peripheral IV located on Right forearm. Patients belongings are located locked up on [facility]. Patient's point of contact is friend Alicia and their contact number is: [phone]. Alicia was called in pre op and ride verified. They will need to be called when pt is in recovery. They are able to receive medical information. They will be their ride home.

## 2019-03-21 NOTE — DISCHARGE INSTRUCTIONS
Patient Discharge Instructions    Tray Mims / [id number] : 1945    Admitted 3/21/2019 Discharged: 3/21/2019         Procedure Impression:  1. Chronic pancreatitis without pancreatic mass  2. Removal of bile duct stent    Recommendation:  1. Resume regular diet. 2. Stop smoking  3. No alcohol  4. Go to our office in 2 weeks for labwork. Recommended Diet: Regular Diet    Recommended Activity:    1. Do not drink alcohol, drive or operate machinery for 12 hours   2. Call if any fever, abdominal pain or bleeding noted. Signed By: Goran Yan MD     2019           DISCHARGE SUMMARY from Nurse    PATIENT INSTRUCTIONS:    After general anesthesia or intravenous sedation, for 24 hours or while taking prescription Narcotics:  · Limit your activities  · Do not drive and operate hazardous machinery  · Do not make important personal or business decisions  · Do  not drink alcoholic beverages  · If you have not urinated within 8 hours after discharge, please contact your surgeon on call. Report the following to your surgeon:  · Excessive pain, swelling, redness or odor of or around the surgical area  · Temperature over 100.5  · Nausea and vomiting lasting longer than 4 hours or if unable to take medications  · Any signs of decreased circulation or nerve impairment to extremity: change in color, persistent  numbness, tingling, coldness or increase pain  · Any questions    What to do at Home:  Recommended activity: Activity as tolerated and no driving for today    These are general instructions for a healthy lifestyle:    No smoking/ No tobacco products/ Avoid exposure to second hand smoke  Surgeon General's Warning:  Quitting smoking now greatly reduces serious risk to your health.     Obesity, smoking, and sedentary lifestyle greatly increases your risk for illness    A healthy diet, regular physical exercise & weight monitoring are important for maintaining a healthy lifestyle    You may be retaining fluid if you have a history of heart failure or if you experience any of the following symptoms:  Weight gain of 3 pounds or more overnight or 5 pounds in a week, increased swelling in our hands or feet or shortness of breath while lying flat in bed. Please call your doctor as soon as you notice any of these symptoms; do not wait until your next office visit. Recognize signs and symptoms of STROKE:    F-face looks uneven    A-arms unable to move or move unevenly    S-speech slurred or non-existent    T-time-call 911 as soon as signs and symptoms begin-DO NOT go       Back to bed or wait to see if you get better-TIME IS BRAIN. Warning Signs of HEART ATTACK     Call 911 if you have these symptoms:   Chest discomfort. Most heart attacks involve discomfort in the center of the chest that lasts more than a few minutes, or that goes away and comes back. It can feel like uncomfortable pressure, squeezing, fullness, or pain.  Discomfort in other areas of the upper body. Symptoms can include pain or discomfort in one or both arms, the back, neck, jaw, or stomach.  Shortness of breath with or without chest discomfort.  Other signs may include breaking out in a cold sweat, nausea, or lightheadedness. Don't wait more than five minutes to call 911 - MINUTES MATTER! Fast action can save your life. Calling 911 is almost always the fastest way to get lifesaving treatment. Emergency Medical Services staff can begin treatment when they arrive -- up to an hour sooner than if someone gets to the hospital by car. The discharge information has been reviewed with the patient. The patient verbalized understanding. Discharge medications reviewed with the patient and appropriate educational materials and side effects teaching were provided. Patient armband removed and given to patient to take home. Patient was informed of the privacy risks if armband lost or stolen.

## 2019-03-21 NOTE — ANESTHESIA POSTPROCEDURE EVALUATION
Procedure(s): ENDOSCOPIC ULTRASOUND. MAC Anesthesia Post Evaluation Multimodal analgesia: multimodal analgesia not used between 6 hours prior to anesthesia start to PACU discharge Patient location during evaluation: PACU Patient participation: complete - patient participated Level of consciousness: awake and alert Pain management: satisfactory to patient Airway patency: patent Anesthetic complications: no 
Cardiovascular status: acceptable Respiratory status: acceptable Hydration status: acceptable Post anesthesia nausea and vomiting:  none Vitals Value Taken Time /78 3/21/2019 12:24 PM  
Temp  3/21/2019 12:25 PM  
Pulse 89 3/21/2019 12:24 PM  
Resp 18 3/21/2019 12:24 PM  
SpO2 96 % 3/21/2019 12:24 PM

## 2019-03-21 NOTE — PROCEDURES
Endoscopic Ultrasound Procedure Note    Patient: Angelci Mcgowan MRN: [id number]  SSN: [id number]    YOB: 1945  Age: 72 y.o. Sex: male      Date/Time:  3/21/2019 12:22 PM    Endoscopic Ultrasound  Procedure Note    Procedure: Endoscopic ultrasound    IMPRESSION:   1. Chronic calcific pancreatitis with large calcifications in the head and neck  2. Biliary stricture as the bile duct runs adjacent to an area of calcification; likely causing extrinsic biliary compression  3. Small benign appearing pancreatic head cyst  4. Dilated pancreatic duct. No evidence of pancreatic mass although EUS sensitivity is limited in the setting of chronic pancreatitis  5. Removal of biliary stent    RECOMMENDATIONS:  1. Stop smoking and complete abstinence from alcohol  2. Repeat labwork in 2 weeks  3. Will monitor closely    Indication: Biliary stricture, chronic pancreatitis  :  Juan Jose Lee MD  Referring Provider:   Ar Guardado MD  History: The history and physical exam were reviewed and updated. Endoscope: Olympus [id number]-160 Radial Echoendoscope  Extent of Exam: third portion of the duodenum  ASA: Per Anesthesia  Assistants:  Endoscopy Technician-1: [place], [address] [place] [address]  Pre Op Nurse: Ty Ivory RN  Endoscopy RN-1: Heladio Olsen RN  Anethesia/Sedation:  MAC anesthesia    Description of the procedure: The procedure was discussed with the patient including risks, benefits, alternatives including risks of iv sedation, bleeding, perforation and aspiration. A safety timeout was performed. The patient was placed in the left lateral decubitus position. A bite block was placed. The patient was given incremental doses of intravenous sedation until moderate sedation was achieved. The patients vital signs were monitored at all times including heart rate/rhythm, blood pressure and oxygen saturation.   The endoscope was then passed under direct visualization to the third portion of the duodenum. The pancreas head and surrounding areas were imaged with ultrasound. The endoscope was then withdrawn into the stomach. The pancreas body and tail and surrounding areas were again visualized with ultrasound. The endoscope was withdrawn into the esophagus. The mediastinum and esophagus were imaged with ultrasound. The endoscope was then withdrawn and patient transferred to recovery in stable condition. Findings:    Esophagus: The esophageal mucosa was normal with no ulceration, mass or stricture. There was no evidence of Soriano's esophagus or reflux esophagitis. Stomach: The gastric mucosa was normal with no ulceration, mass, stricture. Duodenum: The duodenum mucosa was normal with no ulceration, mass, stricture and no evidence of villous atrophy. After completion of the EUS exam, the biliary stent was removed completely with a snare. EUS:  CBD: Nondilated, measuring up to 4.8mm proximal to the stent and narrowing through the head of the pancreas with a small portion of open bile duct just above the area of the papilla. A stent was seen in the bile duct. The area of bile duct stricture/compression was in the head of the pancreas with surrounding shadowing hyperechoic foci seen in the head of the pancreas abutting the bile duct. PD: Dilated and ectatic measuring up to 5.5mm in the head/neck, 3.4mm in the body, and 2.1mm in the tail. The pancreatic duct narrows abruptly in the head/neck at an area of shadowing hyperechoic foci. These appeared to be parenchymal rather than intraductal but difficult to definitively determine on this exam.   The pancreatic parenchyma was heterogenous with multiple large hyperechoic foci with posterior shadowing, primarily in the head and neck but also seen in the tail. A few dilated side branches were seen and a single simple-appearing 7.5mm pancreatic head cyst was noted. There was hyperechoic stranding and atrophy noted in the body/tail.  No mass was seen. The visualized portions of the left hepatic lobe, left adrenal, left kidney, celiac axis, and gallbladder were unremarkable. Specimens: * No specimens in log *           Complications:   None; patient tolerated the procedure well. EBL:Minimal    Discharge disposition:  Home in the company of  when sedation criteria are met.      Mini Darnell MD  March 21, 2019  12:22 PM

## 2019-03-21 NOTE — ANESTHESIA PREPROCEDURE EVALUATION
Relevant Problems No relevant active problems Anesthetic History No history of anesthetic complications Review of Systems / Medical History Patient summary reviewed and pertinent labs reviewed Pulmonary COPD: mild Smoker Neuro/Psych Within defined limits Cardiovascular Hypertension: well controlled GI/Hepatic/Renal 
Within defined limits Endo/Other Within defined limits Other Findings Physical Exam 
 
Airway Mallampati: II 
TM Distance: 4 - 6 cm Neck ROM: normal range of motion Mouth opening: Normal 
 
 Cardiovascular Dental 
 
Dentition: Edentulous Pulmonary Abdominal 
GI exam deferred Other Findings Anesthetic Plan ASA: 3 Anesthesia type: MAC Anesthetic plan and risks discussed with: Patient

## 2019-04-04 ENCOUNTER — HOSPITAL ENCOUNTER (OUTPATIENT)
Dept: LAB | Age: 74
Discharge: HOME | End: 2019-04-04
Payer: SUBSIDIZED

## 2019-04-04 LAB
ALBUMIN SERPL-MCNC: 4.4 G/DL (ref 3.4–5)
ALBUMIN/GLOB SERPL: 1.2 {RATIO} (ref 0.8–1.7)
ALP SERPL-CCNC: 91 U/L (ref 45–117)
ALT SERPL-CCNC: 16 U/L (ref 16–61)
ANION GAP SERPL CALC-SCNC: 9 MMOL/L (ref 3–18)
AST SERPL-CCNC: 11 U/L (ref 15–37)
BASOPHILS # BLD: 0 K/UL (ref 0–0.1)
BASOPHILS NFR BLD: 0 % (ref 0–2)
BILIRUB DIRECT SERPL-MCNC: 0.3 MG/DL (ref 0–0.2)
BILIRUB SERPL-MCNC: 0.6 MG/DL (ref 0.2–1)
BUN SERPL-MCNC: 7 MG/DL (ref 7–18)
BUN/CREAT SERPL: 9 (ref 12–20)
CALCIUM SERPL-MCNC: 9 MG/DL (ref 8.5–10.1)
CHLORIDE SERPL-SCNC: 101 MMOL/L (ref 100–108)
CO2 SERPL-SCNC: 27 MMOL/L (ref 21–32)
CREAT SERPL-MCNC: 0.78 MG/DL (ref 0.6–1.3)
DIFFERENTIAL METHOD BLD: ABNORMAL
EOSINOPHIL # BLD: 0.1 K/UL (ref 0–0.4)
EOSINOPHIL NFR BLD: 1 % (ref 0–5)
ERYTHROCYTE [DISTWIDTH] IN BLOOD BY AUTOMATED COUNT: 16.2 % (ref 11.6–14.5)
GLOBULIN SER CALC-MCNC: 3.7 G/DL (ref 2–4)
GLUCOSE SERPL-MCNC: 99 MG/DL (ref 74–99)
HCT VFR BLD AUTO: 42.7 % (ref 36–48)
HGB BLD-MCNC: 14.3 G/DL (ref 13–16)
LIPASE SERPL-CCNC: 509 U/L (ref 73–393)
LYMPHOCYTES # BLD: 1.9 K/UL (ref 0.9–3.6)
LYMPHOCYTES NFR BLD: 25 % (ref 21–52)
MCH RBC QN AUTO: 30 PG (ref 24–34)
MCHC RBC AUTO-ENTMCNC: 33.5 G/DL (ref 31–37)
MCV RBC AUTO: 89.5 FL (ref 74–97)
MONOCYTES # BLD: 0.5 K/UL (ref 0.05–1.2)
MONOCYTES NFR BLD: 7 % (ref 3–10)
NEUTS SEG # BLD: 4.9 K/UL (ref 1.8–8)
NEUTS SEG NFR BLD: 67 % (ref 40–73)
PLATELET # BLD AUTO: 390 K/UL (ref 135–420)
PMV BLD AUTO: 9.6 FL (ref 9.2–11.8)
POTASSIUM SERPL-SCNC: 3.9 MMOL/L (ref 3.5–5.5)
PROT SERPL-MCNC: 8.1 G/DL (ref 6.4–8.2)
RBC # BLD AUTO: 4.77 M/UL (ref 4.7–5.5)
SODIUM SERPL-SCNC: 137 MMOL/L (ref 136–145)
WBC # BLD AUTO: 7.5 K/UL (ref 4.6–13.2)

## 2019-04-04 PROCEDURE — 80053 COMPREHEN METABOLIC PANEL: CPT

## 2019-04-04 PROCEDURE — 82248 BILIRUBIN DIRECT: CPT

## 2019-04-04 PROCEDURE — 85025 COMPLETE CBC W/AUTO DIFF WBC: CPT

## 2019-04-04 PROCEDURE — 83690 ASSAY OF LIPASE: CPT

## 2019-04-04 PROCEDURE — 86301 IMMUNOASSAY TUMOR CA 19-9: CPT

## 2019-04-04 PROCEDURE — 36415 COLL VENOUS BLD VENIPUNCTURE: CPT

## 2019-04-05 LAB — CANCER AG19-9 SERPL-ACNC: 25 U/ML (ref 0–35)

## 2019-06-11 ENCOUNTER — APPOINTMENT (OUTPATIENT)
Dept: GENERAL RADIOLOGY | Age: 74
End: 2019-06-11
Attending: EMERGENCY MEDICINE
Payer: MEDICARE

## 2019-06-11 ENCOUNTER — HOSPITAL ENCOUNTER (EMERGENCY)
Age: 74
Discharge: HOME | End: 2019-06-11
Attending: EMERGENCY MEDICINE
Payer: MEDICARE

## 2019-06-11 VITALS
RESPIRATION RATE: 16 BRPM | OXYGEN SATURATION: 97 % | DIASTOLIC BLOOD PRESSURE: 89 MMHG | HEIGHT: 68 IN | WEIGHT: 108 LBS | TEMPERATURE: 97.8 F | SYSTOLIC BLOOD PRESSURE: 124 MMHG | HEART RATE: 91 BPM | BODY MASS INDEX: 16.37 KG/M2

## 2019-06-11 DIAGNOSIS — M25.562 ACUTE PAIN OF LEFT KNEE: Primary | ICD-10-CM

## 2019-06-11 DIAGNOSIS — S80.02XA CONTUSION OF LEFT KNEE, INITIAL ENCOUNTER: ICD-10-CM

## 2019-06-11 PROCEDURE — 74011250637 HC RX REV CODE- 250/637: Performed by: EMERGENCY MEDICINE

## 2019-06-11 PROCEDURE — L1830 KO IMMOB CANVAS LONG PRE OTS: HCPCS

## 2019-06-11 PROCEDURE — 73562 X-RAY EXAM OF KNEE 3: CPT

## 2019-06-11 PROCEDURE — 74011000250 HC RX REV CODE- 250: Performed by: EMERGENCY MEDICINE

## 2019-06-11 PROCEDURE — 99284 EMERGENCY DEPT VISIT MOD MDM: CPT

## 2019-06-11 RX ORDER — ACETAMINOPHEN 325 MG/1
650 TABLET ORAL
Qty: 20 TAB | Refills: 0 | Status: SHIPPED | OUTPATIENT
Start: 2019-06-11 | End: 2020-05-14

## 2019-06-11 RX ORDER — IBUPROFEN 600 MG/1
600 TABLET ORAL
Qty: 20 TAB | Refills: 0 | OUTPATIENT
Start: 2019-06-11 | End: 2020-05-09

## 2019-06-11 RX ORDER — LIDOCAINE 50 MG/G
PATCH TOPICAL
Qty: 5 EACH | Refills: 0 | OUTPATIENT
Start: 2019-06-11 | End: 2020-05-14

## 2019-06-11 RX ORDER — ASPIRIN 325 MG
325 TABLET ORAL DAILY
COMMUNITY

## 2019-06-11 RX ORDER — TRAMADOL HYDROCHLORIDE 50 MG/1
50 TABLET ORAL
Qty: 8 TAB | Refills: 0 | Status: SHIPPED | OUTPATIENT
Start: 2019-06-11 | End: 2019-06-14

## 2019-06-11 RX ORDER — TRAMADOL HYDROCHLORIDE 50 MG/1
50 TABLET ORAL
Status: DISCONTINUED | OUTPATIENT
Start: 2019-06-11 | End: 2019-06-11 | Stop reason: HOSPADM

## 2019-06-11 RX ORDER — LIDOCAINE 4 G/100G
1 PATCH TOPICAL EVERY 24 HOURS
Status: DISCONTINUED | OUTPATIENT
Start: 2019-06-11 | End: 2019-06-11 | Stop reason: HOSPADM

## 2019-06-11 RX ADMIN — TRAMADOL HYDROCHLORIDE 50 MG: 50 TABLET, FILM COATED ORAL at 12:19

## 2019-06-11 NOTE — ED NOTES
Pt discharged home with friend. Educated on new prescriptions and follow up care. Pt stating understanding.

## 2019-06-11 NOTE — DISCHARGE INSTRUCTIONS
Patient Education        Bruises: Care Instructions  Your Care Instructions    Bruises occur when small blood vessels under the skin tear or rupture, most often from a twist, bump, or fall. Blood leaks into tissues under the skin and causes a black-and-blue spot that often turns colors, including purplish black, reddish blue, or yellowish green, as the bruise heals. Bruises hurt, but most are not serious and will go away on their own within 2 to 4 weeks. Sometimes, gravity causes them to spread down the body. A leg bruise usually will take longer to heal than a bruise on the face or arms. Follow-up care is a key part of your treatment and safety. Be sure to make and go to all appointments, and call your doctor if you are having problems. It's also a good idea to know your test results and keep a list of the medicines you take. How can you care for yourself at home? · Take pain medicines exactly as directed. ? If the doctor gave you a prescription medicine for pain, take it as prescribed. ? If you are not taking a prescription pain medicine, ask your doctor if you can take an over-the-counter medicine. · Put ice or a cold pack on the area for 10 to 20 minutes at a time. Put a thin cloth between the ice and your skin. · If you can, prop up the bruised area on pillows as much as possible for the next few days. Try to keep the bruise above the level of your heart. When should you call for help? Call your doctor now or seek immediate medical care if:    · You have signs of infection, such as:  ? Increased pain, swelling, warmth, or redness. ? Red streaks leading from the bruise. ? Pus draining from the bruise. ? A fever.     · You have a bruise on your leg and signs of a blood clot, such as:  ? Increasing redness and swelling along with warmth, tenderness, and pain in the bruised area. ? Pain in your calf, back of the knee, thigh, or groin. ?  Redness and swelling in your leg or groin.     · Your pain gets worse.    Watch closely for changes in your health, and be sure to contact your doctor if:    · You do not get better as expected. Where can you learn more? Go to http://vale-peng.info/. Enter [phone]) 950-780 in the search box to learn more about \"Bruises: Care Instructions. \"  Current as of: September 23, 2018  Content Version: 11.9  © 8021-2032 [employer] Care instructions adapted under license by [employer] (which disclaims liability or warranty for this information). If you have questions about a medical condition or this instruction, always ask your healthcare professional. [place] [address] any warranty or liability for your use of this information. Patient Education        Contusion: Care Instructions  Your Care Instructions    Contusion is the medical term for a bruise. It is the result of a direct blow or an impact, such as a fall. Contusions are common sports injuries. Most people think of a bruise as a black-and-blue spot. This happens when small blood vessels get torn and leak blood under the skin. But bones, muscles, and organs can also get bruised. This may damage deep tissues but not cause a bruise you can see. The doctor will do a physical exam to find the location of your contusion. You may also have tests to make sure you do not have a more serious injury, such as a broken bone or nerve damage. These may include X-rays or other imaging tests like a CT scan or MRI. Deep-tissue contusions may cause pain and swelling. But if there is no serious damage, they will often get better in a few weeks with home treatment. The doctor has checked you carefully, but problems can develop later. If you notice any problems or new symptoms, get medical treatment right away. Follow-up care is a key part of your treatment and safety. Be sure to make and go to all appointments, and call your doctor if you are having problems.  It's also a good idea to know your test results and keep a list of the medicines you take. How can you care for yourself at home? · Put ice or a cold pack on the sore area for 10 to 20 minutes at a time to stop swelling. Put a thin cloth between the ice pack and your skin. · Be safe with medicines. Read and follow all instructions on the label. ? If the doctor gave you a prescription medicine for pain, take it as prescribed. ? If you are not taking a prescription pain medicine, ask your doctor if you can take an over-the-counter medicine. · If you can, prop up the sore area on pillows as much as possible for the next few days. Try to keep the sore area above the level of your heart. When should you call for help? Call your doctor now or seek immediate medical care if:    · Your pain gets worse.     · You have new or worse swelling.     · You have tingling, weakness, or numbness in the area near the contusion.     · The area near the contusion is cold or pale.    Watch closely for changes in your health, and be sure to contact your doctor if:    · You do not get better as expected. Where can you learn more? Go to http://vale-peng.info/. Enter C619 in the search box to learn more about \"Contusion: Care Instructions. \"  Current as of: September 23, 2018  Content Version: 11.9  © 6569-2996 [employer]. Care instructions adapted under license by [employer] (which disclaims liability or warranty for this information). If you have questions about a medical condition or this instruction, always ask your healthcare professional. [place] [address] any warranty or liability for your use of this information.

## 2019-06-11 NOTE — ED PROVIDER NOTES
EMERGENCY DEPARTMENT HISTORY AND PHYSICAL EXAM      Date: 6/11/2019  Patient Name: Arlet Sorto    History of Presenting Illness     Chief Complaint   Patient presents with   [facility] Fall    Knee Pain       History Provided By: Patient      HPI/Chief Complaint: (Context):who presents with fall tripped stairs yesterday 12 hours ago constant left knee pain which he hit against a wall. No LOC no head injury no blood thinners no neck pain no focal neurological deficit no fever chills no chest pain shortness of breath abdominal pain. No weakness numbness in leg. Pain is sharp worse with movement and palpation no radiation of symptoms. Exacerbated by movement.     -----  Patient's triage note is reviewed patient's vitals are stable in the emergency department patient's home medications were reviewed with Norvasc and multivitamin and aspirin  Allergies are none  Past medical history of hypertension COPD  Surgical history including ERCP appendectomy hernia repair  Social history is currently half pack per day smoking, no alcohol use. PCP: Giorgio Guardado III, MD    Current Facility-Administered Medications   Medication Dose Route Frequency Provider Last Rate Last Dose    lidocaine 4 % patch 1 Patch  1 Patch TransDERmal Q24H Alyse Hanley MD   1 Patch at 06/11/19 1114     Current Outpatient Medications   Medication Sig Dispense Refill    aspirin (ASPIRIN) 325 mg tablet Take 325 mg by mouth daily.  lidocaine (LIDODERM) 5 % Apply patch to the affected area for 12 hours a day and remove for 12 hours a day. 5 Each 0    ibuprofen (MOTRIN) 600 mg tablet Take 1 Tab by mouth every six (6) hours as needed for Pain. 20 Tab 0    acetaminophen (TYLENOL) 325 mg tablet Take 2 Tabs by mouth every four (4) hours as needed for Pain. 20 Tab 0    amLODIPine (NORVASC) 10 mg tablet Take 1 Tab by mouth daily. 30 Tab 0    cholecalciferol (VITAMIN D3) 1,000 unit tablet Take  by mouth daily.          Past History     Past Medical History:  Past Medical History:   Diagnosis Date    COPD [facility]chronic obstructive pulmonary disease) ([facility] [address]     Hypertension     Smoker     25 pack years       Past Surgical History:  Past Surgical History:   Procedure Laterality Date    ABDOMEN SURGERY PROC UNLISTED      APPENDECTOMY      HX APPENDECTOMY      HX COLONOSCOPY  1/28/2015    Repeat colonoscopy in 5 yrs per Dr. Magdalena Kruger    HX ERCP  02/11/2019    bile duct stent    HX HERNIA REPAIR  10/22/2017    Ex lap. lysis of adhesions adn reductions of internal hernia done 10/22/2017       Family History:  Family History   Problem Relation Age of Onset    Heart Disease Mother     Cancer Father     Cancer Sister     Diabetes Sister        Social History:  Social History     Tobacco Use    Smoking status: Current Every Day Smoker     Packs/day: 0.50     Years: 50.00     Pack years: 25.00     Types: Cigarettes    Smokeless tobacco: Never Used   Substance Use Topics    Alcohol use: Not Currently     Comment: no alcohol in a year     Drug use: No       Allergies:  No Known Allergies      Review of Systems   Review of Systems   Constitutional: Negative for activity change, fatigue and fever. HENT: Negative for congestion and rhinorrhea. Eyes: Negative for visual disturbance. Respiratory: Negative for shortness of breath. Cardiovascular: Negative for chest pain and palpitations. Gastrointestinal: Negative for abdominal pain, diarrhea, nausea and vomiting. Genitourinary: Negative for dysuria and hematuria. Musculoskeletal: Positive for arthralgias and myalgias. Negative for back pain, gait problem, joint swelling, neck pain and neck stiffness. Skin: Negative for rash. Neurological: Negative for dizziness, weakness and light-headedness. Psychiatric/Behavioral: Negative for agitation. All other systems reviewed and are negative. Physical Exam     Physical Exam   Constitutional: He appears well-developed and well-nourished. HENT:   Head: Normocephalic and atraumatic. Eyes: Pupils are equal, round, and reactive to light. Conjunctivae are normal.   Neck: Normal range of motion. Neck supple. Cardiovascular: Normal rate and regular rhythm. Pulmonary/Chest: Effort normal and breath sounds normal.   Abdominal: Soft. Bowel sounds are normal.   Musculoskeletal: Normal range of motion. Left knee lateral tenderness no medial tenderness no effusion no laxity of the joint dorsalis pedis pulse intact. Lymphadenopathy:     He has no cervical adenopathy. Neurological: He is alert. Skin: Skin is warm. Psychiatric: He has a normal mood and affect. Medical Decision Making   I am the first provider for this patient. I reviewed the vital signs, available nursing notes, past medical history, past surgical history, family history and social history. Provider Notes (Medical Decision Making): Acute pain trauma no LOC no concern for intracranial hemorrhage as patient did not hit his head did not have loss of consciousness and not on blood thinners  X-ray symptomatic relief immobilization follow-up with orthopedic. Vital Signs-Reviewed the patient's vital signs. Pulse Oximetry Analysis -97%, room air, normal        Vitals:    06/11/19 1008   BP: 124/89   Pulse: 91   Resp: 16   Temp: 97.8 °F (36.6 °C)   SpO2: 97%   Weight: 49 kg (108 lb)   Height: 5' 8\" (1.727 m)       Records Reviewed: Nursing Notes    ED Course:      Discharge Note:  11:44 AM  The pt is ready for discharge. The pt's signs, symptoms, diagnosis, and discharge instructions have been discussed and pt has conveyed their understanding. The pt is to follow up as recommended or return to ER should their symptoms worsen. Plan has been discussed and pt is in agreement. . All questions answered  Patient's follow-up requested daughter bedside immobilizer and crutches instructions provided.         Diagnostic Study Results     Labs -   No results found for this or any previous visit (from the past 12 hour(s)). Radiologic Studies -   XR KNEE LT 3 V   Final Result   IMPRESSION:      No fracture or subluxation. Mild degenerative changes. CT Results  (Last 48 hours)    None        CXR Results  (Last 48 hours)    None              Discharge     Clinical Impression:   1. Acute pain of left knee    2. Contusion of left knee, initial encounter        Disposition:  Home    It should be noted that I will be the provider of record for this patient  Macarena Alexander MD, RDMS, FACEP    Follow-up Information     Follow up With Specialties Details Why Contact Info    Yenifer Guardado MD Internal Medicine Call in 1 day Follow Up From Emergency Department [facility] 41  25 [facility] [phone]      [facility] EMERGENCY DEPT Emergency Medicine  If symptoms worsen [address] [place] [address] 54, Aurelia Woodruff 38., MD Orthopedic Surgery   Herbie Yung 43  [address] [place] [id number]  [phone]            Current Discharge Medication List      START taking these medications    Details   lidocaine (LIDODERM) 5 % Apply patch to the affected area for 12 hours a day and remove for 12 hours a day. Qty: 5 Each, Refills: 0      ibuprofen (MOTRIN) 600 mg tablet Take 1 Tab by mouth every six (6) hours as needed for Pain. Qty: 20 Tab, Refills: 0      acetaminophen (TYLENOL) 325 mg tablet Take 2 Tabs by mouth every four (4) hours as needed for Pain.   Qty: 20 Tab, Refills: 0

## 2019-10-13 ENCOUNTER — HOSPITAL ENCOUNTER (EMERGENCY)
Age: 74
Discharge: HOME | End: 2019-10-13
Attending: EMERGENCY MEDICINE
Payer: SUBSIDIZED

## 2019-10-13 ENCOUNTER — APPOINTMENT (OUTPATIENT)
Dept: GENERAL RADIOLOGY | Age: 74
End: 2019-10-13
Attending: PHYSICIAN ASSISTANT
Payer: SUBSIDIZED

## 2019-10-13 VITALS
RESPIRATION RATE: 16 BRPM | TEMPERATURE: 97.6 F | DIASTOLIC BLOOD PRESSURE: 102 MMHG | HEART RATE: 84 BPM | SYSTOLIC BLOOD PRESSURE: 177 MMHG | OXYGEN SATURATION: 96 %

## 2019-10-13 DIAGNOSIS — K21.9 GASTROESOPHAGEAL REFLUX DISEASE, ESOPHAGITIS PRESENCE NOT SPECIFIED: ICD-10-CM

## 2019-10-13 DIAGNOSIS — R07.89 ATYPICAL CHEST PAIN: Primary | ICD-10-CM

## 2019-10-13 LAB
ALBUMIN SERPL-MCNC: 4.3 G/DL (ref 3.4–5)
ALBUMIN/GLOB SERPL: 1.2 {RATIO} (ref 0.8–1.7)
ALP SERPL-CCNC: 68 U/L (ref 45–117)
ALT SERPL-CCNC: 10 U/L (ref 16–61)
ANION GAP SERPL CALC-SCNC: 4 MMOL/L (ref 3–18)
APPEARANCE UR: CLEAR
AST SERPL-CCNC: 9 U/L (ref 10–38)
BACTERIA URNS QL MICRO: NEGATIVE /HPF
BASOPHILS # BLD: 0 K/UL (ref 0–0.1)
BASOPHILS NFR BLD: 0 % (ref 0–2)
BILIRUB SERPL-MCNC: 0.4 MG/DL (ref 0.2–1)
BILIRUB UR QL: NEGATIVE
BUN SERPL-MCNC: 7 MG/DL (ref 7–18)
BUN/CREAT SERPL: 9 (ref 12–20)
CALCIUM SERPL-MCNC: 9.7 MG/DL (ref 8.5–10.1)
CHLORIDE SERPL-SCNC: 104 MMOL/L (ref 100–111)
CK MB CFR SERPL CALC: 1.1 % (ref 0–4)
CK MB SERPL-MCNC: 1.5 NG/ML (ref 5–25)
CK SERPL-CCNC: 142 U/L (ref 39–308)
CO2 SERPL-SCNC: 32 MMOL/L (ref 21–32)
COLOR UR: YELLOW
CREAT SERPL-MCNC: 0.76 MG/DL (ref 0.6–1.3)
DIFFERENTIAL METHOD BLD: ABNORMAL
EOSINOPHIL # BLD: 0.1 K/UL (ref 0–0.4)
EOSINOPHIL NFR BLD: 1 % (ref 0–5)
EPITH CASTS URNS QL MICRO: NORMAL /LPF (ref 0–5)
ERYTHROCYTE [DISTWIDTH] IN BLOOD BY AUTOMATED COUNT: 17.2 % (ref 11.6–14.5)
GLOBULIN SER CALC-MCNC: 3.6 G/DL (ref 2–4)
GLUCOSE SERPL-MCNC: 113 MG/DL (ref 74–99)
GLUCOSE UR STRIP.AUTO-MCNC: NEGATIVE MG/DL
HCT VFR BLD AUTO: 40.4 % (ref 36–48)
HGB BLD-MCNC: 14 G/DL (ref 13–16)
HGB UR QL STRIP: ABNORMAL
KETONES UR QL STRIP.AUTO: NEGATIVE MG/DL
LEUKOCYTE ESTERASE UR QL STRIP.AUTO: NEGATIVE
LIPASE SERPL-CCNC: 188 U/L (ref 73–393)
LYMPHOCYTES # BLD: 1.9 K/UL (ref 0.9–3.6)
LYMPHOCYTES NFR BLD: 27 % (ref 21–52)
MAGNESIUM SERPL-MCNC: 2.1 MG/DL (ref 1.6–2.6)
MCH RBC QN AUTO: 31.2 PG (ref 24–34)
MCHC RBC AUTO-ENTMCNC: 34.7 G/DL (ref 31–37)
MCV RBC AUTO: 90 FL (ref 74–97)
MONOCYTES # BLD: 0.7 K/UL (ref 0.05–1.2)
MONOCYTES NFR BLD: 10 % (ref 3–10)
NEUTS SEG # BLD: 4.3 K/UL (ref 1.8–8)
NEUTS SEG NFR BLD: 62 % (ref 40–73)
NITRITE UR QL STRIP.AUTO: NEGATIVE
PH UR STRIP: 5.5 [PH] (ref 5–8)
PLATELET # BLD AUTO: 289 K/UL (ref 135–420)
PMV BLD AUTO: 8.7 FL (ref 9.2–11.8)
POTASSIUM SERPL-SCNC: 3.7 MMOL/L (ref 3.5–5.5)
PROT SERPL-MCNC: 7.9 G/DL (ref 6.4–8.2)
PROT UR STRIP-MCNC: NEGATIVE MG/DL
RBC # BLD AUTO: 4.49 M/UL (ref 4.7–5.5)
RBC #/AREA URNS HPF: NORMAL /HPF (ref 0–5)
SODIUM SERPL-SCNC: 140 MMOL/L (ref 136–145)
SP GR UR REFRACTOMETRY: 1.01 (ref 1–1.03)
TROPONIN I SERPL-MCNC: <0.02 NG/ML (ref 0–0.04)
UROBILINOGEN UR QL STRIP.AUTO: 1 EU/DL (ref 0.2–1)
WBC # BLD AUTO: 6.9 K/UL (ref 4.6–13.2)
WBC URNS QL MICRO: NORMAL /HPF (ref 0–4)

## 2019-10-13 PROCEDURE — 74011000250 HC RX REV CODE- 250: Performed by: PHYSICIAN ASSISTANT

## 2019-10-13 PROCEDURE — 85025 COMPLETE CBC W/AUTO DIFF WBC: CPT

## 2019-10-13 PROCEDURE — 82550 ASSAY OF CK (CPK): CPT

## 2019-10-13 PROCEDURE — 80053 COMPREHEN METABOLIC PANEL: CPT

## 2019-10-13 PROCEDURE — 93005 ELECTROCARDIOGRAM TRACING: CPT

## 2019-10-13 PROCEDURE — 74011250636 HC RX REV CODE- 250/636: Performed by: PHYSICIAN ASSISTANT

## 2019-10-13 PROCEDURE — 74011250637 HC RX REV CODE- 250/637: Performed by: PHYSICIAN ASSISTANT

## 2019-10-13 PROCEDURE — 71045 X-RAY EXAM CHEST 1 VIEW: CPT

## 2019-10-13 PROCEDURE — 99285 EMERGENCY DEPT VISIT HI MDM: CPT

## 2019-10-13 PROCEDURE — 83735 ASSAY OF MAGNESIUM: CPT

## 2019-10-13 PROCEDURE — 81001 URINALYSIS AUTO W/SCOPE: CPT

## 2019-10-13 PROCEDURE — 94762 N-INVAS EAR/PLS OXIMTRY CONT: CPT

## 2019-10-13 PROCEDURE — 83690 ASSAY OF LIPASE: CPT

## 2019-10-13 RX ORDER — ASPIRIN 325 MG
325 TABLET ORAL
Status: COMPLETED | OUTPATIENT
Start: 2019-10-13 | End: 2019-10-13

## 2019-10-13 RX ORDER — SODIUM CHLORIDE 9 MG/ML
125 INJECTION, SOLUTION INTRAVENOUS ONCE
Status: COMPLETED | OUTPATIENT
Start: 2019-10-13 | End: 2019-10-13

## 2019-10-13 RX ORDER — PANTOPRAZOLE SODIUM 40 MG/1
40 TABLET, DELAYED RELEASE ORAL
Status: COMPLETED | OUTPATIENT
Start: 2019-10-13 | End: 2019-10-13

## 2019-10-13 RX ADMIN — ASPIRIN 325 MG ORAL TABLET 325 MG: 325 PILL ORAL at 16:43

## 2019-10-13 RX ADMIN — PANTOPRAZOLE SODIUM 40 MG: 40 TABLET, DELAYED RELEASE ORAL at 17:09

## 2019-10-13 RX ADMIN — SODIUM CHLORIDE 125 ML/HR: 900 INJECTION, SOLUTION INTRAVENOUS at 16:45

## 2019-10-13 RX ADMIN — LIDOCAINE HYDROCHLORIDE 40 ML: 20 SOLUTION ORAL; TOPICAL at 16:44

## 2019-10-13 NOTE — ED NOTES
Patient refusing to get into gown.  Patient states that he has heartburn and now it's pretty much gone

## 2019-10-13 NOTE — DISCHARGE INSTRUCTIONS

## 2019-10-13 NOTE — ED TRIAGE NOTES
States he was out drinking last night and woke up just a bit ago with epigastric pain. Patient states its burning.

## 2019-10-13 NOTE — ED PROVIDER NOTES
EMERGENCY DEPARTMENT HISTORY AND PHYSICAL EXAM    Date: 10/13/2019  Patient Name: Thad Briggs    History of Presenting Illness     Chief Complaint   Patient presents with    Chest Pain         History Provided By: Patient        Additional History (Context): Thad Briggs is a 69 y.o. male with Chronic pancreatitis hypertension who presents with a complaint of mid sternal chest pain with burning sensation going across the chest at the nipple line, since this morning. Patient feels he may have drank too much alcohol last night celebrating his birthday. His reported alcohol intake was 1 beer in the morning and 4 shots of vodka. Patient denies abdominal pain, however he does have a history of chronic pancreatitis. No mdications taken prior to arrival.  Patient denies shortness of breath or dyspnea. Patient also denies dyspnea on exertion. Reports a previous history of reflux. Patient is currently on Chantix and attempt to quit smoking cigarettes. PCP: Deb Guardado III, MD    Current Facility-Administered Medications   Medication Dose Route Frequency Provider Last Rate Last Dose    pantoprazole (PROTONIX) tablet 40 mg  40 mg Oral NOW Rita Yepez PA         Current Outpatient Medications   Medication Sig Dispense Refill    aspirin (ASPIRIN) 325 mg tablet Take 325 mg by mouth daily.  lidocaine (LIDODERM) 5 % Apply patch to the affected area for 12 hours a day and remove for 12 hours a day. 5 Each 0    ibuprofen (MOTRIN) 600 mg tablet Take 1 Tab by mouth every six (6) hours as needed for Pain. 20 Tab 0    acetaminophen (TYLENOL) 325 mg tablet Take 2 Tabs by mouth every four (4) hours as needed for Pain. 20 Tab 0    amLODIPine (NORVASC) 10 mg tablet Take 1 Tab by mouth daily. 30 Tab 0    cholecalciferol (VITAMIN D3) 1,000 unit tablet Take  by mouth daily.          Past History     Past Medical History:  Past Medical History:   Diagnosis Date    COPD (chronic obstructive pulmonary disease) ([facility] [address]     Hypertension     Smoker     25 pack years       Past Surgical History:  Past Surgical History:   Procedure Laterality Date    ABDOMEN SURGERY PROC UNLISTED      APPENDECTOMY      HX APPENDECTOMY      HX COLONOSCOPY  1/28/2015    Repeat colonoscopy in 5 yrs per Dr. Paul Morrison    HX ERCP  02/11/2019    bile duct stent    HX HERNIA REPAIR  10/22/2017    Ex lap. lysis of adhesions adn reductions of internal hernia done 10/22/2017       Family History:  Family History   Problem Relation Age of Onset    Heart Disease Mother     Cancer Father     Cancer Sister     Diabetes Sister        Social History:  Social History     Tobacco Use    Smoking status: Current Every Day Smoker     Packs/day: 0.50     Years: 50.00     Pack years: 25.00     Types: Cigarettes    Smokeless tobacco: Never Used   Substance Use Topics    Alcohol use: Not Currently     Comment: no alcohol in a year     Drug use: No       Allergies:  No Known Allergies      Review of Systems   Review of Systems  Review of Systems   Constitutional: Negative for fatigue and fever. HENT: Negative for congestion. Respiratory: Negative for cough and shortness of breath. Cardiovascular: positive for chest pain described as burning sensation of the skin of the chest.   Gastrointestinal: Negative for abdominal pain, diarrhea, nausea and vomiting. Patient patient reports burning sensation midsternal region at the nipple line. Genitourinary: Negative for difficulty urinating and dysuria. Musculoskeletal: Negative joint pain, joint swelling, recent injury. Skin: Negative for wound. Neurological: Negative for dizziness and headaches. All other systems reviewed and are negative.        All Other Systems Negative  Physical Exam     Vitals:    10/13/19 1620 10/13/19 1621 10/13/19 1640 10/13/19 1641   BP: (!) 178/94  (!) 177/102    Pulse: 71 70 90 84   Resp:   16    Temp:   97.6 °F (36.4 °C)    SpO2:  98%  96%     Physical Exam Constitutional: Pt is oriented to person, place, and time. Pt appears well-developed and well-nourished. HENT:   Head: Normocephalic and atraumatic. Mouth/Throat: Oropharynx is clear and moist.   Eyes: Pupils are equal, round, and reactive to light. Neck: Normal range of motion. Neck supple. No tracheal deviation present. No thyromegaly present. Cardiovascular: Normal rate, regular rhythm and normal heart sounds. No murmur heard. Pulmonary/Chest: Effort normal and breath sounds normal. No respiratory distress. No wheezes or rales. No rash of the chest wall. Abdominal: Soft. no distension and no mass. There is no tenderness. There is no rebound and no guarding. Musculoskeletal: Normal range of motion. No edema or deformity. Neurological: Pt is alert and oriented to person, place, and time   Skin: Skin is warm and dry. No diaphoreses  Psychiatric: Pt has a normal mood and affect;  behavior is normal. Judgment and thought content normal.           Diagnostic Study Results     Labs -     Recent Results (from the past 12 hour(s))   CBC WITH AUTOMATED DIFF    Collection Time: 10/13/19  3:29 PM   Result Value Ref Range    WBC 6.9 4.6 - 13.2 K/uL    RBC 4.49 (L) 4.70 - 5.50 M/uL    HGB 14.0 13.0 - 16.0 g/dL    HCT 40.4 36.0 - 48.0 %    MCV 90.0 74.0 - 97.0 FL    MCH 31.2 24.0 - 34.0 PG    MCHC 34.7 31.0 - 37.0 g/dL    RDW 17.2 (H) 11.6 - 14.5 %    PLATELET 008 588 - 545 K/uL    MPV 8.7 (L) 9.2 - 11.8 FL    NEUTROPHILS 62 40 - 73 %    LYMPHOCYTES 27 21 - 52 %    MONOCYTES 10 3 - 10 %    EOSINOPHILS 1 0 - 5 %    BASOPHILS 0 0 - 2 %    ABS. NEUTROPHILS 4.3 1.8 - 8.0 K/UL    ABS. LYMPHOCYTES 1.9 0.9 - 3.6 K/UL    ABS. MONOCYTES 0.7 0.05 - 1.2 K/UL    ABS. EOSINOPHILS 0.1 0.0 - 0.4 K/UL    ABS.  BASOPHILS 0.0 0.0 - 0.1 K/UL    DF AUTOMATED     METABOLIC PANEL, COMPREHENSIVE    Collection Time: 10/13/19  3:29 PM   Result Value Ref Range    Sodium 140 136 - 145 mmol/L    Potassium 3.7 3.5 - 5.5 mmol/L Chloride 104 100 - 111 mmol/L    CO2 32 21 - 32 mmol/L    Anion gap 4 3.0 - 18 mmol/L    Glucose 113 (H) 74 - 99 mg/dL    BUN 7 7.0 - 18 MG/DL    Creatinine 0.76 0.6 - 1.3 MG/DL    BUN/Creatinine ratio 9 (L) 12 - 20      GFR est AA >60 >60 ml/min/1.73m2    GFR est non-AA >60 >60 ml/min/1.73m2    Calcium 9.7 8.5 - 10.1 MG/DL    Bilirubin, total 0.4 0.2 - 1.0 MG/DL    ALT (SGPT) 10 (L) 16 - 61 U/L    AST (SGOT) 9 (L) 10 - 38 U/L    Alk. phosphatase 68 45 - 117 U/L    Protein, total 7.9 6.4 - 8.2 g/dL    Albumin 4.3 3.4 - 5.0 g/dL    Globulin 3.6 2.0 - 4.0 g/dL    A-G Ratio 1.2 0.8 - 1.7     CARDIAC PANEL,(CK, CKMB & TROPONIN)    Collection Time: 10/13/19  3:29 PM   Result Value Ref Range     39 - 308 U/L    CK - MB 1.5 <3.6 ng/ml    CK-MB Index 1.1 0.0 - 4.0 %    Troponin-I, QT <0.02 0.0 - 0.045 NG/ML   LIPASE    Collection Time: 10/13/19  3:29 PM   Result Value Ref Range    Lipase 188 73 - 393 U/L   MAGNESIUM    Collection Time: 10/13/19  3:37 PM   Result Value Ref Range    Magnesium 2.1 1.6 - 2.6 mg/dL       Radiologic Studies -   XR CHEST PORT    (Results Pending)     CT Results  (Last 48 hours)    None        CXR Results  (Last 48 hours)    None            Medical Decision Making   I am the first provider for this patient. I reviewed the vital signs, available nursing notes, past medical history, past surgical history, family history and social history. Vital Signs-Reviewed the patient's vital signs. Comparison:    Records Reviewed: Nursing Notes and Old Medical Records    Procedures:  Procedures    Provider Notes (Medical Decision Making):   Patient presentation for cardiac cause of his chest pain is atypical, however his heart score equals 3. Patient reports relief status post GI cocktail. Patient wants to go home. Declining the remaining of his IV fluids. Protonix PO prior to departure.     MED RECONCILIATION:  Current Facility-Administered Medications   Medication Dose Route Frequency  pantoprazole (PROTONIX) tablet 40 mg  40 mg Oral NOW     Current Outpatient Medications   Medication Sig    aspirin (ASPIRIN) 325 mg tablet Take 325 mg by mouth daily.  lidocaine (LIDODERM) 5 % Apply patch to the affected area for 12 hours a day and remove for 12 hours a day.  ibuprofen (MOTRIN) 600 mg tablet Take 1 Tab by mouth every six (6) hours as needed for Pain.  acetaminophen (TYLENOL) 325 mg tablet Take 2 Tabs by mouth every four (4) hours as needed for Pain.  amLODIPine (NORVASC) 10 mg tablet Take 1 Tab by mouth daily.  cholecalciferol (VITAMIN D3) 1,000 unit tablet Take  by mouth daily. Disposition:  Home    DISCHARGE NOTE:     Pt has been reexamined. Patient has no new complaints, changes, or physical findings. Care plan outlined and precautions discussed. Results of labs and exam were reviewed with the patient. All medications were reviewed with the patient; will d/c home with follow up instructions. All of pt's questions and concerns were addressed. Patient was instructed and agrees to follow up with primary care tomorrow, as well as to return to the ED upon further deterioration. Patient is ready to go home. Follow-up Information     Follow up With Specialties Details Why Contact Info    Venita Guardado MD Internal Medicine Schedule an appointment as soon as possible for a visit in 1 day  [place] [address]  72 [facility] [phone]      [facility] EMERGENCY DEPT Emergency Medicine  If symptoms worsen [address]  [phone]          Current Discharge Medication List              Diagnosis     Clinical Impression:   1. Atypical chest pain    2.  Gastroesophageal reflux disease, esophagitis presence not specified

## 2019-10-14 LAB
ATRIAL RATE: 98 BPM
CALCULATED P AXIS, ECG09: 68 DEGREES
CALCULATED R AXIS, ECG10: -20 DEGREES
CALCULATED T AXIS, ECG11: 60 DEGREES
DIAGNOSIS, 93000: NORMAL
P-R INTERVAL, ECG05: 166 MS
Q-T INTERVAL, ECG07: 350 MS
QRS DURATION, ECG06: 80 MS
QTC CALCULATION (BEZET), ECG08: 446 MS
VENTRICULAR RATE, ECG03: 98 BPM

## 2020-03-06 NOTE — PROGRESS NOTES
Rec'd pt on RA- pt awake & alert- sitting upright dressed on side of bed  No resp distress observed or reported- administer neb 06-Mar-2020 15:32
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
## 2020-04-29 NOTE — PROGRESS NOTES
200  Entered patient's chart to contact surgeon and provided patient the name of surgical procedure that was performed today, per patient's request.  Danny Cast per patient and patient's wife request.  Patient voiced concerns and questions in reference to procedure and dietary intake. Updated patient MD Yaritza Cast will be up. Patient given swabs to moisten mouth. HIPPA maintained. normal...

## 2020-05-09 ENCOUNTER — HOSPITAL ENCOUNTER (EMERGENCY)
Age: 75
Discharge: HOME | End: 2020-05-09
Attending: EMERGENCY MEDICINE
Payer: MEDICARE

## 2020-05-09 VITALS
BODY MASS INDEX: 18.96 KG/M2 | RESPIRATION RATE: 16 BRPM | SYSTOLIC BLOOD PRESSURE: 190 MMHG | TEMPERATURE: 97.3 F | HEART RATE: 82 BPM | DIASTOLIC BLOOD PRESSURE: 98 MMHG | HEIGHT: 69 IN | OXYGEN SATURATION: 97 % | WEIGHT: 128 LBS

## 2020-05-09 DIAGNOSIS — S29.012A STRAIN OF THORACIC BACK REGION: Primary | ICD-10-CM

## 2020-05-09 PROCEDURE — 99284 EMERGENCY DEPT VISIT MOD MDM: CPT

## 2020-05-09 PROCEDURE — 74011250637 HC RX REV CODE- 250/637: Performed by: EMERGENCY MEDICINE

## 2020-05-09 RX ORDER — IBUPROFEN 800 MG/1
800 TABLET ORAL EVERY 8 HOURS
Qty: 15 TAB | Refills: 0 | Status: SHIPPED | OUTPATIENT
Start: 2020-05-09 | End: 2020-05-14 | Stop reason: ALTCHOICE

## 2020-05-09 RX ORDER — IBUPROFEN 400 MG/1
800 TABLET ORAL
Status: COMPLETED | OUTPATIENT
Start: 2020-05-09 | End: 2020-05-09

## 2020-05-09 RX ADMIN — IBUPROFEN 800 MG: 400 TABLET ORAL at 09:49

## 2020-05-09 NOTE — DISCHARGE INSTRUCTIONS
SPECIFIC PATIENT INSTRUCTIONS FROM THE PHYSICIAN WHO TREATED YOU IN THE ER TODAY:  1. Ibuprofen as prescribed until finished. 2. Return if any concerns or worsening condition(s). 3. Follow up with:  Your primary doctor if still having symptoms after you finish the ibuprofen and flexeril. MyChart Activation    Thank you for requesting access to [facility]. Please follow the instructions below to securely access and download your online medical record. [facility] allows you to send messages to your doctor, view your test results, renew your prescriptions, schedule appointments, and more. How Do I Sign Up? In your internet browser, go to [email or web address]. Click on the First Time User? Click Here link in the Sign In box. You will see the New Member Sign Up page. Enter your [facility] Access Code exactly as it appears below. You will not need to use this code after you´ve completed the sign-up process. If you do not sign up before the expiration date, you must request a new code. [facility] Access Code: [email or web address]  Expires: 3/28/2019  2:27 PM (This is the date your [facility] access code will )    Enter the last four digits of your Social Security Number (xxxx) and Date of Birth (mm/dd/yyyy) as indicated and click Submit. You will be taken to the next sign-up page. Create a [facility] ID. This will be your [facility] login ID and cannot be changed, so think of one that is secure and easy to remember. Create a [facility] password. You can change your password at any time. Enter your Password Reset Question and Answer. This can be used at a later time if you forget your password. Enter your e-mail address. You will receive e-mail notification when new information is available in [address] Click Sign Up. You can now view and download portions of your medical record. Click the "[employer]" link to download a portable copy of your medical information.     Additional Information    If you have questions, please visit the Frequently Asked Questions section of the [facility] website at [email or web address] Remember, [facility] is NOT to be used for urgent needs. For medical emergencies, dial 911.

## 2020-05-09 NOTE — ED PROVIDER NOTES
[facility] EMERGENCY DEPT      No other complaints. No recent fall nor history of overuse. No bowel or bladder incontinence. 9:17 AM    Date: 5/9/2020  Patient Name: Alvarez Hennessy    History of Presenting Illness     Chief Complaint   Patient presents with    Back Pain       69 y.o. male with noted past medical history who presents to the emergency department with thoracic back pain. Patient states he was in his usual state of health but was recently discharged from [facility] for a stroke in the recent past.  He has no residual weakness or motor deficits that he notes from the stroke. He states that he started to have some thoracic back pain bilateral that started yesterday afternoon and continued to the present. Did not take any medication for the pain. He denies any fall trauma or overuse of the back. Patient denies any other associated signs or symptoms. Patient denies any other complaints. Nursing notes regarding the HPI and triage nursing notes were reviewed. Prior medical records were reviewed. Current Outpatient Medications   Medication Sig Dispense Refill    aspirin (ASPIRIN) 325 mg tablet Take 325 mg by mouth daily.  lidocaine (LIDODERM) 5 % Apply patch to the affected area for 12 hours a day and remove for 12 hours a day. 5 Each 0    ibuprofen (MOTRIN) 600 mg tablet Take 1 Tab by mouth every six (6) hours as needed for Pain. 20 Tab 0    acetaminophen (TYLENOL) 325 mg tablet Take 2 Tabs by mouth every four (4) hours as needed for Pain. 20 Tab 0    amLODIPine (NORVASC) 10 mg tablet Take 1 Tab by mouth daily. 30 Tab 0    cholecalciferol (VITAMIN D3) 1,000 unit tablet Take  by mouth daily.          Past History     Past Medical History:  Past Medical History:   Diagnosis Date    COPD (chronic obstructive pulmonary disease) ([facility] [address]     Hypertension     Smoker     25 pack years       Past Surgical History:  Past Surgical History:   Procedure Laterality Date    ABDOMEN SURGERY PROC UNLISTED      APPENDECTOMY      HX APPENDECTOMY      HX COLONOSCOPY  1/28/2015    Repeat colonoscopy in 5 yrs per Dr. Uriah Black    HX ERCP  02/11/2019    bile duct stent    HX HERNIA REPAIR  10/22/2017    Ex lap. lysis of adhesions adn reductions of internal hernia done 10/22/2017       Family History:  Family History   Problem Relation Age of Onset    Heart Disease Mother     Cancer Father     Cancer Sister     Diabetes Sister        Social History:  Social History     Tobacco Use    Smoking status: Current Every Day Smoker     Packs/day: 0.50     Years: 50.00     Pack years: 25.00     Types: Cigarettes    Smokeless tobacco: Never Used   Substance Use Topics    Alcohol use: Not Currently     Comment: no alcohol in a year     Drug use: No       Allergies:  No Known Allergies    Patient's primary care provider (as noted in EPIC):  Maximino Guardado III, MD    Review of Systems   Constitutional: Negative for diaphoresis. HENT: Negative for congestion. Eyes: Negative for discharge. Respiratory: Negative for stridor. Cardiovascular: Negative for palpitations. Gastrointestinal: Negative for diarrhea. Genitourinary: Negative for flank pain. Musculoskeletal: Positive for back pain. Neurological: Negative for weakness. Psychiatric/Behavioral: Negative for hallucinations. All other systems reviewed and are negative. Visit Vitals  BP (!) 206/110   Pulse 82   Temp 97.3 °F (36.3 °C)   Resp 16   Ht 5' 9\" (1.753 m)   Wt 58.1 kg (128 lb)   SpO2 100%   BMI 18.90 kg/m²       PHYSICAL EXAM:  CONSTITUTIONAL:  Alert, in no apparent distress;  well developed;  well nourished. HEAD:  Normocephalic, atraumatic. EYES:  EOMI. Non-icteric sclera. Normal conjunctiva. ENTM:  Nose:  no rhinorrhea. Throat:  no erythema or exudate, mucous membranes moist.  NECK:  No JVD. Supple  RESPIRATORY:  Chest clear, equal breath sounds, good air movement.   CARDIOVASCULAR: Regular rate and rhythm. No murmurs, rubs, or gallops. GI:  Normal bowel sounds, abdomen soft and non-tender. No rebound or guarding. BACK: Bilateral mid parathoracic mild reproducible tenderness to palpation. No midline vertebral bony point tenderness or step-off. Normal tim-anal sensation. Normal bilateral straight leg raise. UPPER EXT:  Normal inspection. LOWER EXT:  No edema, no calf tenderness. Distal pulses intact. NEURO:  Moves all four extremities, and grossly normal motor exam.  SKIN:  No rashes;  Normal for age. PSYCH:  Alert and normal affect. DIFFERENTIAL DIAGNOSES/ MEDICAL DECISION MAKING:  Low back pain from myofascial strain/ sprain, muscle spasm, vertebral disc problem, neuropathy to include sciatica, abscess/infection, referred retroperitoneal pain from pyelonephritis or ureterolithiasis, other etiologies versus a combination of the above (example: myofascial strain/ sprain as well as muscle spasm). Diagnostic Study Results     Abnormal lab results from this emergency department encounter:  Labs Reviewed - No data to display    Lab values for this patient within approximately the last 12 hours:  No results found for this or any previous visit (from the past 12 hour(s)). Radiologist and cardiologist interpretations if available at time of this note:  No results found. Medication(s) ordered for patient during this emergency visit encounter:  Medications - No data to display    Medical Decision Making     I am the first provider for this patient. I reviewed the vital signs, available nursing notes, past medical history, past surgical history, family history and social history. Vital Signs:  Reviewed the patient's vital signs. IMPRESSION AND MEDICAL DECISION MAKING:  There is no signs of sciatica. No perianal decreased sensation nor bowel/bladder incontinence to suggest a neurological emergency.       The patient does not have fever, no other signs of infection to suggest an epidural or other back abscess that would require emergent intervention. The patient denies being an IVDA. Based upon the patients presentation with noted HPI and PE, along with the work up done in the emergency department, I believe that the patient is having paralumbar myofascial strain (lower back strain). DIAGNOSIS:  1. Back strain, paralumbar myofascial.    SPECIFIC PATIENT INSTRUCTIONS FROM THE PHYSICIAN WHO TREATED YOU IN THE ER TODAY:  1. Ibuprofen as prescribed until finished. 2. Return if any concerns or worsening condition(s). 3. Follow up with:  Your primary doctor if still having symptoms after you finish the ibuprofen and flexeril. Patient is improved, resting quietly and comfortably. The patient will be discharged home. The patient was reassured that these symptoms do not appear to represent a serious or life threatening condition at this time. Warning signs of worsening condition were discussed and understood by the patient. Based on patient's age, coexisting illness, exam, and the results of this ED evaluation, the decision to treat as an outpatient was made. Based on the information available at time of discharge, acute pathology requiring immediate intervention was deemed relative unlikely. While it is impossible to completely exclude the possibility of underlying serious disease or worsening of condition, I feel the relative likelihood is extremely low. I discussed this uncertainty with the patient, who understood ED evaluation and treatment and felt comfortable with the outpatient treatment plan. All questions regarding care, test results, and follow up were answered. The patient is stable and appropriate to discharge. They understand that they should return to the emergency department for any new or worsening symptoms. I stressed the importance of follow up for repeat assessment and possibly further evaluation/treatment.     Dictation disclaimer:  Please note that this dictation was completed with Smartling, the computer voice recognition software. Quite often unanticipated grammatical, syntax, homophones, and other interpretive errors are inadvertently transcribed by the computer software. Please disregard these errors. Please excuse any errors that have escaped final proofreading. Coding Diagnoses     Clinical Impression:   1. Strain of thoracic back region        Disposition     Disposition: Discharge. KENYA Church Board Certified Emergency Physician    Provider Attestation:  If a scribe was utilized in generation of this patient record, I personally performed the services described in the documentation, reviewed the documentation, as recorded by the scribe in my presence, and it accurately records the patient's history of presenting illness, review of systems, patient physical examination, and procedures performed by me as the attending physician. KENYA Church Board Certified Emergency Physician  5/9/2020.  9:18 AM

## 2020-05-14 ENCOUNTER — HOSPITAL ENCOUNTER (EMERGENCY)
Age: 75
Discharge: HOME | End: 2020-05-14
Attending: EMERGENCY MEDICINE | Admitting: EMERGENCY MEDICINE
Payer: MEDICARE

## 2020-05-14 ENCOUNTER — APPOINTMENT (OUTPATIENT)
Dept: CT IMAGING | Age: 75
End: 2020-05-14
Attending: EMERGENCY MEDICINE
Payer: MEDICARE

## 2020-05-14 VITALS
WEIGHT: 125 LBS | HEART RATE: 81 BPM | SYSTOLIC BLOOD PRESSURE: 196 MMHG | TEMPERATURE: 98.1 F | DIASTOLIC BLOOD PRESSURE: 106 MMHG | OXYGEN SATURATION: 96 % | RESPIRATION RATE: 18 BRPM | BODY MASS INDEX: 18.51 KG/M2 | HEIGHT: 69 IN

## 2020-05-14 DIAGNOSIS — R10.13 ABDOMINAL PAIN, ACUTE, EPIGASTRIC: Primary | ICD-10-CM

## 2020-05-14 DIAGNOSIS — K59.00 CONSTIPATION, UNSPECIFIED CONSTIPATION TYPE: ICD-10-CM

## 2020-05-14 LAB
ALBUMIN SERPL-MCNC: 4.5 G/DL (ref 3.4–5)
ALBUMIN/GLOB SERPL: 1.1 {RATIO} (ref 0.8–1.7)
ALP SERPL-CCNC: 82 U/L (ref 45–117)
ALT SERPL-CCNC: 11 U/L (ref 16–61)
ANION GAP SERPL CALC-SCNC: 8 MMOL/L (ref 3–18)
APPEARANCE UR: CLEAR
AST SERPL-CCNC: 4 U/L (ref 10–38)
ATRIAL RATE: 77 BPM
BASOPHILS # BLD: 0 K/UL (ref 0–0.1)
BASOPHILS NFR BLD: 0 % (ref 0–2)
BILIRUB SERPL-MCNC: 0.3 MG/DL (ref 0.2–1)
BILIRUB UR QL: NEGATIVE
BUN SERPL-MCNC: 8 MG/DL (ref 7–18)
BUN/CREAT SERPL: 8 (ref 12–20)
CALCIUM SERPL-MCNC: 9.4 MG/DL (ref 8.5–10.1)
CALCULATED P AXIS, ECG09: 30 DEGREES
CALCULATED R AXIS, ECG10: -38 DEGREES
CALCULATED T AXIS, ECG11: 36 DEGREES
CHLORIDE SERPL-SCNC: 98 MMOL/L (ref 100–111)
CO2 SERPL-SCNC: 31 MMOL/L (ref 21–32)
COLOR UR: YELLOW
CREAT SERPL-MCNC: 0.97 MG/DL (ref 0.6–1.3)
DIAGNOSIS, 93000: NORMAL
DIFFERENTIAL METHOD BLD: ABNORMAL
EOSINOPHIL # BLD: 0.1 K/UL (ref 0–0.4)
EOSINOPHIL NFR BLD: 1 % (ref 0–5)
ERYTHROCYTE [DISTWIDTH] IN BLOOD BY AUTOMATED COUNT: 14.3 % (ref 11.6–14.5)
ETHANOL SERPL-MCNC: <3 MG/DL (ref 0–3)
GLOBULIN SER CALC-MCNC: 4 G/DL (ref 2–4)
GLUCOSE SERPL-MCNC: 166 MG/DL (ref 74–99)
GLUCOSE UR STRIP.AUTO-MCNC: NEGATIVE MG/DL
HCT VFR BLD AUTO: 43.3 % (ref 36–48)
HGB BLD-MCNC: 14.9 G/DL (ref 13–16)
HGB UR QL STRIP: NEGATIVE
KETONES UR QL STRIP.AUTO: NEGATIVE MG/DL
LEUKOCYTE ESTERASE UR QL STRIP.AUTO: NEGATIVE
LIPASE SERPL-CCNC: 32 U/L (ref 73–393)
LYMPHOCYTES # BLD: 1.4 K/UL (ref 0.9–3.6)
LYMPHOCYTES NFR BLD: 18 % (ref 21–52)
MCH RBC QN AUTO: 30.7 PG (ref 24–34)
MCHC RBC AUTO-ENTMCNC: 34.4 G/DL (ref 31–37)
MCV RBC AUTO: 89.3 FL (ref 74–97)
MONOCYTES # BLD: 0.5 K/UL (ref 0.05–1.2)
MONOCYTES NFR BLD: 7 % (ref 3–10)
NEUTS SEG # BLD: 5.6 K/UL (ref 1.8–8)
NEUTS SEG NFR BLD: 74 % (ref 40–73)
NITRITE UR QL STRIP.AUTO: NEGATIVE
P-R INTERVAL, ECG05: 146 MS
PH UR STRIP: 6.5 [PH] (ref 5–8)
PLATELET # BLD AUTO: 292 K/UL (ref 135–420)
PMV BLD AUTO: 8.6 FL (ref 9.2–11.8)
POTASSIUM SERPL-SCNC: 3.2 MMOL/L (ref 3.5–5.5)
PROT SERPL-MCNC: 8.5 G/DL (ref 6.4–8.2)
PROT UR STRIP-MCNC: NEGATIVE MG/DL
Q-T INTERVAL, ECG07: 380 MS
QRS DURATION, ECG06: 92 MS
QTC CALCULATION (BEZET), ECG08: 430 MS
RBC # BLD AUTO: 4.85 M/UL (ref 4.7–5.5)
SODIUM SERPL-SCNC: 137 MMOL/L (ref 136–145)
SP GR UR REFRACTOMETRY: 1.01 (ref 1–1.03)
TROPONIN I SERPL-MCNC: <0.02 NG/ML (ref 0–0.04)
UROBILINOGEN UR QL STRIP.AUTO: 0.2 EU/DL (ref 0.2–1)
VENTRICULAR RATE, ECG03: 77 BPM
WBC # BLD AUTO: 7.6 K/UL (ref 4.6–13.2)

## 2020-05-14 PROCEDURE — 84484 ASSAY OF TROPONIN QUANT: CPT

## 2020-05-14 PROCEDURE — 99284 EMERGENCY DEPT VISIT MOD MDM: CPT

## 2020-05-14 PROCEDURE — 74011250637 HC RX REV CODE- 250/637: Performed by: EMERGENCY MEDICINE

## 2020-05-14 PROCEDURE — 93005 ELECTROCARDIOGRAM TRACING: CPT

## 2020-05-14 PROCEDURE — 74011636320 HC RX REV CODE- 636/320: Performed by: EMERGENCY MEDICINE

## 2020-05-14 PROCEDURE — 96374 THER/PROPH/DIAG INJ IV PUSH: CPT

## 2020-05-14 PROCEDURE — 96375 TX/PRO/DX INJ NEW DRUG ADDON: CPT

## 2020-05-14 PROCEDURE — 74011250636 HC RX REV CODE- 250/636: Performed by: EMERGENCY MEDICINE

## 2020-05-14 PROCEDURE — C9113 INJ PANTOPRAZOLE SODIUM, VIA: HCPCS | Performed by: EMERGENCY MEDICINE

## 2020-05-14 PROCEDURE — 74011000250 HC RX REV CODE- 250: Performed by: EMERGENCY MEDICINE

## 2020-05-14 PROCEDURE — 80307 DRUG TEST PRSMV CHEM ANLYZR: CPT

## 2020-05-14 PROCEDURE — 83690 ASSAY OF LIPASE: CPT

## 2020-05-14 PROCEDURE — 74177 CT ABD & PELVIS W/CONTRAST: CPT

## 2020-05-14 PROCEDURE — 85025 COMPLETE CBC W/AUTO DIFF WBC: CPT

## 2020-05-14 PROCEDURE — 80053 COMPREHEN METABOLIC PANEL: CPT

## 2020-05-14 PROCEDURE — 81003 URINALYSIS AUTO W/O SCOPE: CPT

## 2020-05-14 RX ORDER — PANTOPRAZOLE SODIUM 40 MG/10ML
40 INJECTION, POWDER, LYOPHILIZED, FOR SOLUTION INTRAVENOUS
Status: COMPLETED | OUTPATIENT
Start: 2020-05-14 | End: 2020-05-14

## 2020-05-14 RX ORDER — PHENOL/SODIUM PHENOLATE
20 AEROSOL, SPRAY (ML) MUCOUS MEMBRANE DAILY
Qty: 15 TAB | Refills: 0 | OUTPATIENT
Start: 2020-05-14 | End: 2020-06-01

## 2020-05-14 RX ORDER — BISACODYL 5 MG
10 TABLET, DELAYED RELEASE (ENTERIC COATED) ORAL
Qty: 15 TAB | Refills: 0 | Status: SHIPPED | OUTPATIENT
Start: 2020-05-14

## 2020-05-14 RX ORDER — BISACODYL 5 MG
10 TABLET, DELAYED RELEASE (ENTERIC COATED) ORAL
Status: COMPLETED | OUTPATIENT
Start: 2020-05-14 | End: 2020-05-14

## 2020-05-14 RX ORDER — AMLODIPINE BESYLATE 5 MG/1
10 TABLET ORAL
Status: COMPLETED | OUTPATIENT
Start: 2020-05-14 | End: 2020-05-14

## 2020-05-14 RX ORDER — POLYETHYLENE GLYCOL 3350 17 G/17G
17 POWDER, FOR SOLUTION ORAL
Qty: 510 G | Refills: 0 | Status: SHIPPED | OUTPATIENT
Start: 2020-05-14

## 2020-05-14 RX ORDER — ONDANSETRON 2 MG/ML
4 INJECTION INTRAMUSCULAR; INTRAVENOUS
Status: COMPLETED | OUTPATIENT
Start: 2020-05-14 | End: 2020-05-14

## 2020-05-14 RX ORDER — MORPHINE SULFATE 2 MG/ML
2 INJECTION, SOLUTION INTRAMUSCULAR; INTRAVENOUS
Status: COMPLETED | OUTPATIENT
Start: 2020-05-14 | End: 2020-05-14

## 2020-05-14 RX ORDER — ACETAMINOPHEN 325 MG/1
650 TABLET ORAL
Qty: 50 TAB | Refills: 0 | Status: SHIPPED | OUTPATIENT
Start: 2020-05-14 | End: 2020-08-16

## 2020-05-14 RX ADMIN — LIDOCAINE HYDROCHLORIDE 40 ML: 20 SOLUTION ORAL; TOPICAL at 04:14

## 2020-05-14 RX ADMIN — AMLODIPINE BESYLATE 10 MG: 5 TABLET ORAL at 04:14

## 2020-05-14 RX ADMIN — SODIUM CHLORIDE 1000 ML: 900 INJECTION, SOLUTION INTRAVENOUS at 04:05

## 2020-05-14 RX ADMIN — POTASSIUM BICARBONATE 20 MEQ: 782 TABLET, EFFERVESCENT ORAL at 05:33

## 2020-05-14 RX ADMIN — PANTOPRAZOLE SODIUM 40 MG: 40 INJECTION, POWDER, FOR SOLUTION INTRAVENOUS at 04:15

## 2020-05-14 RX ADMIN — ONDANSETRON 4 MG: 2 INJECTION INTRAMUSCULAR; INTRAVENOUS at 04:05

## 2020-05-14 RX ADMIN — BISACODYL 10 MG: 5 TABLET, COATED ORAL at 06:17

## 2020-05-14 RX ADMIN — MORPHINE SULFATE 2 MG: 2 INJECTION, SOLUTION INTRAMUSCULAR; INTRAVENOUS at 04:05

## 2020-05-14 RX ADMIN — IOPAMIDOL 100 ML: 612 INJECTION, SOLUTION INTRAVENOUS at 05:01

## 2020-05-14 NOTE — ED TRIAGE NOTES
Alert and oriented male arrived via EMS with c/o pain in upper abdomen and mid back since this afternoon. Denies n/v/d. Denies cough/SOB/fever.

## 2020-05-14 NOTE — ED PROVIDER NOTES
Amor Hunter is a 69 y.o. male with history of chronic pancreatitis with complaints of upper abdominal pain rating to his back for the last several hours with no associated nausea, vomiting, diarrhea, blood in his stool, melena, fever. He has no increase shortness of breath or chest pain. He has no near syncope or syncopal event. No recent alcohol use. He not take anything for the pain. It is sharp and stabbing. He denies any urinary symptoms. No history of AAA    The history is provided by the patient and medical records. Past Medical History:   Diagnosis Date    COPD (chronic obstructive pulmonary disease) ([facility] [address]     Hypertension     Smoker     25 pack years       Past Surgical History:   Procedure Laterality Date    ABDOMEN SURGERY PROC UNLISTED      APPENDECTOMY      HX APPENDECTOMY      HX COLONOSCOPY  1/28/2015    Repeat colonoscopy in 5 yrs per Dr. Colleen John    HX ERCP  02/11/2019    bile duct stent    HX HERNIA REPAIR  10/22/2017    Ex lap. lysis of adhesions adn reductions of internal hernia done 10/22/2017         Family History:   Problem Relation Age of Onset    Heart Disease Mother     Cancer Father     Cancer Sister     Diabetes Sister        Social History     Socioeconomic History    Marital status:      Spouse name: Not on file    Number of children: Not on file    Years of education: Not on file    Highest education level: Not on file   Occupational History    Not on file   Social Needs    Financial resource strain: Not on file    Food insecurity     Worry: Not on file     Inability: Not on file    Transportation needs     Medical: Not on file     Non-medical: Not on file   Tobacco Use    Smoking status: Current Every Day Smoker     Packs/day: 0.50     Years: 50.00     Pack years: 25.00     Types: Cigarettes    Smokeless tobacco: Never Used   Substance and Sexual Activity    Alcohol use: Not Currently     Comment: no alcohol in a year     Drug use:  No  Sexual activity: Not on file   Lifestyle    Physical activity     Days per week: Not on file     Minutes per session: Not on file    Stress: Not on file   Relationships    Social connections     Talks on phone: Not on file     Gets together: Not on file     Attends Zoroastrianism service: Not on file     Active member of club or organization: Not on file     Attends meetings of clubs or organizations: Not on file     Relationship status: Not on file    Intimate partner violence     Fear of current or ex partner: Not on file     Emotionally abused: Not on file     Physically abused: Not on file     Forced sexual activity: Not on file   Other Topics Concern    Not on file   Social History Narrative    Not on file         ALLERGIES: Patient has no known allergies. Review of Systems   Constitutional: Negative for appetite change, diaphoresis, fatigue and fever. HENT: Negative for sore throat and trouble swallowing. Eyes: Negative for visual disturbance. Respiratory: Negative for cough and shortness of breath. Cardiovascular: Negative for chest pain. Gastrointestinal: Positive for abdominal pain. Negative for abdominal distention and blood in stool. Genitourinary: Negative for difficulty urinating. Musculoskeletal: Positive for back pain. Negative for gait problem. Skin: Negative for rash. Allergic/Immunologic: Negative for immunocompromised state. Neurological: Negative for syncope. Psychiatric/Behavioral: Positive for sleep disturbance. Vitals:    05/14/20 0345   BP: (!) 196/106   Pulse: 81   Resp: 18   Temp: 98.1 °F (36.7 °C)   SpO2: 96%   Weight: 56.7 kg (125 lb)   Height: 5' 8.5\" (1.74 m)            Physical Exam  Vitals signs and nursing note reviewed. Constitutional:       General: He is not in acute distress. Appearance: Normal appearance. He is not ill-appearing, toxic-appearing or diaphoretic. HENT:      Head: Normocephalic and atraumatic.       Right Ear: External ear normal.      Left Ear: External ear normal.      Nose: Nose normal.      Mouth/Throat:      Pharynx: No oropharyngeal exudate. Eyes:      Conjunctiva/sclera: Conjunctivae normal.   Neck:      Musculoskeletal: Normal range of motion. Cardiovascular:      Rate and Rhythm: Normal rate and regular rhythm. Heart sounds: Normal heart sounds. Pulmonary:      Effort: Pulmonary effort is normal. No respiratory distress. Breath sounds: Normal breath sounds. Abdominal:      General: Abdomen is flat. Palpations: Abdomen is soft. There is no hepatomegaly, splenomegaly or pulsatile mass. Tenderness: There is abdominal tenderness in the epigastric area. There is no right CVA tenderness, left CVA tenderness, guarding or rebound. Negative signs include Estrada's sign and McBurney's sign. Musculoskeletal: Normal range of motion. Right lower leg: No edema. Left lower leg: No edema. Skin:     General: Skin is warm and dry. Capillary Refill: Capillary refill takes less than 2 seconds. Neurological:      Mental Status: He is alert and oriented to person, place, and time.    Psychiatric:         Behavior: Behavior normal.          MDM       Procedures    Vitals:  Patient Vitals for the past 12 hrs:   Temp Pulse Resp BP SpO2   05/14/20 0345 98.1 °F (36.7 °C) 81 18 (!) 196/106 96 %         Medications ordered:   Medications   bisacodyL (DULCOLAX) tablet 10 mg (has no administration in time range)   sodium chloride 0.9 % bolus infusion 1,000 mL (1,000 mL IntraVENous New Bag 5/14/20 0405)   morphine injection 2 mg (2 mg IntraVENous Given 5/14/20 0405)   ondansetron (ZOFRAN) injection 4 mg (4 mg IntraVENous Given 5/14/20 0405)   pantoprazole (PROTONIX) injection 40 mg (40 mg IntraVENous Given 5/14/20 0415)   mylanta/viscous lidocaine (GI COCKTAIL) (40 mL Oral Given 5/14/20 0414)   amLODIPine (NORVASC) tablet 10 mg (10 mg Oral Given 5/14/20 0414)   iopamidoL (ISOVUE 300) 61 % contrast injection 100 mL (100 mL IntraVENous Given 5/14/20 8013)   potassium bicarb-citric acid (EFFER-K) tablet 20 mEq (20 mEq Oral Given 5/14/20 5796)         Lab findings:  Recent Results (from the past 12 hour(s))   CBC WITH AUTOMATED DIFF    Collection Time: 05/14/20  4:04 AM   Result Value Ref Range    WBC 7.6 4.6 - 13.2 K/uL    RBC 4.85 4.70 - 5.50 M/uL    HGB 14.9 13.0 - 16.0 g/dL    HCT 43.3 36.0 - 48.0 %    MCV 89.3 74.0 - 97.0 FL    MCH 30.7 24.0 - 34.0 PG    MCHC 34.4 31.0 - 37.0 g/dL    RDW 14.3 11.6 - 14.5 %    PLATELET 678 882 - 381 K/uL    MPV 8.6 (L) 9.2 - 11.8 FL    NEUTROPHILS 74 (H) 40 - 73 %    LYMPHOCYTES 18 (L) 21 - 52 %    MONOCYTES 7 3 - 10 %    EOSINOPHILS 1 0 - 5 %    BASOPHILS 0 0 - 2 %    ABS. NEUTROPHILS 5.6 1.8 - 8.0 K/UL    ABS. LYMPHOCYTES 1.4 0.9 - 3.6 K/UL    ABS. MONOCYTES 0.5 0.05 - 1.2 K/UL    ABS. EOSINOPHILS 0.1 0.0 - 0.4 K/UL    ABS. BASOPHILS 0.0 0.0 - 0.1 K/UL    DF AUTOMATED     METABOLIC PANEL, COMPREHENSIVE    Collection Time: 05/14/20  4:04 AM   Result Value Ref Range    Sodium 137 136 - 145 mmol/L    Potassium 3.2 (L) 3.5 - 5.5 mmol/L    Chloride 98 (L) 100 - 111 mmol/L    CO2 31 21 - 32 mmol/L    Anion gap 8 3.0 - 18 mmol/L    Glucose 166 (H) 74 - 99 mg/dL    BUN 8 7.0 - 18 MG/DL    Creatinine 0.97 0.6 - 1.3 MG/DL    BUN/Creatinine ratio 8 (L) 12 - 20      GFR est AA >60 >60 ml/min/1.73m2    GFR est non-AA >60 >60 ml/min/1.73m2    Calcium 9.4 8.5 - 10.1 MG/DL    Bilirubin, total 0.3 0.2 - 1.0 MG/DL    ALT (SGPT) 11 (L) 16 - 61 U/L    AST (SGOT) 4 (L) 10 - 38 U/L    Alk.  phosphatase 82 45 - 117 U/L    Protein, total 8.5 (H) 6.4 - 8.2 g/dL    Albumin 4.5 3.4 - 5.0 g/dL    Globulin 4.0 2.0 - 4.0 g/dL    A-G Ratio 1.1 0.8 - 1.7     TROPONIN I    Collection Time: 05/14/20  4:04 AM   Result Value Ref Range    Troponin-I, QT <0.02 0.0 - 0.045 NG/ML   ETHYL ALCOHOL    Collection Time: 05/14/20  4:04 AM   Result Value Ref Range    ALCOHOL(ETHYL),SERUM <3 0 - 3 MG/DL   LIPASE    Collection Time: 05/14/20  4:04 AM   Result Value Ref Range    Lipase 32 (L) 73 - 393 U/L   EKG, 12 LEAD, INITIAL    Collection Time: 05/14/20  4:13 AM   Result Value Ref Range    Ventricular Rate 77 BPM    Atrial Rate 77 BPM    P-R Interval 146 ms    QRS Duration 92 ms    Q-T Interval 380 ms    QTC Calculation (Bezet) 430 ms    Calculated P Axis 30 degrees    Calculated R Axis -38 degrees    Calculated T Axis 36 degrees    Diagnosis       Normal sinus rhythm  Left axis deviation  Septal infarct (cited on or before 28-MAR-2017)  Inferior infarct , age undetermined  Abnormal ECG  When compared with ECG of 13-OCT-2019 14:03,  Inferior infarct is now present         EKG interpretation by ED Physician:  Normal sinus rhythm with no acute ST or T wave changes indicative of acute ischemia  Rate 77 QRS 92   Evidence of inferior infarct not present    Cardiac monitor: Normal rate with regular rhythm and no ectopy  Pulse ox: 96%, room air, borderline    X-Ray, CT or other radiology findings or impressions:  CT ABD PELV W CONT    (Results Pending)   Previously seen pancreatic cyst structure is no longer definitively seen. Slightly more prominent mild lower lobe bronchial wall thickening with bibasilar streak opacities. Likely chronic marked colitis. Gallbladder mildly distended compared to prior. No evidence of calculi gallbladder wall thickening or pericholecystic fluid. No CT evidence of an acute intra-abdominal intrapelvic abnormality. Moderate to significant stool burden. Progress notes, Consult notes or additional Procedure notes:   Suspect patient may have just significant constipation as the etiology for his discomfort. Patient was also recently on Motrin and which may have also caused some inflammation in his stomach lining as well.   Do not feel he requires other work-up or imaging at this time  I have discussed with patient and/or family/sig other the results, interpretation of any imaging if performed, suspected diagnosis and treatment plan to include instructions regarding the diagnoses listed to which understanding was expressed with all questions answered      Reevaluation of patient:   stable    Disposition:  Diagnosis:   1. Abdominal pain, acute, epigastric    2. Constipation, unspecified constipation type        Disposition: home      Follow-up Information     Follow up With Specialties Details Why Contact Info    Mariangel Guardado MD Internal Medicine Schedule an appointment as soon as possible for a visit  [facility] [phone]      [facility] EMERGENCY DEPT Emergency Medicine  If symptoms worsen [phone] [address] [phone]            Patient's Medications   Start Taking    BISACODYL (DULCOLAX, BISACODYL,) 5 MG EC TABLET    Take 2 Tabs by mouth daily as needed for Constipation. OMEPRAZOLE DELAYED RELEASE (PRILOSEC D/R) 20 MG TABLET    Take 1 Tab by mouth daily. POLYETHYLENE GLYCOL (MIRALAX) 17 GRAM/DOSE POWDER    Take 17 g by mouth daily as needed for Constipation. 1 tablespoon with 8 oz of water daily   Continue Taking    AMLODIPINE (NORVASC) 10 MG TABLET    Take 1 Tab by mouth daily. ASPIRIN (ASPIRIN) 325 MG TABLET    Take 325 mg by mouth daily. CHOLECALCIFEROL (VITAMIN D3) 1,000 UNIT TABLET    Take  by mouth daily. These Medications have changed    Modified Medication Previous Medication    ACETAMINOPHEN (TYLENOL) 325 MG TABLET acetaminophen (TYLENOL) 325 mg tablet       Take 2 Tabs by mouth every four (4) hours as needed for Pain. Take 2 Tabs by mouth every four (4) hours as needed for Pain. Stop Taking    IBUPROFEN (MOTRIN) 800 MG TABLET    Take 1 Tab by mouth every eight (8) hours for 5 days. LIDOCAINE (LIDODERM) 5 %    Apply patch to the affected area for 12 hours a day and remove for 12 hours a day.

## 2020-05-14 NOTE — DISCHARGE INSTRUCTIONS
Patient Education        Abdominal Pain: Care Instructions  Your Care Instructions    Abdominal pain has many possible causes. Some aren't serious and get better on their own in a few days. Others need more testing and treatment. If your pain continues or gets worse, you need to be rechecked and may need more tests to find out what is wrong. You may need surgery to correct the problem. Don't ignore new symptoms, such as fever, nausea and vomiting, urination problems, pain that gets worse, and dizziness. These may be signs of a more serious problem. Your doctor may have recommended a follow-up visit in the next 8 to 12 hours. If you are not getting better, you may need more tests or treatment. The doctor has checked you carefully, but problems can develop later. If you notice any problems or new symptoms, get medical treatment right away. Follow-up care is a key part of your treatment and safety. Be sure to make and go to all appointments, and call your doctor if you are having problems. It's also a good idea to know your test results and keep a list of the medicines you take. How can you care for yourself at home? · Rest until you feel better. · To prevent dehydration, drink plenty of fluids, enough so that your urine is light yellow or clear like water. Choose water and other caffeine-free clear liquids until you feel better. If you have kidney, heart, or liver disease and have to limit fluids, talk with your doctor before you increase the amount of fluids you drink. · If your stomach is upset, eat mild foods, such as rice, dry toast or crackers, bananas, and applesauce. Try eating several small meals instead of two or three large ones. · Wait until 48 hours after all symptoms have gone away before you have spicy foods, alcohol, and drinks that contain caffeine. · Do not eat foods that are high in fat. · Avoid anti-inflammatory medicines such as aspirin, ibuprofen (Advil, Motrin), and naproxen (Aleve). These can cause stomach upset. Talk to your doctor if you take daily aspirin for another health problem. When should you call for help? Call 911 anytime you think you may need emergency care. For example, call if:    · You passed out (lost consciousness).     · You pass maroon or very bloody stools.     · You vomit blood or what looks like coffee grounds.     · You have new, severe belly pain.    Call your doctor now or seek immediate medical care if:    · Your pain gets worse, especially if it becomes focused in one area of your belly.     · You have a new or higher fever.     · Your stools are black and look like tar, or they have streaks of blood.     · You have unexpected vaginal bleeding.     · You have symptoms of a urinary tract infection. These may include:  ? Pain when you urinate. ? Urinating more often than usual.  ? Blood in your urine.     · You are dizzy or lightheaded, or you feel like you may faint.    Watch closely for changes in your health, and be sure to contact your doctor if:    · You are not getting better after 1 day (24 hours). Where can you learn more? Go to http://vale-peng.info/  Enter T467 in the search box to learn more about \"Abdominal Pain: Care Instructions. \"  Current as of: June 26, 2019Content Version: 12.4  © 1847-9789 Healthwise, Incorporated. Care instructions adapted under license by [employer] (which disclaims liability or warranty for this information). If you have questions about a medical condition or this instruction, always ask your healthcare professional. [place] [address] any warranty or liability for your use of this information. Patient Education        Constipation: Care Instructions  Your Care Instructions    Constipation means that you have a hard time passing stools (bowel movements). People pass stools from 3 times a day to once every 3 days. What is normal for you may be different.  Constipation may occur with pain in the rectum and cramping. The pain may get worse when you try to pass stools. Sometimes there are small amounts of bright red blood on toilet paper or the surface of stools. This is because of enlarged veins near the rectum (hemorrhoids). A few changes in your diet and lifestyle may help you avoid ongoing constipation. Your doctor may also prescribe medicine to help loosen your stool. Some medicines can cause constipation. These include pain medicines and antidepressants. Tell your doctor about all the medicines you take. Your doctor may want to make a medicine change to ease your symptoms. Follow-up care is a key part of your treatment and safety. Be sure to make and go to all appointments, and call your doctor if you are having problems. It's also a good idea to know your test results and keep a list of the medicines you take. How can you care for yourself at home? · Drink plenty of fluids, enough so that your urine is light yellow or clear like water. If you have kidney, heart, or liver disease and have to limit fluids, talk with your doctor before you increase the amount of fluids you drink. · Include high-fiber foods in your diet each day. These include fruits, vegetables, beans, and whole grains. · Get at least 30 minutes of exercise on most days of the week. Walking is a good choice. You also may want to do other activities, such as running, swimming, cycling, or playing tennis or team sports. · Take a fiber supplement, such as Citrucel or Metamucil, every day. Read and follow all instructions on the label. · Schedule time each day for a bowel movement. A daily routine may help. Take your time having your bowel movement. · Support your feet with a small step stool when you sit on the toilet. This helps flex your hips and places your pelvis in a squatting position. · Your doctor may recommend an over-the-counter laxative to relieve your constipation.  Examples are Milk of Magnesia and MiraLax. Read and follow all instructions on the label. Do not use laxatives on a long-term basis. When should you call for help? Call your doctor now or seek immediate medical care if:    · You have new or worse belly pain.     · You have new or worse nausea or vomiting.     · You have blood in your stools.    Watch closely for changes in your health, and be sure to contact your doctor if:    · Your constipation is getting worse.     · You do not get better as expected. Where can you learn more? Go to http://vale-peng.info/  Enter P343 in the search box to learn more about \"Constipation: Care Instructions. \"  Current as of: June 26, 2019Content Version: 12.4  © 3683-1007 Healthwise, Incorporated. Care instructions adapted under license by [employer] (which disclaims liability or warranty for this information). If you have questions about a medical condition or this instruction, always ask your healthcare professional. [place] [address] any warranty or liability for your use of this information.

## 2020-05-20 ENCOUNTER — APPOINTMENT (OUTPATIENT)
Dept: GENERAL RADIOLOGY | Age: 75
End: 2020-05-20
Attending: EMERGENCY MEDICINE
Payer: MEDICARE

## 2020-05-20 ENCOUNTER — HOSPITAL ENCOUNTER (OUTPATIENT)
Dept: ULTRASOUND IMAGING | Age: 75
Discharge: HOME | End: 2020-05-20
Attending: EMERGENCY MEDICINE
Payer: MEDICARE

## 2020-05-20 ENCOUNTER — HOSPITAL ENCOUNTER (EMERGENCY)
Age: 75
Discharge: HOME | End: 2020-05-20
Attending: EMERGENCY MEDICINE
Payer: MEDICARE

## 2020-05-20 ENCOUNTER — APPOINTMENT (OUTPATIENT)
Dept: CT IMAGING | Age: 75
End: 2020-05-20
Attending: EMERGENCY MEDICINE
Payer: MEDICARE

## 2020-05-20 VITALS
OXYGEN SATURATION: 99 % | HEIGHT: 69 IN | WEIGHT: 140 LBS | HEART RATE: 90 BPM | TEMPERATURE: 98 F | DIASTOLIC BLOOD PRESSURE: 82 MMHG | SYSTOLIC BLOOD PRESSURE: 168 MMHG | BODY MASS INDEX: 20.73 KG/M2 | RESPIRATION RATE: 14 BRPM

## 2020-05-20 DIAGNOSIS — R10.11 ABDOMINAL PAIN, RIGHT UPPER QUADRANT: Primary | ICD-10-CM

## 2020-05-20 LAB
ALBUMIN SERPL-MCNC: 4.2 G/DL (ref 3.4–5)
ALBUMIN/GLOB SERPL: 1.3 {RATIO} (ref 0.8–1.7)
ALP SERPL-CCNC: 72 U/L (ref 45–117)
ALT SERPL-CCNC: 10 U/L (ref 16–61)
AMMONIA PLAS-SCNC: 29 UMOL/L (ref 11–32)
ANION GAP SERPL CALC-SCNC: 3 MMOL/L (ref 3–18)
APPEARANCE UR: CLEAR
AST SERPL-CCNC: 7 U/L (ref 10–38)
ATRIAL RATE: 68 BPM
BASOPHILS # BLD: 0 K/UL (ref 0–0.1)
BASOPHILS NFR BLD: 0 % (ref 0–2)
BILIRUB SERPL-MCNC: 0.4 MG/DL (ref 0.2–1)
BILIRUB UR QL: NEGATIVE
BUN SERPL-MCNC: 6 MG/DL (ref 7–18)
BUN/CREAT SERPL: 7 (ref 12–20)
CALCIUM SERPL-MCNC: 9.3 MG/DL (ref 8.5–10.1)
CALCULATED P AXIS, ECG09: 48 DEGREES
CALCULATED R AXIS, ECG10: 34 DEGREES
CALCULATED T AXIS, ECG11: 57 DEGREES
CHLORIDE SERPL-SCNC: 104 MMOL/L (ref 100–111)
CK MB CFR SERPL CALC: 1.3 % (ref 0–4)
CK MB SERPL-MCNC: 1.2 NG/ML (ref 5–25)
CK SERPL-CCNC: 90 U/L (ref 39–308)
CO2 SERPL-SCNC: 32 MMOL/L (ref 21–32)
COLOR UR: YELLOW
CREAT SERPL-MCNC: 0.81 MG/DL (ref 0.6–1.3)
DIAGNOSIS, 93000: NORMAL
DIFFERENTIAL METHOD BLD: ABNORMAL
EOSINOPHIL # BLD: 0.1 K/UL (ref 0–0.4)
EOSINOPHIL NFR BLD: 2 % (ref 0–5)
ERYTHROCYTE [DISTWIDTH] IN BLOOD BY AUTOMATED COUNT: 14.6 % (ref 11.6–14.5)
ETHANOL SERPL-MCNC: <3 MG/DL (ref 0–3)
GLOBULIN SER CALC-MCNC: 3.2 G/DL (ref 2–4)
GLUCOSE SERPL-MCNC: 112 MG/DL (ref 74–99)
GLUCOSE UR STRIP.AUTO-MCNC: NEGATIVE MG/DL
HCT VFR BLD AUTO: 38.9 % (ref 36–48)
HGB BLD-MCNC: 13.2 G/DL (ref 13–16)
HGB UR QL STRIP: NEGATIVE
KETONES UR QL STRIP.AUTO: NEGATIVE MG/DL
LEUKOCYTE ESTERASE UR QL STRIP.AUTO: NEGATIVE
LIPASE SERPL-CCNC: 41 U/L (ref 73–393)
LYMPHOCYTES # BLD: 1.7 K/UL (ref 0.9–3.6)
LYMPHOCYTES NFR BLD: 23 % (ref 21–52)
MCH RBC QN AUTO: 30.2 PG (ref 24–34)
MCHC RBC AUTO-ENTMCNC: 33.9 G/DL (ref 31–37)
MCV RBC AUTO: 89 FL (ref 74–97)
MONOCYTES # BLD: 0.6 K/UL (ref 0.05–1.2)
MONOCYTES NFR BLD: 8 % (ref 3–10)
NEUTS SEG # BLD: 4.9 K/UL (ref 1.8–8)
NEUTS SEG NFR BLD: 67 % (ref 40–73)
NITRITE UR QL STRIP.AUTO: NEGATIVE
P-R INTERVAL, ECG05: 150 MS
PH UR STRIP: 7.5 [PH] (ref 5–8)
PLATELET # BLD AUTO: 280 K/UL (ref 135–420)
PMV BLD AUTO: 9.3 FL (ref 9.2–11.8)
POTASSIUM SERPL-SCNC: 4 MMOL/L (ref 3.5–5.5)
PROT SERPL-MCNC: 7.4 G/DL (ref 6.4–8.2)
PROT UR STRIP-MCNC: NEGATIVE MG/DL
Q-T INTERVAL, ECG07: 398 MS
QRS DURATION, ECG06: 88 MS
QTC CALCULATION (BEZET), ECG08: 423 MS
RBC # BLD AUTO: 4.37 M/UL (ref 4.7–5.5)
SODIUM SERPL-SCNC: 139 MMOL/L (ref 136–145)
SP GR UR REFRACTOMETRY: 1.01 (ref 1–1.03)
TROPONIN I SERPL-MCNC: 0.02 NG/ML (ref 0–0.04)
UROBILINOGEN UR QL STRIP.AUTO: 0.2 EU/DL (ref 0.2–1)
VENTRICULAR RATE, ECG03: 68 BPM
WBC # BLD AUTO: 7.3 K/UL (ref 4.6–13.2)

## 2020-05-20 PROCEDURE — 82550 ASSAY OF CK (CPK): CPT

## 2020-05-20 PROCEDURE — 71045 X-RAY EXAM CHEST 1 VIEW: CPT

## 2020-05-20 PROCEDURE — 74011636320 HC RX REV CODE- 636/320: Performed by: EMERGENCY MEDICINE

## 2020-05-20 PROCEDURE — C9113 INJ PANTOPRAZOLE SODIUM, VIA: HCPCS | Performed by: EMERGENCY MEDICINE

## 2020-05-20 PROCEDURE — 99285 EMERGENCY DEPT VISIT HI MDM: CPT

## 2020-05-20 PROCEDURE — 76705 ECHO EXAM OF ABDOMEN: CPT

## 2020-05-20 PROCEDURE — 93005 ELECTROCARDIOGRAM TRACING: CPT

## 2020-05-20 PROCEDURE — 96375 TX/PRO/DX INJ NEW DRUG ADDON: CPT

## 2020-05-20 PROCEDURE — 74177 CT ABD & PELVIS W/CONTRAST: CPT

## 2020-05-20 PROCEDURE — 81003 URINALYSIS AUTO W/O SCOPE: CPT

## 2020-05-20 PROCEDURE — 96376 TX/PRO/DX INJ SAME DRUG ADON: CPT

## 2020-05-20 PROCEDURE — 82140 ASSAY OF AMMONIA: CPT

## 2020-05-20 PROCEDURE — 83690 ASSAY OF LIPASE: CPT

## 2020-05-20 PROCEDURE — 96374 THER/PROPH/DIAG INJ IV PUSH: CPT

## 2020-05-20 PROCEDURE — 80053 COMPREHEN METABOLIC PANEL: CPT

## 2020-05-20 PROCEDURE — 80307 DRUG TEST PRSMV CHEM ANLYZR: CPT

## 2020-05-20 PROCEDURE — 74011250636 HC RX REV CODE- 250/636: Performed by: EMERGENCY MEDICINE

## 2020-05-20 PROCEDURE — 85025 COMPLETE CBC W/AUTO DIFF WBC: CPT

## 2020-05-20 RX ORDER — PANTOPRAZOLE SODIUM 40 MG/10ML
40 INJECTION, POWDER, LYOPHILIZED, FOR SOLUTION INTRAVENOUS
Status: COMPLETED | OUTPATIENT
Start: 2020-05-20 | End: 2020-05-20

## 2020-05-20 RX ORDER — LANOLIN ALCOHOL/MO/W.PET/CERES
3 CREAM (GRAM) TOPICAL
COMMUNITY

## 2020-05-20 RX ORDER — RISPERIDONE 0.5 MG/1
0.5 TABLET, FILM COATED ORAL
COMMUNITY

## 2020-05-20 RX ORDER — MAG HYDROX/ALUMINUM HYD/SIMETH 200-200-20
30 SUSPENSION, ORAL (FINAL DOSE FORM) ORAL
Qty: 200 ML | Refills: 0 | Status: SHIPPED | OUTPATIENT
Start: 2020-05-20 | End: 2020-07-14 | Stop reason: CLARIF

## 2020-05-20 RX ORDER — LANOLIN ALCOHOL/MO/W.PET/CERES
100 CREAM (GRAM) TOPICAL DAILY
COMMUNITY

## 2020-05-20 RX ORDER — MORPHINE SULFATE 4 MG/ML
2 INJECTION, SOLUTION INTRAMUSCULAR; INTRAVENOUS
Status: COMPLETED | OUTPATIENT
Start: 2020-05-20 | End: 2020-05-20

## 2020-05-20 RX ORDER — MORPHINE SULFATE 2 MG/ML
2 INJECTION, SOLUTION INTRAMUSCULAR; INTRAVENOUS
Status: COMPLETED | OUTPATIENT
Start: 2020-05-20 | End: 2020-05-20

## 2020-05-20 RX ORDER — CAPTOPRIL 25 MG/1
25 TABLET ORAL
COMMUNITY

## 2020-05-20 RX ORDER — AMANTADINE HYDROCHLORIDE 100 MG/1
100 CAPSULE, GELATIN COATED ORAL 2 TIMES DAILY
COMMUNITY

## 2020-05-20 RX ORDER — NALOXONE HYDROCHLORIDE 0.4 MG/ML
0.4 INJECTION, SOLUTION INTRAMUSCULAR; INTRAVENOUS; SUBCUTANEOUS AS NEEDED
Status: DISCONTINUED | OUTPATIENT
Start: 2020-05-20 | End: 2020-05-20 | Stop reason: HOSPADM

## 2020-05-20 RX ORDER — HYDRALAZINE HYDROCHLORIDE 20 MG/ML
20 INJECTION INTRAMUSCULAR; INTRAVENOUS ONCE
Status: COMPLETED | OUTPATIENT
Start: 2020-05-20 | End: 2020-05-20

## 2020-05-20 RX ORDER — CARBAMAZEPINE 200 MG/1
200 TABLET ORAL 3 TIMES DAILY
Status: ON HOLD | COMMUNITY
End: 2020-07-18

## 2020-05-20 RX ORDER — FOLIC ACID 1 MG/1
1 TABLET ORAL DAILY
COMMUNITY

## 2020-05-20 RX ADMIN — MORPHINE SULFATE 2 MG: 4 INJECTION, SOLUTION INTRAMUSCULAR; INTRAVENOUS at 12:24

## 2020-05-20 RX ADMIN — HYDRALAZINE HYDROCHLORIDE 20 MG: 20 INJECTION INTRAMUSCULAR; INTRAVENOUS at 12:24

## 2020-05-20 RX ADMIN — IOPAMIDOL 95 ML: 612 INJECTION, SOLUTION INTRAVENOUS at 15:17

## 2020-05-20 RX ADMIN — MORPHINE SULFATE 2 MG: 2 INJECTION, SOLUTION INTRAMUSCULAR; INTRAVENOUS at 14:33

## 2020-05-20 RX ADMIN — PANTOPRAZOLE SODIUM 40 MG: 40 INJECTION, POWDER, FOR SOLUTION INTRAVENOUS at 10:14

## 2020-05-20 RX ADMIN — SODIUM CHLORIDE 1000 ML: 900 INJECTION, SOLUTION INTRAVENOUS at 10:10

## 2020-05-20 NOTE — PROGRESS NOTES
Problem: Discharge Planning  Goal: *Discharge to safe environment  Outcome: Progressing Towards Goal  Plan is  home and follow up with the [place]

## 2020-05-20 NOTE — ED NOTES
Patient had to be reminded that he was given pain medication, insists he did not receive any pain medication, states he is still in pain. Wants to speak with the provider.

## 2020-05-20 NOTE — ED NOTES
Pt daughter Steven Bland called and states she will be here to  patient in 10 minutes.   Will call when she is outside

## 2020-05-20 NOTE — ED NOTES
4:33 PM  Seen care of patient CT is negative on reevaluation patient is mildly confused I believe to be at his baseline he denies any pain there is no abdominal tenderness will DC home      Diagnosis:   1. Abdominal pain, right upper quadrant          Disposition: home     Follow-up Information     Follow up With Specialties Details Why Contact Leeanne Vigil MD Internal Medicine On 5/29/2020 Phone visit- they are to call patient for 1:30 phone appointment. Please ask one of your daughters to listen in also. [address]  [phone]            Patient's Medications   Start Taking    ALUM-MAG HYDROXIDE-SIMETH (MYLANTA) 200-200-20 MG/5 ML SUSP    Take 30 mL by mouth four (4) times daily as needed for Gastroesophageal Reflux Disease (GERD). Continue Taking    ACETAMINOPHEN (TYLENOL) 325 MG TABLET    Take 2 Tabs by mouth every four (4) hours as needed for Pain. AMANTADINE HCL (SYMMETREL) 100 MG CAPSULE    Take 100 mg by mouth two (2) times a day. AMLODIPINE (NORVASC) 10 MG TABLET    Take 1 Tab by mouth daily. ASPIRIN (ASPIRIN) 325 MG TABLET    Take 325 mg by mouth daily. BISACODYL (DULCOLAX, BISACODYL,) 5 MG EC TABLET    Take 2 Tabs by mouth daily as needed for Constipation. CAMPHOR-MENTHOL (SARNA ORIGINAL) 0.5-0.5 % LOTION    Apply  to affected area as needed for Itching. CAPTOPRIL (CAPOTEN) 25 MG TABLET    Take 25 mg by mouth Before breakfast, lunch, and dinner. CARBAMAZEPINE (TEGRETOL) 200 MG TABLET    Take 200 mg by mouth three (3) times daily. CHOLECALCIFEROL (VITAMIN D3) 1,000 UNIT TABLET    Take  by mouth daily. FOLIC ACID (FOLVITE) 1 MG TABLET    Take 1 mg by mouth daily. MELATONIN 3 MG TABLET    Take 3 mg by mouth. OMEPRAZOLE DELAYED RELEASE (PRILOSEC D/R) 20 MG TABLET    Take 1 Tab by mouth daily. POLYETHYLENE GLYCOL (MIRALAX) 17 GRAM/DOSE POWDER    Take 17 g by mouth daily as needed for Constipation.  1 tablespoon with 8 oz of water daily    RISPERIDONE (RISPERDAL) 0.5 MG TABLET    Take 0.5 mg by mouth. THIAMINE HCL (VITAMIN B-1) 100 MG TABLET    Take 100 mg by mouth daily.    These Medications have changed    No medications on file   Stop Taking    No medications on file

## 2020-05-20 NOTE — ED PROVIDER NOTES
Date: 5/20/2020  Patient Name: Hilaria Zaidi    History of Presenting Illness     Chief Complaint   Patient presents with    Abdominal Pain    Back Pain     History Provided By: Patient    HPI/Chief Complaint: (Context):who presents with chief complaint of abdominal pain  Patient is a poor historian  States he lives alone  Patient does not recall being here 5 days ago. Patient's history shows that he has had this abdominal pain in the past 1 year ago when he had a stent put in in his biliary area  Patient does not appear to have followed up. Patient does not recall this either. Patient's 5 days ago with abdominal pain which was similar presentation also had no specific finding on the CAT scan that was done  Patient is denying any vomiting or diarrhea no chest pain no exertional symptoms no shortness of breath  Patient's history is limited due to his prior stroke as per patient  Patient is denying any new focal deficits denying any chest pain denies any cough congestion states he lives alone. Patient's triage note is reviewed  Patient with SONJA level to arrival  Patient's chief complaint is abdominal pain and back pain  Patient's vitals are stable except for blood pressure 202, 105  Patient is known hypertensive patient but has not taken the medications.   Patient's triage note with right-sided abdominal pain radiating to the back no nausea vomiting or diarrhea  Pain scale is 7 out of 10  Allergies are none  Patient's home medications that are documented to include Tylenol Norvasc aspirin Prilosec  Patient's medical history includes hypertension COPD and smoker  Social history includes smoking no alcohol use no drugs,  Surgical history is colonoscopy, ERCP bile duct stent last year in February, appendectomy, hernia repair  Patient's chart review shows patient was seen this month about 6 days ago for abdominal pain which is epigastric history of chronic pancreatitis patient CT scan that was done on the 14th of this month shows chronic pancreatitis finding with no acute pancreatitis, prominent chronic biliary tree no evidence acute cholecystitis biliary stent was placed last year according to the fluoroscopy in February    PCP: Mellissa Guardado MD    Current Facility-Administered Medications   Medication Dose Route Frequency Provider Last Rate Last Dose    naloxone (NARCAN) injection 0.4 mg  0.4 mg IntraVENous PRN Soumya Lester MD         Current Outpatient Medications   Medication Sig Dispense Refill    camphor-menthoL (Sarna Original) 0.5-0.5 % lotion Apply  to affected area as needed for Itching.  carBAMazepine (TEGretol) 200 mg tablet Take 200 mg by mouth three (3) times daily.  amantadine HCL (SYMMETREL) 100 mg capsule Take 100 mg by mouth two (2) times a day.  folic acid (FOLVITE) 1 mg tablet Take 1 mg by mouth daily.  thiamine HCL (Vitamin B-1) 100 mg tablet Take 100 mg by mouth daily.  risperiDONE (RisperDAL) 0.5 mg tablet Take 0.5 mg by mouth.  melatonin 3 mg tablet Take 3 mg by mouth.  captopriL (CAPOTEN) 25 mg tablet Take 25 mg by mouth Before breakfast, lunch, and dinner.  acetaminophen (TYLENOL) 325 mg tablet Take 2 Tabs by mouth every four (4) hours as needed for Pain. 50 Tab 0    bisacodyL (Dulcolax, bisacodyl,) 5 mg EC tablet Take 2 Tabs by mouth daily as needed for Constipation. 15 Tab 0    polyethylene glycol (Miralax) 17 gram/dose powder Take 17 g by mouth daily as needed for Constipation. 1 tablespoon with 8 oz of water daily 510 g 0    Omeprazole delayed release (PRILOSEC D/R) 20 mg tablet Take 1 Tab by mouth daily. 15 Tab 0    aspirin (ASPIRIN) 325 mg tablet Take 325 mg by mouth daily.  amLODIPine (NORVASC) 10 mg tablet Take 1 Tab by mouth daily. 30 Tab 0    cholecalciferol (VITAMIN D3) 1,000 unit tablet Take  by mouth daily.          Past History     Past Medical History:  Past Medical History:   Diagnosis Date    COPD (chronic obstructive pulmonary disease) ([facility] [address]     Hypertension     Smoker     25 pack years       Past Surgical History:  Past Surgical History:   Procedure Laterality Date    ABDOMEN SURGERY PROC UNLISTED      APPENDECTOMY      HX APPENDECTOMY      HX COLONOSCOPY  1/28/2015    Repeat colonoscopy in 5 yrs per Dr. Meenakshi Valles    HX ERCP  02/11/2019    bile duct stent    HX HERNIA REPAIR  10/22/2017    Ex lap. lysis of adhesions adn reductions of internal hernia done 10/22/2017       Family History:  Family History   Problem Relation Age of Onset    Heart Disease Mother     Cancer Father     Cancer Sister     Diabetes Sister        Social History:  Social History     Tobacco Use    Smoking status: Current Every Day Smoker     Packs/day: 0.50     Years: 50.00     Pack years: 25.00     Types: Cigarettes    Smokeless tobacco: Never Used   Substance Use Topics    Alcohol use: Not Currently     Comment: no alcohol in a year     Drug use: No       Allergies:  No Known Allergies      Review of Systems   Review of Systems   Constitutional: Negative for activity change, fatigue and fever. HENT: Negative for congestion and rhinorrhea. Eyes: Negative for visual disturbance. Respiratory: Negative for shortness of breath. Cardiovascular: Negative for chest pain and palpitations. Gastrointestinal: Positive for abdominal pain. Negative for diarrhea, nausea and vomiting. Genitourinary: Negative for dysuria and hematuria. Musculoskeletal: Negative for back pain. Skin: Negative for rash. Neurological: Negative for dizziness, weakness and light-headedness. Psychiatric/Behavioral: Negative for agitation. All other systems reviewed and are negative. Physical Exam     Physical Exam  Constitutional:       Appearance: He is well-developed. HENT:      Head: Normocephalic and atraumatic. Eyes:      Conjunctiva/sclera: Conjunctivae normal.      Pupils: Pupils are equal, round, and reactive to light.    Neck: Musculoskeletal: Normal range of motion and neck supple. Cardiovascular:      Rate and Rhythm: Normal rate and regular rhythm. Pulmonary:      Effort: Pulmonary effort is normal.      Breath sounds: Normal breath sounds. Abdominal:      General: Bowel sounds are normal.      Palpations: Abdomen is soft. Tenderness: There is abdominal tenderness in the right upper quadrant. Musculoskeletal: Normal range of motion. Lymphadenopathy:      Cervical: No cervical adenopathy. Skin:     General: Skin is warm. Capillary Refill: Capillary refill takes less than 2 seconds. Neurological:      General: No focal deficit present. Mental Status: He is alert. Psychiatric:         Mood and Affect: Mood normal.         Medical Decision Making   I am the first provider for this patient. I reviewed the vital signs, available nursing notes, past medical history, past surgical history, family history and social history. Provider Notes (Medical Decision Making): Right upper quadrant pain  I will check patient's basic lab including lipase as well. My concern for patient is that he is having decreased memory  Patient had a biliary stent placed  I will get patient's ultrasound and basic labs and discussed information with her gastroenterologist as well  Patient called her primary care physician to establish the patient is appropriate care at home if I cannot do that I will have case management also do a home evaluation for this patient. Vital Signs-Reviewed the patient's vital signs. Pulse Oximetry Analysis -100%, room air, normal    Cardiac Monitor:  Rate/Rhythm: 91, sinus rhythm, normal    EKG: Interpreted by the EP.   Time of the EKG is 10:53 AM, 68 normal interval axes and normal sinus rhythm  No sign of acute ischemia or dysrhythmia on the EKG poor R wave progression in the anterior wall is appreciated  This EKG is compared with May 14 for similar morphology of the EKG is present       Vitals:    05/20/20 1300 05/20/20 1315 05/20/20 1345 05/20/20 1400   BP: 148/72 143/78 154/75 158/78   Pulse: 91 93 86 84   Resp: 17 16 16 14   Temp:       SpO2: 98% 98% 98% 99%   Weight:       Height:           Records Reviewed: Nursing Notes    ED Course:    10:12 AM  I discussed the information of the patient's PCP  He does state that patient had some issues in the past but he has not seen the patient last 1 year  He is also stated in the past he had missed his appointment as well. He is unclear if there is home health or any body observing this patient at home. At this point I have added a case management consult for this patient and requesting potentially need Adult Protective Services to evaluate him further if there is no established plan at home for him. Diagnostic Study Results     Orders Placed This Encounter    US GALLBLADDER     Standing Status:   Standing     Number of Occurrences:   1     Order Specific Question:   Transport     Answer:   Stretcher [5]     Order Specific Question:   Reason for Exam     Answer:   Right upper quadrant pain history of biliary stent and chronic pancreatitis CT 4 days ago nonspecific finding.  XR CHEST PORT     Standing Status:   Standing     Number of Occurrences:   1     Order Specific Question:   Reason for Exam     Answer:   cough    CT ABD PELV W CONT     Standing Status:   Standing     Number of Occurrences:   1     Order Specific Question:   Transport     Answer:   Stretcher [5]     Order Specific Question:   Reason for Exam     Answer:   abd pain/ruq/     Order Specific Question: This order utilizes IV contrast.  What additional contrast is needed? Answer:   None     Order Specific Question:   Does patient have history of Renal Disease?      Answer:   No    CBC WITH AUTOMATED DIFF     Standing Status:   Standing     Number of Occurrences:   1    URINALYSIS W/ RFLX MICROSCOPIC     Standing Status:   Standing     Number of Occurrences:   1    AMMONIA     Standing Status:   Standing     Number of Occurrences:   1    ETHYL ALCOHOL     Standing Status:   Standing     Number of Occurrences:   1    LIPASE     Standing Status:   Standing     Number of Occurrences:   1    CARDIAC PANEL,(CK, CKMB & TROPONIN)     Standing Status:   Standing     Number of Occurrences:   1    METABOLIC PANEL, COMPREHENSIVE     Standing Status:   Standing     Number of Occurrences:   1    DIET NPO     Standing Status:   Standing     Number of Occurrences:   1    Consult PCP     Standing Status:   Standing     Number of Occurrences:   1     Order Specific Question:   Reason for Consult: Answer:   Manage patient's home health, abdominal pain     Order Specific Question:   Did you call or speak to the consulting provider? Answer:   No     Order Specific Question:   Consult To     Answer:   Manage in the emergency department     Order Specific Question:   Schedule When? Answer:   TODAY    EKG, 12 LEAD, INITIAL     Standing Status:   Standing     Number of Occurrences:   1     Order Specific Question:   Reason for Exam:     Answer:   ruq/epigastric pain    sodium chloride 0.9 % bolus infusion 1,000 mL    pantoprazole (PROTONIX) injection 40 mg     Order Specific Question:   PPI INDICATION     Answer:   Symptomatic GERD    camphor-menthoL (Sarna Original) 0.5-0.5 % lotion     Sig: Apply  to affected area as needed for Itching.  carBAMazepine (TEGretol) 200 mg tablet     Sig: Take 200 mg by mouth three (3) times daily.  amantadine HCL (SYMMETREL) 100 mg capsule     Sig: Take 100 mg by mouth two (2) times a day.  folic acid (FOLVITE) 1 mg tablet     Sig: Take 1 mg by mouth daily.  thiamine HCL (Vitamin B-1) 100 mg tablet     Sig: Take 100 mg by mouth daily.  risperiDONE (RisperDAL) 0.5 mg tablet     Sig: Take 0.5 mg by mouth.  melatonin 3 mg tablet     Sig: Take 3 mg by mouth.     captopriL (CAPOTEN) 25 mg tablet     Sig: Take 25 mg by mouth Before breakfast, lunch, and dinner.  hydrALAZINE (APRESOLINE) 20 mg/mL injection 20 mg    morphine injection 2 mg    morphine injection 2 mg    naloxone (NARCAN) injection 0.4 mg    IP CONSULT TO CASE MANAGEMENT     Standing Status:   Standing     Number of Occurrences:   1     Order Specific Question:   Reason for Consult: Answer:   manage patient/no OP followup with PCP - missed appointment - possibly needs APS. Labs -     Recent Results (from the past 12 hour(s))   CBC WITH AUTOMATED DIFF    Collection Time: 05/20/20 10:20 AM   Result Value Ref Range    WBC 7.3 4.6 - 13.2 K/uL    RBC 4.37 (L) 4.70 - 5.50 M/uL    HGB 13.2 13.0 - 16.0 g/dL    HCT 38.9 36.0 - 48.0 %    MCV 89.0 74.0 - 97.0 FL    MCH 30.2 24.0 - 34.0 PG    MCHC 33.9 31.0 - 37.0 g/dL    RDW 14.6 (H) 11.6 - 14.5 %    PLATELET 258 889 - 841 K/uL    MPV 9.3 9.2 - 11.8 FL    NEUTROPHILS 67 40 - 73 %    LYMPHOCYTES 23 21 - 52 %    MONOCYTES 8 3 - 10 %    EOSINOPHILS 2 0 - 5 %    BASOPHILS 0 0 - 2 %    ABS. NEUTROPHILS 4.9 1.8 - 8.0 K/UL    ABS. LYMPHOCYTES 1.7 0.9 - 3.6 K/UL    ABS. MONOCYTES 0.6 0.05 - 1.2 K/UL    ABS. EOSINOPHILS 0.1 0.0 - 0.4 K/UL    ABS.  BASOPHILS 0.0 0.0 - 0.1 K/UL    DF AUTOMATED     AMMONIA    Collection Time: 05/20/20 10:20 AM   Result Value Ref Range    Ammonia 29 11 - 32 UMOL/L   ETHYL ALCOHOL    Collection Time: 05/20/20 10:20 AM   Result Value Ref Range    ALCOHOL(ETHYL),SERUM <3 0 - 3 MG/DL   EKG, 12 LEAD, INITIAL    Collection Time: 05/20/20 10:53 AM   Result Value Ref Range    Ventricular Rate 68 BPM    Atrial Rate 68 BPM    P-R Interval 150 ms    QRS Duration 88 ms    Q-T Interval 398 ms    QTC Calculation (Bezet) 423 ms    Calculated P Axis 48 degrees    Calculated R Axis 34 degrees    Calculated T Axis 57 degrees    Diagnosis       Normal sinus rhythm  Septal infarct (cited on or before 28-MAR-2017)  Abnormal ECG  When compared with ECG of 14-MAY-2020 04:13,  Criteria for Inferior infarct are no longer present  Confirmed by Lee Eli ([id number]) on 5/20/2020 2:25:40 PM     URINALYSIS W/ RFLX MICROSCOPIC    Collection Time: 05/20/20 11:22 AM   Result Value Ref Range    Color YELLOW      Appearance CLEAR      Specific gravity 1.006 1.005 - 1.030      pH (UA) 7.5 5.0 - 8.0      Protein Negative NEG mg/dL    Glucose Negative NEG mg/dL    Ketone Negative NEG mg/dL    Bilirubin Negative NEG      Blood Negative NEG      Urobilinogen 0.2 0.2 - 1.0 EU/dL    Nitrites Negative NEG      Leukocyte Esterase Negative NEG     LIPASE    Collection Time: 05/20/20 11:36 AM   Result Value Ref Range    Lipase 41 (L) 73 - 393 U/L   CARDIAC PANEL,(CK, CKMB & TROPONIN)    Collection Time: 05/20/20 11:36 AM   Result Value Ref Range    CK - MB 1.2 <3.6 ng/ml    CK-MB Index 1.3 0.0 - 4.0 %    CK 90 39 - 308 U/L    Troponin-I, QT 0.02 0.0 - 5.401 NG/ML   METABOLIC PANEL, COMPREHENSIVE    Collection Time: 05/20/20 11:36 AM   Result Value Ref Range    Sodium 139 136 - 145 mmol/L    Potassium 4.0 3.5 - 5.5 mmol/L    Chloride 104 100 - 111 mmol/L    CO2 32 21 - 32 mmol/L    Anion gap 3 3.0 - 18 mmol/L    Glucose 112 (H) 74 - 99 mg/dL    BUN 6 (L) 7.0 - 18 MG/DL    Creatinine 0.81 0.6 - 1.3 MG/DL    BUN/Creatinine ratio 7 (L) 12 - 20      GFR est AA >60 >60 ml/min/1.73m2    GFR est non-AA >60 >60 ml/min/1.73m2    Calcium 9.3 8.5 - 10.1 MG/DL    Bilirubin, total 0.4 0.2 - 1.0 MG/DL    ALT (SGPT) 10 (L) 16 - 61 U/L    AST (SGOT) 7 (L) 10 - 38 U/L    Alk. phosphatase 72 45 - 117 U/L    Protein, total 7.4 6.4 - 8.2 g/dL    Albumin 4.2 3.4 - 5.0 g/dL    Globulin 3.2 2.0 - 4.0 g/dL    A-G Ratio 1.3 0.8 - 1.7         Radiologic Studies -   XR CHEST PORT   Final Result   IMPRESSION:      No acute cardiopulmonary abnormality. US GALLBLADDER   Final Result   IMPRESSION:      No gallstones or secondary findings of cholecystitis.          CT ABD PELV W CONT    (Results Pending)     CT Results  (Last 48 hours)    None        CXR Results  (Last 48 hours)               05/20/20 1104  XR CHEST PORT Final result    Impression:  IMPRESSION:       No acute cardiopulmonary abnormality. Narrative:  EXAM: XR CHEST PORT       CLINICAL INDICATION/HISTORY: cough   -Additional: None       COMPARISON: 10/13/2019       TECHNIQUE: Portable frontal view of the chest       _______________       FINDINGS:       SUPPORT DEVICES: None. HEART AND MEDIASTINUM: Cardiomediastinal silhouette within normal limits. LUNGS AND PLEURAL SPACES: No dense consolidation, large effusion or   pneumothorax.       _______________                 I have discussed the patient patient's PCP  They will evaluate patient outpatient as well he has not been following up with them  I discussed the information my  talk to patient's daughter as well who picked the patient up and states they check up on the patient and patient is 9-5 care every day on weekdays they are very comfortable patient getting discharged as well      2:51 PM : Pt care transferred to Dr. Teresa Escalante  ,ED provider. History of patient complaint(s), available diagnostic reports and current treatment plan has been discussed thoroughly. Intended disposition of patient : Home  Pending diagnostics reports and/or labs (please list): CT abd  Please attempt to call patient's daughter as I have tried and received a busy signal.            Discharge     Clinical Impression:   1. Abdominal pain, right upper quadrant        Disposition:  Home    It should be noted that I will be the provider of record for this patient  Saji Mark MD      Follow-up Information     Follow up With Specialties Details Why Contact Info    Sosa Guardado MD Internal Medicine On 5/29/2020 Phone visit- they are to call patient for 1:30 phone appointment. Please ask one of your daughters to listen in also.  [address] [place] [address]  [phone]            Current Discharge Medication List

## 2020-05-20 NOTE — PROGRESS NOTES
Problem: Discharge Planning  Goal: *Discharge to safe environment  Outcome: Progressing Towards Goal  Plan is  home and follow up with the [place]    Reason for Admission:   Right side Abdominal pain                   RUR Score:      none               Plan for utilizing home health: He would follow up thru the [place]       PCP: First and Last name:  Julissa Guardado   Name of Practice:    Are you a current patient: Yes/No:    Approximate date of last visit:  Says he does not remember, it has been a while. Current Advanced Directive/Advance Care Plan: none                         Transition of Care Plan:    Of note: patient appears to be hard of hearing and it may be harder for him when we have our masks on. Chart reviewed and pt verified demographics. He says he has not seen his doctor for awhile, but has an appt coming up this week. I asked him how he got to the [place] for MD appt, He says he has a bus stop near his home that he can take. He has a apartment  that is on 1st floor. He says he just walks to the grocery store. He states he is independent with all ADL. I discussed having the benefit from Medicaid that he can get personal care  But he says he does not need them. He is alright on his own. His plan is home. Contact- daughter Damian Park [phone]- I tried to call her but call was busy x2. This is wrong number. Has other contact- not [facility]   Is ex wife Benton Bee- [phone]  . Care Management Interventions  PCP Verified by CM: Yes(does not remember)  Mode of Transport at Discharge: Other (see comment)(He says he will walk down the street)  Transition of Care Consult (CM Consult): Discharge Planning  Current Support Network: Lives Alone  Confirm Follow Up Transport: Other (see comment)  The Plan for Transition of Care is Related to the Following Treatment Goals : Patient pain has decresed .   [place] Resource Information Provided?: Yes  Discharge Location  Discharge Placement: Home     12:48 I called to pt's ex wife and she states pt has a personall care aide from [address] [place] [address] to , unsure of which company. He had recently came out of [facility] for an extended stay. He had been in and out from there a couple times this year. Ex-wife states daughter Danyell Butler is working 2 jobs and the phone number changed. But she says not to call her, but to call daughter Andressa Maldonado  since she is the one that is able to check up on him. Jessica Junior will pick him up from hospital and says she called and spoke to patient's nurse. I discussed with MD.   Patient's next PCP appointment will be May 29th at  1:30. The doctor office will call patient at home and notification paced in follow up in chart and large print note given to pt about this appointment. [facility] Pref[facility] to Transfer form signed and faxed to the [place]. .,     NATHAN Quiles  [phone]- change sent to Registration. Yoandy Johnston.  Gael Shane, BSN  [facility]  [phone], Pager [phone]  [email or web address]

## 2020-05-20 NOTE — ED NOTES
Patient states the pain medication is too weak, provider made aware that the patient is still in pain

## 2020-05-20 NOTE — DISCHARGE INSTRUCTIONS

## 2020-06-01 ENCOUNTER — APPOINTMENT (OUTPATIENT)
Dept: GENERAL RADIOLOGY | Age: 75
End: 2020-06-01
Attending: PHYSICIAN ASSISTANT
Payer: MEDICARE

## 2020-06-01 ENCOUNTER — HOSPITAL ENCOUNTER (EMERGENCY)
Age: 75
Discharge: HOME | End: 2020-06-01
Attending: EMERGENCY MEDICINE
Payer: MEDICARE

## 2020-06-01 VITALS
RESPIRATION RATE: 16 BRPM | TEMPERATURE: 98.7 F | HEART RATE: 94 BPM | SYSTOLIC BLOOD PRESSURE: 170 MMHG | OXYGEN SATURATION: 100 % | DIASTOLIC BLOOD PRESSURE: 113 MMHG

## 2020-06-01 DIAGNOSIS — R10.84 ABDOMINAL PAIN, GENERALIZED: Primary | ICD-10-CM

## 2020-06-01 DIAGNOSIS — I10 ESSENTIAL HYPERTENSION: ICD-10-CM

## 2020-06-01 DIAGNOSIS — E86.0 DEHYDRATION: ICD-10-CM

## 2020-06-01 LAB
ALBUMIN SERPL-MCNC: 3.6 G/DL (ref 3.4–5)
ALBUMIN/GLOB SERPL: 0.9 {RATIO} (ref 0.8–1.7)
ALP SERPL-CCNC: 73 U/L (ref 45–117)
ALT SERPL-CCNC: 9 U/L (ref 16–61)
ANION GAP SERPL CALC-SCNC: 8 MMOL/L (ref 3–18)
APPEARANCE UR: CLEAR
AST SERPL-CCNC: 13 U/L (ref 10–38)
BASOPHILS # BLD: 0 K/UL (ref 0–0.1)
BASOPHILS NFR BLD: 0 % (ref 0–2)
BILIRUB DIRECT SERPL-MCNC: <0.1 MG/DL (ref 0–0.2)
BILIRUB SERPL-MCNC: 0.4 MG/DL (ref 0.2–1)
BILIRUB UR QL: NEGATIVE
BUN SERPL-MCNC: 10 MG/DL (ref 7–18)
BUN/CREAT SERPL: 11 (ref 12–20)
CALCIUM SERPL-MCNC: 8.4 MG/DL (ref 8.5–10.1)
CHLORIDE SERPL-SCNC: 102 MMOL/L (ref 100–111)
CK MB CFR SERPL CALC: 0.9 % (ref 0–4)
CK MB SERPL-MCNC: 1.2 NG/ML (ref 5–25)
CK SERPL-CCNC: 130 U/L (ref 39–308)
CO2 SERPL-SCNC: 23 MMOL/L (ref 21–32)
COLOR UR: YELLOW
CREAT SERPL-MCNC: 0.9 MG/DL (ref 0.6–1.3)
DIFFERENTIAL METHOD BLD: ABNORMAL
EOSINOPHIL # BLD: 0 K/UL (ref 0–0.4)
EOSINOPHIL NFR BLD: 0 % (ref 0–5)
ERYTHROCYTE [DISTWIDTH] IN BLOOD BY AUTOMATED COUNT: 14.1 % (ref 11.6–14.5)
GLOBULIN SER CALC-MCNC: 3.9 G/DL (ref 2–4)
GLUCOSE SERPL-MCNC: 84 MG/DL (ref 74–99)
GLUCOSE UR STRIP.AUTO-MCNC: NEGATIVE MG/DL
HCT VFR BLD AUTO: 39.1 % (ref 36–48)
HGB BLD-MCNC: 13.3 G/DL (ref 13–16)
HGB UR QL STRIP: NEGATIVE
KETONES UR QL STRIP.AUTO: 15 MG/DL
LEUKOCYTE ESTERASE UR QL STRIP.AUTO: NEGATIVE
LIPASE SERPL-CCNC: 33 U/L (ref 73–393)
LYMPHOCYTES # BLD: 1.6 K/UL (ref 0.9–3.6)
LYMPHOCYTES NFR BLD: 23 % (ref 21–52)
MCH RBC QN AUTO: 30 PG (ref 24–34)
MCHC RBC AUTO-ENTMCNC: 34 G/DL (ref 31–37)
MCV RBC AUTO: 88.3 FL (ref 74–97)
MONOCYTES # BLD: 0.8 K/UL (ref 0.05–1.2)
MONOCYTES NFR BLD: 11 % (ref 3–10)
NEUTS SEG # BLD: 4.7 K/UL (ref 1.8–8)
NEUTS SEG NFR BLD: 66 % (ref 40–73)
NITRITE UR QL STRIP.AUTO: NEGATIVE
PH UR STRIP: 6 [PH] (ref 5–8)
PLATELET # BLD AUTO: 263 K/UL (ref 135–420)
PMV BLD AUTO: 8.9 FL (ref 9.2–11.8)
POTASSIUM SERPL-SCNC: 5 MMOL/L (ref 3.5–5.5)
PROT SERPL-MCNC: 7.5 G/DL (ref 6.4–8.2)
PROT UR STRIP-MCNC: NEGATIVE MG/DL
RBC # BLD AUTO: 4.43 M/UL (ref 4.7–5.5)
SODIUM SERPL-SCNC: 133 MMOL/L (ref 136–145)
SP GR UR REFRACTOMETRY: 1.02 (ref 1–1.03)
TROPONIN I SERPL-MCNC: <0.02 NG/ML (ref 0–0.04)
UROBILINOGEN UR QL STRIP.AUTO: 1 EU/DL (ref 0.2–1)
WBC # BLD AUTO: 7.1 K/UL (ref 4.6–13.2)

## 2020-06-01 PROCEDURE — 81003 URINALYSIS AUTO W/O SCOPE: CPT

## 2020-06-01 PROCEDURE — 82550 ASSAY OF CK (CPK): CPT

## 2020-06-01 PROCEDURE — 80076 HEPATIC FUNCTION PANEL: CPT

## 2020-06-01 PROCEDURE — 83690 ASSAY OF LIPASE: CPT

## 2020-06-01 PROCEDURE — 99283 EMERGENCY DEPT VISIT LOW MDM: CPT

## 2020-06-01 PROCEDURE — 80048 BASIC METABOLIC PNL TOTAL CA: CPT

## 2020-06-01 PROCEDURE — 71045 X-RAY EXAM CHEST 1 VIEW: CPT

## 2020-06-01 PROCEDURE — 85025 COMPLETE CBC W/AUTO DIFF WBC: CPT

## 2020-06-01 RX ORDER — FAMOTIDINE 10 MG/ML
20 INJECTION INTRAVENOUS
Status: DISCONTINUED | OUTPATIENT
Start: 2020-06-01 | End: 2020-06-01 | Stop reason: HOSPADM

## 2020-06-01 RX ORDER — FAMOTIDINE 20 MG/1
20 TABLET, FILM COATED ORAL 2 TIMES DAILY
Qty: 20 TAB | Refills: 0 | Status: SHIPPED | OUTPATIENT
Start: 2020-06-01 | End: 2020-06-11

## 2020-06-01 RX ORDER — MORPHINE SULFATE 4 MG/ML
4 INJECTION, SOLUTION INTRAMUSCULAR; INTRAVENOUS
Status: DISCONTINUED | OUTPATIENT
Start: 2020-06-01 | End: 2020-06-01 | Stop reason: HOSPADM

## 2020-06-01 NOTE — ED PROVIDER NOTES
EMERGENCY DEPARTMENT HISTORY AND PHYSICAL EXAM    Date: 6/1/2020  Patient Name: Franklin Durand    History of Presenting Illness     Chief Complaint   Patient presents with    Abdominal Pain    Back Pain         History Provided By: patient     Chief Complaint: abd pain   Duration: 2 days  Timing: acute  Location: low back and lower abdomen   Quality: aching   Severity:moderate  Modifying Factors: motrin helped some  Associated Symptoms: back pain      Additional History (Context): Franklin Durand is a 69 y.o. male with PMH COPD and hypertension who presents with complaints of 2 days of bilateral lower back and lower abdominal pain. Patient denies fever and chills. Patient states he took some Motrin earlier today which seemed to help his pain. Denies urinary symptoms, and any known sick exposures. Patient's last bowel movement was 2 days ago. Patient states his bowel movement was within normal limits. No other complaints reported at this time. PCP: Leeanne Guardado MD    Current Facility-Administered Medications   Medication Dose Route Frequency Provider Last Rate Last Dose    sodium chloride 0.9 % bolus infusion 1,000 mL  1,000 mL IntraVENous ONCE [facility], [place], [place]        morphine injection 4 mg  4 mg IntraVENous NOW [facility] [place] BRIELLE LEDESMA        famotidine (PF) (PEPCID) injection 20 mg  20 mg [facility]         Current Outpatient Medications   Medication Sig Dispense Refill    famotidine (Pepcid) 20 mg tablet Take 1 Tab by mouth two (2) times a day for 10 days. 20 Tab 0    carBAMazepine (TEGretol) 200 mg tablet Take 200 mg by mouth three (3) times daily.  risperiDONE (RisperDAL) 0.5 mg tablet Take 0.5 mg by mouth.  camphor-menthoL (Sarna Original) 0.5-0.5 % lotion Apply  to affected area as needed for Itching.  amantadine HCL (SYMMETREL) 100 mg capsule Take 100 mg by mouth two (2) times a day.       folic acid (FOLVITE) 1 mg tablet Take 1 mg by mouth daily.  thiamine HCL (Vitamin B-1) 100 mg tablet Take 100 mg by mouth daily.  melatonin 3 mg tablet Take 3 mg by mouth.  captopriL (CAPOTEN) 25 mg tablet Take 25 mg by mouth Before breakfast, lunch, and dinner.  alum-mag hydroxide-simeth (MYLANTA) 200-200-20 mg/5 mL susp Take 30 mL by mouth four (4) times daily as needed for Gastroesophageal Reflux Disease (GERD). 200 mL 0    acetaminophen (TYLENOL) 325 mg tablet Take 2 Tabs by mouth every four (4) hours as needed for Pain. 50 Tab 0    bisacodyL (Dulcolax, bisacodyl,) 5 mg EC tablet Take 2 Tabs by mouth daily as needed for Constipation. 15 Tab 0    polyethylene glycol (Miralax) 17 gram/dose powder Take 17 g by mouth daily as needed for Constipation. 1 tablespoon with 8 oz of water daily 510 g 0    aspirin (ASPIRIN) 325 mg tablet Take 325 mg by mouth daily.  amLODIPine (NORVASC) 10 mg tablet Take 1 Tab by mouth daily. 30 Tab 0    cholecalciferol (VITAMIN D3) 1,000 unit tablet Take  by mouth daily.          Past History     Past Medical History:  Past Medical History:   Diagnosis Date    COPD (chronic obstructive pulmonary disease) ([facility] [address]     Hypertension     Smoker     25 pack years       Past Surgical History:  Past Surgical History:   Procedure Laterality Date    ABDOMEN SURGERY PROC UNLISTED      APPENDECTOMY      HX APPENDECTOMY      HX COLONOSCOPY  1/28/2015    Repeat colonoscopy in 5 yrs per Dr. Conrad  HX ERCP  02/11/2019    bile duct stent    HX HERNIA REPAIR  10/22/2017    Ex lap. lysis of adhesions adn reductions of internal hernia done 10/22/2017       Family History:  Family History   Problem Relation Age of Onset    Heart Disease Mother     Cancer Father     Cancer Sister     Diabetes Sister        Social History:  Social History     Tobacco Use    Smoking status: Current Every Day Smoker     Packs/day: 0.50     Years: 50.00     Pack years: 25.00     Types: Cigarettes    Smokeless tobacco: Never Used Substance Use Topics    Alcohol use: Not Currently     Comment: no alcohol in a year     Drug use: No       Allergies:  No Known Allergies      Review of Systems   Review of Systems   Constitutional: Negative. Negative for chills and fever. HENT: Negative. Negative for congestion, ear pain and rhinorrhea. Eyes: Negative. Negative for pain and redness. Respiratory: Negative. Negative for cough, shortness of breath, wheezing and stridor. Cardiovascular: Negative. Negative for chest pain and leg swelling. Gastrointestinal: Positive for abdominal pain. Negative for constipation, diarrhea, nausea and vomiting. Genitourinary: Negative. Negative for dysuria and frequency. Musculoskeletal: Positive for back pain. Negative for neck pain. Skin: Negative. Negative for rash and wound. Neurological: Negative. Negative for dizziness, seizures, syncope and headaches. All other systems reviewed and are negative. All Other Systems Negative  Physical Exam     Vitals:    06/01/20 1359   BP: (!) 170/113   Pulse: 94   Resp: 16   Temp: 98.7 °F (37.1 °C)   SpO2: 100%     Physical Exam  Vitals signs and nursing note reviewed. Constitutional:       General: He is not in acute distress. Appearance: He is well-developed. He is not diaphoretic. HENT:      Head: Normocephalic and atraumatic. Eyes:      General: No scleral icterus. Right eye: No discharge. Left eye: No discharge. Conjunctiva/sclera: Conjunctivae normal.   Neck:      Musculoskeletal: Normal range of motion and neck supple. Cardiovascular:      Rate and Rhythm: Normal rate and regular rhythm. Heart sounds: Normal heart sounds. No murmur. No friction rub. No gallop. Pulmonary:      Effort: Pulmonary effort is normal. No respiratory distress. Breath sounds: Normal breath sounds. No stridor. No wheezing or rales. Abdominal:      General: Bowel sounds are normal. There is no distension. Palpations: Abdomen is soft. There is no mass. Tenderness: There is abdominal tenderness. There is right CVA tenderness. There is no left CVA tenderness or guarding. Comments: Bilateral lower abdominal TTP and R CVA TTP, no guarding or rebound TTP present. Musculoskeletal: Normal range of motion. Skin:     General: Skin is warm and dry. Findings: No erythema or rash. Neurological:      Mental Status: He is alert and oriented to person, place, and time. Coordination: Coordination normal.      Comments: Gait is steady and patient exhibits no evidence of ataxia. Patient is able to ambulate without difficulty. No focal neurological deficit noted. No facial droop, slurred speech, or evidence of altered mentation noted on exam.     Psychiatric:         Behavior: Behavior normal.         Thought Content: Thought content normal.                Diagnostic Study Results     Labs -     Recent Results (from the past 12 hour(s))   CBC WITH AUTOMATED DIFF    Collection Time: 06/01/20  3:51 PM   Result Value Ref Range    WBC 7.1 4.6 - 13.2 K/uL    RBC 4.43 (L) 4.70 - 5.50 M/uL    HGB 13.3 13.0 - 16.0 g/dL    HCT 39.1 36.0 - 48.0 %    MCV 88.3 74.0 - 97.0 FL    MCH 30.0 24.0 - 34.0 PG    MCHC 34.0 31.0 - 37.0 g/dL    RDW 14.1 11.6 - 14.5 %    PLATELET 712 816 - 904 K/uL    MPV 8.9 (L) 9.2 - 11.8 FL    NEUTROPHILS 66 40 - 73 %    LYMPHOCYTES 23 21 - 52 %    MONOCYTES 11 (H) 3 - 10 %    EOSINOPHILS 0 0 - 5 %    BASOPHILS 0 0 - 2 %    ABS. NEUTROPHILS 4.7 1.8 - 8.0 K/UL    ABS. LYMPHOCYTES 1.6 0.9 - 3.6 K/UL    ABS. MONOCYTES 0.8 0.05 - 1.2 K/UL    ABS. EOSINOPHILS 0.0 0.0 - 0.4 K/UL    ABS.  BASOPHILS 0.0 0.0 - 0.1 K/UL    DF AUTOMATED     METABOLIC PANEL, BASIC    Collection Time: 06/01/20  3:51 PM   Result Value Ref Range    Sodium 133 (L) 136 - 145 mmol/L    Potassium 5.0 3.5 - 5.5 mmol/L    Chloride 102 100 - 111 mmol/L    CO2 23 21 - 32 mmol/L    Anion gap 8 3.0 - 18 mmol/L    Glucose 84 74 - 99 mg/dL BUN 10 7.0 - 18 MG/DL    Creatinine 0.90 0.6 - 1.3 MG/DL    BUN/Creatinine ratio 11 (L) 12 - 20      GFR est AA >60 >60 ml/min/1.73m2    GFR est non-AA >60 >60 ml/min/1.73m2    Calcium 8.4 (L) 8.5 - 10.1 MG/DL   CARDIAC PANEL,(CK, CKMB & TROPONIN)    Collection Time: 06/01/20  3:51 PM   Result Value Ref Range    CK - MB 1.2 <3.6 ng/ml    CK-MB Index 0.9 0.0 - 4.0 %     39 - 308 U/L    Troponin-I, QT <0.02 0.0 - 0.045 NG/ML   HEPATIC FUNCTION PANEL    Collection Time: 06/01/20  3:51 PM   Result Value Ref Range    Protein, total 7.5 6.4 - 8.2 g/dL    Albumin 3.6 3.4 - 5.0 g/dL    Globulin 3.9 2.0 - 4.0 g/dL    A-G Ratio 0.9 0.8 - 1.7      Bilirubin, total 0.4 0.2 - 1.0 MG/DL    Bilirubin, direct <0.1 0.0 - 0.2 MG/DL    Alk. phosphatase 73 45 - 117 U/L    AST (SGOT) 13 10 - 38 U/L    ALT (SGPT) 9 (L) 16 - 61 U/L   LIPASE    Collection Time: 06/01/20  3:51 PM   Result Value Ref Range    Lipase 33 (L) 73 - 393 U/L   URINALYSIS W/ RFLX MICROSCOPIC    Collection Time: 06/01/20  4:43 PM   Result Value Ref Range    Color YELLOW      Appearance CLEAR      Specific gravity 1.018 1.005 - 1.030      pH (UA) 6.0 5.0 - 8.0      Protein Negative NEG mg/dL    Glucose Negative NEG mg/dL    Ketone 15 (A) NEG mg/dL    Bilirubin Negative NEG      Blood Negative NEG      Urobilinogen 1.0 0.2 - 1.0 EU/dL    Nitrites Negative NEG      Leukocyte Esterase Negative NEG         Radiologic Studies -   XR CHEST PORT   Final Result   IMPRESSION:      No acute process. Stable exam        CT Results  (Last 48 hours)    None        CXR Results  (Last 48 hours)               06/01/20 1516  XR CHEST PORT Final result    Impression:  IMPRESSION:       No acute process. Stable exam       Narrative:  CLINICAL: Back pain. COMPARISON: May 20, 2020. A portable view of the chest from 1447 hours: The lungs and pleural spaces are clear. The mediastinum is unremarkable in   appearance.                    Medical Decision Making   I am the first provider for this patient. I reviewed the vital signs, available nursing notes, past medical history, past surgical history, family history and social history. Vital Signs-Reviewed the patient's vital signs. Records Reviewed: Renu Coe PA-C     Procedures:  Procedures    Provider Notes (Medical Decision Making): Impression:  abd pain, back pain    IV inserted    Labs show mild dehydration, Na 133, otherwise unremarkable. Normal lipase and t bili. UA shows 15 ketones  IV fluids and pain medication ordered    Review of pt's chart shows he has received 2 CT scans of the abdomen within the month of May alone. The most recent scan was on 5/20/2020 and was WNL. Pt's prior CT showed chronic pancreatitis. Pt's labs today are normal. I have discussed the risks of increased radiation exposure with the pt and he declines any further ED work up at this time. No guarding noted on exam, WBC is normal and the pt is afebrile. No signs of acute abdomen were noted. Clinical presentation suggestive of abd pain and dehydration, will plan to d/c with pepcid with pcp follow-up. Pt agrees. Renu Coe PA-C  5:39 PM     MED RECONCILIATION:  Current Facility-Administered Medications   Medication Dose Route Frequency    sodium chloride 0.9 % bolus infusion 1,000 mL  1,000 mL IntraVENous ONCE    morphine injection 4 mg  4 mg IntraVENous NOW    famotidine (PF) (PEPCID) injection 20 mg  20 mg IntraVENous NOW     Current Outpatient Medications   Medication Sig    famotidine (Pepcid) 20 mg tablet Take 1 Tab by mouth two (2) times a day for 10 days.  carBAMazepine (TEGretol) 200 mg tablet Take 200 mg by mouth three (3) times daily.  risperiDONE (RisperDAL) 0.5 mg tablet Take 0.5 mg by mouth.  camphor-menthoL (Sarna Original) 0.5-0.5 % lotion Apply  to affected area as needed for Itching.  amantadine HCL (SYMMETREL) 100 mg capsule Take 100 mg by mouth two (2) times a day.     folic acid (FOLVITE) 1 mg tablet Take 1 mg by mouth daily.  thiamine HCL (Vitamin B-1) 100 mg tablet Take 100 mg by mouth daily.  melatonin 3 mg tablet Take 3 mg by mouth.  captopriL (CAPOTEN) 25 mg tablet Take 25 mg by mouth Before breakfast, lunch, and dinner.  alum-mag hydroxide-simeth (MYLANTA) 200-200-20 mg/5 mL susp Take 30 mL by mouth four (4) times daily as needed for Gastroesophageal Reflux Disease (GERD).  acetaminophen (TYLENOL) 325 mg tablet Take 2 Tabs by mouth every four (4) hours as needed for Pain.  bisacodyL (Dulcolax, bisacodyl,) 5 mg EC tablet Take 2 Tabs by mouth daily as needed for Constipation.  polyethylene glycol (Miralax) 17 gram/dose powder Take 17 g by mouth daily as needed for Constipation. 1 tablespoon with 8 oz of water daily    aspirin (ASPIRIN) 325 mg tablet Take 325 mg by mouth daily.  amLODIPine (NORVASC) 10 mg tablet Take 1 Tab by mouth daily.  cholecalciferol (VITAMIN D3) 1,000 unit tablet Take  by mouth daily. Disposition:  D/c    DISCHARGE NOTE:   Patient is stable for discharge at this time. I have discussed all the findings from today's work up with the patient, including lab results and imaging. I have answered all questions. Rx for pepcid given. Rest and close follow-up with the PCP recommended this week. Return to the ED immediately for any new or worsening symptoms. Renu Coe PA-C     Follow-up Information     Follow up With Specialties Details Why Contact Info    Yoshi Guardado MD Internal Medicine Schedule an appointment as soon as possible for a visit in 2 days  [facility] [phone]      [facility] EMERGENCY DEPT Emergency Medicine  As needed, If symptoms worsen [address] [facility] [phone]          Current Discharge Medication List      START taking these medications    Details   famotidine (Pepcid) 20 mg tablet Take 1 Tab by mouth two (2) times a day for 10 days.   Qty: 20 Tab, Refills: 0         STOP taking these medications       Omeprazole delayed release (PRILOSEC D/R) 20 mg tablet Comments:   Reason for Stopping:                   Diagnosis     Clinical Impression:   1. Abdominal pain, generalized    2. Dehydration    3. Essential hypertension           I was personally available for consultation in the emergency department. I have not seen the patient but have reviewed the chart prior to discharge and agree with the documentation recorded by the [facility], including the assessment, treatment plan, and disposition.   Calvin Mendez, DO

## 2020-06-01 NOTE — ED TRIAGE NOTES
\"I have stomach and back pain early last night. I've had it before. I just need some motrin. The pain goes around. \" Patient denies constipation, diarrhea, nausea, vomiting, or urinary symptoms, denies alcohol use.

## 2020-06-01 NOTE — DISCHARGE INSTRUCTIONS
Patient Education        Abdominal Pain: Care Instructions  Your Care Instructions     Abdominal pain has many possible causes. Some aren't serious and get better on their own in a few days. Others need more testing and treatment. If your pain continues or gets worse, you need to be rechecked and may need more tests to find out what is wrong. You may need surgery to correct the problem. Don't ignore new symptoms, such as fever, nausea and vomiting, urination problems, pain that gets worse, and dizziness. These may be signs of a more serious problem. Your doctor may have recommended a follow-up visit in the next 8 to 12 hours. If you are not getting better, you may need more tests or treatment. The doctor has checked you carefully, but problems can develop later. If you notice any problems or new symptoms, get medical treatment right away. Follow-up care is a key part of your treatment and safety. Be sure to make and go to all appointments, and call your doctor if you are having problems. It's also a good idea to know your test results and keep a list of the medicines you take. How can you care for yourself at home? · Rest until you feel better. · To prevent dehydration, drink plenty of fluids, enough so that your urine is light yellow or clear like water. Choose water and other caffeine-free clear liquids until you feel better. If you have kidney, heart, or liver disease and have to limit fluids, talk with your doctor before you increase the amount of fluids you drink. · If your stomach is upset, eat mild foods, such as rice, dry toast or crackers, bananas, and applesauce. Try eating several small meals instead of two or three large ones. · Wait until 48 hours after all symptoms have gone away before you have spicy foods, alcohol, and drinks that contain caffeine. · Do not eat foods that are high in fat. · Avoid anti-inflammatory medicines such as aspirin, ibuprofen (Advil, Motrin), and naproxen (Aleve). These can cause stomach upset. Talk to your doctor if you take daily aspirin for another health problem. When should you call for help? [id number] anytime you think you may need emergency care. For example, call if:  · You passed out (lost consciousness). · You pass maroon or very bloody stools. · You vomit blood or what looks like coffee grounds. · You have new, severe belly pain. Call your doctor now or seek immediate medical care if:  · Your pain gets worse, especially if it becomes focused in one area of your belly. · You have a new or higher fever. · Your stools are black and look like tar, or they have streaks of blood. · You have unexpected vaginal bleeding. · You have symptoms of a urinary tract infection. These may include:  ? Pain when you urinate. ? Urinating more often than usual.  ? Blood in your urine. · You are dizzy or lightheaded, or you feel like you may faint. Watch closely for changes in your health, and be sure to contact your doctor if:  · You are not getting better after 1 day (24 hours). Where can you learn more? Go to http://vale-peng.info/  Enter B279 in the search box to learn more about \"Abdominal Pain: Care Instructions. \"  Current as of: June 26, 2019               Content Version: 12.5  © 3459-7032 [employer]. Care instructions adapted under license by [employer]pel[employer] (which disclaims liability or warranty for this information). If you have questions about a medical condition or this instruction, always ask your healthcare professional. [place] [address] any warranty or liability for your use of this information. Patient Education        Dehydration: Care Instructions  Your Care Instructions  Dehydration happens when your body loses too much fluid. This might happen when you do not drink enough water or you lose large amounts of fluids from your body because of diarrhea, vomiting, or sweating.  Severe dehydration can be life-threatening. Water and minerals called electrolytes help put your body fluids back in balance. Learn the early signs of fluid loss, and drink more fluids to prevent dehydration. Follow-up care is a key part of your treatment and safety. Be sure to make and go to all appointments, and call your doctor if you are having problems. It's also a good idea to know your test results and keep a list of the medicines you take. How can you care for yourself at home? · To prevent dehydration, drink plenty of fluids, enough so that your urine is light yellow or clear like water. Choose water and other caffeine-free clear liquids until you feel better. If you have kidney, heart, or liver disease and have to limit fluids, talk with your doctor before you increase the amount of fluids you drink. · If you do not feel like eating or drinking, try taking small sips of water, sports drinks, or other rehydration drinks. · Get plenty of rest.  To prevent dehydration  · Add more fluids to your diet and daily routine, unless your doctor has told you not to. · During hot weather, drink more fluids. Drink even more fluids if you exercise a lot. Stay away from drinks with alcohol or caffeine. · Watch for the symptoms of dehydration. These include:  ? A dry, sticky mouth. ? Dark yellow urine, and not much of it. ? Dry and sunken eyes. ? Feeling very tired. · Learn what problems can lead to dehydration. These include:  ? Diarrhea, fever, and vomiting. ? Any illness with a fever, such as pneumonia or the flu. ? Activities that cause heavy sweating, such as endurance races and heavy outdoor work in hot or humid weather. ? Alcohol or drug use or problems caused by quitting their use (withdrawal). ? Certain medicines, such as cold and allergy pills (antihistamines), diet pills (diuretics), and laxatives. ? Certain diseases, such as diabetes, cancer, and heart or kidney disease.   When should you call for help?   [id number] anytime you think you may need emergency care. For example, call if:  · You passed out (lost consciousness). Call your doctor now or seek immediate medical care if:  · You are confused and cannot think clearly. · You are dizzy or lightheaded, or you feel like you may faint. · You have signs of needing more fluids. You have sunken eyes and a dry mouth, and you pass only a little dark urine. · You cannot keep fluids down. Watch closely for changes in your health, and be sure to contact your doctor if:  · You are not making tears. · Your skin is very dry and sags slowly back into place after you pinch it. · Your mouth and eyes are very dry. Where can you learn more? Go to http://vale-peng.info/  Enter Q814 in the search box to learn more about \"Dehydration: Care Instructions. \"  Current as of: June 26, 2019               Content Version: 12.5  © 0836-3966 [employer]. Care instructions adapted under license by [employer] (which disclaims liability or warranty for this information). If you have questions about a medical condition or this instruction, always ask your healthcare professional. [address] any warranty or liability for your use of this information. [employer] Activation    Thank you for requesting access to [employer] Please follow the instructions below to securely access and download your online medical record. [employer] allows you to send messages to your doctor, view your test results, renew your prescriptions, schedule appointments, and more. How Do I Sign Up? 1. In your internet browser, go to www.The Daily Muse  2. Click on the First Time User? Click Here link in the Sign In box. You will be redirect to the New Member Sign Up page. 3. Enter your [employer] Access Code exactly as it appears below. You will not need to use this code after youve completed the sign-up process.  If you do not sign up before the expiration date, you must request a new code. Flipkart Access Code: [email or web address]  Expires: 2020  2:53 PM (This is the date your Flipkart access code will )    4. Enter the last four digits of your Social Security Number (xxxx) and Date of Birth (mm/dd/yyyy) as indicated and click Submit. You will be taken to the next sign-up page. 5. Create a Flipkart ID. This will be your Flipkart login ID and cannot be changed, so think of one that is secure and easy to remember. 6. Create a Flipkart password. You can change your password at any time. 7. Enter your Password Reset Question and Answer. This can be used at a later time if you forget your password. 8. Enter your e-mail address. You will receive e-mail notification when new information is available in [address] 9. Click Sign Up. You can now view and download portions of your medical record. 10. Click the Download Summary menu link to download a portable copy of your medical information. Additional Information    If you have questions, please visit the Frequently Asked Questions section of the Flipkart website at [email or web address] Remember, Flipkart is NOT to be used for urgent needs. For medical emergencies, dial 911.

## 2020-06-01 NOTE — ED NOTES
Pt does not want any medications. Daughter called and is arranging for a ride. I have reviewed discharge instructions with the patient. The patient verbalized understanding.

## 2020-06-11 ENCOUNTER — HOSPITAL ENCOUNTER (EMERGENCY)
Age: 75
Discharge: HOME | End: 2020-06-11
Attending: EMERGENCY MEDICINE
Payer: MEDICARE

## 2020-06-11 ENCOUNTER — APPOINTMENT (OUTPATIENT)
Dept: GENERAL RADIOLOGY | Age: 75
End: 2020-06-11
Attending: EMERGENCY MEDICINE
Payer: MEDICARE

## 2020-06-11 ENCOUNTER — APPOINTMENT (OUTPATIENT)
Dept: CT IMAGING | Age: 75
End: 2020-06-11
Attending: EMERGENCY MEDICINE
Payer: MEDICARE

## 2020-06-11 VITALS
HEART RATE: 71 BPM | BODY MASS INDEX: 20.73 KG/M2 | SYSTOLIC BLOOD PRESSURE: 150 MMHG | TEMPERATURE: 97.8 F | DIASTOLIC BLOOD PRESSURE: 80 MMHG | HEIGHT: 69 IN | OXYGEN SATURATION: 98 % | WEIGHT: 140 LBS | RESPIRATION RATE: 16 BRPM

## 2020-06-11 DIAGNOSIS — I70.90 ATHEROSCLEROTIC OCCLUSIVE DISEASE: ICD-10-CM

## 2020-06-11 DIAGNOSIS — R10.11 ABDOMINAL PAIN, RIGHT UPPER QUADRANT: Primary | ICD-10-CM

## 2020-06-11 LAB
ALBUMIN SERPL-MCNC: 3.9 G/DL (ref 3.4–5)
ALBUMIN/GLOB SERPL: 1.1 {RATIO} (ref 0.8–1.7)
ALP SERPL-CCNC: 70 U/L (ref 45–117)
ALT SERPL-CCNC: 11 U/L (ref 16–61)
ANION GAP SERPL CALC-SCNC: 11 MMOL/L (ref 3–18)
APPEARANCE UR: CLEAR
AST SERPL-CCNC: 12 U/L (ref 10–38)
BACTERIA URNS QL MICRO: ABNORMAL /HPF
BASOPHILS # BLD: 0 K/UL (ref 0–0.1)
BASOPHILS NFR BLD: 0 % (ref 0–2)
BILIRUB SERPL-MCNC: 0.3 MG/DL (ref 0.2–1)
BILIRUB UR QL: NEGATIVE
BUN SERPL-MCNC: 9 MG/DL (ref 7–18)
BUN/CREAT SERPL: 9 (ref 12–20)
CALCIUM SERPL-MCNC: 8.6 MG/DL (ref 8.5–10.1)
CHLORIDE SERPL-SCNC: 106 MMOL/L (ref 100–111)
CO2 SERPL-SCNC: 23 MMOL/L (ref 21–32)
COLOR UR: YELLOW
CREAT SERPL-MCNC: 0.95 MG/DL (ref 0.6–1.3)
DIFFERENTIAL METHOD BLD: ABNORMAL
EOSINOPHIL # BLD: 0.1 K/UL (ref 0–0.4)
EOSINOPHIL NFR BLD: 2 % (ref 0–5)
EPITH CASTS URNS QL MICRO: ABNORMAL /LPF (ref 0–5)
ERYTHROCYTE [DISTWIDTH] IN BLOOD BY AUTOMATED COUNT: 14.5 % (ref 11.6–14.5)
GLOBULIN SER CALC-MCNC: 3.7 G/DL (ref 2–4)
GLUCOSE SERPL-MCNC: 115 MG/DL (ref 74–99)
GLUCOSE UR STRIP.AUTO-MCNC: NEGATIVE MG/DL
HCT VFR BLD AUTO: 40.3 % (ref 36–48)
HGB BLD-MCNC: 13.6 G/DL (ref 13–16)
HGB UR QL STRIP: NEGATIVE
KETONES UR QL STRIP.AUTO: ABNORMAL MG/DL
LEUKOCYTE ESTERASE UR QL STRIP.AUTO: NEGATIVE
LIPASE SERPL-CCNC: 46 U/L (ref 73–393)
LYMPHOCYTES # BLD: 1.8 K/UL (ref 0.9–3.6)
LYMPHOCYTES NFR BLD: 26 % (ref 21–52)
MCH RBC QN AUTO: 30.2 PG (ref 24–34)
MCHC RBC AUTO-ENTMCNC: 33.7 G/DL (ref 31–37)
MCV RBC AUTO: 89.6 FL (ref 74–97)
MONOCYTES # BLD: 0.5 K/UL (ref 0.05–1.2)
MONOCYTES NFR BLD: 7 % (ref 3–10)
MUCOUS THREADS URNS QL MICRO: ABNORMAL /LPF
NEUTS SEG # BLD: 4.5 K/UL (ref 1.8–8)
NEUTS SEG NFR BLD: 65 % (ref 40–73)
NITRITE UR QL STRIP.AUTO: NEGATIVE
PH UR STRIP: 5 [PH] (ref 5–8)
PLATELET # BLD AUTO: 284 K/UL (ref 135–420)
PMV BLD AUTO: 8.8 FL (ref 9.2–11.8)
POTASSIUM SERPL-SCNC: 3.9 MMOL/L (ref 3.5–5.5)
PROT SERPL-MCNC: 7.6 G/DL (ref 6.4–8.2)
PROT UR STRIP-MCNC: ABNORMAL MG/DL
RBC # BLD AUTO: 4.5 M/UL (ref 4.7–5.5)
RBC #/AREA URNS HPF: ABNORMAL /HPF (ref 0–5)
SODIUM SERPL-SCNC: 140 MMOL/L (ref 136–145)
SP GR UR REFRACTOMETRY: 1.02 (ref 1–1.03)
UROBILINOGEN UR QL STRIP.AUTO: 1 EU/DL (ref 0.2–1)
WBC # BLD AUTO: 7 K/UL (ref 4.6–13.2)
WBC URNS QL MICRO: ABNORMAL /HPF (ref 0–4)

## 2020-06-11 PROCEDURE — 85025 COMPLETE CBC W/AUTO DIFF WBC: CPT

## 2020-06-11 PROCEDURE — 87086 URINE CULTURE/COLONY COUNT: CPT

## 2020-06-11 PROCEDURE — 74011636320 HC RX REV CODE- 636/320: Performed by: EMERGENCY MEDICINE

## 2020-06-11 PROCEDURE — 74011250636 HC RX REV CODE- 250/636: Performed by: EMERGENCY MEDICINE

## 2020-06-11 PROCEDURE — 83690 ASSAY OF LIPASE: CPT

## 2020-06-11 PROCEDURE — 96360 HYDRATION IV INFUSION INIT: CPT

## 2020-06-11 PROCEDURE — 81001 URINALYSIS AUTO W/SCOPE: CPT

## 2020-06-11 PROCEDURE — 99283 EMERGENCY DEPT VISIT LOW MDM: CPT

## 2020-06-11 PROCEDURE — 74177 CT ABD & PELVIS W/CONTRAST: CPT

## 2020-06-11 PROCEDURE — 80053 COMPREHEN METABOLIC PANEL: CPT

## 2020-06-11 PROCEDURE — 71045 X-RAY EXAM CHEST 1 VIEW: CPT

## 2020-06-11 RX ADMIN — IOPAMIDOL 87 ML: 612 INJECTION, SOLUTION INTRAVENOUS at 11:08

## 2020-06-11 RX ADMIN — SODIUM CHLORIDE 1000 ML: 900 INJECTION, SOLUTION INTRAVENOUS at 10:09

## 2020-06-11 NOTE — PROGRESS NOTES
Case management consult noted for \"concern for repeat visits and no follow up. needs home assessment. \"     Spoke with ED  and informed her that a \"Face to Face\" encounter and a home health order is needed.            Yolanda Umana, MSN, RN, ACM-RN   ED Outcomes Manager  [phone] (phone)

## 2020-06-11 NOTE — PROGRESS NOTES
Confirmed that pt's PCP is at the [facility]. Pt will need to follow up with his PCP for home health orders. Call placed to PCP's office in an attempt to speak with a nurse about pt's frequent ED visits. Left a message requesting a return call. Met with the pt at the bedside and spoke with him about his care at home. Pt is familiar to me from a previous hospitalization at an outside facility. Confirmed with pt's personal care aide in the ED waiting area that pt receives personal care 8hrs/day; 5days/wk. I also confirmed that pt's terrencerAlanis checks in on patient everyday. Confirmed with pt's aide that the pt has an appt with his PCP on 7/1/20.      Jak Bhagat, MSN, RN, ACM-RN   ED Outcomes Manager  [phone] (phone)

## 2020-06-11 NOTE — DISCHARGE INSTRUCTIONS

## 2020-06-11 NOTE — ED PROVIDER NOTES
Date: 6/11/2020  Patient Name: Felicitas Meza    History of Presenting Illness     Chief Complaint   Patient presents with    Abdominal Pain       History Provided By: Patient    HPI/Chief Complaint: (Context):who presents with chief complaint of right upper quadrant abdominal pain  Burning sensation  4 days  Waxing and waning  No particular inciting events  Patient states no alcohol no vomiting no diarrhea no black or bloody stool  Patient states  he has no trauma as well  Patient is no dysuria  Patient otherwise has no acute complaints in the emergency department. Patient is no radiation of symptoms  No fever chills no cough congestion no chest pain no shortness of breath    Patient describes it as a burning sensation that goes up and down his abdomen.     ---  Patient's triage note is reviewed  Patient's SONJA level 3  Patient's Chief complaint abdominal pain  Vitals are stable in the emergency room pulse ox 98% room air  Patient's blood pressure is 150/80  W persistent abdominal pain per triage note  Pain is 5 out of 10  Allergies are none  Patient's home medication include Tylenol Mylanta Norvasc Tegretol folic acid Risperdal  Patient's medical history includes COPD hypertension smoker  Patient surgical history includes colonoscopy ERCP appendectomy hernia repair  Patient social history is current smoker no alcohol no drugs  Patient's chart review shows patient with abdominal pain at the beginning of this month denies abdominal pain  Patient is seen in [facility] multiple times for the same complaint  Patient with chronic pancreatitis diagnosis in March 2019  Patient CT abdomen at that is done twice this year with contrast has impression of no intra-abdominal pathology  Patient has an MRCP  Shows mild intrahepatic biliary dilation common bile duct upper limit of normal  Irregular pancreatic duct as well          PCP: Casper Guardado III, MD    Current Outpatient Medications   Medication Sig Dispense Refill    famotidine (Pepcid) 20 mg tablet Take 1 Tab by mouth two (2) times a day for 10 days. 20 Tab 0    camphor-menthoL (Sarna Original) 0.5-0.5 % lotion Apply  to affected area as needed for Itching.  carBAMazepine (TEGretol) 200 mg tablet Take 200 mg by mouth three (3) times daily.  amantadine HCL (SYMMETREL) 100 mg capsule Take 100 mg by mouth two (2) times a day.  folic acid (FOLVITE) 1 mg tablet Take 1 mg by mouth daily.  thiamine HCL (Vitamin B-1) 100 mg tablet Take 100 mg by mouth daily.  risperiDONE (RisperDAL) 0.5 mg tablet Take 0.5 mg by mouth.  melatonin 3 mg tablet Take 3 mg by mouth.  captopriL (CAPOTEN) 25 mg tablet Take 25 mg by mouth Before breakfast, lunch, and dinner.  alum-mag hydroxide-simeth (MYLANTA) 200-200-20 mg/5 mL susp Take 30 mL by mouth four (4) times daily as needed for Gastroesophageal Reflux Disease (GERD). 200 mL 0    acetaminophen (TYLENOL) 325 mg tablet Take 2 Tabs by mouth every four (4) hours as needed for Pain. 50 Tab 0    bisacodyL (Dulcolax, bisacodyl,) 5 mg EC tablet Take 2 Tabs by mouth daily as needed for Constipation. 15 Tab 0    polyethylene glycol (Miralax) 17 gram/dose powder Take 17 g by mouth daily as needed for Constipation. 1 tablespoon with 8 oz of water daily 510 g 0    aspirin (ASPIRIN) 325 mg tablet Take 325 mg by mouth daily.  amLODIPine (NORVASC) 10 mg tablet Take 1 Tab by mouth daily. 30 Tab 0    cholecalciferol (VITAMIN D3) 1,000 unit tablet Take  by mouth daily.          Past History     Past Medical History:  Past Medical History:   Diagnosis Date    COPD (chronic obstructive pulmonary disease) ([address]     Hypertension     Smoker     25 pack years       Past Surgical History:  Past Surgical History:   Procedure Laterality Date    ABDOMEN SURGERY PROC UNLISTED      APPENDECTOMY      HX APPENDECTOMY      HX COLONOSCOPY  1/28/2015    Repeat colonoscopy in 5 yrs per Dr. Carlie Abernathy HX ERCP  02/11/2019    bile duct stent    HX HERNIA REPAIR  10/22/2017    Ex lap. lysis of adhesions adn reductions of internal hernia done 10/22/2017       Family History:  Family History   Problem Relation Age of Onset    Heart Disease Mother     Cancer Father     Cancer Sister     Diabetes Sister        Social History:  Social History     Tobacco Use    Smoking status: Current Every Day Smoker     Packs/day: 0.50     Years: 50.00     Pack years: 25.00     Types: Cigarettes    Smokeless tobacco: Never Used   Substance Use Topics    Alcohol use: Not Currently     Comment: no alcohol in a year     Drug use: No       Allergies:  No Known Allergies      Review of Systems   Review of Systems   Constitutional: Negative for activity change, fatigue and fever. HENT: Negative for congestion and rhinorrhea. Eyes: Negative for visual disturbance. Respiratory: Negative for shortness of breath. Cardiovascular: Negative for chest pain and palpitations. Gastrointestinal: Positive for abdominal pain. Negative for diarrhea, nausea and vomiting. Genitourinary: Negative for dysuria and hematuria. Musculoskeletal: Negative for back pain. Skin: Negative for rash. Neurological: Negative for dizziness, weakness and light-headedness. Psychiatric/Behavioral: Negative for agitation. All other systems reviewed and are negative. Physical Exam     Physical Exam  Constitutional:       Appearance: He is well-developed. HENT:      Head: Normocephalic and atraumatic. Eyes:      Conjunctiva/sclera: Conjunctivae normal.      Pupils: Pupils are equal, round, and reactive to light. Neck:      Musculoskeletal: Normal range of motion and neck supple. Cardiovascular:      Rate and Rhythm: Normal rate and regular rhythm. Heart sounds: Normal heart sounds. Pulmonary:      Effort: Pulmonary effort is normal.      Breath sounds: Normal breath sounds. Abdominal:      General: Abdomen is flat.  Bowel sounds are normal. Palpations: Abdomen is soft. Tenderness: There is abdominal tenderness in the right upper quadrant and right lower quadrant. Musculoskeletal: Normal range of motion. Lymphadenopathy:      Cervical: No cervical adenopathy. Skin:     General: Skin is warm. Neurological:      General: No focal deficit present. Mental Status: He is alert. Medical Decision Making   I am the first provider for this patient. I reviewed the vital signs, available nursing notes, past medical history, past surgical history, family history and social history. Provider Notes (Medical Decision Making): Right upper and lower quadrant abdominal pain  Waxing and waning constant pain  4 to 5 days per patient  Not similar to the prior pain  Patient denies any vomiting diarrhea  No black or bloody stool  Check patient's urinalysis for infection  Check CT abdomen for any pathology  Patient is denying any other acute trauma or complaints  ---    Vital Signs-Reviewed the patient's vital signs. Pulse Oximetry Analysis -98%, room air, normal      Vitals:    06/11/20 0805   BP: 150/80   Pulse: 71   Resp: 16   Temp: 97.8 °F (36.6 °C)   SpO2: 98%   Weight: 63.5 kg (140 lb)   Height: 5' 8.5\" (1.74 m)       Records Reviewed: Nursing Notes    ED Course:    Patient reveals multiple times in the ER patient has no abdominal pain the emergency department 1 PM  Patient has no physical finding of any acute abnormality on his labs as well  Patient CT scans were done again and is negative as well my concern was patient was forgetting that he has had these images before and has a belly pain in the past which he denied.   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 /location was at bedside as well communicating that with him in a follow-up. Patient will go back to [address] [facility] and will call 9 1 if any changes or worsens          Diagnostic Study Results     Orders Placed This Encounter    CULTURE, URINE     Standing Status:   Standing     Number of Occurrences:   1     Order Specific Question:   Reason for Culture     Answer:   Flank discomfort    CT ABD PELV W CONT     Standing Status:   Standing     Number of Occurrences:   1     Order Specific Question:   Transport     Answer:   Stretcher [5]     Order Specific Question:   Reason for Exam     Answer:   right sided abd pain     Order Specific Question: This order utilizes IV contrast.  What additional contrast is needed? Answer:   None     Order Specific Question:   Does patient have history of Renal Disease? Answer:   No    XR CHEST PORT     Standing Status:   Standing     Number of Occurrences:   1     Order Specific Question:   Reason for Exam     Answer:   CT abdomen with concern for groundglass opacity.   Patient is asymptomatic for any respiratory symptoms    CBC WITH AUTOMATED DIFF     Standing Status:   Standing     Number of Occurrences:   1    METABOLIC PANEL, COMPREHENSIVE     Standing Status:   Standing     Number of Occurrences:   1    URINALYSIS W/ RFLX MICROSCOPIC     Standing Status:   Standing     Number of Occurrences:   1    LIPASE     Standing Status:   Standing     Number of Occurrences:   1    URINE MICROSCOPIC ONLY     Standing Status:   Standing     Number of Occurrences:   1    Consult PCP     Standing Status:   Standing     Number of Occurrences:   1 Order Specific Question:   Reason for Consult: Answer:   ruq pain acute on chronic, pt needs close follow up     Order Specific Question:   Did you call or speak to the consulting provider? Answer:   No     Order Specific Question:   Consult To     Answer:   manage     Order Specific Question:   Schedule When? Answer:   TODAY    sodium chloride 0.9 % bolus infusion 1,000 mL    iopamidoL (ISOVUE 300) 61 % contrast injection 100 mL    IP CONSULT TO CASE MANAGEMENT     Standing Status:   Standing     Number of Occurrences:   1     Order Specific Question:   Reason for Consult: Answer:   concern for repeat visits and no follow up. needs home assessment    IP CONSULT HOME HEALTH     Standing Status:   Standing     Number of Occurrences:   1     Order Specific Question:   Reason for Consult:      Answer:   manage pt's home health       Labs -     Recent Results (from the past 12 hour(s))   URINALYSIS W/ RFLX MICROSCOPIC    Collection Time: 06/11/20  8:36 AM   Result Value Ref Range    Color YELLOW      Appearance CLEAR      Specific gravity 1.023 1.005 - 1.030      pH (UA) 5.0 5.0 - 8.0      Protein TRACE (A) NEG mg/dL    Glucose Negative NEG mg/dL    Ketone TRACE (A) NEG mg/dL    Bilirubin Negative NEG      Blood Negative NEG      Urobilinogen 1.0 0.2 - 1.0 EU/dL    Nitrites Negative NEG      Leukocyte Esterase Negative NEG     URINE MICROSCOPIC ONLY    Collection Time: 06/11/20  8:36 AM   Result Value Ref Range    WBC 0 to 3 0 - 4 /hpf    RBC 0 to 3 0 - 5 /hpf    Epithelial cells FEW 0 - 5 /lpf    Bacteria 1+ (A) NEG /hpf    Mucus 1+ (A) NEG /lpf   CBC WITH AUTOMATED DIFF    Collection Time: 06/11/20  9:59 AM   Result Value Ref Range    WBC 7.0 4.6 - 13.2 K/uL    RBC 4.50 (L) 4.70 - 5.50 M/uL    HGB 13.6 13.0 - 16.0 g/dL    HCT 40.3 36.0 - 48.0 %    MCV 89.6 74.0 - 97.0 FL    MCH 30.2 24.0 - 34.0 PG    MCHC 33.7 31.0 - 37.0 g/dL    RDW 14.5 11.6 - 14.5 %    PLATELET 400 193 - 621 K/uL    MPV 8.8 (L) 9.2 - 11.8 FL    NEUTROPHILS 65 40 - 73 %    LYMPHOCYTES 26 21 - 52 %    MONOCYTES 7 3 - 10 %    EOSINOPHILS 2 0 - 5 %    BASOPHILS 0 0 - 2 %    ABS. NEUTROPHILS 4.5 1.8 - 8.0 K/UL    ABS. LYMPHOCYTES 1.8 0.9 - 3.6 K/UL    ABS. MONOCYTES 0.5 0.05 - 1.2 K/UL    ABS. EOSINOPHILS 0.1 0.0 - 0.4 K/UL    ABS. BASOPHILS 0.0 0.0 - 0.1 K/UL    DF AUTOMATED     METABOLIC PANEL, COMPREHENSIVE    Collection Time: 06/11/20  9:59 AM   Result Value Ref Range    Sodium 140 136 - 145 mmol/L    Potassium 3.9 3.5 - 5.5 mmol/L    Chloride 106 100 - 111 mmol/L    CO2 23 21 - 32 mmol/L    Anion gap 11 3.0 - 18 mmol/L    Glucose 115 (H) 74 - 99 mg/dL    BUN 9 7.0 - 18 MG/DL    Creatinine 0.95 0.6 - 1.3 MG/DL    BUN/Creatinine ratio 9 (L) 12 - 20      GFR est AA >60 >60 ml/min/1.73m2    GFR est non-AA >60 >60 ml/min/1.73m2    Calcium 8.6 8.5 - 10.1 MG/DL    Bilirubin, total 0.3 0.2 - 1.0 MG/DL    ALT (SGPT) 11 (L) 16 - 61 U/L    AST (SGOT) 12 10 - 38 U/L    Alk. phosphatase 70 45 - 117 U/L    Protein, total 7.6 6.4 - 8.2 g/dL    Albumin 3.9 3.4 - 5.0 g/dL    Globulin 3.7 2.0 - 4.0 g/dL    A-G Ratio 1.1 0.8 - 1.7     LIPASE    Collection Time: 06/11/20  9:59 AM   Result Value Ref Range    Lipase 46 (L) 73 - 393 U/L       Radiologic Studies -   XR CHEST PORT   Final Result   IMPRESSION:      No acute radiographic cardiopulmonary abnormality. No areas of basilar density   identified to correlate with today's CT scan findings. CT ABD PELV W CONT   Final Result   IMPRESSION:      1. Basilar areas of ground glass density potential small areas of alveolar   infiltrate with accompanying atelectasis. 2. Extensive atherosclerotic vascular disease and chronic atherosclerotic   vascular was several areas of chronic occlusion as described above. 3. No focal bowel wall thickening or dilatation. No morphology of bowel   obstruction. No pneumatosis or mesenteric venous gas.       4. Unchanged imaging stigmata of chronic pancreatitis CT Results  (Last 48 hours)               06/11/20 1109  CT ABD PELV W CONT Final result    Impression:  IMPRESSION:       1. Basilar areas of ground glass density potential small areas of alveolar   infiltrate with accompanying atelectasis. 2. Extensive atherosclerotic vascular disease and chronic atherosclerotic   vascular was several areas of chronic occlusion as described above. 3. No focal bowel wall thickening or dilatation. No morphology of bowel   obstruction. No pneumatosis or mesenteric venous gas. 4. Unchanged imaging stigmata of chronic pancreatitis       Narrative:  EXAM: CT of the abdomen and pelvis       INDICATION: Right-sided abdominal pain       COMPARISON: CT scan of the abdomen-pelvis 5/20/2020; chest radiograph 6/1/2020       TECHNIQUE: Axial CT imaging of the abdomen and pelvis was performed with   intravenous contrast. Multiplanar reformats were generated. One or more dose reduction techniques were used on this CT: automated exposure   control, adjustment of the mAs and/or kVp according to patient size, and   iterative reconstruction techniques. The specific techniques used on this CT   exam have been documented in the patient's electronic medical record. Digital   Imaging and Communications in Medicine (DICOM) format image data are available   to nonaffiliated external healthcare facilities or entities on a secure, media   free, reciprocally searchable basis with patient authorization for at least a   12-month period after this study. _______________       FINDINGS:       LOWER CHEST: Basilar areas of groundglass density/atelectasis noted. No alveolar   consolidation or pleural effusion. Cardiac size remains within normal limits. No   pericardial effusion. LIVER, BILIARY: Hepatic parenchymal enhancement is uniform with mild and diffuse   hepatic hypoattenuation present. No focal hepatic lesion. No biliary dilation. Gallbladder is unremarkable. PANCREAS: Atrophic pancreatic calcifications noted throughout the pancreatic   head and uncinate process is unchanged main pancreatic ductal dilatation and   pancreatic tail calcifications. SPLEEN: Normal.       ADRENALS: Mild adreniform thickening of the each adrenal gland is noted. KIDNEYS/URETERS/BLADDER: Symmetric renal enhancement is present. No evidence of   hydronephrosis or urolithiasis. The urinary bladder is unremarkable in CT   appearance. PELVIC ORGANS: Unremarkable. VASCULATURE: Extensive aortobiiliac atherosclerotic vascular calcification is   present. 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 LYMPH NODES: No enlarged lymph nodes. GASTROINTESTINAL TRACT: There is no focal bowel wall thickening or dilatation   demonstrated. No morphology of bowel obstruction. Prior colonic resection and   anastomosis noted in the right lower quadrant of the abdomen. No evidence of   pneumatosis. No mesenteric venous gas. BONES: No acute or aggressive osseous abnormalities identified. OTHER: None.       _______________               CXR Results  (Last 48 hours)               06/11/20 1224  XR CHEST PORT Final result    Impression:  IMPRESSION:       No acute radiographic cardiopulmonary abnormality. No areas of basilar density   identified to correlate with today's CT scan findings. Narrative:  EXAM: XR CHEST PORT       CLINICAL INDICATION/HISTORY: CT abdomen with concern for groundglass opacity.     Patient is asymptomatic for any respiratory symptoms   -Additional: None       COMPARISON: June 1, 2020       TECHNIQUE: Frontal view of the chest       _______________       FINDINGS:       HEART AND MEDIASTINUM: Normal cardiac size and mediastinal contours. LUNGS AND PLEURAL SPACES: Lung bases are clear. No focal pneumonic opacity. No   evidence of pneumothorax or pleural effusion. BONY THORAX AND SOFT TISSUES: No acute osseous abnormality       _______________               1:05 PM  Patient with atherosclerotic disease in the abdomen that is extensive  There is no acute occlusion  Patient has no abdominal pain or tenderness  Patient is no white count  I will discuss with the radiologist for further assess this and I have already placed Dr. Trinidad as a vascular surgeon as a follow-up for the patient.  ===  I discussed this information with Dr. Bubba Valero. 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 I have attempted to call patient's primary care physician but we were unsuccessful in that matter. Discharge     Clinical Impression:   1. Abdominal pain, right upper quadrant    2.  Atherosclerotic occlusive disease        Disposition:  Home     It should be noted that I will be the provider of record for this patient  Saji Mark MD      Follow-up Information     Follow up With Specialties Details Why Contact Info    Sosa Guardado MD Internal Medicine Call today Follow Up From Emergency Department [facility] [phone]      [facility] EMERGENCY DEPT Emergency Medicine  If symptoms worsen [address] [address]    Rachna Petty MD Gastroenterology Call today Follow Up From Emergency Department [address]  [address] [facility] [address] [facility] [address]       Treva Haynes MD Vascular Surgery   [address] [facility],  [address] [place] [address] [address]            Current Discharge Medication List

## 2020-06-13 LAB
BACTERIA SPEC CULT: NORMAL
SERVICE CMNT-IMP: NORMAL

## 2020-07-14 ENCOUNTER — APPOINTMENT (OUTPATIENT)
Dept: GENERAL RADIOLOGY | Age: 75
End: 2020-07-14
Attending: NURSE PRACTITIONER
Payer: MEDICARE

## 2020-07-14 ENCOUNTER — HOSPITAL ENCOUNTER (EMERGENCY)
Age: 75
Discharge: HOME | End: 2020-07-14
Payer: MEDICARE

## 2020-07-14 VITALS
DIASTOLIC BLOOD PRESSURE: 82 MMHG | TEMPERATURE: 97.2 F | OXYGEN SATURATION: 95 % | RESPIRATION RATE: 16 BRPM | HEART RATE: 86 BPM | SYSTOLIC BLOOD PRESSURE: 146 MMHG

## 2020-07-14 DIAGNOSIS — E87.1 HYPONATREMIA: ICD-10-CM

## 2020-07-14 DIAGNOSIS — J20.9 ACUTE BRONCHITIS, UNSPECIFIED ORGANISM: ICD-10-CM

## 2020-07-14 DIAGNOSIS — R07.9 CHEST PAIN, UNSPECIFIED TYPE: Primary | ICD-10-CM

## 2020-07-14 LAB
ALBUMIN SERPL-MCNC: 3.7 G/DL (ref 3.4–5)
ALBUMIN/GLOB SERPL: 1 {RATIO} (ref 0.8–1.7)
ALP SERPL-CCNC: 75 U/L (ref 45–117)
ALT SERPL-CCNC: 10 U/L (ref 16–61)
ANION GAP SERPL CALC-SCNC: 4 MMOL/L (ref 3–18)
AST SERPL-CCNC: 18 U/L (ref 10–38)
BASOPHILS # BLD: 0 K/UL (ref 0–0.1)
BASOPHILS NFR BLD: 0 % (ref 0–2)
BILIRUB SERPL-MCNC: 0.6 MG/DL (ref 0.2–1)
BNP SERPL-MCNC: 29 PG/ML (ref 0–900)
BUN SERPL-MCNC: 9 MG/DL (ref 7–18)
BUN/CREAT SERPL: 10 (ref 12–20)
CALCIUM SERPL-MCNC: 8.7 MG/DL (ref 8.5–10.1)
CHLORIDE SERPL-SCNC: 94 MMOL/L (ref 100–111)
CO2 SERPL-SCNC: 29 MMOL/L (ref 21–32)
CREAT SERPL-MCNC: 0.87 MG/DL (ref 0.6–1.3)
DIFFERENTIAL METHOD BLD: ABNORMAL
EOSINOPHIL # BLD: 0.1 K/UL (ref 0–0.4)
EOSINOPHIL NFR BLD: 2 % (ref 0–5)
ERYTHROCYTE [DISTWIDTH] IN BLOOD BY AUTOMATED COUNT: 14.9 % (ref 11.6–14.5)
GLOBULIN SER CALC-MCNC: 3.6 G/DL (ref 2–4)
GLUCOSE SERPL-MCNC: 100 MG/DL (ref 74–99)
HCT VFR BLD AUTO: 40.2 % (ref 36–48)
HGB BLD-MCNC: 14 G/DL (ref 13–16)
LYMPHOCYTES # BLD: 1.3 K/UL (ref 0.9–3.6)
LYMPHOCYTES NFR BLD: 19 % (ref 21–52)
MAGNESIUM SERPL-MCNC: 2.4 MG/DL (ref 1.6–2.6)
MCH RBC QN AUTO: 30.1 PG (ref 24–34)
MCHC RBC AUTO-ENTMCNC: 34.8 G/DL (ref 31–37)
MCV RBC AUTO: 86.5 FL (ref 74–97)
MONOCYTES # BLD: 0.7 K/UL (ref 0.05–1.2)
MONOCYTES NFR BLD: 10 % (ref 3–10)
NEUTS SEG # BLD: 4.7 K/UL (ref 1.8–8)
NEUTS SEG NFR BLD: 69 % (ref 40–73)
PLATELET # BLD AUTO: 363 K/UL (ref 135–420)
PMV BLD AUTO: 9.1 FL (ref 9.2–11.8)
POTASSIUM SERPL-SCNC: 4.6 MMOL/L (ref 3.5–5.5)
PROT SERPL-MCNC: 7.3 G/DL (ref 6.4–8.2)
RBC # BLD AUTO: 4.65 M/UL (ref 4.7–5.5)
SODIUM SERPL-SCNC: 127 MMOL/L (ref 136–145)
TROPONIN I SERPL-MCNC: <0.02 NG/ML (ref 0–0.04)
WBC # BLD AUTO: 6.8 K/UL (ref 4.6–13.2)

## 2020-07-14 PROCEDURE — 93005 ELECTROCARDIOGRAM TRACING: CPT

## 2020-07-14 PROCEDURE — 83880 ASSAY OF NATRIURETIC PEPTIDE: CPT

## 2020-07-14 PROCEDURE — 74011250636 HC RX REV CODE- 250/636: Performed by: NURSE PRACTITIONER

## 2020-07-14 PROCEDURE — 83735 ASSAY OF MAGNESIUM: CPT

## 2020-07-14 PROCEDURE — 84484 ASSAY OF TROPONIN QUANT: CPT

## 2020-07-14 PROCEDURE — 99283 EMERGENCY DEPT VISIT LOW MDM: CPT

## 2020-07-14 PROCEDURE — 80053 COMPREHEN METABOLIC PANEL: CPT

## 2020-07-14 PROCEDURE — 85025 COMPLETE CBC W/AUTO DIFF WBC: CPT

## 2020-07-14 PROCEDURE — 71045 X-RAY EXAM CHEST 1 VIEW: CPT

## 2020-07-14 PROCEDURE — 96374 THER/PROPH/DIAG INJ IV PUSH: CPT

## 2020-07-14 RX ORDER — ONDANSETRON 2 MG/ML
4 INJECTION INTRAMUSCULAR; INTRAVENOUS
Status: DISCONTINUED | OUTPATIENT
Start: 2020-07-14 | End: 2020-07-14

## 2020-07-14 RX ORDER — GUAIFENESIN 600 MG/1
600 TABLET, EXTENDED RELEASE ORAL 2 TIMES DAILY
Qty: 30 TAB | Refills: 0 | Status: SHIPPED | OUTPATIENT
Start: 2020-07-14

## 2020-07-14 RX ORDER — DOXYCYCLINE HYCLATE 100 MG
100 TABLET ORAL 2 TIMES DAILY
Qty: 10 TAB | Refills: 0 | Status: SHIPPED | OUTPATIENT
Start: 2020-07-14 | End: 2020-07-19

## 2020-07-14 RX ORDER — FAMOTIDINE 10 MG/ML
20 INJECTION INTRAVENOUS
Status: COMPLETED | OUTPATIENT
Start: 2020-07-14 | End: 2020-07-14

## 2020-07-14 RX ADMIN — FAMOTIDINE 20 MG: 10 INJECTION INTRAVENOUS at 13:08

## 2020-07-14 NOTE — ED TRIAGE NOTES
\"I've got pain that goes from the right side of my chest al the way around and it hurts and it burns for 2-3 days. \"

## 2020-07-14 NOTE — DISCHARGE INSTRUCTIONS
Patient Education        Chest Pain: Care Instructions  Your Care Instructions     There are many things that can cause chest pain. Some are not serious and will get better on their own in a few days. But some kinds of chest pain need more testing and treatment. Your doctor may have recommended a follow-up visit in the next 8 to 12 hours. If you are not getting better, you may need more tests or treatment. Even though your doctor has released you, you still need to watch for any problems. The doctor carefully checked you, but sometimes problems can develop later. If you have new symptoms or if your symptoms do not get better, get medical care right away. If you have worse or different chest pain or pressure that lasts more than 5 minutes or you passed out (lost consciousness), [id number] or seek other emergency help right away. A medical visit is only one step in your treatment. Even if you feel better, you still need to do what your doctor recommends, such as going to all suggested follow-up appointments and taking medicines exactly as directed. This will help you recover and help prevent future problems. How can you care for yourself at home? · Rest until you feel better. · Take your medicine exactly as prescribed. Call your doctor if you think you are having a problem with your medicine. · Do not drive after taking a prescription pain medicine. When should you call for help? [id number]:   · You passed out (lost consciousness). · You have severe difficulty breathing. · You have symptoms of a heart attack. These may include:  ? Chest pain or pressure, or a strange feeling in your chest.  ? Sweating. ? Shortness of breath. ? Nausea or vomiting. ? Pain, pressure, or a strange feeling in your back, neck, jaw, or upper belly or in one or both shoulders or arms. ? Lightheadedness or sudden weakness. ? A fast or irregular heartbeat.   After you call 911, the  may tell you to chew 1 adult-strength or 2 to 4 low-dose aspirin. Wait for an ambulance. Do not try to drive yourself. Call your doctor today if:   · You have any trouble breathing. · Your chest pain gets worse. · You are dizzy or lightheaded, or you feel like you may faint. · You are not getting better as expected. · You are having new or different chest pain. Where can you learn more? Go to http://www.dumont.com/  Enter A120 in the search box to learn more about \"Chest Pain: Care Instructions. \"  Current as of: June 26, 2019               Content Version: 12.5  © 6361-4954 [employer]. Care instructions adapted under license by [employer] (which disclaims liability or warranty for this information). If you have questions about a medical condition or this instruction, always ask your healthcare professional. [address] any warranty or liability for your use of this information. Patient Education        Bronchitis: Care Instructions  Your Care Instructions    Bronchitis is inflammation of the bronchial tubes, which carry air to the lungs. The tubes swell and produce mucus, or phlegm. The mucus and inflamed bronchial tubes make you cough. You may have trouble breathing. Most cases of bronchitis are caused by viruses like those that cause colds. Antibiotics usually do not help and they may be harmful. Bronchitis usually develops rapidly and lasts about 2 to 3 weeks in otherwise healthy people. Follow-up care is a key part of your treatment and safety. Be sure to make and go to all appointments, and call your doctor if you are having problems. It's also a good idea to know your test results and keep a list of the medicines you take. How can you care for yourself at home? · Take all medicines exactly as prescribed. Call your doctor if you think you are having a problem with your medicine.   · Get some extra rest.  · Take an over-the-counter pain medicine, such as acetaminophen (Tylenol), ibuprofen (Advil, Motrin), or naproxen (Aleve) to reduce fever and relieve body aches. Read and follow all instructions on the label. · Do not take two or more pain medicines at the same time unless the doctor told you to. Many pain medicines have acetaminophen, which is Tylenol. Too much acetaminophen (Tylenol) can be harmful. · Take an over-the-counter cough medicine that contains dextromethorphan to help quiet a dry, hacking cough so that you can sleep. Avoid cough medicines that have more than one active ingredient. Read and follow all instructions on the label. · Breathe moist air from a humidifier, hot shower, or sink filled with hot water. The heat and moisture will thin mucus so you can cough it out. · Do not smoke. Smoking can make bronchitis worse. If you need help quitting, talk to your doctor about stop-smoking programs and medicines. These can increase your chances of quitting for good. When should you call for help? Call 911 anytime you think you may need emergency care. For example, call if:    · You have severe trouble breathing.    Call your doctor now or seek immediate medical care if:    · You have new or worse trouble breathing.     · You cough up dark brown or bloody mucus (sputum).     · You have a new or higher fever.     · You have a new rash.    Watch closely for changes in your health, and be sure to contact your doctor if:    · You cough more deeply or more often, especially if you notice more mucus or a change in the color of your mucus.     · You are not getting better as expected. Where can you learn more? Go to http://vale-peng.info/  Enter H333 in the search box to learn more about \"Bronchitis: Care Instructions. \"  Current as of: June 9, 2019Content Version: 12.4  © 7522-1834 [employer]. Care instructions adapted under license by [employer] (which disclaims liability or warranty for this information). If you have questions about a medical condition or this instruction, always ask your healthcare professional. [place] [address] any warranty or liability for your use of this information.

## 2020-07-14 NOTE — ED PROVIDER NOTES
EMERGENCY DEPARTMENT HISTORY AND PHYSICAL EXAM    Date: 7/14/2020  Patient Name: Carrie Domingo    History of Presenting Illness     Chief Complaint   Patient presents with    Chest Pain    Other     History Provided By: Patient  Chief complaint: This is a 69year old male with past medical history significant for alcohol and tobacco use, COPD, and hypertension who presented to the ED with chief complaint of right sided burning chest pain radiating to right back on going X 2-3 days with no associated SOB, fevers, chills, n/v, d/c. No abd or pelvic pain. No edema. No calf pain. No sick contacts. No recent travel. PCP: Robina Guardado III, MD    Current Facility-Administered Medications   Medication Dose Route Frequency Provider Last Rate Last Dose    sodium chloride 0.9 % bolus infusion 1,000 mL  1,000 mL IntraVENous ONCE Khadijah Cerna NP         Current Outpatient Medications   Medication Sig Dispense Refill    doxycycline (VIBRA-TABS) 100 mg tablet Take 1 Tab by mouth two (2) times a day for 5 days. 10 Tab 0    guaiFENesin ER (Mucinex) 600 mg ER tablet Take 1 Tab by mouth two (2) times a day. 30 Tab 0    camphor-menthoL (Sarna Original) 0.5-0.5 % lotion Apply  to affected area as needed for Itching.  carBAMazepine (TEGretol) 200 mg tablet Take 200 mg by mouth three (3) times daily.  amantadine HCL (SYMMETREL) 100 mg capsule Take 100 mg by mouth two (2) times a day.  folic acid (FOLVITE) 1 mg tablet Take 1 mg by mouth daily.  thiamine HCL (Vitamin B-1) 100 mg tablet Take 100 mg by mouth daily.  risperiDONE (RisperDAL) 0.5 mg tablet Take 0.5 mg by mouth.  melatonin 3 mg tablet Take 3 mg by mouth.  captopriL (CAPOTEN) 25 mg tablet Take 25 mg by mouth Before breakfast, lunch, and dinner.  acetaminophen (TYLENOL) 325 mg tablet Take 2 Tabs by mouth every four (4) hours as needed for Pain.  50 Tab 0    bisacodyL (Dulcolax, bisacodyl,) 5 mg EC tablet Take 2 Tabs by mouth daily as needed for Constipation. 15 Tab 0    polyethylene glycol (Miralax) 17 gram/dose powder Take 17 g by mouth daily as needed for Constipation. 1 tablespoon with 8 oz of water daily 510 g 0    aspirin (ASPIRIN) 325 mg tablet Take 325 mg by mouth daily.  amLODIPine (NORVASC) 10 mg tablet Take 1 Tab by mouth daily. 30 Tab 0    cholecalciferol (VITAMIN D3) 1,000 unit tablet Take  by mouth daily. Past History     Past Medical History:  Past Medical History:   Diagnosis Date    COPD (chronic obstructive pulmonary disease) ([address]     Hypertension     Smoker     25 pack years       Past Surgical History:  Past Surgical History:   Procedure Laterality Date    ABDOMEN SURGERY PROC UNLISTED      APPENDECTOMY      HX APPENDECTOMY      HX COLONOSCOPY  1/28/2015    Repeat colonoscopy in 5 yrs per Dr. Santillan Homes    HX ERCP  02/11/2019    bile duct stent    HX HERNIA REPAIR  10/22/2017    Ex lap. lysis of adhesions adn reductions of internal hernia done 10/22/2017       Family History:  Family History   Problem Relation Age of Onset    Heart Disease Mother     Cancer Father     Cancer Sister     Diabetes Sister        Social History:  Social History     Tobacco Use    Smoking status: Current Every Day Smoker     Packs/day: 0.50     Years: 50.00     Pack years: 25.00     Types: Cigarettes    Smokeless tobacco: Never Used   Substance Use Topics    Alcohol use: Not Currently     Comment: no alcohol in a year     Drug use: No       Allergies:  No Known Allergies      Review of Systems       Review of Systems   Constitutional: Negative for chills, diaphoresis, fatigue and fever. HENT: Negative for congestion, ear pain, mouth sores, sinus pressure, sneezing and sore throat. Eyes: Negative for photophobia, pain and visual disturbance. Respiratory: Negative for cough, chest tightness, shortness of breath and wheezing. Cardiovascular: Positive for chest pain.  Negative for palpitations and leg swelling. Gastrointestinal: Negative for abdominal pain, diarrhea, nausea and vomiting. Genitourinary: Negative for dysuria, flank pain and hematuria. Musculoskeletal: Negative for back pain, gait problem, neck pain and neck stiffness. Skin: Negative for rash and wound. Neurological: Negative for dizziness, weakness, light-headedness and headaches. Physical Exam     Visit Vitals  /82   Pulse 86   Temp 97.2 °F (36.2 °C)   Resp 16   SpO2 95%         Physical Exam  Vitals signs and nursing note reviewed. Constitutional:       General: He is not in acute distress. Appearance: Normal appearance. He is not ill-appearing, toxic-appearing or diaphoretic. HENT:      Head: Normocephalic and atraumatic. Neck:      Musculoskeletal: Normal range of motion and neck supple. No neck rigidity or muscular tenderness. Cardiovascular:      Rate and Rhythm: Normal rate and regular rhythm. Pulses: Normal pulses. Heart sounds: Normal heart sounds. No murmur. Pulmonary:      Effort: Pulmonary effort is normal. No respiratory distress. Breath sounds: Normal breath sounds. No wheezing or rales. Abdominal:      General: Abdomen is flat. Bowel sounds are normal. There is no distension. Palpations: Abdomen is soft. Tenderness: There is no abdominal tenderness. There is no right CVA tenderness or left CVA tenderness. Musculoskeletal: Normal range of motion. Right lower leg: No edema. Left lower leg: No edema. Skin:     General: Skin is warm and dry. Capillary Refill: Capillary refill takes less than 2 seconds. Neurological:      General: No focal deficit present. Mental Status: He is alert and oriented to person, place, and time.    Psychiatric:         Behavior: Behavior normal.         Diagnostic Study Results     Labs -  Recent Results (from the past 12 hour(s))   EKG, 12 LEAD, INITIAL    Collection Time: 07/14/20 12:54 PM   Result Value Ref Range    Ventricular Rate 83 BPM    Atrial Rate 83 BPM    P-R Interval 174 ms    QRS Duration 90 ms    Q-T Interval 368 ms    QTC Calculation (Bezet) 432 ms    Calculated P Axis 75 degrees    Calculated R Axis -42 degrees    Calculated T Axis 74 degrees    Diagnosis       Normal sinus rhythm  Left axis deviation  Septal infarct (cited on or before 28-MAR-2017)  Abnormal ECG  When compared with ECG of 20-MAY-2020 10:53,  No significant change was found     CBC WITH AUTOMATED DIFF    Collection Time: 07/14/20  1:09 PM   Result Value Ref Range    WBC 6.8 4.6 - 13.2 K/uL    RBC 4.65 (L) 4.70 - 5.50 M/uL    HGB 14.0 13.0 - 16.0 g/dL    HCT 40.2 36.0 - 48.0 %    MCV 86.5 74.0 - 97.0 FL    MCH 30.1 24.0 - 34.0 PG    MCHC 34.8 31.0 - 37.0 g/dL    RDW 14.9 (H) 11.6 - 14.5 %    PLATELET 526 323 - 027 K/uL    MPV 9.1 (L) 9.2 - 11.8 FL    NEUTROPHILS 69 40 - 73 %    LYMPHOCYTES 19 (L) 21 - 52 %    MONOCYTES 10 3 - 10 %    EOSINOPHILS 2 0 - 5 %    BASOPHILS 0 0 - 2 %    ABS. NEUTROPHILS 4.7 1.8 - 8.0 K/UL    ABS. LYMPHOCYTES 1.3 0.9 - 3.6 K/UL    ABS. MONOCYTES 0.7 0.05 - 1.2 K/UL    ABS. EOSINOPHILS 0.1 0.0 - 0.4 K/UL    ABS. BASOPHILS 0.0 0.0 - 0.1 K/UL    DF AUTOMATED     MAGNESIUM    Collection Time: 07/14/20  2:15 PM   Result Value Ref Range    Magnesium 2.4 1.6 - 2.6 mg/dL   METABOLIC PANEL, COMPREHENSIVE    Collection Time: 07/14/20  2:15 PM   Result Value Ref Range    Sodium 127 (L) 136 - 145 mmol/L    Potassium 4.6 3.5 - 5.5 mmol/L    Chloride 94 (L) 100 - 111 mmol/L    CO2 29 21 - 32 mmol/L    Anion gap 4 3.0 - 18 mmol/L    Glucose 100 (H) 74 - 99 mg/dL    BUN 9 7.0 - 18 MG/DL    Creatinine 0.87 0.6 - 1.3 MG/DL    BUN/Creatinine ratio 10 (L) 12 - 20      GFR est AA >60 >60 ml/min/1.73m2    GFR est non-AA >60 >60 ml/min/1.73m2    Calcium 8.7 8.5 - 10.1 MG/DL    Bilirubin, total 0.6 0.2 - 1.0 MG/DL    ALT (SGPT) 10 (L) 16 - 61 U/L    AST (SGOT) 18 10 - 38 U/L    Alk.  phosphatase 75 45 - 117 U/L    Protein, total 7.3 6.4 - 8.2 g/dL    Albumin 3.7 3.4 - 5.0 g/dL    Globulin 3.6 2.0 - 4.0 g/dL    A-G Ratio 1.0 0.8 - 1.7     NT-PRO BNP    Collection Time: 07/14/20  2:15 PM   Result Value Ref Range    NT pro-BNP 29 0 - 900 PG/ML   TROPONIN I    Collection Time: 07/14/20  2:15 PM   Result Value Ref Range    Troponin-I, QT <0.02 0.0 - 0.045 NG/ML       Radiologic Studies -   XR CHEST PORT   Final Result   IMPRESSION:  No acute cardiopulmonary disease. Medical Decision Making   I am the first provider for this patient. I reviewed the vital signs, available nursing notes, past medical history, past surgical history, family history and social history. Vital Signs-Reviewed the patient's vital signs. Records Reviewed: Nursing Notes and Old Medical Records (Time of Review: 4:44 PM)      Provider Notes (Medical Decision Making): This is a 69year old male with past medical history significant for alcohol and tobacco use, COPD, and hypertension who presented to the ED with chief complaint of right sided burning chest pain radiating to right back on going X 2-3 days with no associated SOB, fevers, chills, n/v, d/c. No abd or pelvic pain. No edema. No calf pain. No sick contacts. No recent travel. Afebrile. No work of breathing. NAD. Diminished lung sounds. Cbc unremarkable. cmp with sodium 127 but otherwise unremarkable. bnp 29, troponin <0.02, lfts wnl, CXR with no acute cardiopulmonary disease. ECG with no acute ischemic changes, NSR. Patient consumes alcohol daily, 2-3 beers per day. He is asymptomatic with a sodium of 127. He has hx of chronic hyponatremia per chart review since 2017. No hx of seizures. He received IVF 1 L bolus. Low threshold per admission at this time, he is to follow up with his PCP tomorrow for repeat blood work. Extensive review of return precautions discussed with patient prior to discharge.  Discussed importance of PCP follow up and need to return to ED immediately should symptoms worsen. Patient verbalized understanding of these instructions and is in agreement with discharge plan. Diagnosis     Clinical Impression:   1. Chest pain, unspecified type    2. Acute bronchitis, unspecified organism    3. Hyponatremia        Disposition: home       Follow-up Information     Follow up With Specialties Details Why [facility] EMERGENCY DEPT Emergency Medicine  If symptoms worsen [address]  [phone]    Rasta Guardado MD Internal Medicine In 2 days chest pain, cough, hyponatremia  [address]  [address] [place] [address] [phone]             Discharge Medication List as of 7/14/2020  4:51 PM      START taking these medications    Details   doxycycline (VIBRA-TABS) 100 mg tablet Take 1 Tab by mouth two (2) times a day for 5 days. , Print, Disp-10 Tab,R-0      guaiFENesin ER (Mucinex) 600 mg ER tablet Take 1 Tab by mouth two (2) times a day., Print, Disp-30 Tab,R-0         CONTINUE these medications which have NOT CHANGED    Details   camphor-menthoL (Sarna Original) 0.5-0.5 % lotion Apply  to affected area as needed for Itching., Historical Med      carBAMazepine (TEGretol) 200 mg tablet Take 200 mg by mouth three (3) times daily. , Historical Med      amantadine HCL (SYMMETREL) 100 mg capsule Take 100 mg by mouth two (2) times a day., Historical Med      folic acid (FOLVITE) 1 mg tablet Take 1 mg by mouth daily. , Historical Med      thiamine HCL (Vitamin B-1) 100 mg tablet Take 100 mg by mouth daily. , Historical Med      risperiDONE (RisperDAL) 0.5 mg tablet Take 0.5 mg by mouth., Historical Med      melatonin 3 mg tablet Take 3 mg by mouth., Historical Med      captopriL (CAPOTEN) 25 mg tablet Take 25 mg by mouth Before breakfast, lunch, and dinner., Historical Med      acetaminophen (TYLENOL) 325 mg tablet Take 2 Tabs by mouth every four (4) hours as needed for Pain., Normal, Disp-50 Tab, R-0      bisacodyL (Dulcolax, bisacodyl,) 5 mg EC tablet Take 2 Tabs by mouth daily as needed for Constipation. , Normal, Disp-15 Tab, R-0      polyethylene glycol (Miralax) 17 gram/dose powder Take 17 g by mouth daily as needed for Constipation. 1 tablespoon with 8 oz of water daily, Normal, Disp-510 g, R-0      aspirin (ASPIRIN) 325 mg tablet Take 325 mg by mouth daily. , Historical Med      amLODIPine (NORVASC) 10 mg tablet Take 1 Tab by mouth daily. , Print, Disp-30 Tab, R-0      cholecalciferol (VITAMIN D3) 1,000 unit tablet Take  by mouth daily. , Historical Med             Dictation disclaimer:  Please note that this dictation was completed with Pollen, the computer voice recognition software. Quite often unanticipated grammatical, syntax, homophones, and other interpretive errors are inadvertently transcribed by the computer software. Please disregard these errors. Please excuse any errors that have escaped final proofreading.

## 2020-07-15 LAB
ATRIAL RATE: 83 BPM
CALCULATED P AXIS, ECG09: 75 DEGREES
CALCULATED R AXIS, ECG10: -42 DEGREES
CALCULATED T AXIS, ECG11: 74 DEGREES
DIAGNOSIS, 93000: NORMAL
P-R INTERVAL, ECG05: 174 MS
Q-T INTERVAL, ECG07: 368 MS
QRS DURATION, ECG06: 90 MS
QTC CALCULATION (BEZET), ECG08: 432 MS
VENTRICULAR RATE, ECG03: 83 BPM

## 2020-07-18 ENCOUNTER — APPOINTMENT (OUTPATIENT)
Dept: GENERAL RADIOLOGY | Age: 75
DRG: 641 | End: 2020-07-18
Attending: EMERGENCY MEDICINE
Payer: MEDICARE

## 2020-07-18 ENCOUNTER — HOSPITAL ENCOUNTER (INPATIENT)
Age: 75
LOS: 2 days | Discharge: LEFT BEFORE DISCHARGE | DRG: 641 | End: 2020-07-20
Attending: EMERGENCY MEDICINE | Admitting: HOSPITALIST
Payer: MEDICARE

## 2020-07-18 DIAGNOSIS — E87.1 HYPONATREMIA: Primary | ICD-10-CM

## 2020-07-18 DIAGNOSIS — R07.9 CHEST PAIN, UNSPECIFIED TYPE: ICD-10-CM

## 2020-07-18 PROBLEM — I67.9 CEREBROVASCULAR DISEASE: Status: ACTIVE | Noted: 2020-07-18

## 2020-07-18 LAB
ANION GAP SERPL CALC-SCNC: 8 MMOL/L (ref 3–18)
ANION GAP SERPL CALC-SCNC: 9 MMOL/L (ref 3–18)
ATRIAL RATE: 93 BPM
BASOPHILS # BLD: 0 K/UL (ref 0–0.1)
BASOPHILS NFR BLD: 0 % (ref 0–2)
BUN SERPL-MCNC: 19 MG/DL (ref 7–18)
BUN SERPL-MCNC: 19 MG/DL (ref 7–18)
BUN/CREAT SERPL: 17 (ref 12–20)
BUN/CREAT SERPL: 22 (ref 12–20)
CALCIUM SERPL-MCNC: 8.8 MG/DL (ref 8.5–10.1)
CALCIUM SERPL-MCNC: 9 MG/DL (ref 8.5–10.1)
CALCULATED P AXIS, ECG09: 70 DEGREES
CALCULATED R AXIS, ECG10: 57 DEGREES
CALCULATED T AXIS, ECG11: 73 DEGREES
CHLORIDE SERPL-SCNC: 82 MMOL/L (ref 100–111)
CHLORIDE SERPL-SCNC: 85 MMOL/L (ref 100–111)
CK MB CFR SERPL CALC: 0.6 % (ref 0–4)
CK MB SERPL-MCNC: 1.2 NG/ML (ref 5–25)
CK SERPL-CCNC: 185 U/L (ref 39–308)
CO2 SERPL-SCNC: 27 MMOL/L (ref 21–32)
CO2 SERPL-SCNC: 29 MMOL/L (ref 21–32)
CREAT SERPL-MCNC: 0.88 MG/DL (ref 0.6–1.3)
CREAT SERPL-MCNC: 1.13 MG/DL (ref 0.6–1.3)
DIAGNOSIS, 93000: NORMAL
DIFFERENTIAL METHOD BLD: ABNORMAL
EOSINOPHIL # BLD: 0 K/UL (ref 0–0.4)
EOSINOPHIL NFR BLD: 0 % (ref 0–5)
ERYTHROCYTE [DISTWIDTH] IN BLOOD BY AUTOMATED COUNT: 14.3 % (ref 11.6–14.5)
GLUCOSE SERPL-MCNC: 112 MG/DL (ref 74–99)
GLUCOSE SERPL-MCNC: 131 MG/DL (ref 74–99)
HCT VFR BLD AUTO: 40.4 % (ref 36–48)
HGB BLD-MCNC: 14.5 G/DL (ref 13–16)
LYMPHOCYTES # BLD: 1.3 K/UL (ref 0.9–3.6)
LYMPHOCYTES NFR BLD: 10 % (ref 21–52)
MCH RBC QN AUTO: 30.5 PG (ref 24–34)
MCHC RBC AUTO-ENTMCNC: 35.9 G/DL (ref 31–37)
MCV RBC AUTO: 84.9 FL (ref 74–97)
MONOCYTES # BLD: 1.7 K/UL (ref 0.05–1.2)
MONOCYTES NFR BLD: 14 % (ref 3–10)
NEUTS SEG # BLD: 9.5 K/UL (ref 1.8–8)
NEUTS SEG NFR BLD: 76 % (ref 40–73)
P-R INTERVAL, ECG05: 164 MS
PLATELET # BLD AUTO: 321 K/UL (ref 135–420)
PMV BLD AUTO: 8.8 FL (ref 9.2–11.8)
POTASSIUM SERPL-SCNC: 3.4 MMOL/L (ref 3.5–5.5)
POTASSIUM SERPL-SCNC: 4.5 MMOL/L (ref 3.5–5.5)
Q-T INTERVAL, ECG07: 350 MS
QRS DURATION, ECG06: 90 MS
QTC CALCULATION (BEZET), ECG08: 435 MS
RBC # BLD AUTO: 4.76 M/UL (ref 4.7–5.5)
SODIUM SERPL-SCNC: 119 MMOL/L (ref 136–145)
SODIUM SERPL-SCNC: 121 MMOL/L (ref 136–145)
TROPONIN I SERPL-MCNC: <0.02 NG/ML (ref 0–0.04)
VENTRICULAR RATE, ECG03: 93 BPM
WBC # BLD AUTO: 12.6 K/UL (ref 4.6–13.2)

## 2020-07-18 PROCEDURE — 65270000029 HC RM PRIVATE

## 2020-07-18 PROCEDURE — 82550 ASSAY OF CK (CPK): CPT

## 2020-07-18 PROCEDURE — 71045 X-RAY EXAM CHEST 1 VIEW: CPT

## 2020-07-18 PROCEDURE — 80048 BASIC METABOLIC PNL TOTAL CA: CPT

## 2020-07-18 PROCEDURE — 74011250637 HC RX REV CODE- 250/637: Performed by: EMERGENCY MEDICINE

## 2020-07-18 PROCEDURE — 74011250636 HC RX REV CODE- 250/636: Performed by: HOSPITALIST

## 2020-07-18 PROCEDURE — 99283 EMERGENCY DEPT VISIT LOW MDM: CPT

## 2020-07-18 PROCEDURE — 93005 ELECTROCARDIOGRAM TRACING: CPT

## 2020-07-18 PROCEDURE — 85025 COMPLETE CBC W/AUTO DIFF WBC: CPT

## 2020-07-18 PROCEDURE — 74011000258 HC RX REV CODE- 258: Performed by: HOSPITALIST

## 2020-07-18 PROCEDURE — 74011250637 HC RX REV CODE- 250/637: Performed by: HOSPITALIST

## 2020-07-18 RX ORDER — ONDANSETRON 2 MG/ML
4 INJECTION INTRAMUSCULAR; INTRAVENOUS
Status: DISCONTINUED | OUTPATIENT
Start: 2020-07-18 | End: 2020-07-20 | Stop reason: HOSPADM

## 2020-07-18 RX ORDER — AMANTADINE HYDROCHLORIDE 100 MG/1
100 CAPSULE, GELATIN COATED ORAL 2 TIMES DAILY
Status: DISCONTINUED | OUTPATIENT
Start: 2020-07-18 | End: 2020-07-20 | Stop reason: HOSPADM

## 2020-07-18 RX ORDER — DEXTROSE MONOHYDRATE AND SODIUM CHLORIDE 5; .45 G/100ML; G/100ML
75 INJECTION, SOLUTION INTRAVENOUS CONTINUOUS
Status: DISCONTINUED | OUTPATIENT
Start: 2020-07-18 | End: 2020-07-20 | Stop reason: HOSPADM

## 2020-07-18 RX ORDER — ASPIRIN 325 MG
325 TABLET ORAL DAILY
Status: DISCONTINUED | OUTPATIENT
Start: 2020-07-18 | End: 2020-07-20 | Stop reason: HOSPADM

## 2020-07-18 RX ORDER — ACETAMINOPHEN 650 MG/1
650 SUPPOSITORY RECTAL
Status: DISCONTINUED | OUTPATIENT
Start: 2020-07-18 | End: 2020-07-20 | Stop reason: HOSPADM

## 2020-07-18 RX ORDER — ACETAMINOPHEN 325 MG/1
650 TABLET ORAL
Status: DISCONTINUED | OUTPATIENT
Start: 2020-07-18 | End: 2020-07-20 | Stop reason: HOSPADM

## 2020-07-18 RX ORDER — ASPIRIN 325 MG
325 TABLET ORAL
Status: COMPLETED | OUTPATIENT
Start: 2020-07-18 | End: 2020-07-18

## 2020-07-18 RX ORDER — PROMETHAZINE HYDROCHLORIDE 25 MG/1
12.5 TABLET ORAL
Status: DISCONTINUED | OUTPATIENT
Start: 2020-07-18 | End: 2020-07-20 | Stop reason: HOSPADM

## 2020-07-18 RX ORDER — CARBAMAZEPINE 200 MG/1
200 TABLET, EXTENDED RELEASE ORAL 2 TIMES DAILY
COMMUNITY

## 2020-07-18 RX ORDER — CARBAMAZEPINE 200 MG/1
200 TABLET ORAL 3 TIMES DAILY
Status: DISCONTINUED | OUTPATIENT
Start: 2020-07-18 | End: 2020-07-18 | Stop reason: CLARIF

## 2020-07-18 RX ORDER — POLYETHYLENE GLYCOL 3350 17 G/17G
17 POWDER, FOR SOLUTION ORAL DAILY PRN
Status: DISCONTINUED | OUTPATIENT
Start: 2020-07-18 | End: 2020-07-20 | Stop reason: HOSPADM

## 2020-07-18 RX ORDER — AMLODIPINE BESYLATE 10 MG/1
10 TABLET ORAL DAILY
Status: DISCONTINUED | OUTPATIENT
Start: 2020-07-18 | End: 2020-07-18

## 2020-07-18 RX ORDER — ENOXAPARIN SODIUM 100 MG/ML
40 INJECTION SUBCUTANEOUS DAILY
Status: DISCONTINUED | OUTPATIENT
Start: 2020-07-18 | End: 2020-07-20 | Stop reason: HOSPADM

## 2020-07-18 RX ORDER — CAPTOPRIL 12.5 MG/1
25 TABLET ORAL
Status: DISCONTINUED | OUTPATIENT
Start: 2020-07-18 | End: 2020-07-20 | Stop reason: HOSPADM

## 2020-07-18 RX ORDER — CARBAMAZEPINE 200 MG/1
200 TABLET, EXTENDED RELEASE ORAL 2 TIMES DAILY
Status: DISCONTINUED | OUTPATIENT
Start: 2020-07-18 | End: 2020-07-20 | Stop reason: HOSPADM

## 2020-07-18 RX ORDER — RISPERIDONE 0.5 MG/1
0.5 TABLET, FILM COATED ORAL 3 TIMES DAILY
Status: DISCONTINUED | OUTPATIENT
Start: 2020-07-18 | End: 2020-07-20 | Stop reason: HOSPADM

## 2020-07-18 RX ADMIN — ASPIRIN 325 MG ORAL TABLET 325 MG: 325 PILL ORAL at 02:18

## 2020-07-18 RX ADMIN — DEXTROSE MONOHYDRATE AND SODIUM CHLORIDE 75 ML/HR: 5; .45 INJECTION, SOLUTION INTRAVENOUS at 07:23

## 2020-07-18 RX ADMIN — RISPERIDONE 0.5 MG: 0.5 TABLET, FILM COATED ORAL at 17:23

## 2020-07-18 RX ADMIN — ENOXAPARIN SODIUM 40 MG: 40 INJECTION SUBCUTANEOUS at 09:34

## 2020-07-18 RX ADMIN — CARBAMAZEPINE 200 MG: 200 TABLET, EXTENDED RELEASE ORAL at 10:54

## 2020-07-18 RX ADMIN — ASPIRIN 325 MG ORAL TABLET 325 MG: 325 PILL ORAL at 09:33

## 2020-07-18 RX ADMIN — CAPTOPRIL 25 MG: 12.5 TABLET ORAL at 12:37

## 2020-07-18 RX ADMIN — AMANTADINE HYDROCHLORIDE 100 MG: 100 CAPSULE ORAL at 10:54

## 2020-07-18 RX ADMIN — AMANTADINE HYDROCHLORIDE 100 MG: 100 CAPSULE ORAL at 17:23

## 2020-07-18 RX ADMIN — CAPTOPRIL 25 MG: 12.5 TABLET ORAL at 17:23

## 2020-07-18 RX ADMIN — RISPERIDONE 0.5 MG: 0.5 TABLET, FILM COATED ORAL at 21:41

## 2020-07-18 RX ADMIN — CARBAMAZEPINE 200 MG: 200 TABLET, EXTENDED RELEASE ORAL at 17:23

## 2020-07-18 NOTE — ED NOTES
TRANSFER - ED to INPATIENT REPORT:    Verbal report given to Azra Alvarez on Luba Justice  being transferred to 2200 for routine progression of care       Report consisted of patients Situation, Background, Assessment and   Recommendations(SBAR). SBAR report made available to receiving floor on this patient being transferred to [address] [place] [address] (2200)  for routine progression of care       Admitting diagnosis Hyponatremia [E87.1]    Information from the following report(s) SBAR, ED Summary and MAR was made available to receiving floor.     Lines:   Peripheral IV 07/18/20 Right Arm (Active)   Site Assessment Clean, dry, & intact 07/18/20 0127   Phlebitis Assessment 0 07/18/20 0127   Infiltration Assessment 0 07/18/20 0127   Dressing Status Clean, dry, & intact 07/18/20 0127   Dressing Type Transparent 07/18/20 0127   Hub Color/Line Status Pink 07/18/20 0127             Patient is oriented to time, place, person and situation        Valuables transported with patient         MAP (Monitor): 94 =Monitored (most recent)  Vitals w/ MEWS Score (last day)     Date/Time MEWS Score Pulse Resp Temp BP Level of Consciousness SpO2    07/18/20 0730  1  84  18  98.9 °F (37.2 °C)  133/81  Alert  97 %    07/18/20 07:26:36              94 %    07/18/20 0714    87  20    132/79  Alert  94 %    07/18/20 0625        99.1 °F (37.3 °C)          07/18/20 0600          146/85    98 %    07/18/20 0530          152/88    98 %    07/18/20 0515    (!) 109  24    (!) 186/125    99 %    07/18/20 0500    (!) 101  20    127/77    95 %    07/18/20 0430    93  22    105/57    97 %    07/18/20 0052  2  (!) 106  18  97.8 °F (36.6 °C)  123/75  Alert  97 %

## 2020-07-18 NOTE — ED TRIAGE NOTES
Pt arrives in triage a/o x 4. Pt states he started to have chest pain today (unsure as of when exactly). Patient decided to walk to hospital. Per witnesses patient was trying to walk up a steep ramp and stumbled. Per witnesses patient did not hit his head or make contact with the ground.  No complaints of related to stumbling     0114  Xray is here

## 2020-07-18 NOTE — ED NOTES
Patient states he is having chest pain 8/10 in left part of his chest. EKG done. Stattes it hurts to palpate chest area. MD in room. Patient is a smoker, has a smokers cough. Lungs are course. Pelon nausea or vomiting.      0631  Kulwinder from 2200 was given report on this patient. Patient has been sleeping, voices no complaints except chest hurts when he coughs and he has a runny nose.

## 2020-07-18 NOTE — ED PROVIDER NOTES
EMERGENCY DEPARTMENT HISTORY AND PHYSICAL EXAM    1:10 AM      Date: 7/18/2020  Patient Name: Tracie rOr    History of Presenting Illness     Chief Complaint   Patient presents with    Chest Pain    Fall         History Provided By: Patient      Additional History (Context): Tracie Orr is a 69 y.o. male who presents with chest pain. Patient states about 2 hours prior to arrival he had onset of chest pain. Patient describes as being a sharp, stabbing sensation that is across his entire chest.  Patient states that when he takes a deep breath and it makes it worse. Patient states that he does feel lightheaded with the onset of the chest pain. Denies any diaphoresis, shortness of breath, abdominal pain, nausea, vomiting. No history of MI in the past.  No recent stress test.  Patient does have a history of hypertension, smoking. Denies any history of hyperlipidemia or diabetes mellitus. No known family history. No treatment prior to arrival.  Patient states that he did walk here to be evaluated, he did trip but did not land on the ground. PCP: Ema Guardado III, MD      Current Outpatient Medications   Medication Sig Dispense Refill    doxycycline (VIBRA-TABS) 100 mg tablet Take 1 Tab by mouth two (2) times a day for 5 days. 10 Tab 0    guaiFENesin ER (Mucinex) 600 mg ER tablet Take 1 Tab by mouth two (2) times a day. 30 Tab 0    camphor-menthoL (Sarna Original) 0.5-0.5 % lotion Apply  to affected area as needed for Itching.  carBAMazepine (TEGretol) 200 mg tablet Take 200 mg by mouth three (3) times daily.  amantadine HCL (SYMMETREL) 100 mg capsule Take 100 mg by mouth two (2) times a day.  folic acid (FOLVITE) 1 mg tablet Take 1 mg by mouth daily.  thiamine HCL (Vitamin B-1) 100 mg tablet Take 100 mg by mouth daily.  risperiDONE (RisperDAL) 0.5 mg tablet Take 0.5 mg by mouth.  melatonin 3 mg tablet Take 3 mg by mouth.       captopriL (CAPOTEN) 25 mg tablet Take 25 mg by mouth Before breakfast, lunch, and dinner.  acetaminophen (TYLENOL) 325 mg tablet Take 2 Tabs by mouth every four (4) hours as needed for Pain. 50 Tab 0    bisacodyL (Dulcolax, bisacodyl,) 5 mg EC tablet Take 2 Tabs by mouth daily as needed for Constipation. 15 Tab 0    polyethylene glycol (Miralax) 17 gram/dose powder Take 17 g by mouth daily as needed for Constipation. 1 tablespoon with 8 oz of water daily 510 g 0    aspirin (ASPIRIN) 325 mg tablet Take 325 mg by mouth daily.  amLODIPine (NORVASC) 10 mg tablet Take 1 Tab by mouth daily. 30 Tab 0    cholecalciferol (VITAMIN D3) 1,000 unit tablet Take  by mouth daily. Past History     Past Medical History:  Past Medical History:   Diagnosis Date    COPD (chronic obstructive pulmonary disease) ([facility] [address]     Hypertension     Smoker     25 pack years       Past Surgical History:  Past Surgical History:   Procedure Laterality Date    ABDOMEN SURGERY PROC UNLISTED      APPENDECTOMY      HX APPENDECTOMY      HX COLONOSCOPY  1/28/2015    Repeat colonoscopy in 5 yrs per Dr. Diana Salazar    HX ERCP  02/11/2019    bile duct stent    HX HERNIA REPAIR  10/22/2017    Ex lap. lysis of adhesions adn reductions of internal hernia done 10/22/2017       Family History:  Family History   Problem Relation Age of Onset    Heart Disease Mother     Cancer Father     Cancer Sister     Diabetes Sister        Social History:  Social History     Tobacco Use    Smoking status: Current Every Day Smoker     Packs/day: 0.50     Years: 50.00     Pack years: 25.00     Types: Cigarettes    Smokeless tobacco: Never Used   Substance Use Topics    Alcohol use: Not Currently     Comment: no alcohol in a year     Drug use: No       Allergies:  No Known Allergies      Review of Systems       Review of Systems   Constitutional: Negative for chills, fatigue and fever. HENT: Negative for congestion, rhinorrhea, sinus pressure, sinus pain, sneezing and sore throat. Eyes: Negative for photophobia and visual disturbance. Respiratory: Negative for cough, shortness of breath and wheezing. Cardiovascular: Positive for chest pain. Negative for palpitations. Gastrointestinal: Negative for abdominal pain, constipation, diarrhea, nausea and vomiting. Genitourinary: Negative for dysuria, frequency and hematuria. Musculoskeletal: Negative for back pain, neck pain and neck stiffness. Skin: Negative for rash and wound. Neurological: Positive for light-headedness. Negative for dizziness and headaches. Psychiatric/Behavioral: Negative for agitation, behavioral problems and confusion. Physical Exam     Visit Vitals  /85   Pulse (!) 109   Temp 97.8 °F (36.6 °C)   Resp 24   Ht 5' 8\" (1.727 m)   Wt 59 kg (130 lb)   SpO2 98%   BMI 19.77 kg/m²       Physical Exam  Constitutional:       General: He is not in acute distress. Appearance: He is not toxic-appearing. HENT:      Head: Normocephalic and atraumatic. Mouth/Throat:      Mouth: Mucous membranes are moist.   Eyes:      Pupils: Pupils are equal, round, and reactive to light. Neck:      Musculoskeletal: Normal range of motion. No neck rigidity. Cardiovascular:      Rate and Rhythm: Normal rate. Heart sounds: No murmur. No gallop. Pulmonary:      Effort: No respiratory distress. Breath sounds: Normal breath sounds. No wheezing. Comments: Clear breath sounds in all lung fields. No acute respiratory distress. Pulse ox 97% on room air. Patient does have chest wall tenderness anteriorly, I does reproduce his chest pain. No overlying ecchymosis or erythema. No crepitance. Chest:      Chest wall: Tenderness present. Abdominal:      General: There is no distension. Palpations: Abdomen is soft. Tenderness: There is no abdominal tenderness. Musculoskeletal: Normal range of motion. General: No swelling or deformity.    Lymphadenopathy:      Cervical: No cervical adenopathy. Skin:     General: Skin is warm. Capillary Refill: Capillary refill takes less than 2 seconds. Coloration: Skin is not jaundiced. Findings: No bruising. Neurological:      Mental Status: He is alert and oriented to person, place, and time. Cranial Nerves: No cranial nerve deficit. Motor: No weakness. Psychiatric:         Mood and Affect: Mood normal.         Thought Content:  Thought content normal.           Diagnostic Study Results     Labs -  Recent Results (from the past 12 hour(s))   EKG, 12 LEAD, INITIAL    Collection Time: 07/18/20  1:01 AM   Result Value Ref Range    Ventricular Rate 93 BPM    Atrial Rate 93 BPM    P-R Interval 164 ms    QRS Duration 90 ms    Q-T Interval 350 ms    QTC Calculation (Bezet) 435 ms    Calculated P Axis 70 degrees    Calculated R Axis 57 degrees    Calculated T Axis 73 degrees    Diagnosis       Critical Test Result: STEMI  Normal sinus rhythm  Possible Left atrial enlargement  Septal infarct (cited on or before 28-MAR-2017)  Inferior injury pattern  ACUTE MI / STEMI  Abnormal ECG  When compared with ECG of 14-JUL-2020 12:54,  QRS axis shifted right     METABOLIC PANEL, BASIC    Collection Time: 07/18/20  1:26 AM   Result Value Ref Range    Sodium 119 (LL) 136 - 145 mmol/L    Potassium 4.5 3.5 - 5.5 mmol/L    Chloride 82 (L) 100 - 111 mmol/L    CO2 29 21 - 32 mmol/L    Anion gap 8 3.0 - 18 mmol/L    Glucose 131 (H) 74 - 99 mg/dL    BUN 19 (H) 7.0 - 18 MG/DL    Creatinine 1.13 0.6 - 1.3 MG/DL    BUN/Creatinine ratio 17 12 - 20      GFR est AA >60 >60 ml/min/1.73m2    GFR est non-AA >60 >60 ml/min/1.73m2    Calcium 9.0 8.5 - 10.1 MG/DL   CBC WITH AUTOMATED DIFF    Collection Time: 07/18/20  1:26 AM   Result Value Ref Range    WBC 12.6 4.6 - 13.2 K/uL    RBC 4.76 4.70 - 5.50 M/uL    HGB 14.5 13.0 - 16.0 g/dL    HCT 40.4 36.0 - 48.0 %    MCV 84.9 74.0 - 97.0 FL    MCH 30.5 24.0 - 34.0 PG    MCHC 35.9 31.0 - 37.0 g/dL    RDW 14.3 11.6 - 14.5 %    PLATELET 145 568 - 727 K/uL    MPV 8.8 (L) 9.2 - 11.8 FL    NEUTROPHILS 76 (H) 40 - 73 %    LYMPHOCYTES 10 (L) 21 - 52 %    MONOCYTES 14 (H) 3 - 10 %    EOSINOPHILS 0 0 - 5 %    BASOPHILS 0 0 - 2 %    ABS. NEUTROPHILS 9.5 (H) 1.8 - 8.0 K/UL    ABS. LYMPHOCYTES 1.3 0.9 - 3.6 K/UL    ABS. MONOCYTES 1.7 (H) 0.05 - 1.2 K/UL    ABS. EOSINOPHILS 0.0 0.0 - 0.4 K/UL    ABS. BASOPHILS 0.0 0.0 - 0.1 K/UL    DF AUTOMATED     CARDIAC PANEL,(CK, CKMB & TROPONIN)    Collection Time: 07/18/20  1:26 AM   Result Value Ref Range    CK - MB 1.2 <3.6 ng/ml    CK-MB Index 0.6 0.0 - 4.0 %     39 - 308 U/L    Troponin-I, QT <0.02 0.0 - 0.045 NG/ML       Radiologic Studies -   XR CHEST PORT    (Results Pending)         Medical Decision Making   I am the first provider for this patient. I reviewed the vital signs, available nursing notes, past medical history, past surgical history, family history and social history. Vital Signs-Reviewed the patient's vital signs. EKG: Sinus rhythm, heart rate 93. No acute ischemic changes seen from comparison EKG on 7/14/2020. The EKG does read acute STEMI, but I do not see any ST elevations. Again he has no acute ischemic changes from comparison EKGs and I did look at several of them in the chart. Records Reviewed: Nursing Notes (Time of Review: 1:10 AM)    ED Course: Progress Notes, Reevaluation, and Consults:  Patient was found to be hyponatremic, he is on ACE inhibitor's, no Lasix. Patient is agreeable for admission. Time: 65. Spoke with , hospitalist.  Discussed case. He will admit the patient. Provider Notes (Medical Decision Making): MDM        Critical Care Time: 0      Diagnosis     Clinical Impression:   1. Hyponatremia    2.  Chest pain, unspecified type        Disposition: Admit    Follow-up Information    None          Patient's Medications   Start Taking    No medications on file   Continue Taking    ACETAMINOPHEN (TYLENOL) 325 MG TABLET    Take 2 Tabs by mouth every four (4) hours as needed for Pain. AMANTADINE HCL (SYMMETREL) 100 MG CAPSULE    Take 100 mg by mouth two (2) times a day. AMLODIPINE (NORVASC) 10 MG TABLET    Take 1 Tab by mouth daily. ASPIRIN (ASPIRIN) 325 MG TABLET    Take 325 mg by mouth daily. BISACODYL (DULCOLAX, BISACODYL,) 5 MG EC TABLET    Take 2 Tabs by mouth daily as needed for Constipation. CAMPHOR-MENTHOL (SARNA ORIGINAL) 0.5-0.5 % LOTION    Apply  to affected area as needed for Itching. CAPTOPRIL (CAPOTEN) 25 MG TABLET    Take 25 mg by mouth Before breakfast, lunch, and dinner. CARBAMAZEPINE (TEGRETOL) 200 MG TABLET    Take 200 mg by mouth three (3) times daily. CHOLECALCIFEROL (VITAMIN D3) 1,000 UNIT TABLET    Take  by mouth daily. DOXYCYCLINE (VIBRA-TABS) 100 MG TABLET    Take 1 Tab by mouth two (2) times a day for 5 days. FOLIC ACID (FOLVITE) 1 MG TABLET    Take 1 mg by mouth daily. GUAIFENESIN ER (MUCINEX) 600 MG ER TABLET    Take 1 Tab by mouth two (2) times a day. MELATONIN 3 MG TABLET    Take 3 mg by mouth. POLYETHYLENE GLYCOL (MIRALAX) 17 GRAM/DOSE POWDER    Take 17 g by mouth daily as needed for Constipation. 1 tablespoon with 8 oz of water daily    RISPERIDONE (RISPERDAL) 0.5 MG TABLET    Take 0.5 mg by mouth. THIAMINE HCL (VITAMIN B-1) 100 MG TABLET    Take 100 mg by mouth daily. These Medications have changed    No medications on file   Stop Taking    No medications on file     _______________________________    Please note that this dictation was completed with [employer], the [employer] voice recognition software. Quite often unanticipated grammatical, syntax, homophones, and other interpretive errors are inadvertently transcribed by the computer software. Please disregard these errors. Please excuse any errors that have escaped final proofreading.

## 2020-07-18 NOTE — ED NOTES
Report from [facility], per offgoing nurse, report already called to 2200 and pt may be admitted after shift change

## 2020-07-18 NOTE — H&P
History and Physical    Patient: Rafiq Pisano               Sex: male          DOA: 2020       YOB: 1945      Age:  69 y.o.        LOS:  LOS: 0 days        HPI:     Rafiq Pisano is a 69 y.o. male who presented to the ER with weakness and feeling bad. This had been worsening at home for 2-3 days. He has associated abdominal pain and nausea. He reports some mild increase in difficulty with ambulation from generalized weakness. He presented to the ER where he was found to have hyponatremia and he will be admitted for ongoing management. Past Medical History:   Diagnosis Date    COPD (chronic obstructive pulmonary disease) ([facility] [address]     Hypertension     Smoker     25 pack years       Social History:   Tobacco use:  Patient has smoked for many years   Alcohol use:  Patient does not use alcohol   Occupation:  Patient lives alone    Family History: Mother had diabetes   Father  with Cancer    Review of Systems    Constitutional:  Weakness as above, no fever or weight loss  HEENT:  No headache or visual changes  Cardiovascular:  No chest pain or diaphoresis  Respiratory:  No coughing, wheezing, or shortness of breath. GI:  No nausea or vomitting. No diarrhea  :  No hematuria or dysuria  Skin:  No rashes or moles  Neuro:  No seizures or syncope  Hematological:  No bruising or bleeding  Endocrine:  No diabetes or thyroid disease    Physical Exam:      Visit Vitals  /84   Pulse 81   Temp 98 °F (36.7 °C)   Resp 20   Ht 5' 8\" (1.727 m)   Wt 59 kg (130 lb)   SpO2 98%   BMI 19.77 kg/m²       Physical Exam:    Gen:  No distress, alert  HEENT:  Normal cephalic atraumatic, extra-occular movements are intact. Neck:  Supple, No JVD  Lungs:  Clear bilaterally, no wheeze, no rales, normal effort  Heart:  Regular Rate and Rhythm, normal S1 and S2, no edema  Abdomen:  Soft, non tender, normal bowel sounds, no guarding.   Extremities:  Well perfused, no cyanosis or edema  Neurological:  Awake and alert, CN's are intact, normal strength throughout extremities  Skin:  No rashes or moles  Mental Status:  Normal thought process, does not appear anxious    Laboratory Studies:    BMP:   Lab Results   Component Value Date/Time     (LL) 07/18/2020 01:26 AM    K 4.5 07/18/2020 01:26 AM    CL 82 (L) 07/18/2020 01:26 AM    CO2 29 07/18/2020 01:26 AM    AGAP 8 07/18/2020 01:26 AM     (H) 07/18/2020 01:26 AM    BUN 19 (H) 07/18/2020 01:26 AM    CREA 1.13 07/18/2020 01:26 AM    GFRAA >60 07/18/2020 01:26 AM    GFRNA >60 07/18/2020 01:26 AM     CBC:   Lab Results   Component Value Date/Time    WBC 12.6 07/18/2020 01:26 AM    HGB 14.5 07/18/2020 01:26 AM    HCT 40.4 07/18/2020 01:26 AM     07/18/2020 01:26 AM       Assessment/Plan     Principal Problem:    Hyponatremia (7/18/2020)    Active Problems:    Hypertension (2/9/2019)      Cerebrovascular disease (7/18/2020)      Overview: Stroke twice in the past      Underweight (2/9/2019)        PLAN:    Will treat with IV fluid. Follow sodium  May need fluid restriction  Mobilize as able.

## 2020-07-18 NOTE — PROGRESS NOTES
TRANSFER - IN REPORT:    Verbal report received from Jerson Rosen RN(name) on [place]  being received from ED(unit) for routine progression of care      Report consisted of patients Situation, Background, Assessment and   Recommendations(SBAR). Information from the following report(s) SBAR and Kardex was reviewed with the receiving nurse. Opportunity for questions and clarification was provided. Assessment completed upon patients arrival to unit and care assumed.

## 2020-07-18 NOTE — PROGRESS NOTES
Problem: Discharge Planning  Goal: *Discharge to safe environment  Outcome: Progressing Towards Goal  Plan is home    Reason for Admission:   Chest ain, hyponatremia               RUR Score:     22    PCP: First and Last name: Padmini Guardado   Name of Practice:   Are you a current patient: Yes/No:   Approximate date of last visit:  About 3 months ago   Can you do a virtual visit with your PCP: unsure             Resources/supports as identified by patient/family:                   Top Challenges facing patient (as identified by patient/family and CM): Finances/Medication cost?     Patient has Medicare  A and b, and [facility]? Says he will walk home, lives right down the street              Support system or lack thereof? Living arrangements? Lives alone           Self-care/ADLs/Cognition? He is independent with ADL, uses no DME        Current Advanced Directive/Advance Care Plan:  None , says he is not interested in doing one                           Plan for utilizing home health:    tbd                 Transition of Care Plan:   Chart reviewed and pt verifed demographics. He lives alone and is independent with ADL . Hi plan is home. Patient has designated ___Daughter_____________________ to participate in his/her discharge plan and to receive any needed information. Name: [facility]  Address:  Lives in [place]  Phone number:[phone]    Care Management Interventions  PCP Verified by CM: Yes  Mode of Transport at Discharge:  Other (see comment)(brian is going to walk home- he lives right down the street)  Transition of Care Consult (CM Consult): Discharge Planning  Current Support Network: Lives Alone  Confirm Follow Up Transport: Self  The Plan for Transition of Care is Related to the Following Treatment Goals : resolution of acute symptons  Discharge Location  Discharge Placement: Home     Lucretia CHRISTINE Maritza Dickson, BSN  [facility]  [phone], Pager [phone]  [email or web address]

## 2020-07-18 NOTE — ED NOTES
Pt transported to 2220 for admission. Pt received by Deny Pendleton. Pt in no distress at time of admission.

## 2020-07-19 LAB
ANION GAP SERPL CALC-SCNC: 8 MMOL/L (ref 3–18)
BASOPHILS # BLD: 0 K/UL (ref 0–0.1)
BASOPHILS NFR BLD: 0 % (ref 0–2)
BUN SERPL-MCNC: 18 MG/DL (ref 7–18)
BUN/CREAT SERPL: 25 (ref 12–20)
CALCIUM SERPL-MCNC: 8 MG/DL (ref 8.5–10.1)
CHLORIDE SERPL-SCNC: 89 MMOL/L (ref 100–111)
CO2 SERPL-SCNC: 27 MMOL/L (ref 21–32)
CREAT SERPL-MCNC: 0.71 MG/DL (ref 0.6–1.3)
DIFFERENTIAL METHOD BLD: ABNORMAL
EOSINOPHIL # BLD: 0.1 K/UL (ref 0–0.4)
EOSINOPHIL NFR BLD: 1 % (ref 0–5)
ERYTHROCYTE [DISTWIDTH] IN BLOOD BY AUTOMATED COUNT: 14.6 % (ref 11.6–14.5)
GLUCOSE SERPL-MCNC: 119 MG/DL (ref 74–99)
HCT VFR BLD AUTO: 38.6 % (ref 36–48)
HGB BLD-MCNC: 13.6 G/DL (ref 13–16)
LYMPHOCYTES # BLD: 1.9 K/UL (ref 0.9–3.6)
LYMPHOCYTES NFR BLD: 26 % (ref 21–52)
MCH RBC QN AUTO: 29.8 PG (ref 24–34)
MCHC RBC AUTO-ENTMCNC: 35.2 G/DL (ref 31–37)
MCV RBC AUTO: 84.5 FL (ref 74–97)
MONOCYTES # BLD: 1.2 K/UL (ref 0.05–1.2)
MONOCYTES NFR BLD: 16 % (ref 3–10)
NEUTS SEG # BLD: 4.1 K/UL (ref 1.8–8)
NEUTS SEG NFR BLD: 57 % (ref 40–73)
PLATELET # BLD AUTO: 326 K/UL (ref 135–420)
PMV BLD AUTO: 8.7 FL (ref 9.2–11.8)
POTASSIUM SERPL-SCNC: 3.6 MMOL/L (ref 3.5–5.5)
RBC # BLD AUTO: 4.57 M/UL (ref 4.7–5.5)
SODIUM SERPL-SCNC: 124 MMOL/L (ref 136–145)
WBC # BLD AUTO: 7.2 K/UL (ref 4.6–13.2)

## 2020-07-19 PROCEDURE — 97162 PT EVAL MOD COMPLEX 30 MIN: CPT

## 2020-07-19 PROCEDURE — 74011250636 HC RX REV CODE- 250/636: Performed by: HOSPITALIST

## 2020-07-19 PROCEDURE — 65270000029 HC RM PRIVATE

## 2020-07-19 PROCEDURE — 80048 BASIC METABOLIC PNL TOTAL CA: CPT

## 2020-07-19 PROCEDURE — 74011250637 HC RX REV CODE- 250/637: Performed by: HOSPITALIST

## 2020-07-19 PROCEDURE — 85025 COMPLETE CBC W/AUTO DIFF WBC: CPT

## 2020-07-19 PROCEDURE — 36415 COLL VENOUS BLD VENIPUNCTURE: CPT

## 2020-07-19 RX ORDER — SODIUM CHLORIDE 9 MG/ML
500 INJECTION, SOLUTION INTRAVENOUS ONCE
Status: COMPLETED | OUTPATIENT
Start: 2020-07-19 | End: 2020-07-19

## 2020-07-19 RX ADMIN — AMANTADINE HYDROCHLORIDE 100 MG: 100 CAPSULE ORAL at 09:38

## 2020-07-19 RX ADMIN — CARBAMAZEPINE 200 MG: 200 TABLET, EXTENDED RELEASE ORAL at 18:36

## 2020-07-19 RX ADMIN — ASPIRIN 325 MG ORAL TABLET 325 MG: 325 PILL ORAL at 09:38

## 2020-07-19 RX ADMIN — RISPERIDONE 0.5 MG: 0.5 TABLET, FILM COATED ORAL at 21:51

## 2020-07-19 RX ADMIN — CAPTOPRIL 25 MG: 12.5 TABLET ORAL at 09:38

## 2020-07-19 RX ADMIN — CARBAMAZEPINE 200 MG: 200 TABLET, EXTENDED RELEASE ORAL at 09:38

## 2020-07-19 RX ADMIN — RISPERIDONE 0.5 MG: 0.5 TABLET, FILM COATED ORAL at 09:38

## 2020-07-19 RX ADMIN — RISPERIDONE 0.5 MG: 0.5 TABLET, FILM COATED ORAL at 18:36

## 2020-07-19 RX ADMIN — AMANTADINE HYDROCHLORIDE 100 MG: 100 CAPSULE ORAL at 18:36

## 2020-07-19 RX ADMIN — SODIUM CHLORIDE 500 ML: 900 INJECTION, SOLUTION INTRAVENOUS at 14:34

## 2020-07-19 RX ADMIN — ENOXAPARIN SODIUM 40 MG: 40 INJECTION SUBCUTANEOUS at 09:38

## 2020-07-19 NOTE — PROGRESS NOTES
Problem: Mobility Impaired (Adult and Pediatric)  Goal: *Acute Goals and Plan of Care (Insert Text)  Description: Physical Therapy Goals  Initiated 7/19/2020 and to be accomplished within 7 day(s)  1. Patient will transfer from bed to chair and chair to bed with modified independence using the least restrictive device. 2.  Patient will perform sit to stand with independence. 3.  Patient will ambulate with independence for 150 feet with the least restrictive device. Prior Level of Function:   Patient was independent for all mobility including gait without DME. Patient lives alone in a apartment, no steps. No DME at home. No one around to assist at home. Outcome: Progressing Towards Goal   PHYSICAL THERAPY EVALUATION    Patient: Aiyana Beckham (58 y.o. male)  Date: 7/19/2020  Primary Diagnosis: Hyponatremia [E87.1]        Precautions:   Fall  WBAT  PLOF: independent    ASSESSMENT :  Based on the objective data described below, the patient presents with decreased activity tolerance, deconditioning, and RLE weakness. Nurse cleared patient for participation in skilled physical therapy. Patient received reclined in bed, awake and alert, agreeable to physical therapy treatment. Patient on room air. Oriented x 4. Modified independent for bed mobility. Bed noted to be soiled upon supine to sit. Patient unaware. RLE grossly 4/5 vs LLE 5/5. Patient denies weakness RLE. Supervision level for transfers and ambulation in room without AD. Patient declined more than 40 feet ambulation, stating, \"I want to go back to sleep. \" Patient returned to reclined in bed after pads changed. Patient left reclined in bed, in NAD, all needs met, call bell in reach. Recommend home health safety evaluation on discharge. Will keep on caseload for one more visit as he was requesting to return to bed today.  May transition to walking FMP program.     Patient will benefit from skilled intervention to address the above impairments. Patient's rehabilitation potential is considered to be Good  Factors which may influence rehabilitation potential include:   []         None noted  []         Mental ability/status  []         Medical condition  [x]         Home/family situation and support systems  []         Safety awareness  []         Pain tolerance/management  []         Other:      PLAN :  Recommendations and Planned Interventions:   []           Bed Mobility Training             [x]    Neuromuscular Re-Education  [x]           Transfer Training                   []    Orthotic/Prosthetic Training  [x]           Gait Training                          []    Modalities  [x]           Therapeutic Exercises           []    Edema Management/Control  [x]           Therapeutic Activities            [x]    Family Training/Education  [x]           Patient Education  []           Other (comment):    Frequency/Duration: Patient will be followed by physical therapy 3-5 times a week to address goals. Discharge Recommendations: Home Health safety eval  Further Equipment Recommendations for Discharge: N/A     SUBJECTIVE:   Patient stated You mean they think I can't get around? I take care of myself.     OBJECTIVE DATA SUMMARY:     Past Medical History:   Diagnosis Date    COPD (chronic obstructive pulmonary disease) ([facility] [address]     Hypertension     Smoker     25 pack years     Past Surgical History:   Procedure Laterality Date    ABDOMEN SURGERY PROC UNLISTED      APPENDECTOMY      HX APPENDECTOMY      HX COLONOSCOPY  1/28/2015    Repeat colonoscopy in 5 yrs per Dr. Sharla Dean    HX ERCP  02/11/2019    bile duct stent    HX HERNIA REPAIR  10/22/2017    Ex lap. lysis of adhesions adn reductions of internal hernia done 10/22/2017     Barriers to Learning/Limitations: None  Compensate with: N/A  Home Situation:  Home Situation  Home Environment: Apartment  # Steps to Enter: 0  One/Two Story Residence: One story  Living Alone: Yes  Patient Expects to be Discharged to[de-identified] Apartment  Current DME Used/Available at Home: None  Critical Behavior:  Neurologic State: Alert  Orientation Level: Oriented X4  Cognition: Follows commands  Safety/Judgement: Fall prevention  Psychosocial  Patient Behaviors: Calm; Cooperative                 Strength:    Strength: Generally decreased, functional   RLE grossly 4/5 vs LLE 5/5      Tone & Sensation:   Tone: Normal      Sensation: Intact       Range Of Motion:  AROM: Within functional limits         Posture:  Posture (WDL): Within defined limits     Functional Mobility:  Bed Mobility:  Rolling: Modified independent  Supine to Sit: Modified independent  Sit to Supine: Modified independent  Scooting: Modified independent  Transfers:  Sit to Stand: Supervision  Stand to Sit: Supervision     Balance:   Sitting: Intact; Without support  Standing: Intact; Without support     Ambulation/Gait Training:  Distance (ft): 40 Feet (ft)  Assistive Device: Other (comment)(none)  Ambulation - Level of Assistance: Supervision   Gait Description (WDL): Exceptions to WDL  Gait Abnormalities: Decreased step clearance    Base of Support: [facility])   Speed/Sole: Pace decreased (<100 feet/min)  Step Length: Left shortened    Pain:  Pain level pre-treatment: 0/10   Pain level post-treatment: 0/10   Pain Intervention(s) : Medication (see MAR); Rest,Repositioning  Response to intervention: Nurse notified, See doc flow    Activity Tolerance:   fair  Please refer to the flowsheet for vital signs taken during this treatment. After treatment:   []         Patient left in no apparent distress sitting up in chair  [x]         Patient left in no apparent distress in bed  [x]         Call bell left within reach  [x]         Nursing notified  []         Caregiver present  []         Bed alarm activated  []         SCDs applied    COMMUNICATION/EDUCATION:   [x]         Role of Physical Therapy in the acute care setting.   [x]         Fall prevention education was provided and the patient/caregiver indicated understanding. [x]         Patient/family have participated as able in goal setting and plan of care. [x]         Patient/family agree to work toward stated goals and plan of care. []         Patient understands intent and goals of therapy, but is neutral about his/her participation. []         Patient is unable to participate in goal setting/plan of care: ongoing with therapy staff.  []         Other:     Thank you for this referral.  CHRISSIE StrangeT   Time Calculation: 18 mins      Eval Complexity: History: MEDIUM  Complexity : 1-2 comorbidities / personal factors will impact the outcome/ POC Exam:MEDIUM Complexity : 3 Standardized tests and measures addressing body structure, function, activity limitation and / or participation in recreation  Presentation: MEDIUM Complexity : Evolving with changing characteristics  Clinical Decision Making:Medium Complexity strength, bed mobility, transfers, gait, balance  Overall Complexity:MEDIUM

## 2020-07-19 NOTE — PROGRESS NOTES
Comprehensive Nutrition Assessment    Type and Reason for Visit: Initial    Nutrition Recommendations/Plan:     1. Modify Diet texture: Dental Soft Solid   2. Initiate oral nutrition supplement: Ensure Enlive TID   3. Monitor labs, weight and document all PO intake  4. Tray set up/assist    Nutrition Assessment:  Patient admitted for hyponatremia and seen in room this afternoon. Noted 75% of breakfast consumed this morning. Malnutrition Assessment:  Malnutrition Status:  Mild malnutrition    Context:  Social/environmental circumstances     Findings of the 6 clinical characteristics of malnutrition:   Energy Intake:  Unable to assess  Weight Loss:  Unable to assess(large variability in documented weight Hx)     Body Fat Loss:  No significant body fat loss,     Muscle Mass Loss:  7 - Severe muscle mass loss, Thigh (quadraceps), Hand (interosseous), Clavicles (pectoralis &deltoids)  Fluid Accumulation:  No significant fluid accumulation,     Strength:  Not performed     Nutrition History and Allergies: NKFA and prefers softer textures as wears dentures. Stated has fair appetite normally    Estimated Daily Nutrient Needs:  Energy (kcal):  1849-5045  Protein (g):  62-73       Fluid (ml/day):  1 mL/kcal    Nutrition Related Findings:  No GI discomfort      Wounds:    None       Current Nutrition Therapies:   DIET DENTAL SOFT (SOFT SOLID)  DIET NUTRITIONAL SUPPLEMENTS [facility]) All Meals;  Ensure Enlive    Anthropometric Measures:  · Height:  5' 8\" (172.7 cm)  · Current Body Wt:  52.1 kg (114 lb 13.8 oz)   · Admission Body Wt:  130 lb 1.1 oz    · Ideal Body Wt:  154:  74.6 %   · BMI Categories:  Underweight (BMI less than 22) age over 72       Nutrition Diagnosis:   · Mild malnutrition, In context of social or environmental circumstances related to inadequate protein-energy intake as evidenced by mild loss of subcutaneous fat, severe muscle loss      Nutrition Interventions:   Food and/or Nutrient Delivery: Modify current diet, Start oral nutrition supplement  Nutrition Education and Counseling: No recommendations at this time  Coordination of Nutrition Care: Continued inpatient monitoring    Goals:  PO nutrition intake will meet >75% of patient estimated nutritional needs within the next 7 days. Nutrition Monitoring and Evaluation:   Food/Nutrient Intake Outcomes: Food and nutrient intake, Supplement intake  Physical Signs/Symptoms Outcomes: Biochemical data, Nutrition focused physical findings, Meal time behavior, Chewing or swallowing, GI status, Weight, Skin    Discharge Planning:     Too soon to determine     Electronically signed by Daniela Markham on 7/19/2020 at 12:19 AM    Pager: [phone]

## 2020-07-19 NOTE — PROGRESS NOTES
[address] pt care from [facility]. Pt in bed, alert and oriented x 4. Not in any form of distress. Denies pain. Frequent use items and call bell within reach. Verbalized understanding to call for assistance. Bed locked in lowest position. Not in any form of distress throughout the night. Denies any pain or discomfort. Patient utilized urinal for voiding. Tolerated IV fluids well. Bedside and Verbal shift change report given to Violeta Bowen RN (oncoming nurse) by Rebeca Schroeder RN (offgoing nurse). Report included the following information SBAR, Kardex, Intake/Output, MAR and Recent Results.

## 2020-07-19 NOTE — PROGRESS NOTES
Problem: Discharge Planning  Goal: *Discharge to safe environment  Outcome: Progressing Towards Goal     Problem: Falls - Risk of  Goal: *Absence of Falls  Description: Document Abida Juarez Fall Risk and appropriate interventions in the flowsheet.   Outcome: Progressing Towards Goal  Note: Fall Risk Interventions:  Mobility Interventions: Bed/chair exit alarm         Medication Interventions: Assess postural VS orthostatic hypotension    Elimination Interventions: Bed/chair exit alarm    History of Falls Interventions: Bed/chair exit alarm         Problem: Patient Education: Go to Patient Education Activity  Goal: Patient/Family Education  Outcome: Progressing Towards Goal

## 2020-07-19 NOTE — PROGRESS NOTES
Internal Medicine Progress Note    Patient's Name: Ricky Rodriguez  Admit Date: 7/18/2020  Length of Stay: 1      Assessment/Plan     Principal Problem:    Hyponatremia (7/18/2020)    Active Problems:    Hypertension (2/9/2019)      Underweight (2/9/2019)      Cerebrovascular disease (7/18/2020)      Overview: Stroke twice in the past      Hyponatremia  - improving at an acceptable rate  - cont IV fluids  - monitor sodium levels    HTN  - cont home meds and monitor    Hx CVA  - PT recs home health safety eval    - Cont acceptable home medications for chronic conditions   - DVT protocol    I have personally reviewed all pertinent labs and films that have officially resulted over the last 24 hours. I have personally checked for all pending labs that are awaiting final results. Anticipated discharge: 1-2 days    HPI     Ricky Rodriguez is a 69 y.o. male who presented to the ER with weakness and feeling bad. This had been worsening at home for 2-3 days. He has associated abdominal pain and nausea. He reports some mild increase in difficulty with ambulation from generalized weakness. He presented to the ER where he was found to have hyponatremia and he will be admitted for ongoing management. Hospital Course     Patient was started on IV fluids and sodium improved at an acceptable rate. Subjective     Pt s/e @ bedside. No major events overnight. Pt offers no complaints this AM. Denies CP or SOB. Denies abd pain, nvd.     Objective     Visit Vitals  BP 96/60 (BP 1 Location: Left arm, BP Patient Position: At rest)   Pulse 100   Temp 98 °F (36.7 °C)   Resp 18   Ht 5' 8\" (1.727 m)   Wt 52.1 kg (114 lb 13.8 oz)   SpO2 99%   BMI 17.46 kg/m²       Physical Exam:  General Appearance: NAD, conversant  HENT: normocephalic/atraumatic, moist mucus membranes  Neck: No JVD, supple  Lungs: CTA with normal respiratory effort  CV: RRR, no m/r/g  Abdomen: soft, non-tender, normal bowel sounds  Extremities: no cyanosis, no peripheral edema  Neuro: No focal deficits, motor/sensory intact  Skin: Normal color, intact      Intake and Output:  Current Shift:  No intake/output data recorded. Last three shifts:  07/17 1901 - 07/19 0700  In: 240 [P.O.:240]  Out: 600 [Urine:600]    Lab/Data Reviewed:  BMP:   Lab Results   Component Value Date/Time     (L) 07/19/2020 03:05 AM    K 3.6 07/19/2020 03:05 AM    CL 89 (L) 07/19/2020 03:05 AM    CO2 27 07/19/2020 03:05 AM    AGAP 8 07/19/2020 03:05 AM     (H) 07/19/2020 03:05 AM    BUN 18 07/19/2020 03:05 AM    CREA 0.71 07/19/2020 03:05 AM    GFRAA >60 07/19/2020 03:05 AM    GFRNA >60 07/19/2020 03:05 AM       Imaging Reviewed:  No results found.     Medications Reviewed:  Current Facility-Administered Medications   Medication Dose Route Frequency    amantadine HCL (SYMMETREL) capsule 100 mg  100 mg Oral BID    aspirin tablet 325 mg  325 mg Oral DAILY    captopriL (CAPOTEN) tablet 25 mg  25 mg Oral TIDAC    risperiDONE (RisperDAL) tablet 0.5 mg  0.5 mg Oral TID    acetaminophen (TYLENOL) tablet 650 mg  650 mg Oral Q6H PRN    Or    acetaminophen (TYLENOL) suppository 650 mg  650 mg Rectal Q6H PRN    polyethylene glycol (MIRALAX) packet 17 g  17 g Oral DAILY PRN    promethazine (PHENERGAN) tablet 12.5 mg  12.5 mg Oral Q6H PRN    Or    ondansetron (ZOFRAN) injection 4 mg  4 mg IntraVENous Q6H PRN    enoxaparin (LOVENOX) injection 40 mg  40 mg SubCUTAneous DAILY    dextrose 5 % - 0.45% NaCl infusion  75 mL/hr IntraVENous CONTINUOUS    carBAMazepine XR (TEGretol XR) tablet 200 mg  200 mg Oral BID         [facility]  Hospitalist Division  Office:  [phone]  Pager: [phone]

## 2020-07-20 VITALS
WEIGHT: 114.86 LBS | HEIGHT: 68 IN | RESPIRATION RATE: 15 BRPM | OXYGEN SATURATION: 98 % | BODY MASS INDEX: 17.41 KG/M2 | TEMPERATURE: 98 F | DIASTOLIC BLOOD PRESSURE: 66 MMHG | HEART RATE: 87 BPM | SYSTOLIC BLOOD PRESSURE: 111 MMHG

## 2020-07-20 PROCEDURE — 74011250637 HC RX REV CODE- 250/637: Performed by: HOSPITALIST

## 2020-07-20 PROCEDURE — 74011250636 HC RX REV CODE- 250/636: Performed by: HOSPITALIST

## 2020-07-20 PROCEDURE — 97166 OT EVAL MOD COMPLEX 45 MIN: CPT

## 2020-07-20 RX ADMIN — AMANTADINE HYDROCHLORIDE 100 MG: 100 CAPSULE ORAL at 09:39

## 2020-07-20 RX ADMIN — CARBAMAZEPINE 200 MG: 200 TABLET, EXTENDED RELEASE ORAL at 09:39

## 2020-07-20 RX ADMIN — ASPIRIN 325 MG ORAL TABLET 325 MG: 325 PILL ORAL at 09:40

## 2020-07-20 RX ADMIN — ENOXAPARIN SODIUM 40 MG: 40 INJECTION SUBCUTANEOUS at 09:39

## 2020-07-20 RX ADMIN — RISPERIDONE 0.5 MG: 0.5 TABLET, FILM COATED ORAL at 09:40

## 2020-07-20 NOTE — PROGRESS NOTES
Problem: Falls - Risk of  Goal: *Absence of Falls  Description: Document De[place] Fall Risk and appropriate interventions in the flowsheet. Outcome: Progressing Towards Goal  Note: Fall Risk Interventions:  Mobility Interventions: Bed/chair exit alarm, Patient to call before getting OOB    Mentation Interventions: Bed/chair exit alarm, Adequate sleep, hydration, pain control, Door open when patient unattended, Evaluate medications/consider consulting pharmacy, More frequent rounding, Reorient patient, Update white board, Room close to nurse's station    Medication Interventions: Teach patient to arise slowly, Patient to call before getting OOB, Evaluate medications/consider consulting pharmacy, Bed/chair exit alarm    Elimination Interventions: Urinal in reach, Patient to call for help with toileting needs, Call light in reach, Bed/chair exit alarm    History of Falls Interventions: Bed/chair exit alarm, Consult care management for discharge planning, Door open when patient unattended, Evaluate medications/consider consulting pharmacy, Room close to nurse's station         Problem: Patient Education: Go to Patient Education Activity  Goal: Patient/Family Education  Outcome: Progressing Towards Goal     Problem: Pressure Injury - Risk of  Goal: *Prevention of pressure injury  Description: Document Osorio Scale and appropriate interventions in the flowsheet.   Outcome: Progressing Towards Goal  Note: Pressure Injury Interventions:  Sensory Interventions: Assess changes in LOC, Float heels, Keep linens dry and wrinkle-free, Maintain/enhance activity level, Minimize linen layers, Pressure redistribution bed/mattress (bed type)    Moisture Interventions: Absorbent underpads    Activity Interventions: Pressure redistribution bed/mattress(bed type), PT/OT evaluation, Increase time out of bed    Mobility Interventions: HOB 30 degrees or less, Pressure redistribution bed/mattress (bed type), PT/OT evaluation    Nutrition Interventions: Document food/fluid/supplement intake                     Problem: Patient Education: Go to Patient Education Activity  Goal: Patient/Family Education  Outcome: Progressing Towards Goal

## 2020-07-20 NOTE — CDMP QUERY
Pt admitted with Hyponatremia and has malnutrition documented. Please further specify type of malnutrition.       Severe malnutition   Moderate malnutrition   Mild malnutrition   Other (please specify)   Unable to determine    The medical record reflects the following:    Risk Factors: 68 yo male admitted with hyponatremia      Clinical Indicators: 7/18 Per Dietician  Malnutrition Status:  Mild malnutrition in context of social or environment circumstances      => Muscle Mass Loss:  7 - Severe muscle mass loss, Thigh (quadraceps), Hand (interosseous), Clavicles (pectoralis &deltoids)        =>  BMI 17.5        Treatment: Nutritional consult, Modified diet to dental soft, add Ensure Enliven TID,monitor labs, weight and document all PO intake        Thank you for your time,  Toby Santana RN [facility] [phone]

## 2020-07-20 NOTE — PROGRESS NOTES
Discharge/Transition Planning    Problem: Discharge Planning  Goal: *Discharge to safe environment  Outcome: Progressing Towards Goal  Plan is home    Care Management following and chart reviewed.  Plan is [address] RN BSN  Outcomes Manager  Office # [phone]  Pager # [phone]

## 2020-07-20 NOTE — PROGRESS NOTES
OCCUPATIONAL THERAPY EVALUATION/DISCHARGE    Patient: Francesca Quach (89 y.o. male  Date: 7/20/2020  Primary Diagnosis: Hyponatremia [E87.1]        Precautions:   Fall  PLOF: Independent with ADLs and functional transfers w/o AD     ASSESSMENT AND RECOMMENDATIONS:  Nursing/Nursing cleared for pt to participate in OT evaluation and tx session. Patient A & O x 4, reports 10/10 chest pain-nursing notified. Patient reports pain has been a 10/10 in chest to entire hospital stay-nursing notified. Patient independent sit <-> stand, independent w/o AD and no LOB toilet transfer, assist provided with IV pole mgmt. LB dress: Mod I doff and giuseppe socks seated in recliner with Mod I. BUE AROM and strength WFL. Patient verbalized understanding and in agreement of skilled OT services not indicated at this time while in acute hospital. Patient verbalized understanding to utilize call bell to request assist as needed. Skilled occupational therapy is not indicated at this time. Discharge Recommendations: Home Health home safety assessment  Further Equipment Recommendations for Discharge: shower chair and grab bars     SUBJECTIVE:   Patient stated I don't need any help, I'm doing just fine.     OBJECTIVE DATA SUMMARY:     Past Medical History:   Diagnosis Date    COPD (chronic obstructive pulmonary disease) ([facility] [address]     Hypertension     Smoker     25 pack years     Past Surgical History:   Procedure Laterality Date    ABDOMEN SURGERY PROC UNLISTED      APPENDECTOMY      HX APPENDECTOMY      HX COLONOSCOPY  1/28/2015    Repeat colonoscopy in 5 yrs per Dr. Kenyon Estrada    HX ERCP  02/11/2019    bile duct stent    HX HERNIA REPAIR  10/22/2017    Ex lap. lysis of adhesions adn reductions of internal hernia done 10/22/2017     Barriers to Learning/Limitations: None  Compensate with: visual, verbal, tactile, kinesthetic cues/model    Home Situation:   Home Situation  Home Environment: Apartment  # Steps to Enter: 0  One/Two Story Residence: One story  Living Alone: Yes  Support Systems: Family member(s), Friends \ neighbors  Patient Expects to be Discharged to[de-identified] Apartment  Current DME Used/Available at Home: None  Tub or Shower Type: Tub/Shower combination  [x]     Right hand dominant   []     Left hand dominant    Cognitive/Behavioral Status:  Neurologic State: Alert  Orientation Level: Oriented X4  Cognition: Follows commands  Safety/Judgement: Fall prevention    Skin: appears intact    Edema: none noted    Vision/Perceptual:      Corrective Lenses: Reading glasses    Coordination: BUE  Coordination: Within functional limits(BUEs)  Fine Motor Skills-Upper: Left Intact; Right Intact    Gross Motor Skills-Upper: Left Intact; Right Intact    Balance:  Sitting: Intact; Without support  Standing: Intact; Without support    Strength: BUE  Strength: Within functional limits(BUEs)    Tone & Sensation: BUE  Tone: (BUEs)  Sensation: Intact(BUEs)    Range of Motion: BUE  AROM: Within functional limits(BUEs)    Functional Mobility and Transfers for ADLs:  Bed Mobility:    Transfers:  Sit to Stand: Independent  Stand to Sit: Independent     Toilet Transfer : Independent      ADL Assessment:  Feeding: Independent    Oral Facial Hygiene/Grooming: Modified Independent    Bathing: Modified independent    Upper Body Dressing: Modified independent    Lower Body Dressing: Modified independent    Toileting: Modified independent     Cognitive Retraining  Safety/Judgement: Fall prevention    Pain:  Pain level pre-treatment: 10/10   Pain level post-treatment: 10/10   Pain Intervention(s): Medication (see MAR); Rest, Ice, Repositioning   Response to intervention: Nurse notified, See doc flow    Activity Tolerance:   good  Please refer to the flowsheet for vital signs taken during this treatment.   After treatment:   []  Patient left in no apparent distress sitting up in chair  [x]  Patient left in no apparent distress in bed  [x]  Call bell left within reach  [x]  Nursing notified  []  Caregiver present  []  Bed alarm activated    COMMUNICATION/EDUCATION:   [x]      Role of Occupational Therapy in the acute care setting  [x]      Home safety education was provided and the patient/caregiver indicated understanding. [x]      Patient/family have participated as able and agree with findings and recommendations. []      Patient is unable to participate in plan of care at this time. Thank you for this referral.  Sharan Velez  Time Calculation: 14 mins      Eval Complexity: History: MEDIUM Complexity : Expanded review of history including physical, cognitive and psychosocial  history ; Examination: MEDIUM Complexity : 3-5 performance deficits relating to physical, cognitive , or psychosocial skils that result in activity limitations and / or participation restrictions; Decision Making:MEDIUM Complexity : Patient may present with comorbidities that affect occupational performnce.  Miniml to moderate modification of tasks or assistance (eg, physical or verbal ) with assesment(s) is necessary to enable patient to complete evaluation

## 2020-07-20 NOTE — PROGRESS NOTES
7935: Attempted PT session. Declines mobility; educated on benefits of mobility. Will follow up.  1347: 2nd PT attempt. Seated in chair. Continues to decline mobility. Reports just finished with OT; OT verified session. Requests no further attempts by PT; declines further treatment. Denies mobility concerns with discharge to home; reports \"i'm self sufficient. \" Will discontinue PT orders per patient request.

## 2020-07-20 NOTE — PROGRESS NOTES
conducted an initial consultation and Spiritual Assessment for Carlota Swanson, who is a 69 y. o.,male. Patients Primary Language is: [place]. According to the patients EMR [employer] Affiliation is: Congregation. The reason the Patient came to the hospital is:   Patient Active Problem List    Diagnosis Date Noted    Hyponatremia 07/18/2020    Cerebrovascular disease 07/18/2020    Pancreatitis due to obstruction of pancreatic duct 02/09/2019    Hypertension 02/09/2019    Acute on chronic pancreatitis ([facility] 02/09/2019    Pancreatic cyst 02/09/2019    Hyperbilirubinemia 02/09/2019    Elevated transaminase level 02/09/2019    Underweight 02/09/2019    Gall stone pancreatitis 02/06/2019    Ileus ([facility] 10/20/2017    Small bowel obstruction ([facility] 04/21/2017    [facility] 03/30/2017    URI (upper respiratory infection) 03/30/2017        The  provided the following Interventions:  Initiated a relationship of care and support. Explored issues of jairo, spirituality and/or Rastafarian needs while hospitalized. Listened empathically. Provided chaplaincy education. Provided information about Spiritual Care Services. Offered prayer and assurance of continued prayers on patient's behalf. Chart reviewed. The following outcomes were achieved:  Patient shared some information about their medical narrative and spiritual journey/beliefs. Patient processed feeling about current hospitalization. Patient expressed gratitude for the 's visit. Assessment:  Patient did not indicate any spiritual or Rastafarian issues which require Spiritual Care Services interventions at this time. Patient does not have any Rastafarian/cultural needs that will affect patients preferences in health care. Plan:  Chaplains will continue to follow and will provide pastoral care on an as needed or requested basis.    recommends bedside caregivers page  on duty if patient shows signs of acute spiritual or emotional distress.     [facility]   Staff [facility]   [phone]

## 2020-07-20 NOTE — PROGRESS NOTES
7/19/2020  19:40- Report received from previous nurse, Aminata Sherman, [facility]. Patient in bed- awake- has no c/o at this time. Presently does not have an IV; day nurse reports that 2 nurses have unsuccessful attempts. This nurse asked the charge nurse, Delonte Ortiz, CATHIE if he could attempt to insert a new IV.    21:10- Patient noted to be in the hallway fully dressed in his clothes and confused as to where he is at this time. Assisted back to his room and attempted to re-orient patient. Charge nurse came in the room to insert a new PIV. Assisted patient to bed, put gown on and re-oriented, given urinal to use. Bed alarm on.   21:31- Left AC 22 G inserted by Delonte Ortiz RN  00:10- Patient with loose cough- raised med amount of whitish sputum. No c/o at this time. Continue to monitor. Bed alarm on. Call light in reach. 04:00- No change in condition. Continue to monitor. 08:10-Report given to oncoming nurse, Walter Cogan, RN.

## 2020-07-20 NOTE — PROGRESS NOTES
0800Bedside and Verbal shift change report given to CATHIE TOLENTINO (oncoming nurse) by Chan Escobar (offgoing nurse). Report included the following information SBAR, Kardex, MAR and Recent Results. 7390 took patient vs. medications administered, pt tolerated with ease, will continue to monitor. PATIENT COOPERATIVE EXPRESSES DESIRE  [address]. 1230 Shift reassessment patient ate all of his lunch , pt condition unchanged, will continue to monitor. 1620 Shift reassessment, pt states he would like to be discharged states he's \" been here long enough. \" paged provider and patient filed out paper work to leave [facility]      21  patient left the floor.

## 2020-07-21 ENCOUNTER — HOSPITAL ENCOUNTER (EMERGENCY)
Age: 75
Discharge: HOME | End: 2020-07-21
Attending: EMERGENCY MEDICINE
Payer: MEDICARE

## 2020-07-21 VITALS
HEART RATE: 99 BPM | RESPIRATION RATE: 18 BRPM | TEMPERATURE: 98.4 F | OXYGEN SATURATION: 99 % | HEIGHT: 68 IN | SYSTOLIC BLOOD PRESSURE: 112 MMHG | BODY MASS INDEX: 19.7 KG/M2 | DIASTOLIC BLOOD PRESSURE: 76 MMHG | WEIGHT: 130 LBS

## 2020-07-21 DIAGNOSIS — R52 GENERALIZED BODY ACHES: Primary | ICD-10-CM

## 2020-07-21 LAB
ALBUMIN SERPL-MCNC: 3.5 G/DL (ref 3.4–5)
ALBUMIN/GLOB SERPL: 0.8 {RATIO} (ref 0.8–1.7)
ALP SERPL-CCNC: 74 U/L (ref 45–117)
ALT SERPL-CCNC: 11 U/L (ref 16–61)
ANION GAP SERPL CALC-SCNC: 4 MMOL/L (ref 3–18)
AST SERPL-CCNC: 7 U/L (ref 10–38)
BASOPHILS # BLD: 0 K/UL (ref 0–0.1)
BASOPHILS NFR BLD: 0 % (ref 0–2)
BILIRUB SERPL-MCNC: 0.7 MG/DL (ref 0.2–1)
BUN SERPL-MCNC: 15 MG/DL (ref 7–18)
BUN/CREAT SERPL: 12 (ref 12–20)
CALCIUM SERPL-MCNC: 9 MG/DL (ref 8.5–10.1)
CHLORIDE SERPL-SCNC: 95 MMOL/L (ref 100–111)
CO2 SERPL-SCNC: 31 MMOL/L (ref 21–32)
CREAT SERPL-MCNC: 1.22 MG/DL (ref 0.6–1.3)
DIFFERENTIAL METHOD BLD: ABNORMAL
EOSINOPHIL # BLD: 0.1 K/UL (ref 0–0.4)
EOSINOPHIL NFR BLD: 1 % (ref 0–5)
ERYTHROCYTE [DISTWIDTH] IN BLOOD BY AUTOMATED COUNT: 15.3 % (ref 11.6–14.5)
GLOBULIN SER CALC-MCNC: 4.2 G/DL (ref 2–4)
GLUCOSE SERPL-MCNC: 121 MG/DL (ref 74–99)
HCT VFR BLD AUTO: 36.1 % (ref 36–48)
HGB BLD-MCNC: 12.5 G/DL (ref 13–16)
LYMPHOCYTES # BLD: 1.1 K/UL (ref 0.9–3.6)
LYMPHOCYTES NFR BLD: 12 % (ref 21–52)
MCH RBC QN AUTO: 30.1 PG (ref 24–34)
MCHC RBC AUTO-ENTMCNC: 34.6 G/DL (ref 31–37)
MCV RBC AUTO: 87 FL (ref 74–97)
MONOCYTES # BLD: 1.3 K/UL (ref 0.05–1.2)
MONOCYTES NFR BLD: 14 % (ref 3–10)
NEUTS SEG # BLD: 6.8 K/UL (ref 1.8–8)
NEUTS SEG NFR BLD: 73 % (ref 40–73)
PLATELET # BLD AUTO: 312 K/UL (ref 135–420)
PMV BLD AUTO: 8.5 FL (ref 9.2–11.8)
POTASSIUM SERPL-SCNC: 4 MMOL/L (ref 3.5–5.5)
PROT SERPL-MCNC: 7.7 G/DL (ref 6.4–8.2)
RBC # BLD AUTO: 4.15 M/UL (ref 4.7–5.5)
SODIUM SERPL-SCNC: 130 MMOL/L (ref 136–145)
TROPONIN I SERPL-MCNC: <0.02 NG/ML (ref 0–0.04)
WBC # BLD AUTO: 9.3 K/UL (ref 4.6–13.2)

## 2020-07-21 PROCEDURE — 80156 ASSAY CARBAMAZEPINE TOTAL: CPT

## 2020-07-21 PROCEDURE — 80053 COMPREHEN METABOLIC PANEL: CPT

## 2020-07-21 PROCEDURE — 74011250637 HC RX REV CODE- 250/637: Performed by: EMERGENCY MEDICINE

## 2020-07-21 PROCEDURE — 99283 EMERGENCY DEPT VISIT LOW MDM: CPT

## 2020-07-21 PROCEDURE — 93005 ELECTROCARDIOGRAM TRACING: CPT

## 2020-07-21 PROCEDURE — 85025 COMPLETE CBC W/AUTO DIFF WBC: CPT

## 2020-07-21 PROCEDURE — 84484 ASSAY OF TROPONIN QUANT: CPT

## 2020-07-21 RX ORDER — ACETAMINOPHEN 500 MG
1000 TABLET ORAL
Status: COMPLETED | OUTPATIENT
Start: 2020-07-21 | End: 2020-07-21

## 2020-07-21 RX ADMIN — ACETAMINOPHEN 1000 MG: 500 TABLET, FILM COATED ORAL at 21:09

## 2020-07-22 ENCOUNTER — PATIENT OUTREACH (OUTPATIENT)
Dept: CASE MANAGEMENT | Age: 75
End: 2020-07-22

## 2020-07-22 LAB
ATRIAL RATE: 86 BPM
CALCULATED P AXIS, ECG09: 70 DEGREES
CALCULATED R AXIS, ECG10: 21 DEGREES
CALCULATED T AXIS, ECG11: 67 DEGREES
CARBAMAZEPINE SERPL-MCNC: 8.9 UG/ML (ref 4–12)
DIAGNOSIS, 93000: NORMAL
P-R INTERVAL, ECG05: 162 MS
Q-T INTERVAL, ECG07: 346 MS
QRS DURATION, ECG06: 88 MS
QTC CALCULATION (BEZET), ECG08: 414 MS
VENTRICULAR RATE, ECG03: 86 BPM

## 2020-07-22 NOTE — PROGRESS NOTES
Spoke with patient's Daughter, Tina Nazario. Patient is very [address] and has difficulty using phone. Patient has dementia according to daughter and his condition is slowly progressing. Looking into assisted living arrangements. Patient contacted regarding COVID-19  risk. Care Transition Nurse/ Ambulatory Care Manager contacted the family by telephone to perform post discharge assessment. Verified name and  with family as identifiers. Provided introduction to self, and explanation of the CTN/ACM role, and reason for call due to risk factors for infection and/or exposure to COVID-19. Symptoms reviewed with family who verbalized the following symptoms: no new symptoms and no worsening symptoms. Due to no new or worsening symptoms encounter was not routed to provider for escalation. Patient has following risk factors of: COPD. CTN/ACM reviewed discharge instructions, medical action plan and red flags such as increased shortness of breath, increasing fever and signs of decompensation with family who verbalized understanding. Discussed exposure protocols and quarantine with CDC Guidelines What to do if you are sick with coronavirus disease .  Family was given an opportunity for questions and concerns. The family agrees to contact the [facility] exposure line [phone], [facility]  ([phone]) and PCP office for questions related to their healthcare. CTN/ACM provided contact information for future needs. Reviewed and educated family on any new and changed medications related to discharge diagnosis. Patient/family/caregiver given information for [employer] and agrees to enroll no    Patient's preferred e-mail:  Patient unable  Patient's preferred phone number:     Based on Loop alert triggers, patient will be contacted by nurse care manager for worsening symptoms.     Plan for follow-up call in 5-7 days based on severity of symptoms and risk factors.

## 2020-07-22 NOTE — ED TRIAGE NOTES
Pt to ED with complaints of generalized body aches that started one week ago, SOB that started 3-4 days ago, and congestion. Pt states he ws seen in this ED two days ago but let and told Doctors he was coming back. Pt states symptoms got worse.

## 2020-07-22 NOTE — ED PROVIDER NOTES
Hi Mosher is a 69 y.o. male with complaints of just not feeling well with some generalized body aches for last couple of days. Patient has any fever, chills, sweats, cough, shortness of breath, chest pain, abdominal pain, diarrhea, urinary symptoms. He has no rash. Symptoms are worse with moving around. Seen recently for similar issues. He did not take anything for the pain today    The history is provided by the patient.         Past Medical History:   Diagnosis Date    COPD (chronic obstructive pulmonary disease) [facility]     Hypertension     Smoker     25 pack years       Past Surgical History:   Procedure Laterality Date    ABDOMEN SURGERY PROC UNLISTED      APPENDECTOMY      HX APPENDECTOMY      HX COLONOSCOPY  1/28/2015    Repeat colonoscopy in 5 yrs per Dr. Saurabh Maldonado    HX ERCP  02/11/2019    bile duct stent    HX HERNIA REPAIR  10/22/2017    Ex lap. lysis of adhesions adn reductions of internal hernia done 10/22/2017         Family History:   Problem Relation Age of Onset    Heart Disease Mother     Cancer Father     Cancer Sister     Diabetes Sister        Social History     Socioeconomic History    Marital status:      Spouse name: Not on file    Number of children: Not on file    Years of education: Not on file    Highest education level: Not on file   Occupational History    Not on file   Social Needs    Financial resource strain: Not on file    Food insecurity     Worry: Not on file     Inability: Not on file    Transportation needs     Medical: Not on file     Non-medical: Not on file   Tobacco Use    Smoking status: Current Every Day Smoker     Packs/day: 0.50     Years: 50.00     Pack years: 25.00     Types: Cigarettes    Smokeless tobacco: Never Used   Substance and Sexual Activity    Alcohol use: Not Currently     Comment: no alcohol in a year     Drug use: No    Sexual activity: Not on file   Lifestyle    Physical activity     Days per week: Not on file Minutes per session: Not on file    Stress: Not on file   Relationships    Social connections     Talks on phone: Not on file     Gets together: Not on file     Attends Cheondoism service: Not on file     Active member of club or organization: Not on file     Attends meetings of clubs or organizations: Not on file     Relationship status: Not on file    Intimate partner violence     Fear of current or ex partner: Not on file     Emotionally abused: Not on file     Physically abused: Not on file     Forced sexual activity: Not on file   Other Topics Concern    Not on file   Social History Narrative    Not on file         ALLERGIES: Patient has no known allergies. Review of Systems   Constitutional: Negative for fever. HENT: Negative for sore throat. Eyes: Negative for visual disturbance. Respiratory: Negative for cough and shortness of breath. Cardiovascular: Negative for chest pain. Gastrointestinal: Negative for abdominal pain. Genitourinary: Negative for difficulty urinating. Musculoskeletal: Positive for myalgias. Negative for gait problem. Skin: Negative for rash. Allergic/Immunologic: Negative for immunocompromised state. Neurological: Negative for syncope. Psychiatric/Behavioral: Positive for sleep disturbance. Vitals:    07/21/20 2017   BP: 112/76   Pulse: 99   Resp: 18   Temp: 98.4 °F (36.9 °C)   SpO2: 99%   Weight: 59 kg (130 lb)   Height: 5' 8\" (1.727 m)            Physical Exam  Vitals signs and nursing note reviewed. Constitutional:       General: He is not in acute distress. Appearance: He is not ill-appearing, toxic-appearing or diaphoretic. HENT:      Head: Normocephalic and atraumatic. Right Ear: External ear normal.      Left Ear: External ear normal.      Nose: Nose normal.      Mouth/Throat:      Pharynx: No oropharyngeal exudate. Eyes:      Conjunctiva/sclera: Conjunctivae normal.   Neck:      Musculoskeletal: Normal range of motion. Cardiovascular:      Rate and Rhythm: Normal rate and regular rhythm. Heart sounds: Normal heart sounds. Pulmonary:      Effort: Pulmonary effort is normal. No respiratory distress. Breath sounds: Normal breath sounds. Abdominal:      Palpations: Abdomen is soft. Tenderness: There is no abdominal tenderness. Musculoskeletal: Normal range of motion. Right lower leg: No edema. Left lower leg: No edema. Skin:     General: Skin is warm and dry. Capillary Refill: Capillary refill takes less than 2 seconds. Neurological:      Mental Status: He is alert and oriented to person, place, and time.    Psychiatric:         Behavior: Behavior normal.          MDM       Procedures    Vitals:  Patient Vitals for the past 12 hrs:   Temp Pulse Resp BP SpO2   07/21/20 2017 98.4 °F (36.9 °C) 99 18 112/76 99 %         Medications ordered:   Medications   acetaminophen (TYLENOL) tablet 1,000 mg (1,000 mg Oral Given 7/21/20 2109)         Lab findings:  Recent Results (from the past 12 hour(s))   EKG, 12 LEAD, INITIAL    Collection Time: 07/21/20  8:24 PM   Result Value Ref Range    Ventricular Rate 86 BPM    Atrial Rate 86 BPM    P-R Interval 162 ms    QRS Duration 88 ms    Q-T Interval 346 ms    QTC Calculation (Bezet) 414 ms    Calculated P Axis 70 degrees    Calculated R Axis 21 degrees    Calculated T Axis 67 degrees    Diagnosis       Normal sinus rhythm  Normal ECG  When compared with ECG of 18-JUL-2020 01:01,  No significant change was found     CBC WITH AUTOMATED DIFF    Collection Time: 07/21/20  8:45 PM   Result Value Ref Range    WBC 9.3 4.6 - 13.2 K/uL    RBC 4.15 (L) 4.70 - 5.50 M/uL    HGB 12.5 (L) 13.0 - 16.0 g/dL    HCT 36.1 36.0 - 48.0 %    MCV 87.0 74.0 - 97.0 FL    MCH 30.1 24.0 - 34.0 PG    MCHC 34.6 31.0 - 37.0 g/dL    RDW 15.3 (H) 11.6 - 14.5 %    PLATELET 102 249 - 045 K/uL    MPV 8.5 (L) 9.2 - 11.8 FL    NEUTROPHILS 73 40 - 73 %    LYMPHOCYTES 12 (L) 21 - 52 %    MONOCYTES 14 (H) 3 - 10 %    EOSINOPHILS 1 0 - 5 %    BASOPHILS 0 0 - 2 %    ABS. NEUTROPHILS 6.8 1.8 - 8.0 K/UL    ABS. LYMPHOCYTES 1.1 0.9 - 3.6 K/UL    ABS. MONOCYTES 1.3 (H) 0.05 - 1.2 K/UL    ABS. EOSINOPHILS 0.1 0.0 - 0.4 K/UL    ABS. BASOPHILS 0.0 0.0 - 0.1 K/UL    DF AUTOMATED     METABOLIC PANEL, COMPREHENSIVE    Collection Time: 07/21/20  8:45 PM   Result Value Ref Range    Sodium 130 (L) 136 - 145 mmol/L    Potassium 4.0 3.5 - 5.5 mmol/L    Chloride 95 (L) 100 - 111 mmol/L    CO2 31 21 - 32 mmol/L    Anion gap 4 3.0 - 18 mmol/L    Glucose 121 (H) 74 - 99 mg/dL    BUN 15 7.0 - 18 MG/DL    Creatinine 1.22 0.6 - 1.3 MG/DL    BUN/Creatinine ratio 12 12 - 20      GFR est AA >60 >60 ml/min/1.73m2    GFR est non-AA 58 (L) >60 ml/min/1.73m2    Calcium 9.0 8.5 - 10.1 MG/DL    Bilirubin, total 0.7 0.2 - 1.0 MG/DL    ALT (SGPT) 11 (L) 16 - 61 U/L    AST (SGOT) 7 (L) 10 - 38 U/L    Alk. phosphatase 74 45 - 117 U/L    Protein, total 7.7 6.4 - 8.2 g/dL    Albumin 3.5 3.4 - 5.0 g/dL    Globulin 4.2 (H) 2.0 - 4.0 g/dL    A-G Ratio 0.8 0.8 - 1.7     TROPONIN I    Collection Time: 07/21/20  8:45 PM   Result Value Ref Range    Troponin-I, QT <0.02 0.0 - 0.045 NG/ML       EKG interpretation by ED Physician:  Normal sinus rhythm with no acute ST or T wave changes  Normal EKG  Rate 86 QRS 88   No significant changes from previous    Cardiac monitor: Normal rate with regular rhythm and no ectopy  Pulse ox: 98% room air, normal    X-Ray, CT or other radiology findings or impressions:  No orders to display       Progress notes, Consult notes or additional Procedure notes:   Doubt need for any imaging or other work-up here.     I have discussed with patient and/or family/sig other the results, interpretation of any imaging if performed, suspected diagnosis and treatment plan to include instructions regarding the diagnoses listed to which understanding was expressed with all questions answered      Reevaluation of patient: stable    Disposition:  Diagnosis:   1. Generalized body aches        Disposition: home    Follow-up Information     Follow up With Specialties Details Why Contact Info    Lizzy Guardado MD Internal Medicine Schedule an appointment as soon as possible for a visit  Jere Jacobson [phone]      [facility] EMERGENCY DEPT Emergency Medicine  If symptoms worsen [address] [facility] [address]  [phone]            Patient's Medications   Start Taking    No medications on file   Continue Taking    ACETAMINOPHEN (TYLENOL) 325 MG TABLET    Take 2 Tabs by mouth every four (4) hours as needed for Pain. AMANTADINE HCL (SYMMETREL) 100 MG CAPSULE    Take 100 mg by mouth two (2) times a day. AMLODIPINE (NORVASC) 10 MG TABLET    Take 1 Tab by mouth daily. ASPIRIN (ASPIRIN) 325 MG TABLET    Take 325 mg by mouth daily. BISACODYL (DULCOLAX, BISACODYL,) 5 MG EC TABLET    Take 2 Tabs by mouth daily as needed for Constipation. CAMPHOR-MENTHOL (SARNA ORIGINAL) 0.5-0.5 % LOTION    Apply  to affected area as needed for Itching. CAPTOPRIL (CAPOTEN) 25 MG TABLET    Take 25 mg by mouth Before breakfast, lunch, and dinner. CARBAMAZEPINE XR (TEGRETOL XR) 200 MG SR TABLET    Take 200 mg by mouth two (2) times a day. CHOLECALCIFEROL (VITAMIN D3) 1,000 UNIT TABLET    Take  by mouth daily. FOLIC ACID (FOLVITE) 1 MG TABLET    Take 1 mg by mouth daily. GUAIFENESIN ER (MUCINEX) 600 MG ER TABLET    Take 1 Tab by mouth two (2) times a day. MELATONIN 3 MG TABLET    Take 3 mg by mouth. POLYETHYLENE GLYCOL (MIRALAX) 17 GRAM/DOSE POWDER    Take 17 g by mouth daily as needed for Constipation. 1 tablespoon with 8 oz of water daily    RISPERIDONE (RISPERDAL) 0.5 MG TABLET    Take 0.5 mg by mouth. THIAMINE HCL (VITAMIN B-1) 100 MG TABLET    Take 100 mg by mouth daily.    These Medications have changed    No medications on file   Stop Taking    No medications on file

## 2020-07-29 ENCOUNTER — PATIENT OUTREACH (OUTPATIENT)
Dept: CASE MANAGEMENT | Age: 75
End: 2020-07-29

## 2020-07-29 NOTE — PROGRESS NOTES
Spoke with patient's daughter. [facility]      COVID-19 Screening Follow-up Note    Patient contacted regarding COVID-19 risk and screening. Care Transition Nurse/ Ambulatory Care Manager contacted the family by telephone to perform follow-up assessment. Verified name and  with family as identifiers. Patient has following risk factors of: COPD. Symptoms reviewed with family who verbalized the following symptoms: no new symptoms and no worsening symptoms. Due to no new or worsening symptoms encounter was not routed to provider for escalation. Education provided regarding infection prevention, and signs and symptoms of COVID-19 and when to seek medical attention with family who verbalized understanding. Discussed exposure protocols and quarantine from [address] you at higher risk for severe illness  and given an opportunity for questions and concerns. The family agrees to contact the COVID-19 hotline [phone] or PCP office for questions related to their healthcare. CTN/ACM provided contact information for future reference. From CDC: Are you at higher risk for severe illness?  Wash your hands often.  Avoid close contact (6 feet, which is about two arm lengths) with people who are sick.  Put distance between yourself and other people if COVID-19 is spreading in your community.  Clean and disinfect frequently touched surfaces.  Avoid all cruise travel and non-essential air travel.  Call your healthcare professional if you have concerns about COVID-19 and your underlying condition or if you are sick. For more information on steps you can take to protect yourself, see CDC's How to [place] for follow-up call in 7-14 days based on severity of symptoms and risk factors.

## 2020-08-01 NOTE — DISCHARGE SUMMARY
[address]    Name:  Huma Jennings  MR#:   [id number]  :  1945  ACCOUNT #:  [de-identified]  ADMIT DATE:  2020  DISCHARGE DATE:  2020    ADMITTING DIAGNOSES:  Hyponatremia with weakness. HISTORY OF PRESENT ILLNESS:  The patient is a 82-year-old male who presented to the Emergency Department as he reported that he was feeling very badly. He had weakness which was worsening at home. Several days prior to admission, he also had some nausea and abdominal pain. In the emergency room, he was found to have hyponatremia, and he was admitted for ongoing [address]:  The patient was started on IV fluids, and his electrolytes were followed throughout hospitalization. They improved sequentially over time. His weakness also improved. He became strong enough to mobilize and was asking for discharge home. Ultimately by 2020, he was considered ready for discharge. He was discharged home. DIAGNOSES:  1. Hyponatremia, improved. 2.  Cerebrovascular disease, stable. 3.  Hypertension. CONDITION ON DISCHARGE:  His condition is improved. ACTIVITY:  Ad deepthi. DISCHARGE INSTRUCTIONS:  Followup is with his primary care provider in 1 week. The patient will arrange. DISCHARGE MEDICATIONS:  1. Tegretol 200 mg by mouth 2 times daily. 2.  Doxycycline 100 mg by mouth 2 times daily for 5 days. 3.  Mucinex 600 mg by mouth 2 times daily. 4.  Amantadine 100 mg by mouth 2 times daily. 5.  Folic acid 1 mg by mouth daily. 6.  Thiamine 100 mg by mouth daily. 7.  Risperdal 0.5 mg by home regimen. 8.  Melatonin 3 mg by mouth nightly. 9.  Capoten 25 mg by mouth before breakfast, lunch, and dinner. 10.  Aspirin 325 mg by mouth daily. 11.  Norvasc 10 mg by mouth daily. 12.  Vitamin D3 1000 units by mouth daily. DIET:  Regular. DISPOSITION:  Home.     Total Discharge time 35 minutes      Daniel Hinton MD      [id number]  D: 08/01/2020 12:57  T:  08/01/2020 16:03  JOB #:  [id number]

## 2020-08-06 ENCOUNTER — PATIENT OUTREACH (OUTPATIENT)
Dept: CASE MANAGEMENT | Age: 75
End: 2020-08-06

## 2020-08-06 NOTE — PROGRESS NOTES
Date/Time:  8/6/2020 1:36 PM  Attempted to reach patient by telephone. Left HIPPA compliant message requesting a return call. Patient resolved from Transition of Care episode on 8-6-20  Patient/family has been provided the following resources and education related to COVID-19:                         Signs, symptoms and red flags related to COVID-19            CDC exposure and quarantine guidelines            [facility] exposure contact - [phone]            Contact for their local Department of Health                   No further outreach scheduled with this CTN/ACM. Episode of Care resolved. Patient has this CTN/ACM contact information if future needs arise.

## 2020-08-07 ENCOUNTER — HOSPITAL ENCOUNTER (EMERGENCY)
Age: 75
Discharge: HOME | End: 2020-08-07
Attending: EMERGENCY MEDICINE
Payer: MEDICARE

## 2020-08-07 DIAGNOSIS — B02.23 SHINGLES (HERPES ZOSTER) POLYNEUROPATHY: Primary | ICD-10-CM

## 2020-08-07 PROCEDURE — 99281 EMR DPT VST MAYX REQ PHY/QHP: CPT

## 2020-08-07 RX ORDER — METHYLPREDNISOLONE 4 MG/1
TABLET ORAL
Qty: 1 DOSE PACK | Refills: 0 | Status: SHIPPED | OUTPATIENT
Start: 2020-08-07

## 2020-08-07 RX ORDER — ACYCLOVIR 800 MG/1
800 TABLET ORAL
Qty: 35 TAB | Refills: 0 | Status: SHIPPED | OUTPATIENT
Start: 2020-08-07 | End: 2020-08-14

## 2020-08-07 RX ORDER — HYDROCODONE BITARTRATE AND ACETAMINOPHEN 5; 325 MG/1; MG/1
0.5 TABLET ORAL
Qty: 6 TAB | Refills: 0 | Status: SHIPPED | OUTPATIENT
Start: 2020-08-07 | End: 2020-08-10

## 2020-08-07 NOTE — ED PROVIDER NOTES
[facility]  EM[facility]ENCY DEPARTMENT HISTORY AND PHYSICAL EXAM       Date: 8/7/2020   Patient Name: Bryan Maguire   YOB: 1945  Medical Record Number: [id number]    HISTORY OF PRESENTING ILLNESS:     Bryan Maguire is a 69 y.o. male presenting with the noted PMH to the ED c/o itchy burning rash on the right chest wall. Patient stated started 2 weeks ago. Is been constant. No increasing or decreasing factors. He states he is been trying medicines over-the-counter without any relief. He states that just on the right side in this 1 area. Does not radiate anywhere. Does not go to his back or the left side. Denies any shortness of breath. Denies any fevers or chills. He states he occasionally has a dry cough. No sore throat. No nasal congestion. No nausea or vomiting. Denies any abdominal pain. Denies any urine bowel changes. Denies any travel or trauma. Denies any sick contacts. Rest of systems reviewed and negative. Primary Care Provider: Tod Guardado MD   Specialist:    Past Medical History:   Past Medical History:   Diagnosis Date    COPD (chronic obstructive pulmonary disease) ([facility] [address]     Hypertension     Smoker     25 pack years        Past Surgical History:   Past Surgical History:   Procedure Laterality Date    ABDOMEN SURGERY PROC UNLISTED      APPENDECTOMY      HX APPENDECTOMY      HX COLONOSCOPY  1/28/2015    Repeat colonoscopy in 5 yrs per Dr. Pamela Tucker HX ERCP  02/11/2019    bile duct stent    HX HERNIA REPAIR  10/22/2017    Ex lap. lysis of adhesions adn reductions of internal hernia done 10/22/2017        Social History:   Social History     Tobacco Use    Smoking status: Current Every Day Smoker     Packs/day: 0.50     Years: 50.00     Pack years: 25.00     Types: Cigarettes    Smokeless tobacco: Never Used   Substance Use Topics    Alcohol use: Not Currently     Comment: no alcohol in a year     Drug use:  No Allergies:   No Known Allergies     REVIEW OF SYSTEMS:  Review of Systems      PHYSICAL EXAM:  There were no vitals filed for this visit. Physical Exam   Vital signs reviewed. Alert oriented x 3 in NAD. HEENT: normocephalic atraumatic. Eyes are PERRLA EOMI. Conjunctiva normal.    External ears and nose normal.    Neck: normal external exam. No midline neck or back TTP. Lungs are clear to ascultation bilaterally. normal effort  Heart is regular rate and rhythm with no murmurs. Abdomen soft and nontender. No rebound rigidity or guarding. Extremities: Moves all 4 extremities and no distress. Full range of motion. 2+ pulses and BCR in all 4 extremities. Neuro: Normal gait. 5 out of 5 strength in all 4 extremities. No facial droop. Skin examination: intact. no rashes. No petechia or purpura. Small excoriated patch right chest wall under right nipple. Medications - No data to display    RESULTS:    Labs -   Labs Reviewed - No data to display    Radiologic Studies -  No results found. MEDICAL DECISION MAKING    No fevers or chills. No signs of sepsis. Patient states he did have chickenpox as a kid. Discussed patient about shingles. Will cover him with medicines. Patient to follow-up with his primary doctor to be rechecked or come back if symptoms change or worsen. Results and precautions explained. Patient states understanding and agrees with plan. Patient has no new complaints, changes, or physical findings. Results were reviewed with the patient. Pt's questions and concerns were addressed. Care plan was outlined, including follow-up with PCP/specialist and return precautions were discussed. Patient is felt to be stable for discharge at this time. Diagnosis   Clinical Impression:   1.  Shingles (herpes zoster) polyneuropathy           Follow-up Information     Follow up With Specialties Details Why Contact Info    Adam Guardado MD Internal Medicine Call today  [address]      [facility] EMERGENCY DEPT Emergency Medicine Go in 2 days If symptoms worsen, As needed [address]  [phone]          Current Discharge Medication List          Discharged in stable and improved condition. This chart was completed using Dragon, a dictation transcription service. Errors may have resulted from using this device.

## 2020-08-07 NOTE — DISCHARGE INSTRUCTIONS
Thank you so much for visiting us today. Please make sure to call your doctor tomorrow to be rechecked in 1-3 days. Bring your results from here with you when you do. Return immediately if any fevers, headaches, chest pain, difficulty breathing, abdominal pain, worsening or changing symptoms, or any other concerns. Patient Education        Shingles: Care Instructions  Your Care Instructions     Shingles (herpes zoster) causes pain and a blistered rash. The rash can appear anywhere on the body but will be on only one side of the body, the left or right. It will be in a band, a strip, or a small area. The pain can be very severe. Shingles can also cause tingling or itching in the area of the rash. The blisters scab over after a few days and heal in 2 to 4 weeks. Medicines can help you feel better and may help prevent more serious problems caused by shingles. Shingles is caused by the same virus that causes chickenpox. When you have chickenpox, the virus gets into your nerve roots and stays there (becomes dormant) long after you get over the chickenpox. If the virus becomes active again, it can cause shingles. Follow-up care is a key part of your treatment and safety. Be sure to make and go to all appointments, and call your doctor if you are having problems. It's also a good idea to know your test results and keep a list of the medicines you take. How can you care for yourself at home? · Be safe with medicines. Take your medicines exactly as prescribed. Call your doctor if you think you are having a problem with your medicine. Antiviral medicine helps you get better faster. · Try not to scratch or pick at the blisters. They will crust over and fall off on their own if you leave them alone. · Put cool, wet cloths on the area to relieve pain and itching. You can also use calamine lotion. Try not to use so much lotion that it cakes and is hard to get off.   · Put cornstarch or baking soda on the sores to help dry them out so they heal faster. · Do not use thick ointment, such as petroleum jelly, on the sores. This will keep them from drying and healing. · To help remove loose crusts, soak them in tap water. This can help decrease oozing, and dry and soothe the skin. · Take an over-the-counter pain medicine, such as acetaminophen (Tylenol), ibuprofen (Advil, Motrin), or naproxen (Aleve). Read and follow all instructions on the label. · Avoid close contact with people until the blisters have healed. It is very important for you to avoid contact with anyone who has never had chickenpox or the chickenpox vaccine. Pregnant women, young babies, and anyone else who has a hard time fighting infection (such as someone with HIV, diabetes, or cancer) is especially at risk. When should you call for help? Call your doctor now or seek immediate medical care if:  · You have a new or higher fever. · You have a severe headache and a stiff neck. · You lose the ability to think clearly. · The rash spreads to your forehead, nose, eyes, or eyelids. · You have eye pain, or your vision gets worse. · You have new pain in your face, or you cannot move the muscles in your face. · Blisters spread to new parts of your body. Watch closely for changes in your health, and be sure to contact your doctor if:  · The rash has not healed after 2 to 4 weeks. · You still have pain after the rash has healed. Where can you learn more? Go to http://vale-peng.info/  Enter X176 in the search box to learn more about \"Shingles: Care Instructions. \"  Current as of: February 11, 2020               Content Version: 12.5  © 0472-3062 [employer]. Care instructions adapted under license by [employer] (which disclaims liability or warranty for this information).  If you have questions about a medical condition or this instruction, always ask your healthcare professional. Lucrecia Rinne disclaims any warranty or liability for your use of this information.

## 2020-08-07 NOTE — ED TRIAGE NOTES
Burning and itching along upper abdomen  States it has been going on for 2 weeks and cant take it any more

## 2020-08-16 ENCOUNTER — HOSPITAL ENCOUNTER (EMERGENCY)
Age: 75
Discharge: HOME | End: 2020-08-16
Attending: EMERGENCY MEDICINE
Payer: MEDICARE

## 2020-08-16 VITALS
BODY MASS INDEX: 20.73 KG/M2 | OXYGEN SATURATION: 99 % | HEART RATE: 80 BPM | WEIGHT: 140 LBS | RESPIRATION RATE: 16 BRPM | DIASTOLIC BLOOD PRESSURE: 90 MMHG | TEMPERATURE: 97.6 F | SYSTOLIC BLOOD PRESSURE: 132 MMHG | HEIGHT: 69 IN

## 2020-08-16 DIAGNOSIS — B02.29 POST HERPETIC NEURALGIA: Primary | ICD-10-CM

## 2020-08-16 LAB
ANION GAP SERPL CALC-SCNC: 3 MMOL/L (ref 3–18)
BASOPHILS # BLD: 0 K/UL (ref 0–0.1)
BASOPHILS NFR BLD: 0 % (ref 0–2)
BUN SERPL-MCNC: 15 MG/DL (ref 7–18)
BUN/CREAT SERPL: 18 (ref 12–20)
CALCIUM SERPL-MCNC: 9.5 MG/DL (ref 8.5–10.1)
CHLORIDE SERPL-SCNC: 100 MMOL/L (ref 100–111)
CK MB CFR SERPL CALC: NORMAL % (ref 0–4)
CK MB SERPL-MCNC: <1 NG/ML (ref 5–25)
CK SERPL-CCNC: 73 U/L (ref 39–308)
CO2 SERPL-SCNC: 31 MMOL/L (ref 21–32)
CREAT SERPL-MCNC: 0.83 MG/DL (ref 0.6–1.3)
DIFFERENTIAL METHOD BLD: ABNORMAL
EOSINOPHIL # BLD: 0.1 K/UL (ref 0–0.4)
EOSINOPHIL NFR BLD: 1 % (ref 0–5)
ERYTHROCYTE [DISTWIDTH] IN BLOOD BY AUTOMATED COUNT: 15.7 % (ref 11.6–14.5)
GLUCOSE SERPL-MCNC: 105 MG/DL (ref 74–99)
HCT VFR BLD AUTO: 40.8 % (ref 36–48)
HGB BLD-MCNC: 14.1 G/DL (ref 13–16)
LYMPHOCYTES # BLD: 2.2 K/UL (ref 0.9–3.6)
LYMPHOCYTES NFR BLD: 26 % (ref 21–52)
MCH RBC QN AUTO: 30.7 PG (ref 24–34)
MCHC RBC AUTO-ENTMCNC: 34.6 G/DL (ref 31–37)
MCV RBC AUTO: 88.7 FL (ref 74–97)
MONOCYTES # BLD: 0.9 K/UL (ref 0.05–1.2)
MONOCYTES NFR BLD: 10 % (ref 3–10)
NEUTS SEG # BLD: 5.4 K/UL (ref 1.8–8)
NEUTS SEG NFR BLD: 63 % (ref 40–73)
PLATELET # BLD AUTO: 310 K/UL (ref 135–420)
PMV BLD AUTO: 8.5 FL (ref 9.2–11.8)
POTASSIUM SERPL-SCNC: 4.2 MMOL/L (ref 3.5–5.5)
RBC # BLD AUTO: 4.6 M/UL (ref 4.7–5.5)
SODIUM SERPL-SCNC: 134 MMOL/L (ref 136–145)
TROPONIN I SERPL-MCNC: <0.02 NG/ML (ref 0–0.04)
WBC # BLD AUTO: 8.5 K/UL (ref 4.6–13.2)

## 2020-08-16 PROCEDURE — 82550 ASSAY OF CK (CPK): CPT

## 2020-08-16 PROCEDURE — 80048 BASIC METABOLIC PNL TOTAL CA: CPT

## 2020-08-16 PROCEDURE — 99282 EMERGENCY DEPT VISIT SF MDM: CPT

## 2020-08-16 PROCEDURE — 85025 COMPLETE CBC W/AUTO DIFF WBC: CPT

## 2020-08-16 RX ORDER — ACETAMINOPHEN 325 MG/1
650 TABLET ORAL
Qty: 20 TAB | Refills: 0 | Status: SHIPPED | OUTPATIENT
Start: 2020-08-16

## 2020-08-16 RX ORDER — IBUPROFEN 600 MG/1
600 TABLET ORAL
Qty: 20 TAB | Refills: 0 | Status: SHIPPED | OUTPATIENT
Start: 2020-08-16

## 2020-08-16 NOTE — ED TRIAGE NOTES
Pt c/o [pain in RUQ of abdomen. States diagnosed with shingle, no obvious rash in this area noted.  Patient states took all of his prescribed medication and wants more due to it being painful

## 2020-08-16 NOTE — ED PROVIDER NOTES
EMERGENCY DEPARTMENT HISTORY AND PHYSICAL EXAM    10:21 AM      Date: 8/16/2020  Patient Name: Orlando Snow    History of Presenting Illness     Chief Complaint   Patient presents with    Abdominal Pain         History Provided By: Patient    Additional History (Context): Orlando Snow is a 69 y.o. male with hypertension and COPD who presents with complaints of persistent right upper quadrant abdominal pain where his shingles rash used to be. He states he has run out of pain medications and states that the skin overlying his right upper quadrant burns, itches, and feels dry. He states he has not called his primary care provider for this nor has he followed up with their office about this. He states he was just out and about decided to swing by the emergency department to see if he can get a refill on his medications. He denies any fevers or chills, denies any chest pain, shortness of breath, nausea, vomiting, diarrhea, dysuria, hematuria. PCP: Basil Guardado III, MD    Current Outpatient Medications   Medication Sig Dispense Refill    acetaminophen (TYLENOL) 325 mg tablet Take 2 Tabs by mouth every six (6) hours as needed for Pain. 20 Tab 0    ibuprofen (MOTRIN) 600 mg tablet Take 1 Tab by mouth every six (6) hours as needed for Pain. 20 Tab 0    methylPREDNISolone (Medrol, Siva,) 4 mg tablet Tapering dose as directed for 6 days 1 Dose Pack 0    carBAMazepine XR (TEGretol XR) 200 mg SR tablet Take 200 mg by mouth two (2) times a day.  guaiFENesin ER (Mucinex) 600 mg ER tablet Take 1 Tab by mouth two (2) times a day. 30 Tab 0    camphor-menthoL (Sarna Original) 0.5-0.5 % lotion Apply  to affected area as needed for Itching.  amantadine HCL (SYMMETREL) 100 mg capsule Take 100 mg by mouth two (2) times a day.  folic acid (FOLVITE) 1 mg tablet Take 1 mg by mouth daily.  thiamine HCL (Vitamin B-1) 100 mg tablet Take 100 mg by mouth daily.       risperiDONE (RisperDAL) 0.5 mg tablet Take 0.5 mg by mouth.  melatonin 3 mg tablet Take 3 mg by mouth.  captopriL (CAPOTEN) 25 mg tablet Take 25 mg by mouth Before breakfast, lunch, and dinner.  bisacodyL (Dulcolax, bisacodyl,) 5 mg EC tablet Take 2 Tabs by mouth daily as needed for Constipation. 15 Tab 0    polyethylene glycol (Miralax) 17 gram/dose powder Take 17 g by mouth daily as needed for Constipation. 1 tablespoon with 8 oz of water daily 510 g 0    aspirin (ASPIRIN) 325 mg tablet Take 325 mg by mouth daily.  amLODIPine (NORVASC) 10 mg tablet Take 1 Tab by mouth daily. 30 Tab 0    cholecalciferol (VITAMIN D3) 1,000 unit tablet Take  by mouth daily. Past History     Past Medical History:  Past Medical History:   Diagnosis Date    [facility] disease) ([facility] [address]     Hypertension     Smoker     25 pack years       Past Surgical History:  Past Surgical History:   Procedure Laterality Date    ABDOMEN SURGERY PROC UNLISTED      APPENDECTOMY      HX APPENDECTOMY      HX COLONOSCOPY  1/28/2015    Repeat colonoscopy in 5 yrs per Dr. Paul Morrison    HX ERCP  02/11/2019    bile duct stent    HX HERNIA REPAIR  10/22/2017    Ex lap. lysis of adhesions adn reductions of internal hernia done 10/22/2017       Family History:  Family History   Problem Relation Age of Onset    Heart Disease Mother     Cancer Father     Cancer Sister     Diabetes Sister        Social History:  Social History     Tobacco Use    Smoking status: Current Every Day Smoker     Packs/day: 0.50     Years: 50.00     Pack years: 25.00     Types: Cigarettes    Smokeless tobacco: Never Used   Substance Use Topics    Alcohol use: Not Currently     Comment: no alcohol in a year     Drug use: No       Allergies:  No Known Allergies      Review of Systems       Review of Systems   Constitutional: Negative. HENT: Negative. Respiratory: Negative. Cardiovascular: Negative. Gastrointestinal: Negative.     Genitourinary: Negative. Musculoskeletal: Negative. Skin: Negative. Neurological: Negative. All other systems reviewed and are negative. Physical Exam     Visit Vitals  BP (!) 138/96 (BP Patient Position: At rest)   Pulse 84   Temp 97.6 °F (36.4 °C)   Resp 16   Ht 5' 8.5\" (1.74 m)   Wt 63.5 kg (140 lb)   BMI 20.98 kg/m²         Physical Exam  Vitals signs and nursing note reviewed. Constitutional:       Comments: Elderly appearing male. HENT:      Head: Normocephalic. Right Ear: External ear normal.      Left Ear: External ear normal.      Nose: Nose normal.      Mouth/Throat:      Mouth: Mucous membranes are moist.      Pharynx: Oropharynx is clear. Eyes:      Extraocular Movements: Extraocular movements intact. Neck:      Musculoskeletal: Neck supple. Cardiovascular:      Rate and Rhythm: Normal rate and regular rhythm. Pulses: Normal pulses. Heart sounds: Normal heart sounds. Pulmonary:      Effort: Pulmonary effort is normal.      Breath sounds: Normal breath sounds. No wheezing, rhonchi or rales. Abdominal:      General: Bowel sounds are normal. There is no distension. Palpations: Abdomen is soft. There is no mass. Tenderness: There is no abdominal tenderness. There is no guarding or rebound. Comments: Superficial tenderness to palpation of the skin overlying the right upper quadrant. There is no abdominal tenderness on exam, no rebound or guarding. There is no mass. Skin:     General: Skin is warm and dry. Capillary Refill: Capillary refill takes less than 2 seconds. Neurological:      General: No focal deficit present. Mental Status: He is alert and oriented to person, place, and time. Cranial Nerves: No cranial nerve deficit.            Diagnostic Study Results     Labs -  Recent Results (from the past 12 hour(s))   CBC WITH AUTOMATED DIFF    Collection Time: 08/16/20 11:20 AM   Result Value Ref Range    WBC 8.5 4.6 - 13.2 K/uL    RBC 4.60 (L) 4.70 - 5.50 M/uL    HGB 14.1 13.0 - 16.0 g/dL    HCT 40.8 36.0 - 48.0 %    MCV 88.7 74.0 - 97.0 FL    MCH 30.7 24.0 - 34.0 PG    MCHC 34.6 31.0 - 37.0 g/dL    RDW 15.7 (H) 11.6 - 14.5 %    PLATELET 996 350 - 748 K/uL    MPV 8.5 (L) 9.2 - 11.8 FL    NEUTROPHILS 63 40 - 73 %    LYMPHOCYTES 26 21 - 52 %    MONOCYTES 10 3 - 10 %    EOSINOPHILS 1 0 - 5 %    BASOPHILS 0 0 - 2 %    ABS. NEUTROPHILS 5.4 1.8 - 8.0 K/UL    ABS. LYMPHOCYTES 2.2 0.9 - 3.6 K/UL    ABS. MONOCYTES 0.9 0.05 - 1.2 K/UL    ABS. EOSINOPHILS 0.1 0.0 - 0.4 K/UL    ABS. BASOPHILS 0.0 0.0 - 0.1 K/UL    DF AUTOMATED     METABOLIC PANEL, BASIC    Collection Time: 08/16/20 11:20 AM   Result Value Ref Range    Sodium 134 (L) 136 - 145 mmol/L    Potassium 4.2 3.5 - 5.5 mmol/L    Chloride 100 100 - 111 mmol/L    CO2 31 21 - 32 mmol/L    Anion gap 3 3.0 - 18 mmol/L    Glucose 105 (H) 74 - 99 mg/dL    BUN 15 7.0 - 18 MG/DL    Creatinine 0.83 0.6 - 1.3 MG/DL    BUN/Creatinine ratio 18 12 - 20      GFR est AA >60 >60 ml/min/1.73m2    GFR est non-AA >60 >60 ml/min/1.73m2    Calcium 9.5 8.5 - 10.1 MG/DL   CARDIAC PANEL,(CK, CKMB & TROPONIN)    Collection Time: 08/16/20 11:20 AM   Result Value Ref Range    CK - MB <1.0 <3.6 ng/ml    CK-MB Index  0.0 - 4.0 %     CALCULATION NOT PERFORMED WHEN RESULT IS BELOW LINEAR LIMIT    CK 73 39 - 308 U/L    Troponin-I, QT <0.02 0.0 - 0.045 NG/ML       Radiologic Studies -   No orders to display         Medical Decision Making   I am the first provider for this patient. I reviewed the vital signs, available nursing notes, past medical history, past surgical history, family history and social history. Vital Signs-Reviewed the patient's vital signs. Records Reviewed: Nursing Notes and Old Medical Records (Time of Review: 10:21 AM)    ED Course: Progress Notes, Reevaluation, and Consults:  1755: Met with patient, reviewed history, performed physical exam.  Physical exam is large unremarkable.   There is no evidence of a rash over his abdomen, there is no tenderness to palpation of his abdomen. There is no abdominal guarding or rebound. His bowel sounds are active. Abdominal exam is benign. Vital signs are stable. 1151: Labs are unremarkable. Believe that patient's pain is secondary to postherpetic neuralgia. Do not feel patient requires further imaging or work-up at this time. Provider Notes (Medical Decision Making):   59-year-old male seen in the emergency department for complaints of pain to his right upper quadrant of the abdomen where his shingles rash used to be. Work-up in the emergency department is unremarkable. Believe the pain is secondary to postherpetic neuralgia. Do not feel patient requires further work-up or imaging at this time. Patient was advised to follow-up with his primary care provider for reassessment. Patient was advised to return to the emergency department if symptoms worsen, or as needed. Diagnosis     Clinical Impression:   1. Post herpetic neuralgia        Disposition: home     Follow-up Information     Follow up With Specialties Details Why Contact Info    Alejandro Guardado MD Internal Medicine Call in 1 day For follow up regarding ER visit. [facility]      [facility] EMERGENCY DEPT Emergency Medicine  Immediately if symptoms worsen, As needed. [address]           Patient's Medications   Start Taking    ACETAMINOPHEN (TYLENOL) 325 MG TABLET    Take 2 Tabs by mouth every six (6) hours as needed for Pain. IBUPROFEN (MOTRIN) 600 MG TABLET    Take 1 Tab by mouth every six (6) hours as needed for Pain. Continue Taking    AMANTADINE HCL (SYMMETREL) 100 MG CAPSULE    Take 100 mg by mouth two (2) times a day. AMLODIPINE (NORVASC) 10 MG TABLET    Take 1 Tab by mouth daily. ASPIRIN (ASPIRIN) 325 MG TABLET    Take 325 mg by mouth daily.     BISACODYL (DULCOLAX, BISACODYL,) 5 MG EC TABLET    Take 2 Tabs by mouth daily as needed for Constipation. CAMPHOR-MENTHOL (SARNA ORIGINAL) 0.5-0.5 % LOTION    Apply  to affected area as needed for Itching. CAPTOPRIL (CAPOTEN) 25 MG TABLET    Take 25 mg by mouth Before breakfast, lunch, and dinner. CARBAMAZEPINE XR (TEGRETOL XR) 200 MG SR TABLET    Take 200 mg by mouth two (2) times a day. CHOLECALCIFEROL (VITAMIN D3) 1,000 UNIT TABLET    Take  by mouth daily. FOLIC ACID (FOLVITE) 1 MG TABLET    Take 1 mg by mouth daily. GUAIFENESIN ER (MUCINEX) 600 MG ER TABLET    Take 1 Tab by mouth two (2) times a day. MELATONIN 3 MG TABLET    Take 3 mg by mouth. METHYLPREDNISOLONE (MEDROL, NAVEED,) 4 MG TABLET    Tapering dose as directed for 6 days    POLYETHYLENE GLYCOL (MIRALAX) 17 GRAM/DOSE POWDER    Take 17 g by mouth daily as needed for Constipation. 1 tablespoon with 8 oz of water daily    RISPERIDONE (RISPERDAL) 0.5 MG TABLET    Take 0.5 mg by mouth. THIAMINE HCL (VITAMIN B-1) 100 MG TABLET    Take 100 mg by mouth daily. These Medications have changed    No medications on file   Stop Taking    ACETAMINOPHEN (TYLENOL) 325 MG TABLET    Take 2 Tabs by mouth every four (4) hours as needed for Pain. Garrett Patel PA-C    Dictation disclaimer:  Please note that this dictation was completed with [email or web address], the computer voice recognition software. Quite often unanticipated grammatical, syntax, homophones, and other interpretive errors are inadvertently transcribed by the computer software. Please disregard these errors. Please excuse any errors that have escaped final proofreading.

## 2020-08-16 NOTE — ED NOTES
Pt roomed from . States he has an area of shingles on his abdomen that is burning and painful. Was given medication and ran out. Would like a refill.

## 2020-08-16 NOTE — DISCHARGE INSTRUCTIONS
Patient Education        Neuropathic Pain: Care Instructions  Your Care Instructions     Neuropathic pain is caused by pressure on or damage to your nerves. It's often simply called nerve pain. Some people feel this type of pain all the time. For others, it comes and goes. Diabetes, shingles, or an injury can cause nerve pain. Many people say the pain feels sharp, burning, or stabbing. But some people feel it as a dull ache. In some cases, it makes your skin very sensitive. So touch, pressure, and other sensations that did not hurt before may now cause pain. It's important to know that this kind of pain is real and can affect your quality of life. It's also important to know that treatment can help. Treatment includes pain medicines, exercise, and physical therapy. Medicines can help reduce the number of pain signals that travel over the nerves. This can make the painful areas less sensitive. It can also help you sleep better and improve your mood. But medicines are only one part of successful treatment. Most people do best with more than one kind of treatment. Your doctor may recommend that you try cognitive-behavioral therapy and stress management. Or, if needed, you may decide to try to quit smoking, lower your blood pressure, or better control blood sugar. These kinds of healthy changes can also make a difference. If you feel that your treatment is not working, talk to your doctor. And be sure to tell your doctor if you think you might be depressed or anxious. These are common problems that can also be treated. Follow-up care is a key part of your treatment and safety. Be sure to make and go to all appointments, and call your doctor if you are having problems. It's also a good idea to know your test results and keep a list of the medicines you take. How can you care for yourself at home? · Be safe with medicines. Read and follow all instructions on the label.   ? If the doctor gave you a prescription medicine for pain, take it as prescribed. ? If you are not taking a prescription pain medicine, ask your doctor if you can take an over-the-counter medicine. · Save hard tasks for days when you have less pain. Follow a hard task with an easy task. And remember to take breaks. · Relax, and reduce stress. You may want to try deep breathing or meditation. These can help. · Keep moving. Gentle, daily exercise can help reduce pain. Your doctor or physical therapist can tell you what type of exercise is best for you. This may include walking, swimming, and stationary biking. It may also include stretches and range-of-motion exercises. · Try heat, cold packs, and massage. · Get enough sleep. Constant pain can make you more tired. If the pain makes it hard to sleep, talk with your doctor. · Think positively. Your thoughts can affect your pain. Do fun things to distract yourself from the pain. See a movie, read a book, listen to music, or spend time with a friend. · Keep a pain diary. Try to write down how strong your pain is and what it feels like. Also try to notice and write down how your moods, thoughts, sleep, activities, and medicine affect your pain. These notes can help you and your doctor find the best ways to treat your pain. Reducing constipation caused by pain medicine  Pain medicines often cause constipation. To reduce constipation:  · Include fruits, vegetables, beans, and whole grains in your diet each day. These foods are high in fiber. · Drink plenty of fluids, enough so that your urine is light yellow or clear like water. If you have kidney, heart, or liver disease and have to limit fluids, talk with your doctor before you increase the amount of fluids you drink. · Get some exercise every day. Build up slowly to 30 to 60 minutes a day on 5 or more days of the week. · Take a fiber supplement, such as Citrucel or Metamucil, every day if needed.  Read and follow all instructions on the label.  · Schedule time each day for a bowel movement. Having a daily routine may help. Take your time and do not strain when having a bowel movement. · Ask your doctor about a laxative. The goal is to have one easy bowel movement every 1 to 2 days. Do not let constipation go untreated for more than 3 days. When should you call for help? Call your doctor now or seek immediate medical care if:  · You feel sad, anxious, or hopeless for more than a few days. This could mean you are depressed. Depression is common in people who have a lot of pain. But it can be treated. · You have trouble with bowel movements, such as:  ? No bowel movement in 3 days. ? Blood in the anal area, in your stool, or on the toilet paper. ? Diarrhea for more than 24 hours. Watch closely for changes in your health, and be sure to contact your doctor if:  · Your pain is getting worse. · You can't sleep because of pain. · You are very worried or anxious about your pain. · You have trouble taking your pain medicine. · You have any concerns about your pain medicine or its side effects. · You have vomiting or cramps for more than 2 hours. Where can you learn more? Go to http://vale-peng.info/  Enter K223 in the search box to learn more about \"Neuropathic Pain: Care Instructions. \"  Current as of: November 20, 2019               Content Version: 12.5  © 9526-3286 [employer], Incorporated. Care instructions adapted under license by [employer] (which disclaims liability or warranty for this information). If you have questions about a medical condition or this instruction, always ask your healthcare professional. [address] any warranty or liability for your use of this information.

## 2020-08-17 ENCOUNTER — PATIENT OUTREACH (OUTPATIENT)
Dept: CASE MANAGEMENT | Age: 75
End: 2020-08-17

## 2020-08-17 NOTE — PROGRESS NOTES
Date/Time:  8/17/2020 11:55 AM  Attempted to reach patient by telephone. Left HIPPA compliant message requesting a return call. Will attempt to reach patient again.

## 2020-09-12 ENCOUNTER — APPOINTMENT (OUTPATIENT)
Dept: CT IMAGING | Age: 75
End: 2020-09-12
Attending: PHYSICIAN ASSISTANT
Payer: MEDICARE

## 2020-09-12 ENCOUNTER — HOSPITAL ENCOUNTER (EMERGENCY)
Age: 75
Discharge: HOME | End: 2020-09-13
Attending: EMERGENCY MEDICINE
Payer: MEDICARE

## 2020-09-12 DIAGNOSIS — F03.90 DEMENTIA WITHOUT BEHAVIORAL DISTURBANCE, UNSPECIFIED DEMENTIA TYPE: Primary | ICD-10-CM

## 2020-09-12 LAB
ALBUMIN SERPL-MCNC: 3.6 G/DL (ref 3.4–5)
ALBUMIN/GLOB SERPL: 1.2 {RATIO} (ref 0.8–1.7)
ALP SERPL-CCNC: 51 U/L (ref 45–117)
ALT SERPL-CCNC: 11 U/L (ref 16–61)
AMPHET UR QL SCN: NEGATIVE
ANION GAP SERPL CALC-SCNC: 9 MMOL/L (ref 3–18)
AST SERPL-CCNC: 11 U/L (ref 10–38)
BARBITURATES UR QL SCN: NEGATIVE
BASOPHILS # BLD: 0 K/UL (ref 0–0.1)
BASOPHILS NFR BLD: 0 % (ref 0–2)
BENZODIAZ UR QL: NEGATIVE
BILIRUB SERPL-MCNC: 0.3 MG/DL (ref 0.2–1)
BUN SERPL-MCNC: 15 MG/DL (ref 7–18)
BUN/CREAT SERPL: 13 (ref 12–20)
CALCIUM SERPL-MCNC: 8.9 MG/DL (ref 8.5–10.1)
CANNABINOIDS UR QL SCN: NEGATIVE
CHLORIDE SERPL-SCNC: 108 MMOL/L (ref 100–111)
CK MB CFR SERPL CALC: 1.4 % (ref 0–4)
CK MB SERPL-MCNC: 2 NG/ML (ref 5–25)
CK SERPL-CCNC: 141 U/L (ref 39–308)
CO2 SERPL-SCNC: 23 MMOL/L (ref 21–32)
COCAINE UR QL SCN: NEGATIVE
CREAT SERPL-MCNC: 1.12 MG/DL (ref 0.6–1.3)
DIFFERENTIAL METHOD BLD: ABNORMAL
EOSINOPHIL # BLD: 0 K/UL (ref 0–0.4)
EOSINOPHIL NFR BLD: 0 % (ref 0–5)
ERYTHROCYTE [DISTWIDTH] IN BLOOD BY AUTOMATED COUNT: 16.4 % (ref 11.6–14.5)
ETHANOL SERPL-MCNC: <3 MG/DL (ref 0–3)
GLOBULIN SER CALC-MCNC: 3 G/DL (ref 2–4)
GLUCOSE SERPL-MCNC: 134 MG/DL (ref 74–99)
HCT VFR BLD AUTO: 32.4 % (ref 36–48)
HDSCOM,HDSCOM: NORMAL
HGB BLD-MCNC: 11 G/DL (ref 13–16)
LYMPHOCYTES # BLD: 0.9 K/UL (ref 0.9–3.6)
LYMPHOCYTES NFR BLD: 11 % (ref 21–52)
MCH RBC QN AUTO: 30 PG (ref 24–34)
MCHC RBC AUTO-ENTMCNC: 34 G/DL (ref 31–37)
MCV RBC AUTO: 88.3 FL (ref 74–97)
METHADONE UR QL: NEGATIVE
MONOCYTES # BLD: 1 K/UL (ref 0.05–1.2)
MONOCYTES NFR BLD: 12 % (ref 3–10)
NEUTS SEG # BLD: 6.1 K/UL (ref 1.8–8)
NEUTS SEG NFR BLD: 77 % (ref 40–73)
OPIATES UR QL: NEGATIVE
PCP UR QL: NEGATIVE
PLATELET # BLD AUTO: 299 K/UL (ref 135–420)
PMV BLD AUTO: 8.7 FL (ref 9.2–11.8)
POTASSIUM SERPL-SCNC: 3.8 MMOL/L (ref 3.5–5.5)
PROT SERPL-MCNC: 6.6 G/DL (ref 6.4–8.2)
RBC # BLD AUTO: 3.67 M/UL (ref 4.7–5.5)
SODIUM SERPL-SCNC: 140 MMOL/L (ref 136–145)
TROPONIN I SERPL-MCNC: <0.02 NG/ML (ref 0–0.04)
WBC # BLD AUTO: 8 K/UL (ref 4.6–13.2)

## 2020-09-12 PROCEDURE — 82550 ASSAY OF CK (CPK): CPT

## 2020-09-12 PROCEDURE — 80307 DRUG TEST PRSMV CHEM ANLYZR: CPT

## 2020-09-12 PROCEDURE — 72125 CT NECK SPINE W/O DYE: CPT

## 2020-09-12 PROCEDURE — 80053 COMPREHEN METABOLIC PANEL: CPT

## 2020-09-12 PROCEDURE — 85025 COMPLETE CBC W/AUTO DIFF WBC: CPT

## 2020-09-12 PROCEDURE — 70486 CT MAXILLOFACIAL W/O DYE: CPT

## 2020-09-12 PROCEDURE — 99285 EMERGENCY DEPT VISIT HI MDM: CPT

## 2020-09-12 PROCEDURE — 70450 CT HEAD/BRAIN W/O DYE: CPT

## 2020-09-13 VITALS
HEART RATE: 70 BPM | DIASTOLIC BLOOD PRESSURE: 87 MMHG | BODY MASS INDEX: 20.73 KG/M2 | HEIGHT: 69 IN | OXYGEN SATURATION: 97 % | RESPIRATION RATE: 20 BRPM | WEIGHT: 140 LBS | SYSTOLIC BLOOD PRESSURE: 182 MMHG | TEMPERATURE: 98.3 F

## 2020-09-13 NOTE — ED NOTES
Spoke to patient emergency contact, Fern Romano. Fern Romano states the pt has worsening dementia and family is supposed to stay overnights with the pt. Fern Romano states that pt has had issues becoming aggressive with in-home staff and daughter wants to seek assisted living/nursing home placement. Charge nurse and MD informed. Fern Romano states she is going to call local family (as she lives in [place]) to see if anyone can come take the pt home/be at pt side.  Will keep pt in ED until it is safe to d/c

## 2020-09-13 NOTE — ED PROVIDER NOTES
HPI   Patient brought in by EMS for evaluation of possible seizure. Per EMS bystanders saw the patient fall to the ground. He was found next to an empty bottle of liquor and was also noted to be incontinent of urine. Patient is confused and combative on arrival to the emergency room. He has no past history of seizure disorder.       Past Medical History:   Diagnosis Date    COPD (chronic obstructive pulmonary disease) ([address]     Hypertension     Smoker     25 pack years       Past Surgical History:   Procedure Laterality Date    ABDOMEN SURGERY PROC UNLISTED      APPENDECTOMY      HX APPENDECTOMY      HX COLONOSCOPY  1/28/2015    Repeat colonoscopy in 5 yrs per Dr. Scarlett Castellano    HX ERCP  02/11/2019    bile duct stent    HX HERNIA REPAIR  10/22/2017    Ex lap. lysis of adhesions adn reductions of internal hernia done 10/22/2017         Family History:   Problem Relation Age of Onset    Heart Disease Mother     Cancer Father     Cancer Sister     Diabetes Sister        Social History     Socioeconomic History    Marital status:      Spouse name: Not on file    Number of children: Not on file    Years of education: Not on file    Highest education level: Not on file   Occupational History    Not on file   Social Needs    Financial resource strain: Not on file    Food insecurity     Worry: Not on file     Inability: Not on file    Transportation needs     Medical: Not on file     Non-medical: Not on file   Tobacco Use    Smoking status: Current Every Day Smoker     Packs/day: 0.50     Years: 50.00     Pack years: 25.00     Types: Cigarettes    Smokeless tobacco: Never Used   Substance and Sexual Activity    Alcohol use: Not Currently     Comment: no alcohol in a year     Drug use: No    Sexual activity: Not on file   Lifestyle    Physical activity     Days per week: Not on file     Minutes per session: Not on file    Stress: Not on file   Relationships    Social connections Talks on phone: Not on file     Gets together: Not on file     Attends [employer]ism service: Not on file     Active member of club or organization: Not on file     Attends meetings of clubs or organizations: Not on file     Relationship status: Not on file    Intimate partner violence     Fear of current or ex partner: Not on file     Emotionally abused: Not on file     Physically abused: Not on file     Forced sexual activity: Not on file   Other Topics Concern    Not on file   Social History Narrative    Not on file         ALLERGIES: Patient has no known allergies. Review of Systems   Unable to perform ROS: Mental status change       Vitals:    09/12/20 2016 09/12/20 2019 09/12/20 2021   BP: (!) 166/85     Pulse:  94    Resp:  16    Temp:   98.3 °F (36.8 °C)   SpO2:  97%    Weight:   63.5 kg (140 lb)   Height:   5' 8.5\" (1.74 m)            Physical Exam  Vitals signs and nursing note reviewed. Constitutional:       General: He is not in acute distress. Appearance: Normal appearance. He is well-developed and normal weight. He is not diaphoretic. HENT:      Head: Normocephalic and atraumatic. Right Ear: Tympanic membrane, ear canal and external ear normal.      Left Ear: Tympanic membrane, ear canal and external ear normal.      Nose: Nose normal.      Mouth/Throat:      Mouth: Mucous membranes are moist.      Pharynx: No oropharyngeal exudate. Eyes:      General: No scleral icterus. Right eye: No discharge. Left eye: No discharge. Extraocular Movements: Extraocular movements intact. Conjunctiva/sclera: Conjunctivae normal.      Pupils: Pupils are equal, round, and reactive to light. Neck:      Musculoskeletal: Normal range of motion and neck supple. Thyroid: No thyromegaly. Vascular: No JVD. Trachea: No tracheal deviation. Cardiovascular:      Rate and Rhythm: Normal rate and regular rhythm. Heart sounds: Normal heart sounds. No murmur.  No friction rub. No gallop. Pulmonary:      Effort: Pulmonary effort is normal. No respiratory distress. Breath sounds: Normal breath sounds. No stridor. No wheezing or rales. Chest:      Chest wall: No tenderness. Abdominal:      General: Bowel sounds are normal. There is no distension. Palpations: Abdomen is soft. There is no mass. Tenderness: There is no abdominal tenderness. There is no guarding or rebound. Genitourinary:     Penis: Normal.    Musculoskeletal: Normal range of motion. General: No tenderness. Lymphadenopathy:      Cervical: No cervical adenopathy. Skin:     General: Skin is warm and dry. Coloration: Skin is not pale. Findings: No erythema or rash. Neurological:      Mental Status: He is alert and oriented to person, place, and time. Cranial Nerves: No cranial nerve deficit. Motor: No abnormal muscle tone. Coordination: Coordination normal.      Deep Tendon Reflexes: Reflexes are normal and symmetric. Psychiatric:         Behavior: Behavior normal.         Thought Content:  Thought content normal.         Judgment: Judgment normal.          MDM  Number of Diagnoses or Management Options  Dementia without behavioral disturbance, unspecified dementia type [facility]):   Diagnosis management comments: Differential diagnosis includes: Metabolic derangement, syncope, dementia, arrhythmia, seizure       Amount and/or Complexity of Data Reviewed  Clinical lab tests: ordered and reviewed  Tests in the radiology section of CPT®: ordered and reviewed  Tests in the medicine section of CPT®: ordered and reviewed  Discussion of test results with the performing providers: yes  Decide to obtain previous medical records or to obtain history from someone other than the patient: yes  Obtain history from someone other than the patient: yes  Review and summarize past medical records: yes  Discuss the patient with other providers: yes  Independent visualization of images, tracings, or specimens: yes    Risk of Complications, Morbidity, and/or Mortality  Presenting problems: high  Diagnostic procedures: high  Management options: high    Patient Progress  Patient progress: improved         Procedures    Recent Results (from the past 12 hour(s))   CBC WITH AUTOMATED DIFF    Collection Time: 09/12/20  9:00 PM   Result Value Ref Range    WBC 8.0 4.6 - 13.2 K/uL    RBC 3.67 (L) 4.70 - 5.50 M/uL    HGB 11.0 (L) 13.0 - 16.0 g/dL    HCT 32.4 (L) 36.0 - 48.0 %    MCV 88.3 74.0 - 97.0 FL    MCH 30.0 24.0 - 34.0 PG    MCHC 34.0 31.0 - 37.0 g/dL    RDW 16.4 (H) 11.6 - 14.5 %    PLATELET 806 040 - 779 K/uL    MPV 8.7 (L) 9.2 - 11.8 FL    NEUTROPHILS 77 (H) 40 - 73 %    LYMPHOCYTES 11 (L) 21 - 52 %    MONOCYTES 12 (H) 3 - 10 %    EOSINOPHILS 0 0 - 5 %    BASOPHILS 0 0 - 2 %    ABS. NEUTROPHILS 6.1 1.8 - 8.0 K/UL    ABS. LYMPHOCYTES 0.9 0.9 - 3.6 K/UL    ABS. MONOCYTES 1.0 0.05 - 1.2 K/UL    ABS. EOSINOPHILS 0.0 0.0 - 0.4 K/UL    ABS. BASOPHILS 0.0 0.0 - 0.1 K/UL    DF AUTOMATED     METABOLIC PANEL, COMPREHENSIVE    Collection Time: 09/12/20  9:00 PM   Result Value Ref Range    Sodium 140 136 - 145 mmol/L    Potassium 3.8 3.5 - 5.5 mmol/L    Chloride 108 100 - 111 mmol/L    CO2 23 21 - 32 mmol/L    Anion gap 9 3.0 - 18 mmol/L    Glucose 134 (H) 74 - 99 mg/dL    BUN 15 7.0 - 18 MG/DL    Creatinine 1.12 0.6 - 1.3 MG/DL    BUN/Creatinine ratio 13 12 - 20      GFR est AA >60 >60 ml/min/1.73m2    GFR est non-AA >60 >60 ml/min/1.73m2    Calcium 8.9 8.5 - 10.1 MG/DL    Bilirubin, total 0.3 0.2 - 1.0 MG/DL    ALT (SGPT) 11 (L) 16 - 61 U/L    AST (SGOT) 11 10 - 38 U/L    Alk.  phosphatase 51 45 - 117 U/L    Protein, total 6.6 6.4 - 8.2 g/dL    Albumin 3.6 3.4 - 5.0 g/dL    Globulin 3.0 2.0 - 4.0 g/dL    A-G Ratio 1.2 0.8 - 1.7     CARDIAC PANEL,(CK, CKMB & TROPONIN)    Collection Time: 09/12/20  9:00 PM   Result Value Ref Range    CK - MB 2.0 <3.6 ng/ml    CK-MB Index 1.4 0.0 - 4.0 %     39 - 308 U/L Troponin-I, QT <0.02 0.0 - 0.045 NG/ML   ETHYL ALCOHOL    Collection Time: 09/12/20  9:00 PM   Result Value Ref Range    ALCOHOL(ETHYL),SERUM <3 0 - 3 MG/DL   DRUG SCREEN, URINE    Collection Time: 09/12/20 11:20 PM   Result Value Ref Range    BENZODIAZEPINES Negative NEG      BARBITURATES Negative NEG      THC (TH-CANNABINOL) Negative NEG      OPIATES Negative NEG      PCP(PHENCYCLIDINE) Negative NEG      COCAINE Negative NEG      AMPHETAMINES Negative NEG      METHADONE Negative NEG      HDSCOM (NOTE)          CT HEAD WO CONT    (Results Pending)<HTML><META Findings:Based on new scan:-No acute intracranial process. -Periventricular and deep white matter hypodensities consistent with chronic microvascualr ischemia.-Findings discussed with ED provider and attending radiologist.   Jessy Mend CONT    (Results PendingFindings:-No acute traumatic findings. CT SPINE CERV WO CONT    (Results Pending)Calcification seen at the L. lateral neck measuring 1.6 x 1.0 cm, uncertain clinical significance, not aggressive appearing       Nursing staff spoke with the patient's niece who reported that the patient has history of dementia. He has been verbally and physically aggressive with home health. Patient is requiring around-the-clock care due to his dementia. Family is in the process of trying to obtain power of . Family is requesting assistance from case management. Case management consult was obtained. Diagnosis:   1.  Dementia without behavioral disturbance, unspecified dementia type [facility])          Disposition: Discharge home    Follow-up Information     Follow up With Specialties Details Why Contact Info    Kamini Guardado MD Internal Medicine Schedule an appointment as soon as possible for a visit in 1 day  [facility] [phone]      [facility] EMERGENCY DEPT Emergency Medicine  As needed, If symptoms worsen [id number]  [phone] Discharge Medication List as of 9/13/2020  2:39 AM      CONTINUE these medications which have NOT CHANGED    Details   acetaminophen (TYLENOL) 325 mg tablet Take 2 Tabs by mouth every six (6) hours as needed for Pain., Normal, Disp-20 Tab,R-0      ibuprofen (MOTRIN) 600 mg tablet Take 1 Tab by mouth every six (6) hours as needed for Pain., Normal, Disp-20 Tab,R-0      methylPREDNISolone (Medrol, Siva,) 4 mg tablet Tapering dose as directed for 6 days, Normal, Disp-1 Dose Pack,R-0      carBAMazepine XR (TEGretol XR) 200 mg SR tablet Take 200 mg by mouth two (2) times a day., Historical Med      guaiFENesin ER (Mucinex) 600 mg ER tablet Take 1 Tab by mouth two (2) times a day., Print, Disp-30 Tab,R-0      camphor-menthoL (Sarna Original) 0.5-0.5 % lotion Apply  to affected area as needed for Itching., Historical Med      amantadine HCL (SYMMETREL) 100 mg capsule Take 100 mg by mouth two (2) times a day., Historical Med      folic acid (FOLVITE) 1 mg tablet Take 1 mg by mouth daily. , Historical Med      thiamine HCL (Vitamin B-1) 100 mg tablet Take 100 mg by mouth daily. , Historical Med      risperiDONE (RisperDAL) 0.5 mg tablet Take 0.5 mg by mouth., Historical Med      melatonin 3 mg tablet Take 3 mg by mouth., Historical Med      captopriL (CAPOTEN) 25 mg tablet Take 25 mg by mouth Before breakfast, lunch, and dinner., Historical Med      bisacodyL (Dulcolax, bisacodyl,) 5 mg EC tablet Take 2 Tabs by mouth daily as needed for Constipation. , Normal, Disp-15 Tab, R-0      polyethylene glycol (Miralax) 17 gram/dose powder Take 17 g by mouth daily as needed for Constipation. 1 tablespoon with 8 oz of water daily, Normal, Disp-510 g, R-0      aspirin (ASPIRIN) 325 mg tablet Take 325 mg by mouth daily. , Historical Med      amLODIPine (NORVASC) 10 mg tablet Take 1 Tab by mouth daily. , Print, Disp-30 Tab, R-0      cholecalciferol (VITAMIN D3) 1,000 unit tablet Take  by mouth daily. , Historical Med

## 2020-09-13 NOTE — DISCHARGE INSTRUCTIONS
Patient Education        Dementia: Care Instructions  Your Care Instructions     Dementia is a loss of mental skills that affects your daily life. It is different than the occasional trouble with memory that is part of aging. You may find it hard to remember things that you feel you should be able to remember. Or you may feel that your mind is just not working as well as usual.  Finding out that you have dementia is a shock. You may be afraid and worried about how the condition will change your life. Although there is no cure at this time, medicine may slow memory loss and improve thinking for a while. Other medicines may be able to help you sleep or cope with depression and behavior changes. Dementia often gets worse slowly. But it can get worse quickly. As dementia gets worse, it may become harder to do common things that take planning, like making a list and going shopping. Over time, the disease may make it hard for you to take care of yourself. Some people with dementia need others to help care for them. Dementia is different for everyone. You may be able to function well for a long time. In the early stage of the condition, you can do things at home to make life easier and safer. You also can keep doing your hobbies and other activities. Many people find comfort in planning now for their future needs. Follow-up care is a key part of your treatment and safety. Be sure to make and go to all appointments, and call your doctor if you are having problems. It's also a good idea to know your test results and keep a list of the medicines you take. How can you care for yourself at home? · Take your medicines exactly as prescribed. Call your doctor if you think you are having a problem with your medicine. · Eat healthy foods. Eat lots of whole grains, fruits, and vegetables every day.  If you are not hungry, try snacks or nutritional drinks such as Boost, Ensure, or Sustacal.  · If you have problems sleeping:  ? Try not to nap too close to your bedtime. ? Exercise regularly. Walking is a good choice. ? Try a glass of warm milk or caffeine-free herbal tea before bed. · Do tasks and activities during the time of day when you feel your best. It may help to develop a daily routine. · Post labels, lists, and sticky notes to help you remember things. Write your activities on a calendar you can easily find. Put your clock where you can easily see it. · Stay active. Take walks in familiar places, or with friends or loved ones. Try to stay active mentally too. Read and work crossword puzzles if you enjoy these activities. · Do not drive unless you can pass an on-road driving test. If you are not sure if you are safe to drive, your state 's license bureau can test you. · Keep a cordless phone and a flashlight with new batteries by your bed. If possible, put a phone in each of the main rooms of your house, or carry a cell phone in case you fall and cannot reach a phone. Or, you can wear a device around your neck or wrist. You push a button that sends a signal for help. Acknowledge your emotions and plan for the future  · Talk openly and honestly with your doctor. · Let yourself grieve. It is common to feel angry, scared, frustrated, anxious, or depressed. · Get emotional support from family, friends, a support group, or a counselor experienced in working with people who have dementia. · Ask for help if you need it. · Tell your doctor how you feel. You may feel upset, angry, or worried at times. Many things can cause this, including poor sleep, medicine side effects, confusion, and pain. Your doctor may be able to help you. · Plan for the future. ? Talk to your family and doctor about preparing a living will and other important papers while you can make decisions. These papers tell your doctors how to care for you at the end of your life.   ? Consider naming a person to make decisions about your care if you are not able to. When should you call for help? Call 911 anytime you think you may need emergency care. For example, call if:    · You are lost and do not know whom to call.     · You are injured and do not know whom to call. Call your doctor now or seek immediate medical care if:    · You are more confused or upset than usual.     · You feel like you could hurt yourself because your mind is not working well. Watch closely for changes in your health, and be sure to contact your doctor if you have any problems. Where can you learn more? Go to http://www.gray.com/  Enter Y741 in the search box to learn more about \"Dementia: Care Instructions. \"  Current as of: January 31, 2020               Content Version: 12.6  © 9230-8847 [employer]. Care instructions adapted under license by [employer] (which disclaims liability or warranty for this information). If you have questions about a medical condition or this instruction, always ask your healthcare professional. [address] any warranty or liability for your use of this information.

## 2020-09-13 NOTE — ED NOTES
Pt tells this nurse \"I have shingles, they hurt. Can I get something for that? \" pt skin observed to have some dark colored spots to trunk with no open areas. Provider informed.

## 2020-09-13 NOTE — ED NOTES
Throughout stay pt has asked to walk home or take an uber many times, pt seems very unaware of his own limitations. Pt does not remember events that led him to being brought to the ED. Pt asks the same questions to this nurse sporadically throughout this visit. Pt daughter Elvis Roy called back, states pt step daughter Fara Hogan is coming to  the pt. Pt assisted into clean, dry paper scrubs and nonslip socks awaiting family for transport home.

## 2020-09-13 NOTE — ED TRIAGE NOTES
Patient comes in by EMS for complaints of a possible seizure. Bystanders saw patient fall to the ground and according to EMS, Patient acted as if he was postictal on arrival. Patient was confused and combative. Patient is still confused. Patient was also found next to an empty liquor bottle, unsure if it belonged to the patient. Patient also had an incontinent episode.

## 2021-08-17 NOTE — PROGRESS NOTES
Medicare Wellness Visit  Plan for Preventive Care    A good way for you to stay healthy is to use preventive care.  Medicare covers many services that can help you stay healthy.* The goal of these services is to find any health problems as quickly as possible. Finding problems early can help make them easier to treat.  Your personal plan below lists the services you may need and when they are due.     Health Maintenance Summary     Influenza Vaccine (1)  Next due on 9/1/2021    Depression Screening (Yearly)  Next due on 8/15/2022    Medicare Wellness Visit (Yearly)  Next due on 8/17/2022    DTaP/Tdap/Td Vaccine (2 - Td or Tdap)  Next due on 8/22/2029    Pneumococcal Vaccine 65+   Completed    COVID-19 Vaccine   Completed    Shingles Vaccine   Completed    Hepatitis B Vaccine   Aged Out    Meningococcal Vaccine   Aged Out    HPV Vaccine   Aged Out           Preventive Care for Women and Men    Heart Screenings (Cardiovascular):  · Blood tests are used to check your cholesterol, lipid and triglyceride levels. High levels can increase your risk for heart disease and stroke. High levels can be treated with medications, diet and exercise. Lowering your levels can help keep your heart and blood vessels healthy.  Your provider will order these tests if they are needed.    · An ultrasound is done to see if you have an abdominal aortic aneurysm (AAA).  This is an enlargement of one of the main blood vessels that delivers blood to the body.   In the [place], 9,000 deaths are caused by AAA.  You may not even know you have this problem and as many as 1 in 3 people will have a serious problem if it is not treated.  Early diagnosis allows for more effective treatment and cure.  If you have a family history of AAA or are a male age 65-75 who has smoked, you are at higher risk of an AAA.  Your provider can order this test, if needed.    Colorectal Screening:  · There are many tests that are used to check for cancer of your  Problem: Falls - Risk of  Goal: *Absence of Falls  Document Marco Fall Risk and appropriate interventions in the flowsheet.    Outcome: Progressing Towards Goal  Fall Risk Interventions:  Mobility Interventions: Patient to call before getting OOB         Medication Interventions: Patient to call before getting OOB, Teach patient to arise slowly    Elimination Interventions: Call light in reach, Patient to call for help with toileting needs, Toilet paper/wipes in reach colon and rectum. You and your provider should discuss what test is best for you and when to have it done.  Options include:  · Screening Colonoscopy: exam of the entire colon, seen through a flexible lighted tube.  · Flexible Sigmoidoscopy: exam of the last third (sigmoid portion) of the colon and rectum, seen through a flexible lighted tube.  · Cologuard DNA stool test: a sample of your stool is used to screen for cancer and unseen blood in your stool.  · Fecal Occult Blood Test: a sample of your stool is studied to find any unseen blood    Flu Shot:  · An immunization that helps to prevent influenza (the flu). You should get this every year. The best time to get the shot is in the fall.    Pneumococcal Shot:  • Vaccines are available that can help prevent pneumococcal disease, which is any type of infection caused by Streptococcus pneumoniae bacteria.   Their use can prevent some cases of pneumonia, meningitis, and sepsis. There are two types of pneumococcal vaccines:   o Conjugate vaccines (PCV-13 or Prevnar 13®) - helps protect against the 13 types of pneumococcal bacteria that are the most common causes of serious infections in children and adults.    o Polysaccharide vaccine (PPSV23 or Odqdyvzzg50®) - helps protect against 23 types of pneumococcal bacteria for patients who are recommended to get it.  These vaccines should be given at least 12 months apart.  A booster is usually not needed.     Hepatitis B Shot:  · An immunization that helps to protect people from getting Hepatitis B. Hepatitis B is a virus that spreads through contact with infected blood or body fluids. Many people with the virus do not have symptoms.  The virus can lead to serious problems, such as liver disease. Some people are at higher risk than others. Your doctor will tell you if you need this shot.     Diabetes Screening:  · A test to measure sugar (glucose) in your blood is called a fasting blood sugar. Fasting means you cannot have  food or drink for at least 8 hours before the test. This test can detect diabetes long before you may notice symptoms.    Glaucoma Screening:  · Glaucoma screening is performed by your eye doctor. The test measures the fluid pressure inside your eyes to determine if you have glaucoma.     Hepatitis C Screening:  · A blood test to see if you have the hepatitis C virus.  Hepatitis C attacks the liver and is a major cause of chronic liver disease.  Medicare will cover a single screening for all adults born between 1945 & 1965, or high risk patients (people who have injected illegal drugs or people who have had blood transfusions).  High risk patients who continue to inject illegal drugs can be screened for Hepatitis C every year.    Smoking and Tobacco-Use Cessation Counseling:  · Tobacco is the single greatest cause of disease and early death in our country today. Medication and counseling together can increase a person’s chance of quitting for good.   · Medicare covers two quitting attempts per year, with four counseling sessions per attempt (eight sessions in a 12 month period)    Preventive Screening tests for Women    Screening Mammograms and Breast Exams:  · An x-ray of your breasts to check for breast cancer before you or your doctor may be able to feel it.  If breast cancer is found early it can usually be treated with success.    Pelvic Exams and Pap Tests:  · An exam to check for cervical and vaginal cancer. A Pap test is a lab test in which cells are taken from your cervix and sent to the lab to look for signs of cervical cancer. If cancer of the cervix is found early, chances for a cure are good. Testing can generally end at age 65, or if a woman has a hysterectomy for a benign condition. Your provider may recommend more frequent testing if certain abnormal results are found.    Bone Mass Measurements:  · A painless x-ray of your bone density to see if you are at risk for a broken bone. Bone density refers  to the thickness of bones or how tightly the bone tissue is packed.    Preventive Screening tests for Men    Prostate Screening:  · Should you have a prostate cancer test (PSA)?  It is up to you to decide if you want a prostate cancer test. Talk to your clinician to find out if the test is right for you.  Things for you to consider and talk about should include:  · Benefits and harms of the test  · Your family history  · How your race/ethnicity may influence the test  · If the test may impact other medical conditions you have  · Your values on screenings and treatments    *Medicare pays for many preventive services to keep you healthy. For some of these services, you might have to pay a deductible, coinsurance, and / or copayment.  The amounts vary depending on the type of services you need and the kind of Medicare health plan you have.    For further details on screenings offered by Medicare please visit: https://www.medicare.gov/coverage/preventive-screening-services
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
## 2024-07-15 NOTE — ED NOTES
"Preventive Care Visit  [facility]  Gallito SheehanDO suzanne, Family Medicine  Jul 15, 2024      Assessment & Plan   See after visit summary and result note for helpful information and advice given to patient.    Type 2 diabetes mellitus without complication, without long-term current use of insulin (H)  The hemoglobin A1c is outstanding thanks the patient's diet exercise management efforts.  - Albumin Random Urine Quantitative with Creat Ratio  - HEMOGLOBIN A1C  - Lipid panel reflex to direct LDL Fasting  - Albumin Random Urine Quantitative with Creat Ratio  - HEMOGLOBIN A1C  - Lipid panel reflex to direct LDL Fasting  - Hemoglobin A1c    Mixed hyperlipidemia    - simvastatin (ZOCOR) 40 MG tablet  Dispense: 90 tablet; Refill: 3    Elevated blood pressure reading without diagnosis of hypertension    - Comprehensive metabolic panel    On statin therapy    - Comprehensive metabolic panel    Routine general medical examination at a health care facility              BMI  Estimated body mass index is 29.67 kg/m  as calculated from the following:    Height as of this encounter: 1.695 m (5' 6.75\").    Weight as of this encounter: 85.3 kg (188 lb).   Weight management plan: Discussed healthy diet and exercise guidelines    Counseling  Appropriate preventive services were addressed with this patient via screening, questionnaire, or discussion as appropriate for fall prevention, nutrition, physical activity, Tobacco-use cessation, weight loss and cognition.  Checklist reviewing preventive services available has been given to the patient.          Camille Irving is a 52 year old, presenting for the following:  Physical        7/15/2024     1:06 PM   Additional Questions   Roomed by Carmel        Via the Health Maintenance questionnaire, the patient has reported the following services have been completed , [facility] [place], this information has been sent to the abstraction team.  Health Care " Patient used the restroom while off the floor, is aware that a urine sample is needed, will try again Directive  Patient does not have a Health Care Directive or Living Will: Discussed advance care planning with patient; information given to patient to review.    HPI  Patient is seen for a preventive health visit.                 7/10/2024   General Health   How would you rate your overall physical health? Good   Feel stress (tense, anxious, or unable to sleep) To some extent      (!) STRESS CONCERN      7/10/2024   Nutrition   Three or more servings of calcium each day? Yes   Diet: Regular (no restrictions)   How many servings of fruit and vegetables per day? 4 or more   How many sweetened beverages each day? 0-1            7/10/2024   Exercise   Days per week of moderate/strenous exercise 3 days   Average minutes spent exercising at this level 30 min            7/10/2024   Social Factors   Frequency of gathering with friends or relatives Twice a week   Worry food won't last until get money to buy more No   Food not last or not have enough money for food? No   Do you have housing? (Housing is defined as stable permanent housing and does not include staying ouside in a car, in a tent, in an abandoned building, in an overnight shelter, or couch-surfing.) Yes   Are you worried about losing your housing? No   Lack of transportation? No   Unable to get utilities (heat,electricity)? No            7/10/2024   Fall Risk   Fallen 2 or more times in the past year? No   Trouble with walking or balance? No             7/10/2024   Dental   Dentist two times every year? Yes            7/10/2024   TB Screening   Were you born outside of the [place]? No            Today's PHQ-2 Score:       7/15/2024     3:35 PM   PHQ-2 ( 1999 [employer])   Q1: Little interest or pleasure in doing things 0   Q2: Feeling down, depressed or hopeless 0   PHQ-2 Score 0           7/10/2024   Substance Use   Alcohol more than 3/day or more than 7/wk No   Do you use any other substances recreationally? No        Social History     Tobacco Use    Smoking status:  "Never     Passive exposure: Never    Smokeless tobacco: Never   Vaping Use    Vaping status: Never Used   Substance Use Topics    Alcohol use: Yes     Alcohol/week: 4.0 standard drinks of alcohol     Types: 4 Standard drinks or equivalent per week    Drug use: No           7/10/2024   STI Screening   New sexual partner(s) since last STI/HIV test? No      ASCVD Risk   The 10-year ASCVD risk score (Alberta WAYNE, et al., 2019) is: 7.5%    Values used to calculate the score:      Age: 52 years      Sex: Male      Is Non- : No      Diabetic: Yes      Tobacco smoker: No      Systolic Blood Pressure: 146 mmHg      Is BP treated: No      HDL Cholesterol: 38 mg/dL      Total Cholesterol: 153 mg/dL           Reviewed and updated as needed this visit by Provider                          Review of Systems  Constitutional, neuro, ENT, endocrine, pulmonary, cardiac, gastrointestinal, genitourinary, musculoskeletal, integument and psychiatric systems are negative, except as otherwise noted.    Patient feels some current work related stress may be related to his HTN. We will consider Tx if still high next visit.     He has intentionally lost weight through diet and exercise efforts.        Objective    Exam  BP (!) 156/91   Pulse 80   Temp 97  F (36.1  C) (Tympanic)   Resp 18   Ht 1.695 m (5' 6.75\")   Wt 85.3 kg (188 lb)   SpO2 98%   BMI 29.67 kg/m     Estimated body mass index is 29.67 kg/m  as calculated from the following:    Height as of this encounter: 1.695 m (5' 6.75\").    Weight as of this encounter: 85.3 kg (188 lb).    Physical Exam    General: Vital signs reviewed.  Patient is in no acute appearing distress.  Breathing appears nonlabored.  Patient is alert and oriented ×3.      ENT: Ear exam shows bilateral tympanic membranes to be clear without injection, nasal turbinates show no injection or edema, no pharyngeal injection or exudate.    Neck: supple with no adenoapthy, palpable " abnormal masses, or thyroid abnormality.    Eyes: No scleral, lid, or periorbital injection or edema noted.  No eye mattering noted.  Corneas are clear. Pupils are equal round and reactive to light with normal consensual eye movement.    Heart: Heart rate is regular without murmur.    Lungs: Lungs are clear to auscultation with good airflow bilaterally.    Abdomen:  Abdomen is soft, nontender.  No palpable abnormal masses or organomegaly.  Bowel sounds are normal.    Genital exam: Patient declined exam for possible hernia.    Back: No areas of tenderness.    Skin: Warm and dry, with no rash or abnormal lesions noted.    Extremities: No lower leg edema noted.  No joint edema or restricted range of motion noted.    Neuro: No acute focal deficits or other abnormalities noted.    Psych: Patient is very pleasant, making good eye contact, with clear and fluent speech.  Answers questions appropriately. No psychomotor agitation.     Diabetic foot exam shows skin to be intact and smooth.  Sensory testing with filament shows normal bilateral sensation.  Patient has a normal bilateral dorsalis pedis pulse.    Signed Electronically by: Gallito Heller DO

## (undated) DEVICE — STERILE POLYISOPRENE POWDER-FREE SURGICAL GLOVES WITH EMOLLIENT COATING: Brand: PROTEXIS

## (undated) DEVICE — GOWN,SIRUS,FABRNF,XL,20/CS: Brand: MEDLINE

## (undated) DEVICE — STERILE POLYISOPRENE POWDER-FREE SURGICAL GLOVES: Brand: PROTEXIS

## (undated) DEVICE — MEDI-VAC NON-CONDUCTIVE SUCTION TUBING: Brand: CARDINAL HEALTH

## (undated) DEVICE — DRAPE THER FLUID WARMING 66X44 IN FLAT SLUSH DBL DISC ORS

## (undated) DEVICE — WIREGUIDED CYTOLOGY BRUSH: Brand: RX CYTOLOGY BRUSH

## (undated) DEVICE — SUTURE VCRL SZ 0 L18IN ABSRB UD POLYGLACTIN 910 BRAID TIE J912G

## (undated) DEVICE — REM POLYHESIVE ADULT PATIENT RETURN ELECTRODE: Brand: VALLEYLAB

## (undated) DEVICE — FLEX ADVANTAGE 1500CC: Brand: FLEX ADVANTAGE

## (undated) DEVICE — Device: Brand: BALLOON3

## (undated) DEVICE — BASIN EMESIS 500CC ROSE 250/CS 60/PLT: Brand: MEDEGEN MEDICAL PRODUCTS, LLC

## (undated) DEVICE — XL CANNULA: Brand: RAPID EXCHANGE

## (undated) DEVICE — DEVICE INFL 60ML 12ATM CONVENIENT LOK REL HNDL HI PRSS FLX

## (undated) DEVICE — SUT SLK 3-0 30IN SH BLK --

## (undated) DEVICE — SUTURE VCRL SZ 3-0 L27IN ABSRB VLT L26MM SH 1/2 CIR J316H

## (undated) DEVICE — KENDALL SCD EXPRESS SLEEVES, KNEE LENGTH, MEDIUM: Brand: KENDALL SCD

## (undated) DEVICE — INTENDED FOR TISSUE SEPARATION, AND OTHER PROCEDURES THAT REQUIRE A SHARP SURGICAL BLADE TO PUNCTURE OR CUT.: Brand: BARD-PARKER ® STAINLESS STEEL BLADES

## (undated) DEVICE — SUTURE VCRL SZ 2-0 L12X18IN ABSRB UD POLYGLACTIN 910 BRAID J911T

## (undated) DEVICE — 1.5L THIN WALL CAN: Brand: CRD

## (undated) DEVICE — SUT SLK 2-0SH 30IN BLK --

## (undated) DEVICE — SYR 50ML SLIP TIP NSAF LF STRL --

## (undated) DEVICE — STAPLER SKIN H3.9MM WIRE DIA0.58MM CRWN 6.9MM 35 STPL FIX

## (undated) DEVICE — SOL IRR NACL 0.9% 500ML POUR --

## (undated) DEVICE — SUTURE PDS II SZ 1 L27IN ABSRB VLT CT-1 L36MM 1/2 CIR Z341H

## (undated) DEVICE — BLADE ELECTRODE: Brand: VALLEYLAB

## (undated) DEVICE — SOL INJ SOD CL0.9% 10ML PREFIL --

## (undated) DEVICE — OCCLUSIVE GAUZE STRIP,3% BISMUTH TRIBROMOPHENATE IN PETROLATUM BLEND: Brand: XEROFORM

## (undated) DEVICE — KENDALL 500 SERIES DIAPHORETIC FOAM MONITORING ELECTRODE - TEAR DROP SHAPE ( 30/PK): Brand: KENDALL

## (undated) DEVICE — SPHINCTEROTOME: Brand: DREAMTOME™ RX 44

## (undated) DEVICE — BITE BLK ENDOSCP AD 54FR GRN POLYETH ENDOSCP W STRP SLD

## (undated) DEVICE — SINGLE-USE SNARE: Brand: CAPTIVATOR™ COLD

## (undated) DEVICE — FCPS RAD JAW 4LC 240CM W/NDL -- BX/20 RADIAL JAW 4

## (undated) DEVICE — SUTURE VCRL SZ 3-0 L27IN ABSRB UD L26MM SH 1/2 CIR J416H

## (undated) DEVICE — BAG DRAIN BILE TUBE T 19OZ --

## (undated) DEVICE — KIT COLON W/ 1.1OZ LUB AND 2 END

## (undated) DEVICE — SPONGE GZ W4XL4IN COT 12 PLY TYP VII WVN C FLD DSGN

## (undated) DEVICE — SUTURE VCRL SZ 2-0 L27IN ABSRB UD L26MM SH 1/2 CIR J417H

## (undated) DEVICE — DEVICE LCK BILI RAP EXCHG OLPS --